# Patient Record
Sex: FEMALE | Race: ASIAN | NOT HISPANIC OR LATINO | Employment: FULL TIME | URBAN - METROPOLITAN AREA
[De-identification: names, ages, dates, MRNs, and addresses within clinical notes are randomized per-mention and may not be internally consistent; named-entity substitution may affect disease eponyms.]

---

## 2017-04-04 ENCOUNTER — OFFICE VISIT (OUTPATIENT)
Dept: FAMILY MEDICINE | Facility: CLINIC | Age: 47
End: 2017-04-04
Payer: COMMERCIAL

## 2017-04-04 VITALS
SYSTOLIC BLOOD PRESSURE: 124 MMHG | HEIGHT: 62 IN | HEART RATE: 70 BPM | DIASTOLIC BLOOD PRESSURE: 68 MMHG | WEIGHT: 194.88 LBS | BODY MASS INDEX: 35.86 KG/M2 | OXYGEN SATURATION: 97 %

## 2017-04-04 DIAGNOSIS — S56.911A MUSCLE STRAIN OF FOREARM, RIGHT, INITIAL ENCOUNTER: Primary | ICD-10-CM

## 2017-04-04 PROCEDURE — 1160F RVW MEDS BY RX/DR IN RCRD: CPT | Mod: S$GLB,,, | Performed by: NURSE PRACTITIONER

## 2017-04-04 PROCEDURE — 3078F DIAST BP <80 MM HG: CPT | Mod: S$GLB,,, | Performed by: NURSE PRACTITIONER

## 2017-04-04 PROCEDURE — 99999 PR PBB SHADOW E&M-EST. PATIENT-LVL III: CPT | Mod: PBBFAC,,, | Performed by: NURSE PRACTITIONER

## 2017-04-04 PROCEDURE — 96372 THER/PROPH/DIAG INJ SC/IM: CPT | Mod: S$GLB,,, | Performed by: NURSE PRACTITIONER

## 2017-04-04 PROCEDURE — 3074F SYST BP LT 130 MM HG: CPT | Mod: S$GLB,,, | Performed by: NURSE PRACTITIONER

## 2017-04-04 PROCEDURE — 99213 OFFICE O/P EST LOW 20 MIN: CPT | Mod: 25,S$GLB,, | Performed by: NURSE PRACTITIONER

## 2017-04-04 RX ORDER — KETOROLAC TROMETHAMINE 30 MG/ML
30 INJECTION, SOLUTION INTRAMUSCULAR; INTRAVENOUS
Status: COMPLETED | OUTPATIENT
Start: 2017-04-04 | End: 2017-04-04

## 2017-04-04 RX ORDER — ETODOLAC 400 MG/1
400 TABLET, EXTENDED RELEASE ORAL DAILY PRN
Qty: 30 TABLET | Refills: 1 | Status: SHIPPED | OUTPATIENT
Start: 2017-04-04 | End: 2018-02-07 | Stop reason: ALTCHOICE

## 2017-04-04 RX ORDER — CYCLOBENZAPRINE HCL 10 MG
10 TABLET ORAL 3 TIMES DAILY PRN
Qty: 30 TABLET | Refills: 0 | Status: SHIPPED | OUTPATIENT
Start: 2017-04-04 | End: 2017-04-14

## 2017-04-04 RX ORDER — KETOROLAC TROMETHAMINE 30 MG/ML
15 INJECTION, SOLUTION INTRAMUSCULAR; INTRAVENOUS
Status: DISCONTINUED | OUTPATIENT
Start: 2017-04-04 | End: 2017-04-04

## 2017-04-04 RX ADMIN — KETOROLAC TROMETHAMINE 30 MG: 30 INJECTION, SOLUTION INTRAMUSCULAR; INTRAVENOUS at 01:04

## 2017-04-04 NOTE — PROGRESS NOTES
Subjective:       Patient ID: Isaura Mancini is a 46 y.o. female.    Chief Complaint: Arm Pain    Arm Pain    The incident occurred 12 to 24 hours ago. The incident occurred at home. There was no injury mechanism. The pain is present in the upper right arm. The quality of the pain is described as aching and cramping. The pain does not radiate. The pain is at a severity of 8/10. The pain is severe. The pain has been constant since the incident. Associated symptoms include muscle weakness. Pertinent negatives include no chest pain. The symptoms are aggravated by movement, lifting and palpation. She has tried nothing for the symptoms.     Review of Systems   Cardiovascular: Negative for chest pain.   Musculoskeletal: Positive for myalgias (right upper arm).   All other systems reviewed and are negative.      Objective:      Physical Exam   Constitutional: She is oriented to person, place, and time. She appears well-developed. No distress.   obese   HENT:   Head: Normocephalic and atraumatic.   Eyes: EOM are normal. Pupils are equal, round, and reactive to light.   Neck: Neck supple. No JVD present. No tracheal deviation present.   Cardiovascular: Normal rate, regular rhythm, normal heart sounds and intact distal pulses.    No murmur heard.  Pulmonary/Chest: Effort normal and breath sounds normal. No respiratory distress. She has no wheezes. She has no rales.   Abdominal: Soft. Bowel sounds are normal. She exhibits no distension and no mass. There is no tenderness.   Musculoskeletal: Normal range of motion. She exhibits no edema.        Right upper arm: She exhibits tenderness.   Neurological: She is alert and oriented to person, place, and time. Coordination normal.   Skin: Skin is warm and dry. No erythema. No pallor.   Psychiatric: She has a normal mood and affect. Her behavior is normal. Judgment and thought content normal. Cognition and memory are normal. She expresses no homicidal and no suicidal ideation.    Nursing note and vitals reviewed.      Assessment:       1. Muscle strain of forearm, right, initial encounter        Plan:     Isaura was seen today for arm pain.    Diagnoses and all orders for this visit:    Muscle strain of forearm, right, initial encounter  Rest and heat therapy recommended.  -     etodolac (LODINE XL) 400 MG 24 hr tablet; Take 1 tablet (400 mg total) by mouth daily as needed (avoid all other NSAIDs).  -     cyclobenzaprine (FLEXERIL) 10 MG tablet; Take 1 tablet (10 mg total) by mouth 3 (three) times daily as needed for Muscle spasms.  -     methylPREDNISolone sod suc(PF) 125 mg/2 mL injection 125 mg; Inject 125 mg into the muscle one time.  -     ketorolac injection 30 mg; Inject 30 mg into the muscle one time.      Follow-up with PCP if symptoms worsen or fail to improve.

## 2017-04-04 NOTE — MR AVS SNAPSHOT
[facility]   [address]  [place] [address]  Phone: [phone]  Fax: [phone]                  Isaura Mancini   2017 1:20 PM   Office Visit    Description:  Female : 1970   Provider:  Renay Ji NP   Department:  [facility]           Reason for Visit     Arm Pain           Diagnoses this Visit        Comments    Right arm pain    -  Primary     Muscle spasm                To Do List           Goals (5 Years of Data)     None      Follow-Up and Disposition     Return if symptoms worsen or fail to improve.       These Medications        Disp Refills Start End    etodolac (LODINE XL) 400 MG 24 hr tablet 30 tablet 1 2017     Take 1 tablet (400 mg total) by mouth daily as needed (avoid all other NSAIDs). - Oral    Pharmacy: [facility] [address] [place] [address] Ph #: [phone]       cyclobenzaprine (FLEXERIL) 10 MG tablet 30 tablet 0 2017    Take 1 tablet (10 mg total) by mouth 3 (three) times daily as needed for Muscle spasms. - Oral    Pharmacy: [address] Ph #: [phone]         [facility]     [facility] Nurse Care Line -  Assistance  Unless otherwise directed by your provider, please contact [facility], our nurse care line that is available for  assistance.     Registered nurses in the [facility] provide: appointment scheduling, clinical advisement, health education, and other advisory services.  Call: [phone] (toll free)               Medications           Message regarding Medications     Verify the changes and/or additions to your medication regime listed below are the same as discussed with your clinician today.  If any of these changes or additions are incorrect, please notify your healthcare provider.        START taking these NEW medications        Refills    cyclobenzaprine (FLEXERIL) 10  "MG tablet 0    Sig: Take 1 tablet (10 mg total) by mouth 3 (three) times daily as needed for Muscle spasms.    Class: Normal    Route: Oral      These medications were administered today        Dose Freq    methylPREDNISolone sod suc(PF) 125 mg/2 mL injection 125 mg 125 mg Clinic/HOD 1 time    Sig: Inject 125 mg into the vein one time.    Class: Normal    Route: Intravenous    ketorolac injection 15 mg 15 mg Clinic/HOD 1 time    Sig: Inject 15 mg into the vein one time.    Class: Normal    Route: Intravenous           Verify that the below list of medications is an accurate representation of the medications you are currently taking.  If none reported, the list may be blank. If incorrect, please contact your healthcare provider. Carry this list with you in case of emergency.           Current Medications     aspirin (ECOTRIN) 81 MG EC tablet Take 81 mg by mouth once daily.    hydrochlorothiazide (HYDRODIURIL) 25 MG tablet Take 1 tablet (25 mg total) by mouth once daily.    cyclobenzaprine (FLEXERIL) 10 MG tablet Take 1 tablet (10 mg total) by mouth 3 (three) times daily as needed for Muscle spasms.    etodolac (LODINE XL) 400 MG 24 hr tablet Take 1 tablet (400 mg total) by mouth daily as needed (avoid all other NSAIDs).           Clinical Reference Information           Your Vitals Were     BP Pulse Height Weight Last Period SpO2    124/68 70 5' 2" (1.575 m) 88.4 kg (194 lb 14.2 oz) 03/21/2017 97%    BMI                35.65 kg/m2          Blood Pressure          Most Recent Value    BP  124/68      Allergies as of 4/4/2017     No Known Allergies      Immunizations Administered on Date of Encounter - 4/4/2017     None      Language Assistance Services     ATTENTION: Language assistance services are available, free of charge. Please call [phone].      ATENCIÓN: Si alisla dalton, tiene a maria disposición servicios gratuitos de asistencia lingüística. Llame al [phone].     YAJAIRA Ý: N?u b?n nói Ti?ng Vi?t, có " các d?ch v? h? tr? ngôn ng? mi?n phí dành cho b?n. G?i s? [phone].         [facility] complies with applicable Federal civil rights laws and does not discriminate on the basis of race, color, national origin, age, disability, or sex.

## 2017-07-03 ENCOUNTER — OFFICE VISIT (OUTPATIENT)
Dept: INTERNAL MEDICINE | Facility: CLINIC | Age: 47
End: 2017-07-03
Payer: COMMERCIAL

## 2017-07-03 VITALS
WEIGHT: 201.06 LBS | DIASTOLIC BLOOD PRESSURE: 80 MMHG | OXYGEN SATURATION: 97 % | SYSTOLIC BLOOD PRESSURE: 126 MMHG | HEART RATE: 67 BPM | BODY MASS INDEX: 36.77 KG/M2

## 2017-07-03 DIAGNOSIS — E66.9 OBESITY, UNSPECIFIED OBESITY SEVERITY, UNSPECIFIED OBESITY TYPE: ICD-10-CM

## 2017-07-03 DIAGNOSIS — R51.9 HEADACHE, UNSPECIFIED HEADACHE TYPE: ICD-10-CM

## 2017-07-03 DIAGNOSIS — R00.2 PALPITATIONS: Primary | ICD-10-CM

## 2017-07-03 DIAGNOSIS — I10 ESSENTIAL HYPERTENSION: ICD-10-CM

## 2017-07-03 DIAGNOSIS — M54.50 CHRONIC MIDLINE LOW BACK PAIN WITHOUT SCIATICA: ICD-10-CM

## 2017-07-03 DIAGNOSIS — G89.29 CHRONIC MIDLINE LOW BACK PAIN WITHOUT SCIATICA: ICD-10-CM

## 2017-07-03 DIAGNOSIS — Z00.00 PREVENTATIVE HEALTH CARE: ICD-10-CM

## 2017-07-03 PROCEDURE — 93010 ELECTROCARDIOGRAM REPORT: CPT | Mod: S$GLB,,, | Performed by: INTERNAL MEDICINE

## 2017-07-03 PROCEDURE — 99999 PR PBB SHADOW E&M-EST. PATIENT-LVL III: CPT | Mod: PBBFAC,,, | Performed by: INTERNAL MEDICINE

## 2017-07-03 PROCEDURE — 99214 OFFICE O/P EST MOD 30 MIN: CPT | Mod: S$GLB,,, | Performed by: INTERNAL MEDICINE

## 2017-07-03 PROCEDURE — 93005 ELECTROCARDIOGRAM TRACING: CPT | Mod: S$GLB,,, | Performed by: INTERNAL MEDICINE

## 2017-07-03 NOTE — PROGRESS NOTES
Subjective:       Patient ID: Isaura Mancini is a 46 y.o. female.    Chief Complaint: Back Pain (follow up)    HPI Pt. Here for f/u for chronic low back pain; pt. Is seeing pain management and back pain is better; she uses lodine prn but is currently asymptomatic; pt. Reports palpitations for past 1 month on and off; occurs once QOD and lasts for several hours; she drinks 2-3 cups of coffee a day; she denies chest pain; she denies being under stress; weight is elevated but stable; she gets headache every few days and tyelenol ususally helps; she needs a refill on HCTZ  Review of Systems   Constitutional: Negative for chills, fatigue and fever.   HENT: Negative for congestion, rhinorrhea and sore throat.    Respiratory: Negative for cough, shortness of breath and wheezing.    Cardiovascular: Positive for palpitations. Negative for chest pain.   Gastrointestinal: Negative for abdominal pain, nausea and vomiting.   Genitourinary: Negative for dysuria.   Musculoskeletal: Negative for arthralgias.   Skin: Negative for rash.   Neurological: Positive for headaches. Negative for dizziness.   Psychiatric/Behavioral: Negative for sleep disturbance. The patient is not nervous/anxious.        Objective:      Physical Exam   Constitutional: She is oriented to person, place, and time.   obese   Eyes: EOM are normal.   Neck: Normal range of motion.   Cardiovascular: Normal rate, regular rhythm and normal heart sounds.    Pulmonary/Chest: Effort normal and breath sounds normal.   Abdominal: Soft. There is no tenderness. There is no rebound and no guarding.   Musculoskeletal: Normal range of motion.   Neurological: She is alert and oriented to person, place, and time.   Skin: No rash noted.       Assessment:       1. Palpitations Active   2. Essential hypertension Well controlled   3. Chronic midline low back pain without sciatica Well controlled   4. Headache, unspecified headache type Active   5. Obesity, unspecified obesity  severity, unspecified obesity type Sub-optimally controlled   6. Preventative health care Active       Plan:         Isaura was seen today for back pain.    Diagnoses and all orders for this visit:    Palpitations  Comments:  avoid caffeine intake; get EKG and holter; get labs including TSH  Orders:  -     TSH; Future  -     IN OFFICE EKG 12-LEAD (to Muse)  -     Holter monitor - 24 hour; Future    Essential hypertension  Comments:  continue current regimen and encouraged low Na diet and weight loss    Chronic midline low back pain without sciatica  Comments:  continue lodine prn     Headache, unspecified headache type  Comments:  no focal neurologic deficits; continue tyelenol prn; will avoid tryptan until cardiac workup is complete    Obesity, unspecified obesity severity, unspecified obesity type  Comments:  encouraged diet and explained risks     Preventative health care  -     CBC auto differential; Future  -     Comprehensive metabolic panel; Future  -     TSH; Future  -     Lipid panel; Future

## 2017-07-06 ENCOUNTER — HOSPITAL ENCOUNTER (OUTPATIENT)
Dept: CARDIOLOGY | Facility: HOSPITAL | Age: 47
Discharge: HOME | End: 2017-07-06
Attending: INTERNAL MEDICINE
Payer: COMMERCIAL

## 2017-07-06 DIAGNOSIS — R00.2 PALPITATIONS: ICD-10-CM

## 2017-07-06 PROCEDURE — 93225 XTRNL ECG REC<48 HRS REC: CPT

## 2017-07-06 PROCEDURE — 93227 XTRNL ECG REC<48 HR R&I: CPT | Mod: ,,, | Performed by: INTERNAL MEDICINE

## 2017-07-12 DIAGNOSIS — I10 ESSENTIAL HYPERTENSION: ICD-10-CM

## 2017-07-12 RX ORDER — HYDROCHLOROTHIAZIDE 25 MG/1
TABLET ORAL
Qty: 30 TABLET | Refills: 2 | Status: SHIPPED | OUTPATIENT
Start: 2017-07-12 | End: 2017-08-07 | Stop reason: SDUPTHER

## 2017-07-12 NOTE — TELEPHONE ENCOUNTER
----- Message from Olinda Gustafson sent at 7/12/2017 11:55 AM CDT -----  Contact: [phone]/self  Refill request for hydrochlorothiazide (HYDRODIURIL) 25 MG tablet  Please advise

## 2017-07-24 ENCOUNTER — LAB VISIT (OUTPATIENT)
Dept: LAB | Facility: HOSPITAL | Age: 47
End: 2017-07-24
Attending: INTERNAL MEDICINE
Payer: COMMERCIAL

## 2017-07-24 DIAGNOSIS — Z00.00 PREVENTATIVE HEALTH CARE: ICD-10-CM

## 2017-07-24 DIAGNOSIS — R00.2 PALPITATIONS: ICD-10-CM

## 2017-07-24 LAB
ALBUMIN SERPL BCP-MCNC: 3.3 G/DL
ALP SERPL-CCNC: 54 U/L
ALT SERPL W/O P-5'-P-CCNC: 9 U/L
ANION GAP SERPL CALC-SCNC: 5 MMOL/L
AST SERPL-CCNC: 14 U/L
BASOPHILS # BLD AUTO: 0.03 K/UL
BASOPHILS NFR BLD: 0.3 %
BILIRUB SERPL-MCNC: 0.4 MG/DL
BUN SERPL-MCNC: 15 MG/DL
CALCIUM SERPL-MCNC: 9 MG/DL
CHLORIDE SERPL-SCNC: 104 MMOL/L
CHOLEST/HDLC SERPL: 2.9 {RATIO}
CO2 SERPL-SCNC: 28 MMOL/L
CREAT SERPL-MCNC: 0.7 MG/DL
DIFFERENTIAL METHOD: ABNORMAL
EOSINOPHIL # BLD AUTO: 0.2 K/UL
EOSINOPHIL NFR BLD: 2.1 %
ERYTHROCYTE [DISTWIDTH] IN BLOOD BY AUTOMATED COUNT: 12.4 %
EST. GFR  (AFRICAN AMERICAN): >60 ML/MIN/1.73 M^2
EST. GFR  (NON AFRICAN AMERICAN): >60 ML/MIN/1.73 M^2
GLUCOSE SERPL-MCNC: 88 MG/DL
HCT VFR BLD AUTO: 40.3 %
HDL/CHOLESTEROL RATIO: 34.8 %
HDLC SERPL-MCNC: 158 MG/DL
HDLC SERPL-MCNC: 55 MG/DL
HGB BLD-MCNC: 13.2 G/DL
LDLC SERPL CALC-MCNC: 89.8 MG/DL
LYMPHOCYTES # BLD AUTO: 3 K/UL
LYMPHOCYTES NFR BLD: 27.3 %
MCH RBC QN AUTO: 28.1 PG
MCHC RBC AUTO-ENTMCNC: 32.8 G/DL
MCV RBC AUTO: 86 FL
MONOCYTES # BLD AUTO: 0.6 K/UL
MONOCYTES NFR BLD: 5.9 %
NEUTROPHILS # BLD AUTO: 7 K/UL
NEUTROPHILS NFR BLD: 64.2 %
NONHDLC SERPL-MCNC: 103 MG/DL
PLATELET # BLD AUTO: 370 K/UL
PMV BLD AUTO: 10.8 FL
POTASSIUM SERPL-SCNC: 3.9 MMOL/L
PROT SERPL-MCNC: 6.7 G/DL
RBC # BLD AUTO: 4.69 M/UL
SODIUM SERPL-SCNC: 137 MMOL/L
TRIGL SERPL-MCNC: 66 MG/DL
TSH SERPL DL<=0.005 MIU/L-ACNC: 1.47 UIU/ML
WBC # BLD AUTO: 10.87 K/UL

## 2017-07-24 PROCEDURE — 80053 COMPREHEN METABOLIC PANEL: CPT

## 2017-07-24 PROCEDURE — 85025 COMPLETE CBC W/AUTO DIFF WBC: CPT

## 2017-07-24 PROCEDURE — 84443 ASSAY THYROID STIM HORMONE: CPT

## 2017-07-24 PROCEDURE — 80061 LIPID PANEL: CPT

## 2017-07-24 PROCEDURE — 36415 COLL VENOUS BLD VENIPUNCTURE: CPT | Mod: PO

## 2017-08-07 ENCOUNTER — OFFICE VISIT (OUTPATIENT)
Dept: INTERNAL MEDICINE | Facility: CLINIC | Age: 47
End: 2017-08-07
Payer: COMMERCIAL

## 2017-08-07 VITALS
BODY MASS INDEX: 36.87 KG/M2 | SYSTOLIC BLOOD PRESSURE: 138 MMHG | OXYGEN SATURATION: 98 % | WEIGHT: 200.38 LBS | HEIGHT: 62 IN | DIASTOLIC BLOOD PRESSURE: 78 MMHG | HEART RATE: 69 BPM

## 2017-08-07 DIAGNOSIS — R00.2 PALPITATIONS: Primary | ICD-10-CM

## 2017-08-07 DIAGNOSIS — E66.9 OBESITY, UNSPECIFIED OBESITY SEVERITY, UNSPECIFIED OBESITY TYPE: ICD-10-CM

## 2017-08-07 DIAGNOSIS — D75.839 THROMBOCYTOSIS: ICD-10-CM

## 2017-08-07 DIAGNOSIS — I10 ESSENTIAL HYPERTENSION: ICD-10-CM

## 2017-08-07 PROCEDURE — 99214 OFFICE O/P EST MOD 30 MIN: CPT | Mod: S$GLB,,, | Performed by: INTERNAL MEDICINE

## 2017-08-07 PROCEDURE — 3078F DIAST BP <80 MM HG: CPT | Mod: S$GLB,,, | Performed by: INTERNAL MEDICINE

## 2017-08-07 PROCEDURE — 3075F SYST BP GE 130 - 139MM HG: CPT | Mod: S$GLB,,, | Performed by: INTERNAL MEDICINE

## 2017-08-07 PROCEDURE — 99999 PR PBB SHADOW E&M-EST. PATIENT-LVL III: CPT | Mod: PBBFAC,,, | Performed by: INTERNAL MEDICINE

## 2017-08-07 RX ORDER — HYDROCHLOROTHIAZIDE 25 MG/1
25 TABLET ORAL DAILY
Qty: 30 TABLET | Refills: 6 | Status: SHIPPED | OUTPATIENT
Start: 2017-08-07 | End: 2018-02-07 | Stop reason: SDUPTHER

## 2017-08-07 NOTE — PROGRESS NOTES
Subjective:       Patient ID: Isaura Mancini is a 46 y.o. female.    Chief Complaint: Palpitations (1 mos follow up)    HPI Pt. Here for f/u for palpitations; holter monitor dated 7/6/17; PAC's were noted but otherwise negative; she states palpitations have improved after cutting down caffeine;  I reviewed labs dated 7/24/17; cholesterol was WNL; platelets were mildly elevated; she is compliant with meds; weight is elevated but stable; she needs refill on HCTZ  Review of Systems   Constitutional: Negative for chills, fatigue and fever.   HENT: Negative for congestion, rhinorrhea and sore throat.    Respiratory: Negative for cough, shortness of breath and wheezing.    Cardiovascular: Positive for palpitations. Negative for chest pain.        Improving with decreased caffeine intake   Gastrointestinal: Negative for abdominal pain, nausea and vomiting.   Genitourinary: Negative for dysuria.   Musculoskeletal: Negative for arthralgias.   Skin: Negative for rash.   Neurological: Negative for dizziness and headaches.   Psychiatric/Behavioral: Negative for sleep disturbance. The patient is not nervous/anxious.        Objective:      Physical Exam   Constitutional: She is oriented to person, place, and time.   obese   Eyes: EOM are normal.   Neck: Normal range of motion.   Cardiovascular: Normal rate, regular rhythm and normal heart sounds.    Pulmonary/Chest: Effort normal and breath sounds normal.   Abdominal: Soft. There is no tenderness. There is no rebound and no guarding.   Musculoskeletal: Normal range of motion.   Neurological: She is alert and oriented to person, place, and time.   Skin: No rash noted.       Assessment:       1. Palpitations Well controlled   2. Essential hypertension Well controlled   3. Thrombocytosis Active   4. Obesity, unspecified obesity severity, unspecified obesity type Sub-optimally controlled       Plan:         Palpitations  Comments:  improving after pt. has cut down caffeine  intake    Essential hypertension  Comments:  continue current regimen and encouraged low Na diet and weight loss  Orders:  -     hydrochlorothiazide (HYDRODIURIL) 25 MG tablet; Take 1 tablet (25 mg total) by mouth once daily.  Dispense: 30 tablet; Refill: 6    Thrombocytosis  Comments:  repeat CBC in 1 month for surveillence  Orders:  -     CBC auto differential; Future; Expected date: 08/28/2017    Obesity, unspecified obesity severity, unspecified obesity type  Comments:  encouraged diet and explained risks

## 2017-09-07 ENCOUNTER — LAB VISIT (OUTPATIENT)
Dept: LAB | Facility: HOSPITAL | Age: 47
End: 2017-09-07
Attending: INTERNAL MEDICINE
Payer: COMMERCIAL

## 2017-09-07 DIAGNOSIS — D75.839 THROMBOCYTOSIS: ICD-10-CM

## 2017-09-07 LAB
BASOPHILS # BLD AUTO: 0.01 K/UL
BASOPHILS NFR BLD: 0.1 %
DIFFERENTIAL METHOD: ABNORMAL
EOSINOPHIL # BLD AUTO: 0.2 K/UL
EOSINOPHIL NFR BLD: 1.9 %
ERYTHROCYTE [DISTWIDTH] IN BLOOD BY AUTOMATED COUNT: 12.6 %
HCT VFR BLD AUTO: 39.8 %
HGB BLD-MCNC: 13.5 G/DL
LYMPHOCYTES # BLD AUTO: 2.6 K/UL
LYMPHOCYTES NFR BLD: 27.9 %
MCH RBC QN AUTO: 28.7 PG
MCHC RBC AUTO-ENTMCNC: 33.9 G/DL
MCV RBC AUTO: 85 FL
MONOCYTES # BLD AUTO: 0.4 K/UL
MONOCYTES NFR BLD: 4.4 %
NEUTROPHILS # BLD AUTO: 6.2 K/UL
NEUTROPHILS NFR BLD: 65.6 %
PLATELET # BLD AUTO: 393 K/UL
PMV BLD AUTO: 10.9 FL
RBC # BLD AUTO: 4.7 M/UL
WBC # BLD AUTO: 9.37 K/UL

## 2017-09-07 PROCEDURE — 85025 COMPLETE CBC W/AUTO DIFF WBC: CPT

## 2017-09-07 PROCEDURE — 36415 COLL VENOUS BLD VENIPUNCTURE: CPT | Mod: PO

## 2017-09-16 ENCOUNTER — PATIENT MESSAGE (OUTPATIENT)
Dept: INTERNAL MEDICINE | Facility: CLINIC | Age: 47
End: 2017-09-16

## 2018-02-07 ENCOUNTER — OFFICE VISIT (OUTPATIENT)
Dept: INTERNAL MEDICINE | Facility: CLINIC | Age: 48
End: 2018-02-07
Payer: COMMERCIAL

## 2018-02-07 VITALS
BODY MASS INDEX: 37.29 KG/M2 | WEIGHT: 202.63 LBS | HEART RATE: 68 BPM | DIASTOLIC BLOOD PRESSURE: 80 MMHG | SYSTOLIC BLOOD PRESSURE: 128 MMHG | HEIGHT: 62 IN

## 2018-02-07 DIAGNOSIS — Z00.00 ROUTINE GENERAL MEDICAL EXAMINATION AT A HEALTH CARE FACILITY: ICD-10-CM

## 2018-02-07 DIAGNOSIS — D75.839 THROMBOCYTOSIS: ICD-10-CM

## 2018-02-07 DIAGNOSIS — Z12.39 BREAST CANCER SCREENING: ICD-10-CM

## 2018-02-07 DIAGNOSIS — E66.01 SEVERE OBESITY (BMI 35.0-35.9 WITH COMORBIDITY): ICD-10-CM

## 2018-02-07 DIAGNOSIS — I10 ESSENTIAL HYPERTENSION: Primary | ICD-10-CM

## 2018-02-07 DIAGNOSIS — Z00.00 PREVENTATIVE HEALTH CARE: ICD-10-CM

## 2018-02-07 PROCEDURE — 99999 PR PBB SHADOW E&M-EST. PATIENT-LVL III: CPT | Mod: PBBFAC,,, | Performed by: INTERNAL MEDICINE

## 2018-02-07 PROCEDURE — 99396 PREV VISIT EST AGE 40-64: CPT | Mod: S$GLB,,, | Performed by: INTERNAL MEDICINE

## 2018-02-07 RX ORDER — HYDROCHLOROTHIAZIDE 25 MG/1
25 TABLET ORAL DAILY
Qty: 30 TABLET | Refills: 6 | Status: SHIPPED | OUTPATIENT
Start: 2018-02-07 | End: 2018-07-12 | Stop reason: SDUPTHER

## 2018-02-07 NOTE — PROGRESS NOTES
Pt. ID: Isaura Mancini is a 47 y.o. female      Chief complaint:   Chief Complaint   Patient presents with    Palpitations     follow up       HPI: Pt. Here for well visit; I reviewed CBC dated 9/7/17; platelets are elevated but stable; weight is elevated but stable; palpitations have improved; she does not want flu or tetanus shot at this time; she would prefer mammogram every 2 years and I will schedule; she needs refill on BP med      Review of Systems   Constitutional: Negative for chills and fever.   Respiratory: Negative for cough and shortness of breath.    Cardiovascular: Negative for chest pain.   Gastrointestinal: Negative for abdominal pain, nausea and vomiting.   Genitourinary: Negative for dysuria.         Objective:    Physical Exam   Constitutional: She is oriented to person, place, and time.   obese   Eyes: EOM are normal.   Neck: Normal range of motion.   Cardiovascular: Normal rate, regular rhythm and normal heart sounds.    Pulmonary/Chest: Effort normal and breath sounds normal.   Abdominal: Soft. There is no tenderness. There is no rebound and no guarding.   Musculoskeletal: Normal range of motion.   Neurological: She is alert and oriented to person, place, and time.   Skin: No rash noted.         Health Maintenance   Topic Date Due    Mammogram  03/22/2017    Pap Smear with HPV Cotest  06/22/2019    Lipid Panel  07/24/2022    TETANUS VACCINE  02/07/2028    Influenza Vaccine  Addressed         Assessment:     1. Essential hypertension Well controlled   2. Thrombocytosis Stable   3. Severe obesity (BMI 35.0-35.9 with comorbidity) Sub-optimally controlled   4. Breast cancer screening Active   5. Preventative health care Active   6. Routine general medical examination at a health care facility Active         Plan: Essential hypertension  Comments:  continue current regimen and encouraged low Na diet and weight loss  Orders:  -     CBC auto differential; Future; Expected date: 07/07/2018  -      Comprehensive metabolic panel; Future; Expected date: 07/07/2018  -     Urinalysis; Future; Expected date: 07/07/2018  -     hydroCHLOROthiazide (HYDRODIURIL) 25 MG tablet; Take 1 tablet (25 mg total) by mouth once daily.  Dispense: 30 tablet; Refill: 6    Thrombocytosis  Comments:  repeat CBC prior to f/u appt.   Orders:  -     CBC auto differential; Future; Expected date: 07/07/2018    Severe obesity (BMI 35.0-35.9 with comorbidity)  Comments:  encouraged diet     Breast cancer screening  -     Mammo Digital Screening Bilateral With CAD; Future; Expected date: 02/07/2018    Preventative health care  -     CBC auto differential; Future; Expected date: 07/07/2018  -     Comprehensive metabolic panel; Future; Expected date: 07/07/2018  -     Lipid panel; Future; Expected date: 07/07/2018  -     TSH; Future; Expected date: 07/07/2018  -     Urinalysis; Future; Expected date: 07/07/2018    Routine general medical examination at a health care facility        Problem List Items Addressed This Visit        Cardiac/Vascular    Essential hypertension - Primary    Relevant Medications    hydroCHLOROthiazide (HYDRODIURIL) 25 MG tablet    Other Relevant Orders    CBC auto differential    Comprehensive metabolic panel    Urinalysis       Oncology    Thrombocytosis    Relevant Orders    CBC auto differential       Endocrine    Severe obesity (BMI 35.0-35.9 with comorbidity)       Other    Preventative health care    Relevant Orders    CBC auto differential    Comprehensive metabolic panel    Lipid panel    TSH    Urinalysis    Routine general medical examination at a health care facility

## 2018-02-10 ENCOUNTER — HOSPITAL ENCOUNTER (OUTPATIENT)
Dept: RADIOLOGY | Facility: HOSPITAL | Age: 48
Discharge: HOME | End: 2018-02-10
Attending: INTERNAL MEDICINE
Payer: COMMERCIAL

## 2018-02-10 VITALS — WEIGHT: 202 LBS | HEIGHT: 62 IN | BODY MASS INDEX: 37.17 KG/M2

## 2018-02-10 DIAGNOSIS — Z12.39 BREAST CANCER SCREENING: ICD-10-CM

## 2018-02-10 PROCEDURE — 77063 BREAST TOMOSYNTHESIS BI: CPT | Mod: 26,,, | Performed by: RADIOLOGY

## 2018-02-10 PROCEDURE — 77067 SCR MAMMO BI INCL CAD: CPT | Mod: 26,,, | Performed by: RADIOLOGY

## 2018-02-10 PROCEDURE — 77067 SCR MAMMO BI INCL CAD: CPT | Mod: TC

## 2018-07-12 DIAGNOSIS — I10 ESSENTIAL HYPERTENSION: ICD-10-CM

## 2018-07-13 RX ORDER — HYDROCHLOROTHIAZIDE 25 MG/1
25 TABLET ORAL DAILY
Qty: 30 TABLET | Refills: 3 | Status: SHIPPED | OUTPATIENT
Start: 2018-07-13 | End: 2018-10-05 | Stop reason: SDUPTHER

## 2018-08-07 ENCOUNTER — LAB VISIT (OUTPATIENT)
Dept: LAB | Facility: HOSPITAL | Age: 48
End: 2018-08-07
Attending: INTERNAL MEDICINE
Payer: COMMERCIAL

## 2018-08-07 DIAGNOSIS — Z00.00 PREVENTATIVE HEALTH CARE: ICD-10-CM

## 2018-08-07 DIAGNOSIS — D75.839 THROMBOCYTOSIS: ICD-10-CM

## 2018-08-07 DIAGNOSIS — I10 ESSENTIAL HYPERTENSION: ICD-10-CM

## 2018-08-07 LAB
ALBUMIN SERPL BCP-MCNC: 3.6 G/DL
ALP SERPL-CCNC: 54 U/L
ALT SERPL W/O P-5'-P-CCNC: 10 U/L
ANION GAP SERPL CALC-SCNC: 6 MMOL/L
AST SERPL-CCNC: 17 U/L
BASOPHILS # BLD AUTO: 0.03 K/UL
BASOPHILS NFR BLD: 0.4 %
BILIRUB SERPL-MCNC: 0.7 MG/DL
BUN SERPL-MCNC: 17 MG/DL
CALCIUM SERPL-MCNC: 9.2 MG/DL
CHLORIDE SERPL-SCNC: 106 MMOL/L
CHOLEST SERPL-MCNC: 184 MG/DL
CHOLEST/HDLC SERPL: 3.2 {RATIO}
CO2 SERPL-SCNC: 25 MMOL/L
CREAT SERPL-MCNC: 0.7 MG/DL
DIFFERENTIAL METHOD: NORMAL
EOSINOPHIL # BLD AUTO: 0.2 K/UL
EOSINOPHIL NFR BLD: 3.1 %
ERYTHROCYTE [DISTWIDTH] IN BLOOD BY AUTOMATED COUNT: 12.5 %
EST. GFR  (AFRICAN AMERICAN): >60 ML/MIN/1.73 M^2
EST. GFR  (NON AFRICAN AMERICAN): >60 ML/MIN/1.73 M^2
GLUCOSE SERPL-MCNC: 91 MG/DL
HCT VFR BLD AUTO: 40.6 %
HDLC SERPL-MCNC: 58 MG/DL
HDLC SERPL: 31.5 %
HGB BLD-MCNC: 13.1 G/DL
IMM GRANULOCYTES # BLD AUTO: 0.02 K/UL
IMM GRANULOCYTES NFR BLD AUTO: 0.3 %
LDLC SERPL CALC-MCNC: 112.4 MG/DL
LYMPHOCYTES # BLD AUTO: 2.3 K/UL
LYMPHOCYTES NFR BLD: 29.1 %
MCH RBC QN AUTO: 29.3 PG
MCHC RBC AUTO-ENTMCNC: 32.3 G/DL
MCV RBC AUTO: 91 FL
MONOCYTES # BLD AUTO: 0.6 K/UL
MONOCYTES NFR BLD: 7 %
NEUTROPHILS # BLD AUTO: 4.7 K/UL
NEUTROPHILS NFR BLD: 60.1 %
NONHDLC SERPL-MCNC: 126 MG/DL
NRBC BLD-RTO: 0 /100 WBC
PLATELET # BLD AUTO: 338 K/UL
PMV BLD AUTO: 11 FL
POTASSIUM SERPL-SCNC: 3.8 MMOL/L
PROT SERPL-MCNC: 7.2 G/DL
RBC # BLD AUTO: 4.47 M/UL
SODIUM SERPL-SCNC: 137 MMOL/L
TRIGL SERPL-MCNC: 68 MG/DL
TSH SERPL DL<=0.005 MIU/L-ACNC: 0.87 UIU/ML
WBC # BLD AUTO: 7.83 K/UL

## 2018-08-07 PROCEDURE — 84443 ASSAY THYROID STIM HORMONE: CPT

## 2018-08-07 PROCEDURE — 85025 COMPLETE CBC W/AUTO DIFF WBC: CPT

## 2018-08-07 PROCEDURE — 36415 COLL VENOUS BLD VENIPUNCTURE: CPT | Mod: PO

## 2018-08-07 PROCEDURE — 80053 COMPREHEN METABOLIC PANEL: CPT

## 2018-08-07 PROCEDURE — 80061 LIPID PANEL: CPT

## 2018-08-08 ENCOUNTER — TELEPHONE (OUTPATIENT)
Dept: FAMILY MEDICINE | Facility: CLINIC | Age: 48
End: 2018-08-08

## 2018-08-09 NOTE — TELEPHONE ENCOUNTER
----- Message from Batool Alva sent at 8/9/2018  8:54 AM CDT -----  No. [phone]    Patient returned your call.

## 2018-08-09 NOTE — TELEPHONE ENCOUNTER
----- Message from Oma Blue sent at 8/9/2018 11:08 AM CDT -----  Contact: [phone]/ self   Patient called in returning your call. Please advise

## 2018-08-10 ENCOUNTER — TELEPHONE (OUTPATIENT)
Dept: FAMILY MEDICINE | Facility: CLINIC | Age: 48
End: 2018-08-10

## 2018-08-10 DIAGNOSIS — N39.0 URINARY TRACT INFECTION WITHOUT HEMATURIA, SITE UNSPECIFIED: Primary | ICD-10-CM

## 2018-08-10 RX ORDER — SULFAMETHOXAZOLE AND TRIMETHOPRIM 800; 160 MG/1; MG/1
1 TABLET ORAL 2 TIMES DAILY
Qty: 10 TABLET | Refills: 0 | Status: SHIPPED | OUTPATIENT
Start: 2018-08-10 | End: 2018-08-15

## 2018-08-10 NOTE — TELEPHONE ENCOUNTER
Left message to pts son Jose that Dr Virgen sent Antibiotic to her pharmacy. Jose voices understanding.

## 2018-08-10 NOTE — TELEPHONE ENCOUNTER
Spoke with pt regarding urine result, informed pt she has mild sign of UTI, pt c/o frequent urination, no other symptoms. Informed pt  I will send message to Dr Virgen for plan and I will call her back soon. She verbalized understanding.

## 2018-08-13 ENCOUNTER — OFFICE VISIT (OUTPATIENT)
Dept: INTERNAL MEDICINE | Facility: CLINIC | Age: 48
End: 2018-08-13
Payer: COMMERCIAL

## 2018-08-13 ENCOUNTER — LAB VISIT (OUTPATIENT)
Dept: LAB | Facility: HOSPITAL | Age: 48
End: 2018-08-13
Attending: INTERNAL MEDICINE
Payer: COMMERCIAL

## 2018-08-13 VITALS
OXYGEN SATURATION: 98 % | WEIGHT: 203.25 LBS | BODY MASS INDEX: 37.4 KG/M2 | HEIGHT: 62 IN | HEART RATE: 73 BPM | SYSTOLIC BLOOD PRESSURE: 136 MMHG | DIASTOLIC BLOOD PRESSURE: 70 MMHG

## 2018-08-13 DIAGNOSIS — G89.29 CHRONIC BILATERAL LOW BACK PAIN WITH LEFT-SIDED SCIATICA: ICD-10-CM

## 2018-08-13 DIAGNOSIS — R31.9 HEMATURIA, UNSPECIFIED TYPE: ICD-10-CM

## 2018-08-13 DIAGNOSIS — I10 HYPERTENSION, UNSPECIFIED TYPE: Primary | ICD-10-CM

## 2018-08-13 DIAGNOSIS — N39.0 URINARY TRACT INFECTION WITHOUT HEMATURIA, SITE UNSPECIFIED: ICD-10-CM

## 2018-08-13 DIAGNOSIS — D75.839 THROMBOCYTOSIS: ICD-10-CM

## 2018-08-13 DIAGNOSIS — K59.00 CONSTIPATION, UNSPECIFIED CONSTIPATION TYPE: ICD-10-CM

## 2018-08-13 DIAGNOSIS — M54.42 CHRONIC BILATERAL LOW BACK PAIN WITH LEFT-SIDED SCIATICA: ICD-10-CM

## 2018-08-13 LAB
BACTERIA #/AREA URNS AUTO: NORMAL /HPF
BILIRUB UR QL STRIP: NEGATIVE
CLARITY UR REFRACT.AUTO: CLEAR
COLOR UR AUTO: YELLOW
GLUCOSE UR QL STRIP: NEGATIVE
HGB UR QL STRIP: NEGATIVE
KETONES UR QL STRIP: NEGATIVE
LEUKOCYTE ESTERASE UR QL STRIP: ABNORMAL
MICROSCOPIC COMMENT: NORMAL
NITRITE UR QL STRIP: NEGATIVE
PH UR STRIP: 5 [PH] (ref 5–8)
PROT UR QL STRIP: NEGATIVE
RBC #/AREA URNS AUTO: 1 /HPF (ref 0–4)
SP GR UR STRIP: 1.03 (ref 1–1.03)
SQUAMOUS #/AREA URNS AUTO: 3 /HPF
URN SPEC COLLECT METH UR: ABNORMAL
UROBILINOGEN UR STRIP-ACNC: 2 EU/DL
WBC #/AREA URNS AUTO: 5 /HPF (ref 0–5)

## 2018-08-13 PROCEDURE — 99214 OFFICE O/P EST MOD 30 MIN: CPT | Mod: S$GLB,,, | Performed by: INTERNAL MEDICINE

## 2018-08-13 PROCEDURE — 99999 PR PBB SHADOW E&M-EST. PATIENT-LVL III: CPT | Mod: PBBFAC,,, | Performed by: INTERNAL MEDICINE

## 2018-08-13 PROCEDURE — 3078F DIAST BP <80 MM HG: CPT | Mod: CPTII,S$GLB,, | Performed by: INTERNAL MEDICINE

## 2018-08-13 PROCEDURE — 81001 URINALYSIS AUTO W/SCOPE: CPT

## 2018-08-13 PROCEDURE — 3075F SYST BP GE 130 - 139MM HG: CPT | Mod: CPTII,S$GLB,, | Performed by: INTERNAL MEDICINE

## 2018-08-13 RX ORDER — ETODOLAC 400 MG/1
400 TABLET, EXTENDED RELEASE ORAL DAILY PRN
Qty: 30 TABLET | Refills: 1 | Status: SHIPPED | OUTPATIENT
Start: 2018-08-13 | End: 2018-09-05 | Stop reason: SDUPTHER

## 2018-08-13 RX ORDER — TRAMADOL HYDROCHLORIDE 50 MG/1
50 TABLET ORAL 3 TIMES DAILY PRN
Qty: 21 TABLET | Refills: 0 | Status: SHIPPED | OUTPATIENT
Start: 2018-08-13 | End: 2018-08-20

## 2018-08-13 RX ORDER — CYCLOBENZAPRINE HCL 10 MG
10 TABLET ORAL 2 TIMES DAILY PRN
Qty: 30 TABLET | Refills: 0 | Status: SHIPPED | OUTPATIENT
Start: 2018-08-13 | End: 2018-08-23

## 2018-08-13 NOTE — PROGRESS NOTES
Pt. ID: Isaura Mancini is a 47 y.o. female      Chief complaint:   Chief Complaint   Patient presents with    Hypertension     follow up       HPI: Pt. Here for f/u for HTN; I reviewed labs dated 8/7/18; platelets have normalized; trace blood was noted on urinalysis as well as signs of UTI; she is on bactrim; weight is elevated but stable; of note, she states her back has flared for past 3 weeks; she denies injury; lumbar xray dated 11/28/16 showed spondylosis and spondylolisthesis;  Pain is located B/L lower back and radiates down L Quad; she is not taking anything for the pain and pain is 9/10 and worse with ROM; LMP: 3 weeks ago ago; pt. Reports occasional constipation      Review of Systems   Constitutional: Negative for chills and fever.   Respiratory: Negative for cough and shortness of breath.    Cardiovascular: Negative for chest pain.   Gastrointestinal: Positive for constipation. Negative for abdominal pain, nausea and vomiting.   Musculoskeletal: Positive for back pain.        Low back pain radiating down L quad         Objective:    Physical Exam   Constitutional: She is oriented to person, place, and time.   Eyes: EOM are normal.   Neck: Normal range of motion.   Cardiovascular: Normal rate, regular rhythm and normal heart sounds.   Pulmonary/Chest: Effort normal and breath sounds normal.   Abdominal: Soft. There is no tenderness. There is no rebound and no guarding.   Musculoskeletal: She exhibits tenderness.   B/L low back tenderness    Neurological: She is alert and oriented to person, place, and time.   Skin: No rash noted.   Vitals reviewed.        Health Maintenance   Topic Date Due    Influenza Vaccine  08/01/2018    Mammogram  02/10/2019    Pap Smear with HPV Cotest  06/22/2019    Lipid Panel  08/07/2023    TETANUS VACCINE  02/07/2028         Assessment:     1. Hypertension, unspecified type Well controlled   2. Hematuria, unspecified type Active   3. Thrombocytosis Well controlled   4.  Chronic bilateral low back pain with left-sided sciatica Active   5. Constipation, unspecified constipation type Active   6. Urinary tract infection without hematuria, site unspecified Active   7. Class 2 obesity with serious comorbidity and body mass index (BMI) of 37.0 to 37.9 in adult, unspecified obesity type Sub-optimally controlled         Plan: Hypertension, unspecified type  Comments:  continue current regimen and encouraged low Na diet and weight loss    Hematuria, unspecified type  Comments:  repeat U/A today for verification  Orders:  -     Urinalysis; Future; Expected date: 08/13/2018    Thrombocytosis  Comments:  normalized; monitor     Chronic bilateral low back pain with left-sided sciatica  Comments:  start lodine prn and flexeril prn; cautioned on sedative effects   Orders:  -     cyclobenzaprine (FLEXERIL) 10 MG tablet; Take 1 tablet (10 mg total) by mouth 2 (two) times daily as needed for Muscle spasms (caution on sedative effects).  Dispense: 30 tablet; Refill: 0  -     etodolac (LODINE XL) 400 MG 24 hr tablet; Take 1 tablet (400 mg total) by mouth daily as needed (avoid all other NSAIDs).  Dispense: 30 tablet; Refill: 1  -     traMADol (ULTRAM) 50 mg tablet; Take 1 tablet (50 mg total) by mouth 3 (three) times daily as needed for Pain.  Dispense: 21 tablet; Refill: 0    Constipation, unspecified constipation type  Comments:  asked pt. to increase fluid and fiber intake; start OTC laxative     Urinary tract infection without hematuria, site unspecified  Comments:  completing antbx; repeat U/A     Class 2 obesity with serious comorbidity and body mass index (BMI) of 37.0 to 37.9 in adult, unspecified obesity type  Comments:  encouraged diet and explained risks         Problem List Items Addressed This Visit        Cardiac/Vascular    Hypertension - Primary       Renal/    Hematuria    Relevant Orders    Urinalysis    Urinary tract infection without hematuria    Overview     completing antbx;  repeat U/A             Oncology    Thrombocytosis       Endocrine    Class 2 obesity with serious comorbidity and body mass index (BMI) of 37.0 to 37.9 in adult       GI    Constipation       Orthopedic    Chronic bilateral low back pain with left-sided sciatica    Relevant Medications    cyclobenzaprine (FLEXERIL) 10 MG tablet    etodolac (LODINE XL) 400 MG 24 hr tablet    traMADol (ULTRAM) 50 mg tablet

## 2018-09-05 ENCOUNTER — OFFICE VISIT (OUTPATIENT)
Dept: INTERNAL MEDICINE | Facility: CLINIC | Age: 48
End: 2018-09-05
Payer: COMMERCIAL

## 2018-09-05 ENCOUNTER — HOSPITAL ENCOUNTER (OUTPATIENT)
Dept: RADIOLOGY | Facility: HOSPITAL | Age: 48
Discharge: HOME | End: 2018-09-05
Attending: INTERNAL MEDICINE
Payer: COMMERCIAL

## 2018-09-05 VITALS
WEIGHT: 204.38 LBS | OXYGEN SATURATION: 98 % | DIASTOLIC BLOOD PRESSURE: 92 MMHG | HEIGHT: 62 IN | BODY MASS INDEX: 37.61 KG/M2 | SYSTOLIC BLOOD PRESSURE: 148 MMHG | HEART RATE: 71 BPM

## 2018-09-05 DIAGNOSIS — G89.29 CHRONIC BILATERAL LOW BACK PAIN WITH LEFT-SIDED SCIATICA: ICD-10-CM

## 2018-09-05 DIAGNOSIS — M54.42 CHRONIC BILATERAL LOW BACK PAIN WITH LEFT-SIDED SCIATICA: ICD-10-CM

## 2018-09-05 DIAGNOSIS — R31.9 HEMATURIA, UNSPECIFIED TYPE: ICD-10-CM

## 2018-09-05 DIAGNOSIS — D75.839 THROMBOCYTOSIS: ICD-10-CM

## 2018-09-05 DIAGNOSIS — I10 ESSENTIAL HYPERTENSION: Primary | ICD-10-CM

## 2018-09-05 PROCEDURE — 3077F SYST BP >= 140 MM HG: CPT | Mod: CPTII,S$GLB,, | Performed by: INTERNAL MEDICINE

## 2018-09-05 PROCEDURE — 72100 X-RAY EXAM L-S SPINE 2/3 VWS: CPT | Mod: 26,,, | Performed by: RADIOLOGY

## 2018-09-05 PROCEDURE — 99214 OFFICE O/P EST MOD 30 MIN: CPT | Mod: S$GLB,,, | Performed by: INTERNAL MEDICINE

## 2018-09-05 PROCEDURE — 3080F DIAST BP >= 90 MM HG: CPT | Mod: CPTII,S$GLB,, | Performed by: INTERNAL MEDICINE

## 2018-09-05 PROCEDURE — 72100 X-RAY EXAM L-S SPINE 2/3 VWS: CPT | Mod: TC,FY,PO

## 2018-09-05 PROCEDURE — 99999 PR PBB SHADOW E&M-EST. PATIENT-LVL III: CPT | Mod: PBBFAC,,, | Performed by: INTERNAL MEDICINE

## 2018-09-05 PROCEDURE — 3008F BODY MASS INDEX DOCD: CPT | Mod: CPTII,S$GLB,, | Performed by: INTERNAL MEDICINE

## 2018-09-05 RX ORDER — TRAMADOL HYDROCHLORIDE 50 MG/1
50 TABLET ORAL EVERY 8 HOURS PRN
Qty: 21 TABLET | Refills: 0 | Status: SHIPPED | OUTPATIENT
Start: 2018-09-05 | End: 2018-09-12

## 2018-09-05 RX ORDER — ETODOLAC 400 MG/1
400 TABLET, EXTENDED RELEASE ORAL DAILY PRN
Qty: 30 TABLET | Refills: 1 | Status: SHIPPED | OUTPATIENT
Start: 2018-09-05 | End: 2018-10-05 | Stop reason: SDUPTHER

## 2018-09-05 NOTE — LETTER
September 5, 2018    Isaura Mancini  [address]  [place] [address]         [facility]  [address]  [place] [address]  Phone: [phone]  Fax: [phone] September 5, 2018     Patient: Isaura Mancini   YOB: 1970   Date of Visit: 9/5/2018       To Whom It May Concern:    It is my medical opinion that Isaura Mancini should have light duty from 9/5/18 to 9/19/18. She will be evaluated by pain management prior to 2 weeks and further restrictions, if any, will be given by pain management. If you have any questions or concerns, please don't hesitate to call.    Sincerely,        Jhony Virgen MD

## 2018-09-05 NOTE — PROGRESS NOTES
Pt. ID: Isaura Mancini is a 48 y.o. female      Chief complaint:   Chief Complaint   Patient presents with    Follow-up       HPI: pt. Here for f/u for chronic low back pain; lodine, flexeril and tramadol prn helps low back to some extent but she states her back is still bothersome; lumbar xray dated 11/28/16 showed spondylosis and spondylolisthesis; she states pain is 9/10 and worse with ROM; I reviewed labs dated 8/7/18; platelets have normalized; repeat U/A was negative for blood; BP is borderline elevated; weight is elevated but stable; pt. Works in [employer] and states she lifts items > 15 pounds; I will give her light duty x 2 weeks until pt. See pain management       Review of Systems   Constitutional: Negative for chills and fever.   Respiratory: Negative for cough and shortness of breath.    Cardiovascular: Negative for chest pain.   Gastrointestinal: Negative for abdominal pain, nausea and vomiting.   Genitourinary: Negative for dysuria and frequency.   Musculoskeletal: Positive for back pain.        Midline low back pain radiating down L leg         Objective:    Physical Exam   Constitutional: She is oriented to person, place, and time.   obese   Eyes: EOM are normal.   Neck: Normal range of motion.   Cardiovascular: Normal rate, regular rhythm and normal heart sounds.   Pulmonary/Chest: Effort normal and breath sounds normal.   Abdominal: Soft. There is no tenderness. There is no rebound and no guarding.   Musculoskeletal:   Low back pain with ROM; decreased ROM    Neurological: She is alert and oriented to person, place, and time.   Skin: No rash noted.   Vitals reviewed.        Health Maintenance   Topic Date Due    Influenza Vaccine  08/01/2018    Mammogram  02/10/2019    Pap Smear with HPV Cotest  06/22/2019    Lipid Panel  08/07/2023    TETANUS VACCINE  02/07/2028         Assessment:     1. Essential hypertension Sub-optimally controlled   2. Chronic bilateral low back pain with left-sided  sciatica Active   3. Hematuria, unspecified type Well controlled   4. Thrombocytosis Well controlled   5. Class 2 obesity with serious comorbidity and body mass index (BMI) of 37.0 to 37.9 in adult, unspecified obesity type Sub-optimally controlled         Plan: Essential hypertension  Comments:  continue current regimen and encouraged low Na diet and weight loss; monitor with better control of pain     Chronic bilateral low back pain with left-sided sciatica  Comments:  continue lodine prn and extend tramadol; get lumbar xray and refer to pain management; light duty x 2 weeks   Orders:  -     X-Ray Lumbar Spine AP And Lateral; Future; Expected date: 09/05/2018  -     Ambulatory referral to Pain Clinic  -     etodolac (LODINE XL) 400 MG 24 hr tablet; Take 1 tablet (400 mg total) by mouth daily as needed (avoid all other NSAIDs).  Dispense: 30 tablet; Refill: 1  -     traMADol (ULTRAM) 50 mg tablet; Take 1 tablet (50 mg total) by mouth every 8 (eight) hours as needed for Pain.  Dispense: 21 tablet; Refill: 0    Hematuria, unspecified type  Comments:  repeat U/A is negative for blood    Thrombocytosis  Comments:  normalized; monitor     Class 2 obesity with serious comorbidity and body mass index (BMI) of 37.0 to 37.9 in adult, unspecified obesity type  Comments:  encouraged diet and explained risks         Problem List Items Addressed This Visit        Cardiac/Vascular    Hypertension - Primary       Renal/    Hematuria       Oncology    Thrombocytosis       Endocrine    Class 2 obesity with serious comorbidity and body mass index (BMI) of 37.0 to 37.9 in adult       Orthopedic    Chronic bilateral low back pain with left-sided sciatica    Relevant Medications    etodolac (LODINE XL) 400 MG 24 hr tablet    traMADol (ULTRAM) 50 mg tablet    Other Relevant Orders    X-Ray Lumbar Spine AP And Lateral    Ambulatory referral to Pain Clinic

## 2018-09-18 ENCOUNTER — HOSPITAL ENCOUNTER (OUTPATIENT)
Dept: RADIOLOGY | Facility: OTHER | Age: 48
Discharge: HOME | End: 2018-09-18
Attending: ANESTHESIOLOGY
Payer: COMMERCIAL

## 2018-09-18 ENCOUNTER — OFFICE VISIT (OUTPATIENT)
Dept: SPINE | Facility: CLINIC | Age: 48
End: 2018-09-18
Attending: ANESTHESIOLOGY
Payer: COMMERCIAL

## 2018-09-18 VITALS
BODY MASS INDEX: 37.54 KG/M2 | WEIGHT: 204 LBS | HEART RATE: 70 BPM | HEIGHT: 62 IN | SYSTOLIC BLOOD PRESSURE: 137 MMHG | DIASTOLIC BLOOD PRESSURE: 88 MMHG

## 2018-09-18 DIAGNOSIS — M47.26 OSTEOARTHRITIS OF SPINE WITH RADICULOPATHY, LUMBAR REGION: ICD-10-CM

## 2018-09-18 DIAGNOSIS — M43.10 SPONDYLOLISTHESIS, UNSPECIFIED SPINAL REGION: ICD-10-CM

## 2018-09-18 DIAGNOSIS — M47.26 OSTEOARTHRITIS OF SPINE WITH RADICULOPATHY, LUMBAR REGION: Primary | ICD-10-CM

## 2018-09-18 PROCEDURE — 72120 X-RAY BEND ONLY L-S SPINE: CPT | Mod: TC,FY

## 2018-09-18 PROCEDURE — 99245 OFF/OP CONSLTJ NEW/EST HI 55: CPT | Mod: S$GLB,,, | Performed by: ANESTHESIOLOGY

## 2018-09-18 PROCEDURE — 72120 X-RAY BEND ONLY L-S SPINE: CPT | Mod: 26,,, | Performed by: RADIOLOGY

## 2018-09-18 PROCEDURE — 99999 PR PBB SHADOW E&M-EST. PATIENT-LVL IV: CPT | Mod: PBBFAC,,, | Performed by: ANESTHESIOLOGY

## 2018-09-18 NOTE — LETTER
September 18, 2018      Jhony Virgen MD  [address]  [place] [address]           [facility]  [address], [address]  [place] [address]  Phone: [phone]  Fax: [phone]          Patient: Isaura Mancini   MR Number: [id number]   YOB: 1970   Date of Visit: 9/18/2018       Dear Dr. Jhony Virgen:    Thank you for referring Isaura Mancini to me for evaluation. Attached you will find relevant portions of my assessment and plan of care.    If you have questions, please do not hesitate to call me. I look forward to following Isaura Mancini along with you.    Sincerely,    Kinza Hdz MD    Enclosure  CC:  No Recipients    If you would like to receive this communication electronically, please contact [email or web address] or [phone] to request more information on [employer] access.    For providers and/or their staff who would like to refer a patient to [facility], please contact us through our one-stop-shop provider referral line, [facility], at [phone].    If you feel you have received this communication in error or would no longer like to receive these types of communications, please e-mail [email or web address]

## 2018-09-18 NOTE — PROGRESS NOTES
Subjective:      Patient ID: Isaura Mancini is a 48 y.o. female.    Chief Complaint: Low-back Pain    Referred by: Jhony Virgen MD     Low-back Pain   This is a chronic problem. The current episode started more than 1 year ago. The problem occurs constantly. The problem has been gradually worsening since onset. The pain is present in the lumbar spine. The pain radiates to the left thigh, left knee, right knee and right thigh. The quality of the pain is described as aching, burning, shooting and stabbing. The pain is at a severity of 8/10. The pain is severe. The pain is the same all the time. The symptoms are aggravated by bending, standing and sitting (walking). Stiffness is present all day. Associated symptoms include leg pain, numbness and tingling. She has tried bed rest for the symptoms. The treatment provided no relief.     HPI: 48 y.o. female here for initial evaluation of LBP and bilateral leg pain. Pain began 2 years ago, but worsened this past year insidiously. Pain is described as grinding in the low back and radiates to the lower buttocks mostly and occasinally down the leg on the left. Worse with standing, sleeping in certain positions. Tramadol and etodolac helps a little. Denies b/b problems. No weakness or numbness.     Interventional Pain History  None    Past Medical History:   Diagnosis Date    Hypertension        Past Surgical History:   Procedure Laterality Date     SECTION      TUBAL LIGATION         Review of patient's allergies indicates:  No Known Allergies    Current Outpatient Medications   Medication Sig Dispense Refill    aspirin (ECOTRIN) 81 MG EC tablet Take 81 mg by mouth once daily.      etodolac (LODINE XL) 400 MG 24 hr tablet Take 1 tablet (400 mg total) by mouth daily as needed (avoid all other NSAIDs). 30 tablet 1    hydroCHLOROthiazide (HYDRODIURIL) 25 MG tablet Take 1 tablet (25 mg total) by mouth once daily. 30 tablet 3     No current facility-administered  "medications for this visit.        Family History   Problem Relation Age of Onset    Heart disease Father     Hypertension Father     Breast cancer Paternal Aunt 60    Diabetes Neg Hx     Colon cancer Neg Hx     Ovarian cancer Neg Hx        Social History     Socioeconomic History    Marital status: Single     Spouse name: Not on file    Number of children: 3    Years of education: Not on file    Highest education level: Not on file   Social Needs    Financial resource strain: Not on file    Food insecurity - worry: Not on file    Food insecurity - inability: Not on file    Transportation needs - medical: Not on file    Transportation needs - non-medical: Not on file   Occupational History     Employer: [id number]     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   Tobacco Use    Smoking status: Never Smoker    Smokeless tobacco: Never Used   Substance and Sexual Activity    Alcohol use: No    Drug use: No    Sexual activity: Yes     Partners: Male     Birth control/protection: Surgical     Comment: BTL   Other Topics Concern    Not on file   Social History Narrative    Not on file           Review of Systems   Musculoskeletal: Positive for back pain.   Neurological: Positive for numbness and tingling.   All other systems reviewed and are negative.  Bowel: Denies constipation or diarrhea  Bladder: Denies incontinence, dysuria  CV: Denies CP, palpitations  PULM: Denies SOB, cough  GEN: Denies fever        Objective:   /88   Pulse 70   Ht 5' 2" (1.575 m)   Wt 92.5 kg (204 lb)   LMP 09/03/2018   BMI 37.31 kg/m²   Pain Disability Index Review:  Last 3 PDI Scores 9/18/2018 12/20/2016   Pain Disability Index (PDI) 58 46     /88   Pulse 70   Ht 5' 2" (1.575 m)   Wt 92.5 kg (204 lb)   LMP 09/03/2018   BMI 37.31 kg/m²   GEN: No acute distress. Calm, comfortable  HENT: Normocephalic, atraumatic, moist mucous membranes  EYE: Anicteric sclera, non-injected  CV: Non-diaphoretic. " Pulses 2+ in BUE.  CHEST: Breathing comfortably. Chest expansion symmetric  EXT: No clubbing, cyanosis. No edema  Psych: Mood and affect are appropriate  GAIT: Independent, normal ambulation  L-spine:       Inspection: No erythema, bruising, surgical incisions      Palpation: (+) TTP of bilateral SIJ, midline L5/S1 area.       ROM: WFL       (+) Facet loading      (-) SLR      (+) LOU      (+) Milgram's  Hip Exam:      Inspection: No erythema, bruising, surgical incisions      Palpation: no TTP of B/L gtb.       ROM: Internal rotation, external rotation wfl  Neuro Exam:     Alert, speech is fluent and appropriate     Strength: 5/5 throughout BLE     Sensation: Grossly intact to LT in BLE     Reflexes: 2+ in b/l patella     Tone: No abnormality appreciated      No Clonus     (-) Gillis bilaterally       Ortho/SPM Exam      Assessment:       Encounter Diagnoses   Name Primary?    Osteoarthritis of spine with radiculopathy, lumbar region Yes    Spondylolisthesis, unspecified spinal region          Plan:   We discussed with the patient the assessment and recommendations. The following is the plan we agreed on:        Isaura was seen today for low-back pain.    Diagnoses and all orders for this visit:    Osteoarthritis of spine with radiculopathy, lumbar region  -     X-Ray Lumbar Spine Flexion And Extension Only; Future  -     Ambulatory consult to Physical Therapy  -     MRI Lumbar Spine Without Contrast; Future    Spondylolisthesis, unspecified spinal region  -     X-Ray Lumbar Spine Flexion And Extension Only; Future  -     Ambulatory consult to Physical Therapy  -     MRI Lumbar Spine Without Contrast; Future     1. Imaging: Order Lumbar flex/ext x-rays & Lumbar MRI to assess spondylolisthesis  2. Referral to PT  3. RTC after imaging. Consider facet injections in future  4. Discussed dialy walking program.     Sharon Tinoco MD  Fellow    I have personally taken the history and examined this patient and agree  with the fellow's note as stated above.

## 2018-09-25 ENCOUNTER — HOSPITAL ENCOUNTER (OUTPATIENT)
Dept: RADIOLOGY | Facility: HOSPITAL | Age: 48
Discharge: HOME | End: 2018-09-25
Attending: ANESTHESIOLOGY
Payer: COMMERCIAL

## 2018-09-25 DIAGNOSIS — M43.10 SPONDYLOLISTHESIS, UNSPECIFIED SPINAL REGION: ICD-10-CM

## 2018-09-25 DIAGNOSIS — M47.26 OSTEOARTHRITIS OF SPINE WITH RADICULOPATHY, LUMBAR REGION: ICD-10-CM

## 2018-09-25 PROCEDURE — 72148 MRI LUMBAR SPINE W/O DYE: CPT | Mod: 26,,, | Performed by: RADIOLOGY

## 2018-09-25 PROCEDURE — 72148 MRI LUMBAR SPINE W/O DYE: CPT | Mod: TC

## 2018-10-02 ENCOUNTER — OFFICE VISIT (OUTPATIENT)
Dept: SPINE | Facility: CLINIC | Age: 48
End: 2018-10-02
Attending: ANESTHESIOLOGY
Payer: COMMERCIAL

## 2018-10-02 VITALS
DIASTOLIC BLOOD PRESSURE: 86 MMHG | HEART RATE: 70 BPM | WEIGHT: 204 LBS | SYSTOLIC BLOOD PRESSURE: 166 MMHG | HEIGHT: 62 IN | BODY MASS INDEX: 37.54 KG/M2

## 2018-10-02 DIAGNOSIS — M43.10 SPONDYLOLISTHESIS, UNSPECIFIED SPINAL REGION: Primary | ICD-10-CM

## 2018-10-02 DIAGNOSIS — M48.062 SPINAL STENOSIS OF LUMBAR REGION WITH NEUROGENIC CLAUDICATION: ICD-10-CM

## 2018-10-02 DIAGNOSIS — M47.26 OSTEOARTHRITIS OF SPINE WITH RADICULOPATHY, LUMBAR REGION: ICD-10-CM

## 2018-10-02 PROCEDURE — 3077F SYST BP >= 140 MM HG: CPT | Mod: CPTII,S$GLB,, | Performed by: ANESTHESIOLOGY

## 2018-10-02 PROCEDURE — 3079F DIAST BP 80-89 MM HG: CPT | Mod: CPTII,S$GLB,, | Performed by: ANESTHESIOLOGY

## 2018-10-02 PROCEDURE — 3008F BODY MASS INDEX DOCD: CPT | Mod: CPTII,S$GLB,, | Performed by: ANESTHESIOLOGY

## 2018-10-02 PROCEDURE — 99999 PR PBB SHADOW E&M-EST. PATIENT-LVL III: CPT | Mod: PBBFAC,,, | Performed by: ANESTHESIOLOGY

## 2018-10-02 PROCEDURE — 99213 OFFICE O/P EST LOW 20 MIN: CPT | Mod: S$GLB,,, | Performed by: ANESTHESIOLOGY

## 2018-10-02 NOTE — PROGRESS NOTES
Subjective:      Patient ID: Isaura Mancini is a 48 y.o. female.    Chief Complaint: Low-back Pain    Referred by: No ref. provider found     Low-back Pain   This is a chronic problem. The current episode started more than 1 year ago. The problem occurs constantly. The problem has been gradually worsening since onset. The pain is present in the lumbar spine. The pain radiates to the left foot, left thigh and left knee. The quality of the pain is described as stabbing, shooting and aching. The pain is at a severity of 8/10. The pain is moderate. The pain is the same all the time. The symptoms are aggravated by bending, standing and sitting (walking ). Stiffness is present all day. Associated symptoms include leg pain, numbness and tingling. She has tried bed rest and NSAIDs for the symptoms. The treatment provided no relief.     History of lower back pain with radiation to both lower extremities. It has this chronic problem with the lower back pain but over the past year she started feeling it down her lower extremity is getting worse.  It is affecting her balance and strength.  She has problems lifting and carrying objects.  She works for [employer].  We saw her recently and she just had an MRI.  No new symptomatology with her lower extremity symptomatology seems to be gradually getting worse.  I discussed with the patient the images from the x-ray an MRI which showed spondylolisthesis at L4-5 with resultant spinal and bilateral neural foraminal stenosis.        Past Medical History:   Diagnosis Date    Hypertension        Past Surgical History:   Procedure Laterality Date     SECTION      TUBAL LIGATION         Review of patient's allergies indicates:  No Known Allergies    Current Outpatient Medications   Medication Sig Dispense Refill    aspirin (ECOTRIN) 81 MG EC tablet Take 81 mg by mouth once daily.      hydroCHLOROthiazide (HYDRODIURIL) 25 MG tablet Take 1 tablet (25 mg total) by mouth once daily.  "30 tablet 3    etodolac (LODINE XL) 400 MG 24 hr tablet Take 1 tablet (400 mg total) by mouth daily as needed (avoid all other NSAIDs). 30 tablet 1     No current facility-administered medications for this visit.        Family History   Problem Relation Age of Onset    Heart disease Father     Hypertension Father     Breast cancer Paternal Aunt 60    Diabetes Neg Hx     Colon cancer Neg Hx     Ovarian cancer Neg Hx        Social History     Socioeconomic History    Marital status: Single     Spouse name: Not on file    Number of children: 3    Years of education: Not on file    Highest education level: Not on file   Social Needs    Financial resource strain: Not on file    Food insecurity - worry: Not on file    Food insecurity - inability: Not on file    Transportation needs - medical: Not on file    Transportation needs - non-medical: Not on file   Occupational History     Employer: [id number]     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   Tobacco Use    Smoking status: Never Smoker    Smokeless tobacco: Never Used   Substance and Sexual Activity    Alcohol use: No    Drug use: No    Sexual activity: Yes     Partners: Male     Birth control/protection: Surgical     Comment: BTL   Other Topics Concern    Not on file   Social History Narrative    Not on file           Review of Systems   Musculoskeletal: Positive for back pain.   Neurological: Positive for numbness and tingling.   All other systems reviewed and are negative.          Objective:   BP (!) 166/86   Pulse 70   Ht 5' 2" (1.575 m)   Wt 92.5 kg (204 lb)   LMP 09/03/2018   BMI 37.31 kg/m²   Pain Disability Index Review:  Last 3 PDI Scores 10/2/2018 9/18/2018 12/20/2016   Pain Disability Index (PDI) 55 58 46     Normocephalic.  Atraumatic.  Affect appropriate.  Breathing unlabored.  Extra ocular muscles intact.           Ortho/SPM Exam      Assessment:       Encounter Diagnoses   Name Primary?    Spondylolisthesis, " unspecified spinal region Yes    Osteoarthritis of spine with radiculopathy, lumbar region     Spinal stenosis of lumbar region with neurogenic claudication          Plan:   We discussed with the patient the assessment and recommendations. The following is the plan we agreed on:  1.  Schedule patient for bilateral  transforaminal epidural steroid injection at L4 level.  2.  Agree with physical therapy.  Consider functional restoration program.  3.  Recommended light duty and she will check with her employer.  She thinks she cannot be at light duty and she has to do full duty to continue working.  4.  Return as needed.  Otherwise, follow-up 2 weeks after procedure.      Isaura was seen today for low-back pain.    Diagnoses and all orders for this visit:    Spondylolisthesis, unspecified spinal region    Osteoarthritis of spine with radiculopathy, lumbar region    Spinal stenosis of lumbar region with neurogenic claudication

## 2018-10-03 DIAGNOSIS — M48.061 SPINAL STENOSIS OF LUMBAR REGION, UNSPECIFIED WHETHER NEUROGENIC CLAUDICATION PRESENT: ICD-10-CM

## 2018-10-03 DIAGNOSIS — M54.16 LUMBAR RADICULOPATHY: Primary | ICD-10-CM

## 2018-10-05 ENCOUNTER — OFFICE VISIT (OUTPATIENT)
Dept: INTERNAL MEDICINE | Facility: CLINIC | Age: 48
End: 2018-10-05
Payer: COMMERCIAL

## 2018-10-05 VITALS
SYSTOLIC BLOOD PRESSURE: 126 MMHG | HEIGHT: 62 IN | OXYGEN SATURATION: 97 % | DIASTOLIC BLOOD PRESSURE: 76 MMHG | WEIGHT: 202.19 LBS | HEART RATE: 72 BPM | BODY MASS INDEX: 37.21 KG/M2

## 2018-10-05 DIAGNOSIS — I10 ESSENTIAL HYPERTENSION: ICD-10-CM

## 2018-10-05 DIAGNOSIS — E66.9 CLASS 2 OBESITY WITH BODY MASS INDEX (BMI) OF 37.0 TO 37.9 IN ADULT, UNSPECIFIED OBESITY TYPE, UNSPECIFIED WHETHER SERIOUS COMORBIDITY PRESENT: ICD-10-CM

## 2018-10-05 DIAGNOSIS — G89.29 CHRONIC BILATERAL LOW BACK PAIN WITH LEFT-SIDED SCIATICA: Primary | ICD-10-CM

## 2018-10-05 DIAGNOSIS — M54.42 CHRONIC BILATERAL LOW BACK PAIN WITH LEFT-SIDED SCIATICA: Primary | ICD-10-CM

## 2018-10-05 PROCEDURE — 99999 PR PBB SHADOW E&M-EST. PATIENT-LVL III: CPT | Mod: PBBFAC,,, | Performed by: INTERNAL MEDICINE

## 2018-10-05 PROCEDURE — 99213 OFFICE O/P EST LOW 20 MIN: CPT | Mod: S$GLB,,, | Performed by: INTERNAL MEDICINE

## 2018-10-05 PROCEDURE — 3008F BODY MASS INDEX DOCD: CPT | Mod: CPTII,S$GLB,, | Performed by: INTERNAL MEDICINE

## 2018-10-05 PROCEDURE — 3074F SYST BP LT 130 MM HG: CPT | Mod: CPTII,S$GLB,, | Performed by: INTERNAL MEDICINE

## 2018-10-05 PROCEDURE — 3078F DIAST BP <80 MM HG: CPT | Mod: CPTII,S$GLB,, | Performed by: INTERNAL MEDICINE

## 2018-10-05 RX ORDER — HYDROCHLOROTHIAZIDE 25 MG/1
25 TABLET ORAL DAILY
Qty: 30 TABLET | Refills: 6 | Status: SHIPPED | OUTPATIENT
Start: 2018-10-05 | End: 2019-04-05 | Stop reason: SDUPTHER

## 2018-10-05 RX ORDER — ETODOLAC 400 MG/1
400 TABLET, EXTENDED RELEASE ORAL DAILY PRN
Qty: 30 TABLET | Refills: 1 | Status: SHIPPED | OUTPATIENT
Start: 2018-10-05 | End: 2019-04-05 | Stop reason: SDUPTHER

## 2018-10-05 NOTE — PROGRESS NOTES
Pt. ID: Isaura Mancini is a 48 y.o. female      Chief complaint:   Chief Complaint   Patient presents with    Follow-up    Back Pain       HPI: Pt. Here for f/u for HTN and chronic back pain; pt. Has f/u spine clinic and is scheduled for a localized procedure and referred to PT; she will start PT next week; she takes lodine which helps; I reviewed MRI of lumbar spine with pt.; she is compliant with meds; weight is elevated but stable; she does not want flu shot ; she needs refill on HCTZ    Review of Systems   Constitutional: Negative for malaise/fatigue.   Eyes: Negative for blurred vision.   Respiratory: Negative for shortness of breath.    Cardiovascular: Negative for chest pain, palpitations, orthopnea and PND.   Musculoskeletal: Positive for back pain. Negative for neck pain.   Neurological: Negative for headaches.         Objective:    Physical Exam   Constitutional: She is oriented to person, place, and time.   obese   Eyes: EOM are normal.   Neck: Normal range of motion.   Cardiovascular: Normal rate, regular rhythm and normal heart sounds.   Pulmonary/Chest: Effort normal and breath sounds normal.   Abdominal: Soft. There is no tenderness. There is no rebound and no guarding.   Musculoskeletal:   Low back pain with ROM, radiating down L leg    Neurological: She is alert and oriented to person, place, and time.   Skin: No rash noted.   Vitals reviewed.        Health Maintenance   Topic Date Due    Influenza Vaccine  11/23/2018 (Originally 8/1/2018)    Mammogram  02/10/2019    Pap Smear with HPV Cotest  06/22/2019    Lipid Panel  08/07/2023    TETANUS VACCINE  02/07/2028         Assessment:     1. Chronic bilateral low back pain with left-sided sciatica Active   2. Essential hypertension Well controlled   3. Class 2 obesity with body mass index (BMI) of 37.0 to 37.9 in adult, unspecified obesity type, unspecified whether serious comorbidity present Sub-optimally controlled         Plan: Chronic  bilateral low back pain with left-sided sciatica  Comments:  continue lodine and PT; f/u spine clinic for further management   Orders:  -     etodolac (LODINE XL) 400 MG 24 hr tablet; Take 1 tablet (400 mg total) by mouth daily as needed (avoid all other NSAIDs).  Dispense: 30 tablet; Refill: 1    Essential hypertension  Comments:  continue current regimen and encouraged low Na diet and weight loss  Orders:  -     hydroCHLOROthiazide (HYDRODIURIL) 25 MG tablet; Take 1 tablet (25 mg total) by mouth once daily.  Dispense: 30 tablet; Refill: 6    Class 2 obesity with body mass index (BMI) of 37.0 to 37.9 in adult, unspecified obesity type, unspecified whether serious comorbidity present  Comments:  encouraged diet and explained risks         Problem List Items Addressed This Visit        Cardiac/Vascular    Hypertension    Relevant Medications    hydroCHLOROthiazide (HYDRODIURIL) 25 MG tablet       Endocrine    Class 2 obesity with body mass index (BMI) of 37.0 to 37.9 in adult       Orthopedic    Chronic bilateral low back pain with left-sided sciatica - Primary    Relevant Medications    etodolac (LODINE XL) 400 MG 24 hr tablet

## 2018-10-11 ENCOUNTER — CLINICAL SUPPORT (OUTPATIENT)
Dept: REHABILITATION | Facility: HOSPITAL | Age: 48
End: 2018-10-11
Payer: COMMERCIAL

## 2018-10-11 DIAGNOSIS — G89.29 CHRONIC BILATERAL LOW BACK PAIN WITH LEFT-SIDED SCIATICA: Primary | ICD-10-CM

## 2018-10-11 DIAGNOSIS — R26.89 IMPAIRED GAIT AND MOBILITY: ICD-10-CM

## 2018-10-11 DIAGNOSIS — M54.42 CHRONIC BILATERAL LOW BACK PAIN WITH LEFT-SIDED SCIATICA: Primary | ICD-10-CM

## 2018-10-11 PROCEDURE — 97110 THERAPEUTIC EXERCISES: CPT | Mod: PN

## 2018-10-11 PROCEDURE — 97162 PT EVAL MOD COMPLEX 30 MIN: CPT | Mod: PN

## 2018-10-11 NOTE — PLAN OF CARE
TIME RECORD    Date: 10/11/2018    Start Time:  1510  Stop Time:  1600    PROCEDURES:    TIMED  Procedure Min.   TE 10                     UNTIMED  Procedure Min.   IE 40         Total Timed Minutes:  10  Total Timed Units:  1  Total Untimed Units:  1  Charges Billed/# of units:  MCE-1, TE-1    [facility]    PHYSICAL THERAPY EVALUATION  Onset Date: low back pain ~2 years ago; radicular LE pain ~1 year ago  Medical Diagnosis:   M47.26 (ICD-10-CM) - Osteoarthritis of spine with radiculopathy, lumbar region   M43.10 (ICD-10-CM) - Spondylolisthesis, unspecified spinal region     Treatment Diagnosis:   1. Chronic bilateral low back pain with left-sided sciatica     2. Impaired gait and mobility       Physician: Kinza Hdz MD  Past Medical History:   Diagnosis Date    Hypertension      Precautions: Standard  Prior Therapy: Never  Medications: Isaura Mancini has a current medication list which includes the following prescription(s): aspirin, etodolac, and hydrochlorothiazide.  Nutrition:  Obese  History of Present Illness: Chronic low back pain, chronic radicular L LE pain  Prior Level of Function: Independent  Social History: Pt is a full time  in [employer] department at [employer]; requires standing, walking, and lifting  Place of Residence (Steps/Adaptations): No barriers  Functional Deficits Leading to Referral/Nature of Injury: Decreased tolerance to standing, walking, and lifting; radicular L LE symptoms  Patient Therapy Goals: Eliminate pain, especially in low back    Subjective     Isaura Mancini reports chronic low back pain that radiates to the posterior aspect of her L knee and separately into the center of the ball of her L foot. Pt reports these radicular symptoms increase after walking 1 block. Pt had X-Ray and MRI performed, shows PT MRI report indicating L4-5 anterolisthesis with stenosis. Pt denies numbness or tingling in either LE. Denies saddle paresthesia or bowel or  "bladder incontinence.    Pain:  Location: B low back  Description: Deep, sharp; described as "bones breaking"  Activities Which Increase Pain: Standing > 2 hrs; walking > 1 block; lifting ~40-50 lbs  Activities Which Decrease Pain: Supine positioning; medication  Pain Scale: 5/10 at best 6/10 now  9/10 at worst    Objective     Posture: Sitting: Head oriented down, increased upper thoracic kyphosis, decreased lumbar lordosis; increased scapular protraction B  Standing: above - head oriented down; head positioning WFL; mild backwards trunk lean  Palpation: TTP to midline of lower lumbar spine; TTP to B PSIS L>R; TTP to L glute med, glute min, piriformis, and lateral sacral border; TTP to L 2nd and 3rd metatarsal heads  Sensation: Light touch dermatome testing: intact to B LE  Range of Motion/Strength:     Lumbar AROM: Pain/Dysfunction with Movement:   Flexion Moderate loss, pain to B low back with ROM and greater with return to neutral along with Livier's sign   Extension Major loss, concordant pain to L PSIS   Right side bending Minimal loss, relief of pain   Left side bending Minimal loss, increased pain to L PSIS   Right rotation 90%, relief of pain   Left rotation 50%, increased pain to L PSIS     LE Myotomes  Hip flexion: 4+/5 R, 4/5 L; pain to L PSIS with L MMT  Hip extension: NT  Knee flexion: 4+/5 B  Knee extension: 5/5 B  Ankle DF: 4+/5 R, 4/5 L; pain to center aspect of plantar forefoot with L MMT  Ankle PF: 5/5 B, pain to center aspect of plantar forefoot with L MMT  Ankle Eversion: 5/5 R, 4+/5 L  Big Toe Extension: 4/5 B; pain to center aspect of plantar forefoot with L MMT    Flexibility: Impaired gastroc and HS flexibility B  Gait: no AD  Analysis: B toe out, decreased hip flexion and toe clearance B  Bed Mobility:Independent  Transfers: Independent  Special Tests: Slump (+) L for concordant pain near L PSIS, unaffected by foot positioning, improved with neutral head positioning  SLR (+) L for concordant " "pain near L PSIS worsened with cervical flexion  Metatarsal squeeze test/White's neuroma test (+) L for pain to center aspect of plantar forefoot  Metatarsal shear: (+) L for pain to center aspect of plantar forefoot with between 1st and 2nd > 2nd and 3rd metatarsal heads  Other: TA activation poor-fair B    CMS Impairment/Limitation/Restriction for FOTO Lumbar Spine Survey    Therapist reviewed FOTO scores for Isaura Mancini on 10/11/2018.   FOTO documents entered into EPIC - see Media section.    Limitation Score: 55%  Category: Mobility    Current : CK = at least 40% but < 60% impaired, limited or restricted  Goal: CJ = at least 20% but < 40% impaired, limited or restricted       TREATMENT     Time In: 1550  Time Out: 1600    PT Evaluation Completed? Yes  Discussed Plan of Care with patient: Yes    Isaura received 10 minutes of therapeutic exercise & instruction including:    Towel scrunches: HEP only     Date 10/11/18   Visit  Exp 12/31/18 1/20   POC 12/11/18    Gcode 1/10   FOTO 1/5   Cap visit  Cap total 113.2  113.2       MHP        MT        Stretches:    Supine HS    Supine piriformis    Repeated R sidebend X5, continue next       Supine:    DKC    LTR x5 B   TAs 5"x5   March  Reverse March    Bug    Bridge        Standing:    Lats    Pallof Press    Leg Press          Initials CC     Written Home Exercises Provided: See EMR in Patient Instructions for HEP given: Yes  Isaura demo good understanding of the education provided. Patient demo good return demo of skill of exercises.    Assessment     Initial Assessment (Pertinent finding, problem list and factors affecting outcome): Pt is a 47 yo female presenting to PT with signs and symptoms consistent with lumbar joint dysfunction. Pain to 2nd and 3rd metatarsals seems unrelated to low back pain and more related to neural issue in the foot exacerbated by increased WB requirement during work. May benefit from follow up with MD if no change in symptoms. Pt " "would benefit from skilled PT to address limitations and increase functional mobility.    History  Co-morbidities and personal factors that may impact the plan of care Co-morbidities:   Back pain, Headaches, High Blood Pressure, Sleep dysfunction    Personal Factors:   no deficits     high   Examination  Body Structures and Functions, activity limitations and participation restrictions that may impact the plan of care Body Regions:   back  lower extremities    Body Systems:    gross symmetry  ROM  strength  gross coordinated movement  gait  motor control    Participation Restrictions:   Working, community ambulation    Activity limitations:   Learning and applying knowledge  no deficits    General Tasks and Commands  no deficits    Communication  no deficits    Mobility  lifting and carrying objects  walking    Self care  no deficits    Domestic Life  shopping  cooking  doing house work (cleaning house, washing dishes, laundry)    Interactions/Relationships  no deficits    Life Areas  employment    Community and Social Life  community life  recreation and leisure         high   Clinical Presentation evolving clinical presentation with changing clinical characteristics moderate   Decision Making/ Complexity Score: moderate     Rehab Potiential: good  Spiritual/Cultural Needs: none  Barriers to Rehab: chronicity of pain  Short Term Goals (4 Weeks):   1. Pt will be compliant with HEP to supplement PT in decreasing pain with functional mobility.  2. Pt will perform and hold TA contraction for 10x10" in dynamic sitting activities to demonstrate improved core strength  3. Pt will improve lumbar ROM to </= moderate loss in all planes to improve functional mobility.  4. Pt will report no radicular pain with walking >/= 1 block to promote community ambulation.  5. Pt will report pain </=5/10 with lifting 40-50 lbs to promote work related QOL.  Long Term Goals (8 Weeks):   1. Pt will improve FOTO score to </= 40% limited to " "decrease perceived limitation with maintaining/changing body position  2. Pt will perform and hold TA contraction for 10x10" in dynamic standing activities to demonstrate improved core strength  3. Pt will improve lumbar ROM to </= minimal loss in all planes to improve functional mobility.  4. Pt will report no radicular pain with walking >/= 3 blocks to promote community ambulation.  5. Pt will report no pain with lifting 40-50 lbs to promote work related QOL.    Plan     Certification Period: 10/11/18 to 12/11/18  Recommended Treatment Plan: 2 times per week for 8 weeks: Gait Training, Manual Therapy, Moist Heat/ Ice, Neuromuscular Re-ed, Patient Education, Therapeutic Activites and Therapeutic Exercise  Other Recommendations: modalities prn, ASTYM prn, kinesiotape prn, Functional Dry Needling prn       Lily Joe, PT  10/11/2018      I CERTIFY THE NEED FOR THESE SERVICES FURNISHED UNDER THIS PLAN OF TREATMENT AND WHILE UNDER MY CARE    Physician's comments: ________________________________________________________________________________________________________________________________________________      Physician's Name: ___________________________________  "

## 2018-10-11 NOTE — PATIENT INSTRUCTIONS
Posture - Sitting    Sit upright, head facing forward. Scoot your tailbone all the way to the back of your chair. Lean slightly forward and place a rolled up towel at your waist. Lean back so shoulder blades lightly touch the back of the chair. Keep shoulders relaxed, and avoid rounded back. Keep hips level with knees. Avoid crossing legs for long periods.       Transverse Abdominus Activation    Flatten back by tightening lower abdominal muscles. Do not hold your breath.    Hold 5 seconds. Repeat 10 times per set. Do 2 sets per session. Do 2 sessions per day.      Lumbar Rotation    Feet on floor, slowly rock knees from side to side in small, pain-free range of motion. Allow lower back to rotate slightly, but keep shoulders flat on ground.   Hold 5 seconds. Repeat 10 times per set. Do 2 sets per session. Do 2 sessions per day.

## 2018-10-16 ENCOUNTER — CLINICAL SUPPORT (OUTPATIENT)
Dept: REHABILITATION | Facility: HOSPITAL | Age: 48
End: 2018-10-16
Payer: COMMERCIAL

## 2018-10-16 DIAGNOSIS — R26.89 IMPAIRED GAIT AND MOBILITY: ICD-10-CM

## 2018-10-16 DIAGNOSIS — G89.29 CHRONIC BILATERAL LOW BACK PAIN WITH LEFT-SIDED SCIATICA: ICD-10-CM

## 2018-10-16 DIAGNOSIS — M54.42 CHRONIC BILATERAL LOW BACK PAIN WITH LEFT-SIDED SCIATICA: ICD-10-CM

## 2018-10-16 PROCEDURE — 97140 MANUAL THERAPY 1/> REGIONS: CPT | Mod: PN

## 2018-10-16 PROCEDURE — 97110 THERAPEUTIC EXERCISES: CPT | Mod: PN

## 2018-10-16 NOTE — PROGRESS NOTES
"  Physical Therapy Daily Treatment Note     Name: Isaura Mancini  Clinic Number: [id number]    Therapy Diagnosis:   Encounter Diagnoses   Name Primary?    Chronic bilateral low back pain with left-sided sciatica     Impaired gait and mobility      Physician: Kinza Hdz MD    Visit Date: 10/16/2018    Physician Orders: PT Eval and treat  Medical Diagnosis:   M47.26 (ICD-10-CM) - Osteoarthritis of spine with radiculopathy, lumbar region   M43.10 (ICD-10-CM) - Spondylolisthesis, unspecified spinal region     Evaluation Date: 10/11/2018  Authorization Period Expiration: 12/31/18  Plan of Care Certification Period: 12/11/18  Visit #/Visits authorized: 2/20    Time In: 2: 55  Time Out: 3:50  Total Billable Time: 55 minutes (MT-1, TE-3)    Precautions: Standard    Subjective     Pt reports: she has been feeling better since her last visit and since doing her HEP every day  She was compliant with home exercise program.  Response to previous treatment: reduction in pain  Functional change: none    Pain: 6/10  Location:  B lumbar back pain, worst on left     Objective     Isaura received the following manual therapy techniques: Soft tissue Mobilization were applied to the: B lumbar paraspinals, ITB and glute musculature for 15 minutes    Isaura received therapeutic exercises to develop strength, ROM, posture and core stabilization for 40 minutes including:  See log below     Date 10/16/2018 10/11/18   Visit  Exp 12/31/18 2/20 1/20   POC 12/11/18      Gcode 2/10 1/10   FOTO 2/5 1/5   Cap visit  Cap total 118.42 113.2  113.2          MHP NT            MT 15'            Stretches:      Supine HS 3x30" B w/ strap     Supine piriformis 3x30" B     Repeated R sidebend x10  X5, continue next          Supine:      DKC 20x5" w/ TB     LTR x20 B x5 B   TAs 10x5"  5"x5   PPT 10x5"     March  Reverse March 20x     Bug      Bridge 2x10             Standing:      Lats 2x10 YTC     Pallof Press 2x10 YTC     Rows 2x10 YTC    Leg Press DL " "3.5 3x10              Initials AC 1/6 CC       Home Exercises Provided and Patient Education Provided     Education provided:   - Continue HEP    Written Home Exercises Provided: Patient instructed to cont prior HEP.  Exercises were reviewed and Isaura was able to demonstrate them prior to the end of the session.  Isaura demonstrated good  understanding of the education provided.     See EMR under Patient Instructions for exercises provided prior visit.    Assessment   Pt was able to progress with there-ex today with no c/o pain. Pt reported feeling better and looser after session today.   Isaura is progressing well towards her goals.   Pt prognosis is Excellent.     Pt will continue to benefit from skilled outpatient physical therapy to address the deficits listed in the problem list box on initial evaluation, provide pt/family education and to maximize pt's level of independence in the home and community environment.   Pt's spiritual, cultural and educational needs considered and pt agreeable to plan of care and goals.    Anticipated barriers to physical therapy: none    Goals:   Short Term Goals (4 Weeks):   1. Pt will be compliant with HEP to supplement PT in decreasing pain with functional mobility.  2. Pt will perform and hold TA contraction for 10x10" in dynamic sitting activities to demonstrate improved core strength  3. Pt will improve lumbar ROM to </= moderate loss in all planes to improve functional mobility.  4. Pt will report no radicular pain with walking >/= 1 block to promote community ambulation.  5. Pt will report pain </=5/10 with lifting 40-50 lbs to promote work related QOL.  Long Term Goals (8 Weeks):   1. Pt will improve FOTO score to </= 40% limited to decrease perceived limitation with maintaining/changing body position  2. Pt will perform and hold TA contraction for 10x10" in dynamic standing activities to demonstrate improved core strength  3. Pt will improve lumbar ROM to </= minimal " loss in all planes to improve functional mobility.  4. Pt will report no radicular pain with walking >/= 3 blocks to promote community ambulation.  5. Pt will report no pain with lifting 40-50 lbs to promote work related QOL.    Plan   Continue POC. Progress as tolerated.    Tanya Carcamo, PTA

## 2018-10-19 ENCOUNTER — CLINICAL SUPPORT (OUTPATIENT)
Dept: REHABILITATION | Facility: HOSPITAL | Age: 48
End: 2018-10-19
Payer: COMMERCIAL

## 2018-10-19 DIAGNOSIS — M54.42 CHRONIC BILATERAL LOW BACK PAIN WITH LEFT-SIDED SCIATICA: ICD-10-CM

## 2018-10-19 DIAGNOSIS — R26.89 IMPAIRED GAIT AND MOBILITY: ICD-10-CM

## 2018-10-19 DIAGNOSIS — G89.29 CHRONIC BILATERAL LOW BACK PAIN WITH LEFT-SIDED SCIATICA: ICD-10-CM

## 2018-10-19 PROCEDURE — 97110 THERAPEUTIC EXERCISES: CPT | Mod: PN

## 2018-10-19 NOTE — PROGRESS NOTES
"  Physical Therapy Daily Treatment Note     Name: Isaura Mancini  Clinic Number: [id number]    Therapy Diagnosis:   Encounter Diagnoses   Name Primary?    Chronic bilateral low back pain with left-sided sciatica     Impaired gait and mobility      Physician: Kinza Hdz MD    Visit Date: 10/19/2018    Physician Orders: PT Eval and treat  Medical Diagnosis:   M47.26 (ICD-10-CM) - Osteoarthritis of spine with radiculopathy, lumbar region   M43.10 (ICD-10-CM) - Spondylolisthesis, unspecified spinal region     Evaluation Date: 10/11/2018  Authorization Period Expiration: 12/31/18  Plan of Care Certification Period: 12/11/18  Visit #/Visits authorized: 3/20    Time In: 1500  Time Out: 1600  Total Billable Time: 30 minutes (2 TE)    Precautions: Standard    Subjective   Mrs. Berger reports L sided buttock/back pain that is worse when she walks and extends her back.  She reports that the exercises seem to be helping.     She was compliant with home exercise program.  Response to previous treatment: reduction in pain  Functional change: none  Pain: 6/10  Location:  B lumbar back pain, worst on left     Objective   O:  (+) Sarah's sign L with (+) LOU test, SLR, and compression/gapping test for L SIJ pain.  Pain with lumbar extension.  Stretch-type pain with RSB.  Less pain with LSB.  Supine-to-sit LLD test: L posterior innominate rotation.       Isaura received therapeutic exercises to develop strength, ROM, posture and core stabilization for 40 minutes including:  See log below.  Ended session x10' cold to lumbosacral spine for pain and inflammation control.       Date 10/19/2018   Visit  Exp 12/31/18 3/20   POC 12/11/18    Gcode 3/10   FOTO 3/5   Cap visit  Cap total 118.42        MHP NT        MT 15'        Stretches:    Supine HS 3x30" B w/ strap   Supine piriformis 3x30" B   Repeated R sidebend x10    SL QL stretch Over bolster on R; 5 x 30 seconds       Supine:    DKC 20x5" w/ TB   LTR x20 B   TAs 10x5"    PPT " "10x5"    March  Reverse March 20x   Bug    Bridge 2x10         Standing:    Lats 2x10 YTC   Pallof Press 2x10 YTC   Rows 2x10 YTC   Leg Press DL 3.5 3x10          Initials JH       Home Exercises Provided and Patient Education Provided     Education provided:   - Continue HEP    Written Home Exercises Provided: Patient instructed to cont prior HEP.  Exercises were reviewed and Isaura was able to demonstrate them prior to the end of the session.  Isaura demonstrated good  understanding of the education provided.     See EMR under Patient Instructions for exercises provided prior visit.    Assessment   A:  Positive SIJ cluster tests today with (+) supine-to-sit LLD test for innominate rotation.  Also with lumbar closing dysfunction.  Weak core stabilizers.     Isaura is progressing well towards her goals.   Pt prognosis is Excellent.     Pt will continue to benefit from skilled outpatient physical therapy to address the deficits listed in the problem list box on initial evaluation, provide pt/family education and to maximize pt's level of independence in the home and community environment.   Pt's spiritual, cultural and educational needs considered and pt agreeable to plan of care and goals.    Anticipated barriers to physical therapy: none    Goals:   Short Term Goals (4 Weeks):   1. Pt will be compliant with HEP to supplement PT in decreasing pain with functional mobility.  2. Pt will perform and hold TA contraction for 10x10" in dynamic sitting activities to demonstrate improved core strength  3. Pt will improve lumbar ROM to </= moderate loss in all planes to improve functional mobility.  4. Pt will report no radicular pain with walking >/= 1 block to promote community ambulation.  5. Pt will report pain </=5/10 with lifting 40-50 lbs to promote work related QOL.  Long Term Goals (8 Weeks):   1. Pt will improve FOTO score to </= 40% limited to decrease perceived limitation with maintaining/changing body " "position  2. Pt will perform and hold TA contraction for 10x10" in dynamic standing activities to demonstrate improved core strength  3. Pt will improve lumbar ROM to </= minimal loss in all planes to improve functional mobility.  4. Pt will report no radicular pain with walking >/= 3 blocks to promote community ambulation.  5. Pt will report no pain with lifting 40-50 lbs to promote work related QOL.    Plan   Continue POC. Progress as tolerated.    Ashutosh Braden, PT   "

## 2018-10-22 ENCOUNTER — CLINICAL SUPPORT (OUTPATIENT)
Dept: REHABILITATION | Facility: HOSPITAL | Age: 48
End: 2018-10-22
Payer: COMMERCIAL

## 2018-10-22 DIAGNOSIS — M54.42 CHRONIC BILATERAL LOW BACK PAIN WITH LEFT-SIDED SCIATICA: ICD-10-CM

## 2018-10-22 DIAGNOSIS — G89.29 CHRONIC BILATERAL LOW BACK PAIN WITH LEFT-SIDED SCIATICA: ICD-10-CM

## 2018-10-22 DIAGNOSIS — R26.89 IMPAIRED GAIT AND MOBILITY: ICD-10-CM

## 2018-10-22 PROCEDURE — 97110 THERAPEUTIC EXERCISES: CPT | Mod: PN

## 2018-10-22 NOTE — PROGRESS NOTES
"  Physical Therapy Daily Treatment Note     Name: Isaura Mancini  Clinic Number: [id number]    Therapy Diagnosis:   No diagnosis found.  Physician: Kinza Hdz MD    Visit Date: 10/22/2018    Physician Orders: PT Eval and treat  Medical Diagnosis:   M47.26 (ICD-10-CM) - Osteoarthritis of spine with radiculopathy, lumbar region   M43.10 (ICD-10-CM) - Spondylolisthesis, unspecified spinal region     Evaluation Date: 10/11/2018  Authorization Period Expiration: 12/31/18  Plan of Care Certification Period: 12/11/18  Visit #/Visits authorized: 4/20    Time In: 1600  Time Out: 1655  Total Billable Time: 30 minutes (2 TE)    Precautions: Standard    Subjective   Mrs. Berger reports L sided buttock/back pain; improving overall.  States that she felt very good over the weekend.      She was compliant with home exercise program.  Response to previous treatment: reduction in pain  Functional change: none  Pain: 5/10  Location:  B lumbar back pain, worst on left     Objective   O:  (+) Sarah's sign L with (+) LOU test, SLR, and compression/gapping test for L SIJ pain.  Pain with lumbar extension.  Stretch-type pain with RSB.  Less pain with LSB.  Supine-to-sit LLD test: L posterior innominate rotation.       Isaura received therapeutic exercises to develop strength, ROM, posture and core stabilization for 30 minutes with PT 1:1 and 10 minutes under supervision. Including: See log below.  Ended session x10' cold to lumbosacral spine for pain and inflammation control.       Date 10/22/2018   Visit  Exp 12/31/18 4/20   POC 12/11/18    Gcode 4/10   FOTO 4/5        MHP NT        MT -        Stretches:    Supine HS 3x30" B w/ strap   Supine piriformis 3x30" B   Repeated R sidebend 3x10 t/o session.   SL QL stretch Over bolster on R; 5 x 30 seconds       Supine:    DKC 20x5" w/ TB   LTR x20 B   TAs 10x5"    PPT 10x5"    Hip flexor isometrics 10 reps L x 5 second hold   March  Reverse March 20x   Bug    Bridge 2x10 with hip add " "ball   Seated SIJ push/pull R push/L pull; 10 reps x 5 sec hold   Standing:    Lats 2x10 YTC   Pallof Press 2x10 YTC   Rows 2x10 YTC   Leg Press NT          Initials JH       Home Exercises Provided and Patient Education Provided   Education provided:   - Continue HEP    Written Home Exercises Provided: Patient instructed to cont prior HEP.  Exercises were reviewed and Isaura was able to demonstrate them prior to the end of the session.  Isaura demonstrated good  understanding of the education provided.     See EMR under Patient Instructions for exercises provided prior visit.    Assessment   A:  Positive SIJ cluster tests today with (+) supine-to-sit LLD test for innominate rotation.  Also with lumbar closing dysfunction.  Weak core stabilizers. Tolerated session well with mild increase in L-side SIJ pain.    Isaura is progressing well towards her goals.   Pt prognosis is Excellent.     Pt will continue to benefit from skilled outpatient physical therapy to address the deficits listed in the problem list box on initial evaluation, provide pt/family education and to maximize pt's level of independence in the home and community environment.   Pt's spiritual, cultural and educational needs considered and pt agreeable to plan of care and goals.    Anticipated barriers to physical therapy: none    Goals:   Short Term Goals (4 Weeks):   1. Pt will be compliant with HEP to supplement PT in decreasing pain with functional mobility.  2. Pt will perform and hold TA contraction for 10x10" in dynamic sitting activities to demonstrate improved core strength  3. Pt will improve lumbar ROM to </= moderate loss in all planes to improve functional mobility.  4. Pt will report no radicular pain with walking >/= 1 block to promote community ambulation.  5. Pt will report pain </=5/10 with lifting 40-50 lbs to promote work related QOL.  Long Term Goals (8 Weeks):   1. Pt will improve FOTO score to </= 40% limited to decrease " "perceived limitation with maintaining/changing body position  2. Pt will perform and hold TA contraction for 10x10" in dynamic standing activities to demonstrate improved core strength  3. Pt will improve lumbar ROM to </= minimal loss in all planes to improve functional mobility.  4. Pt will report no radicular pain with walking >/= 3 blocks to promote community ambulation.  5. Pt will report no pain with lifting 40-50 lbs to promote work related QOL.    Plan   Continue POC. Progress as tolerated.    Ashutosh Braden, PT   "

## 2018-10-23 ENCOUNTER — DOCUMENTATION ONLY (OUTPATIENT)
Dept: REHABILITATION | Facility: HOSPITAL | Age: 48
End: 2018-10-23

## 2018-10-23 DIAGNOSIS — R26.89 IMPAIRED GAIT AND MOBILITY: ICD-10-CM

## 2018-10-23 DIAGNOSIS — M54.42 CHRONIC BILATERAL LOW BACK PAIN WITH LEFT-SIDED SCIATICA: ICD-10-CM

## 2018-10-23 DIAGNOSIS — G89.29 CHRONIC BILATERAL LOW BACK PAIN WITH LEFT-SIDED SCIATICA: ICD-10-CM

## 2018-10-23 NOTE — PROGRESS NOTES
Face to Face PTA Conference performed with Tanya Carcamo PTA regarding patient's current status, overall progress, and plan of care    Lily Joe, MARCI     Face to face meeting completed with Lily Joe PT regarding current status and progress of   Isaura Mancini .  Tanya Carcamo PTA

## 2018-10-24 ENCOUNTER — CLINICAL SUPPORT (OUTPATIENT)
Dept: REHABILITATION | Facility: HOSPITAL | Age: 48
End: 2018-10-24
Payer: COMMERCIAL

## 2018-10-24 DIAGNOSIS — M54.42 CHRONIC BILATERAL LOW BACK PAIN WITH LEFT-SIDED SCIATICA: ICD-10-CM

## 2018-10-24 DIAGNOSIS — G89.29 CHRONIC BILATERAL LOW BACK PAIN WITH LEFT-SIDED SCIATICA: ICD-10-CM

## 2018-10-24 DIAGNOSIS — R26.89 IMPAIRED GAIT AND MOBILITY: ICD-10-CM

## 2018-10-24 PROCEDURE — 97110 THERAPEUTIC EXERCISES: CPT | Mod: PN

## 2018-10-24 NOTE — PROGRESS NOTES
"  Physical Therapy Daily Treatment Note     Name: Isaura Mancini  Clinic Number: [id number]    Therapy Diagnosis:   Encounter Diagnoses   Name Primary?    Chronic bilateral low back pain with left-sided sciatica     Impaired gait and mobility      Physician: Kinza Hdz MD    Visit Date: 10/24/2018    Physician Orders: PT Eval and treat  Medical Diagnosis:   M47.26 (ICD-10-CM) - Osteoarthritis of spine with radiculopathy, lumbar region   M43.10 (ICD-10-CM) - Spondylolisthesis, unspecified spinal region     Evaluation Date: 10/11/2018  Authorization Period Expiration: 12/31/18  Plan of Care Certification Period: 12/11/18  Visit #/Visits authorized: 5/20    Time In: 1505  Time Out: 1555  Total Billable Time: 25 minutes (TE-2)     Precautions: Standard    Subjective     Pt reports: pain down her L leg  She was compliant with home exercise program.  Response to previous treatment: no adverse reaction   Functional change: no change    Pain: 6/10  Location: left back , buttocks  and lower legs     Objective     Isaura received therapeutic exercises to develop strength, endurance, flexibility and core stabilization for 25 minutes including:  Log below and supervised x 25'    Date 10/24/18 10/22/2018   Visit  Exp 12/31/18 5/20 4/20   POC 12/11/18     FOTO done 4/5         MHP  NT         MT  -         Stretches:     Supine HS 3x30" B w/ strap 3x30" B w/ strap   Supine piriformis 3x30" B 3x30" B   Repeated R sidebend  3x10 t/o session.   SL QL stretch Over bolster on R; 2 x 1' Over bolster on R; 5 x 30 seconds        Supine:     DKC 20x5" w/ TB 20x5" w/ TB   LTR x20 B x20 B   TAs 10x5"  10x5"    PPT 10x5"  10x5"    Hip flexor isometrics 10 reps L x 5 second hold 10 reps L x 5 second hold   March  Reverse March Held (pain) 20x   Bug     Bridge Held (pain) 2x10 with hip add ball   Seated SIJ push/pull  R push/L pull; 10 reps x 5 sec hold        Clams 2x10 B         Standing:     Lats 2x10 YTC 2x10 YTC   Pallof Press 2x10 YTC " "2x10 YTC   Rows 2x10 YTC 2x10 YTC   Leg Press NT NT           Initials KV JH         Home Exercises Provided and Patient Education Provided     Written Home Exercises Provided: Patient instructed to cont prior HEP.  Exercises were reviewed and Isaura was able to demonstrate them prior to the end of the session.  Isaura demonstrated good  understanding of the education provided.     See EMR under Patient Instructions for exercises provided prior visit.    Assessment     Pt reported "a little better" at the end of the treatment session.     Isaura is progressing towards her goals.   Pt prognosis is Good.     Pt will continue to benefit from skilled outpatient physical therapy to address the deficits listed in the problem list box on initial evaluation, provide pt/family education and to maximize pt's level of independence in the home and community environment.     Pt's spiritual, cultural and educational needs considered and pt agreeable to plan of care and goals.    Anticipated barriers to physical therapy: none    Goals:     Short Term Goals (4 Weeks):   1. Pt will be compliant with HEP to supplement PT in decreasing pain with functional mobility.  2. Pt will perform and hold TA contraction for 10x10" in dynamic sitting activities to demonstrate improved core strength  3. Pt will improve lumbar ROM to </= moderate loss in all planes to improve functional mobility.  4. Pt will report no radicular pain with walking >/= 1 block to promote community ambulation.  5. Pt will report pain </=5/10 with lifting 40-50 lbs to promote work related QOL.    Long Term Goals (8 Weeks):   1. Pt will improve FOTO score to </= 40% limited to decrease perceived limitation with maintaining/changing body position  2. Pt will perform and hold TA contraction for 10x10" in dynamic standing activities to demonstrate improved core strength  3. Pt will improve lumbar ROM to </= minimal loss in all planes to improve functional mobility.  4. " Pt will report no radicular pain with walking >/= 3 blocks to promote community ambulation.  5. Pt will report no pain with lifting 40-50 lbs to promote work related QOL.      Plan     Cont POC.    Alis Castro, PT

## 2018-10-29 ENCOUNTER — TELEPHONE (OUTPATIENT)
Dept: REHABILITATION | Facility: HOSPITAL | Age: 48
End: 2018-10-29

## 2018-10-29 DIAGNOSIS — G89.29 CHRONIC BILATERAL LOW BACK PAIN WITH LEFT-SIDED SCIATICA: ICD-10-CM

## 2018-10-29 DIAGNOSIS — R26.89 IMPAIRED GAIT AND MOBILITY: ICD-10-CM

## 2018-10-29 DIAGNOSIS — M54.42 CHRONIC BILATERAL LOW BACK PAIN WITH LEFT-SIDED SCIATICA: ICD-10-CM

## 2018-11-01 ENCOUNTER — CLINICAL SUPPORT (OUTPATIENT)
Dept: REHABILITATION | Facility: HOSPITAL | Age: 48
End: 2018-11-01
Payer: COMMERCIAL

## 2018-11-01 DIAGNOSIS — G89.29 CHRONIC BILATERAL LOW BACK PAIN WITH LEFT-SIDED SCIATICA: ICD-10-CM

## 2018-11-01 DIAGNOSIS — M54.42 CHRONIC BILATERAL LOW BACK PAIN WITH LEFT-SIDED SCIATICA: ICD-10-CM

## 2018-11-01 DIAGNOSIS — R26.89 IMPAIRED GAIT AND MOBILITY: ICD-10-CM

## 2018-11-01 PROCEDURE — 97110 THERAPEUTIC EXERCISES: CPT | Mod: PN

## 2018-11-01 NOTE — PROGRESS NOTES
"  Physical Therapy Daily Treatment Note     Name: Isaura Mancini  Clinic Number: [id number]    Therapy Diagnosis:   Encounter Diagnoses   Name Primary?    Chronic bilateral low back pain with left-sided sciatica     Impaired gait and mobility      Physician: Kinza Hdz MD    Visit Date: 11/1/2018    Physician Orders: PT Eval and treat  Medical Diagnosis:   M47.26 (ICD-10-CM) - Osteoarthritis of spine with radiculopathy, lumbar region   M43.10 (ICD-10-CM) - Spondylolisthesis, unspecified spinal region      Evaluation Date: 10/11/2018  Authorization Period Expiration: 12/31/18  Plan of Care Certification Period: 12/11/18  Visit #/Visits authorized: 6/20     Time In: 3:00  Time Out: 4:05  Total Billable Time: 55 (TE-4)     Precautions: Standard    Subjective     Pt reports: no change in her pain level   She was compliant with home exercise program.  Response to previous treatment: no adverse effects  Functional change: none    Pain: 6/10  Location: left lumbosacral area    Objective      Isaura received therapeutic exercises to develop strength, flexibility, posture and core stabilization for 55 minutes including:  See log below     Date 11/1/18 10/24/18 10/22/2018   Visit  Exp 12/31/18 6/20 5/20 4/20   POC 12/11/18        FOTO 6/10 done 4/5            MHP NT   NT            MT NT   -            Stretches:        Supine HS 3x30" B w/strap 3x30" B w/ strap 3x30" B w/ strap   Supine piriformis 3x30" B 3x30" B 3x30" B   Repeated R sidebend    3x10 t/o session.   SL QL stretch Over bolster on R   2x 1' Over bolster on R; 2 x 1' Over bolster on R; 5 x 30 seconds            Supine:        DKC 20x5" w/ TB 20x5" w/ TB 20x5" w/ TB   LTR x20 B x20 B x20 B   TAs 15 x 5" 10x5"  10x5"    PPT 15 x 5" 10x5"  10x5"    Hip flexor isometrics 10 reps L x 5" hold 10 reps L x 5 second hold 10 reps L x 5 second hold   March  Reverse March x20 alt Held (pain) 20x   Bug        Bridge 2x10 w/ ball Held (pain) 2x10 with hip add ball   Seated " "SIJ push/pull R push L pull 5 reps    R push/L pull; 10 reps x 5 sec hold            Clams 2x10 B 2x10 B              Standing:        Lats 3x10 RTC 2x10 YTC 2x10 YTC   Pallof Press 3x10 RTC 2x10 YTC 2x10 YTC   Rows 3x10 RTC 2x10 YTC 2x10 YTC   Leg Press 4.0 3x10 DL NT NT                    Initials AC 1/6 KV JH      Pt received CP for 10' for pain control    Home Exercises Provided and Patient Education Provided     Education provided:   - Continue HEP    Written Home Exercises Provided: Patient instructed to cont prior HEP.  Exercises were reviewed and Isaura was able to demonstrate them prior to the end of the session.  Isaura demonstrated good  understanding of the education provided.   See EMR under Patient Instructions for exercises provided prior visit.    Assessment     Pt able to progress today with standing exercises. Attempted standing hip abd which pt reported increased her back pain. Pt w/ L posterior rotation innominate correctable with push/pull.   Isaura is progressing well towards her goals.   Pt prognosis is Good.     Pt will continue to benefit from skilled outpatient physical therapy to address the deficits listed in the problem list box on initial evaluation, provide pt/family education and to maximize pt's level of independence in the home and community environment.   Pt's spiritual, cultural and educational needs considered and pt agreeable to plan of care and goals.    Anticipated barriers to physical therapy: none    Goals: Short Term Goals (4 Weeks):   1. Pt will be compliant with HEP to supplement PT in decreasing pain with functional mobility.  2. Pt will perform and hold TA contraction for 10x10" in dynamic sitting activities to demonstrate improved core strength  3. Pt will improve lumbar ROM to </= moderate loss in all planes to improve functional mobility.  4. Pt will report no radicular pain with walking >/= 1 block to promote community ambulation.  5. Pt will report pain </=5/10 " "with lifting 40-50 lbs to promote work related QOL.     Long Term Goals (8 Weeks):   1. Pt will improve FOTO score to </= 40% limited to decrease perceived limitation with maintaining/changing body position  2. Pt will perform and hold TA contraction for 10x10" in dynamic standing activities to demonstrate improved core strength  3. Pt will improve lumbar ROM to </= minimal loss in all planes to improve functional mobility.  4. Pt will report no radicular pain with walking >/= 3 blocks to promote community ambulation.  5. Pt will report no pain with lifting 40-50 lbs to promote work related QOL.    Plan     Continue POC. Progress as tolerated.    Tanya Carcamo, PTA   "

## 2018-11-26 ENCOUNTER — DOCUMENTATION ONLY (OUTPATIENT)
Dept: REHABILITATION | Facility: HOSPITAL | Age: 48
End: 2018-11-26

## 2018-11-26 DIAGNOSIS — G89.29 CHRONIC BILATERAL LOW BACK PAIN WITH LEFT-SIDED SCIATICA: ICD-10-CM

## 2018-11-26 DIAGNOSIS — M54.42 CHRONIC BILATERAL LOW BACK PAIN WITH LEFT-SIDED SCIATICA: ICD-10-CM

## 2018-11-26 DIAGNOSIS — R26.89 IMPAIRED GAIT AND MOBILITY: ICD-10-CM

## 2018-11-26 NOTE — PROGRESS NOTES
Face to Face PTA Conference performed with Tanya Carcamo PTA, Jeny Jarvis PTA, Cresencio Stroud PTA Jose Vaz PTA regarding patient's current status, overall progress, and plan of care    Lily Joe PT     Face to face meeting completed with Lily Joe PT regarding current status and progress of   Isaura Mancini .  Jose Vaz PTA    Face to face meeting completed with Lily Joe PT regarding current status and progress of   Isaura Mancini .  Jeny Jarvis PTA     Face to face meeting completed with Lily Joe PT regarding current status and progress of   Isaura Mancini .  Tanya Carcamo PTA      Face to face meeting completed with Lily Joe PT regarding current status and progress of   Isaura Mancini .  Cresencio Stroud PTA

## 2018-11-27 ENCOUNTER — TELEPHONE (OUTPATIENT)
Dept: REHABILITATION | Facility: HOSPITAL | Age: 48
End: 2018-11-27

## 2018-11-27 DIAGNOSIS — M54.42 CHRONIC BILATERAL LOW BACK PAIN WITH LEFT-SIDED SCIATICA: ICD-10-CM

## 2018-11-27 DIAGNOSIS — G89.29 CHRONIC BILATERAL LOW BACK PAIN WITH LEFT-SIDED SCIATICA: ICD-10-CM

## 2018-11-27 DIAGNOSIS — R26.89 IMPAIRED GAIT AND MOBILITY: ICD-10-CM

## 2018-11-29 ENCOUNTER — TELEPHONE (OUTPATIENT)
Dept: REHABILITATION | Facility: HOSPITAL | Age: 48
End: 2018-11-29

## 2018-11-29 DIAGNOSIS — G89.29 CHRONIC BILATERAL LOW BACK PAIN WITH LEFT-SIDED SCIATICA: ICD-10-CM

## 2018-11-29 DIAGNOSIS — M54.42 CHRONIC BILATERAL LOW BACK PAIN WITH LEFT-SIDED SCIATICA: ICD-10-CM

## 2018-11-29 DIAGNOSIS — R26.89 IMPAIRED GAIT AND MOBILITY: ICD-10-CM

## 2018-12-05 ENCOUNTER — TELEPHONE (OUTPATIENT)
Dept: INTERNAL MEDICINE | Facility: CLINIC | Age: 48
End: 2018-12-05

## 2018-12-05 NOTE — TELEPHONE ENCOUNTER
Pt reprot she is taking Hydrochlorothiazide that's currently on recall she want's to know if need to continue taking it, informed pt she will have to call the pharmacy to check if its the right lot # and . Pt voices understanding.

## 2018-12-05 NOTE — TELEPHONE ENCOUNTER
----- Message from Becca Wilkinson sent at 12/5/2018 10:42 AM CST -----  Contact: [phone]/self  Patient requesting to speak with you regarding medication recall. Please advise.

## 2018-12-18 ENCOUNTER — DOCUMENTATION ONLY (OUTPATIENT)
Dept: REHABILITATION | Facility: HOSPITAL | Age: 48
End: 2018-12-18

## 2018-12-18 DIAGNOSIS — G89.29 CHRONIC BILATERAL LOW BACK PAIN WITH LEFT-SIDED SCIATICA: ICD-10-CM

## 2018-12-18 DIAGNOSIS — R26.89 IMPAIRED GAIT AND MOBILITY: ICD-10-CM

## 2018-12-18 DIAGNOSIS — M54.42 CHRONIC BILATERAL LOW BACK PAIN WITH LEFT-SIDED SCIATICA: ICD-10-CM

## 2018-12-18 NOTE — PROGRESS NOTES
Pt was evaluated on 10/11/18 and was seen 6 times for PT. Pt has not attended PT since 18 and was removed secondary to poor attendance. Current status is unknown. POC  18. Pt to be d/c'd at this time.

## 2019-03-20 ENCOUNTER — TELEPHONE (OUTPATIENT)
Dept: ADMINISTRATIVE | Facility: OTHER | Age: 49
End: 2019-03-20

## 2019-03-20 NOTE — TELEPHONE ENCOUNTER
Called pt to follow up about FRP. Pt was last contacted in 2018. Pt stated she is not interested right now because she is still paying for her all her apts/procedures from last year.

## 2019-04-05 ENCOUNTER — OFFICE VISIT (OUTPATIENT)
Dept: INTERNAL MEDICINE | Facility: CLINIC | Age: 49
End: 2019-04-05
Payer: COMMERCIAL

## 2019-04-05 VITALS
HEART RATE: 71 BPM | WEIGHT: 207.44 LBS | DIASTOLIC BLOOD PRESSURE: 80 MMHG | SYSTOLIC BLOOD PRESSURE: 126 MMHG | OXYGEN SATURATION: 99 % | BODY MASS INDEX: 38.17 KG/M2 | HEIGHT: 62 IN

## 2019-04-05 DIAGNOSIS — M54.42 CHRONIC BILATERAL LOW BACK PAIN WITH LEFT-SIDED SCIATICA: Primary | ICD-10-CM

## 2019-04-05 DIAGNOSIS — I10 ESSENTIAL HYPERTENSION: ICD-10-CM

## 2019-04-05 DIAGNOSIS — E66.01 CLASS 2 SEVERE OBESITY WITH SERIOUS COMORBIDITY AND BODY MASS INDEX (BMI) OF 37.0 TO 37.9 IN ADULT, UNSPECIFIED OBESITY TYPE: ICD-10-CM

## 2019-04-05 DIAGNOSIS — Z00.00 PREVENTATIVE HEALTH CARE: ICD-10-CM

## 2019-04-05 DIAGNOSIS — G89.29 CHRONIC BILATERAL LOW BACK PAIN WITH LEFT-SIDED SCIATICA: Primary | ICD-10-CM

## 2019-04-05 PROCEDURE — 3074F PR MOST RECENT SYSTOLIC BLOOD PRESSURE < 130 MM HG: ICD-10-PCS | Mod: CPTII,S$GLB,, | Performed by: INTERNAL MEDICINE

## 2019-04-05 PROCEDURE — 3079F PR MOST RECENT DIASTOLIC BLOOD PRESSURE 80-89 MM HG: ICD-10-PCS | Mod: CPTII,S$GLB,, | Performed by: INTERNAL MEDICINE

## 2019-04-05 PROCEDURE — 3079F DIAST BP 80-89 MM HG: CPT | Mod: CPTII,S$GLB,, | Performed by: INTERNAL MEDICINE

## 2019-04-05 PROCEDURE — 3008F BODY MASS INDEX DOCD: CPT | Mod: CPTII,S$GLB,, | Performed by: INTERNAL MEDICINE

## 2019-04-05 PROCEDURE — 99999 PR PBB SHADOW E&M-EST. PATIENT-LVL III: ICD-10-PCS | Mod: PBBFAC,,, | Performed by: INTERNAL MEDICINE

## 2019-04-05 PROCEDURE — 3008F PR BODY MASS INDEX (BMI) DOCUMENTED: ICD-10-PCS | Mod: CPTII,S$GLB,, | Performed by: INTERNAL MEDICINE

## 2019-04-05 PROCEDURE — 3074F SYST BP LT 130 MM HG: CPT | Mod: CPTII,S$GLB,, | Performed by: INTERNAL MEDICINE

## 2019-04-05 PROCEDURE — 99214 OFFICE O/P EST MOD 30 MIN: CPT | Mod: S$GLB,,, | Performed by: INTERNAL MEDICINE

## 2019-04-05 PROCEDURE — 99214 PR OFFICE/OUTPT VISIT, EST, LEVL IV, 30-39 MIN: ICD-10-PCS | Mod: S$GLB,,, | Performed by: INTERNAL MEDICINE

## 2019-04-05 PROCEDURE — 99999 PR PBB SHADOW E&M-EST. PATIENT-LVL III: CPT | Mod: PBBFAC,,, | Performed by: INTERNAL MEDICINE

## 2019-04-05 RX ORDER — HYDROCHLOROTHIAZIDE 25 MG/1
25 TABLET ORAL DAILY
Qty: 90 TABLET | Refills: 1 | Status: SHIPPED | OUTPATIENT
Start: 2019-04-05 | End: 2019-09-11 | Stop reason: SDUPTHER

## 2019-04-05 RX ORDER — ETODOLAC 400 MG/1
400 TABLET, EXTENDED RELEASE ORAL DAILY PRN
Qty: 30 TABLET | Refills: 1 | Status: SHIPPED | OUTPATIENT
Start: 2019-04-05 | End: 2019-11-07 | Stop reason: SDUPTHER

## 2019-04-05 NOTE — PROGRESS NOTES
Pt. ID: Isaura Mancini is a 48 y.o. female      Chief complaint:   Chief Complaint   Patient presents with    Follow-up       HPI: pt. Here for f/u for HTN; she is compliant with meds and BP is WNL; pt. Continues to have back pain and sees spine clinic and pain management; she was offered procedure and could not afford and did not proceed; pain is in low back pain and worse wit ROM; pain is 6/10; pain is intermittent; MRI lumbar spine showed spondylosis with moderate spinal stenosis and B/L neural foraminal narrowing; facet arthropathy was noted with anterolisthesis; she would like a refill on lodine prn which helps to some extent, until she f/u spine clinic for further management; she would like mammogram Q 2 years; she needs refills on HCTZ      Review of Systems   Constitutional: Negative for chills and fever.   Respiratory: Negative for cough and shortness of breath.    Cardiovascular: Negative for chest pain.   Gastrointestinal: Negative for abdominal pain, nausea and vomiting.   Genitourinary: Negative for dysuria.   Musculoskeletal: Positive for back pain.        Low back pain which is worse with ROM and helped with lodine prn          Objective:    Physical Exam   Constitutional: She is oriented to person, place, and time.   obese   Eyes: EOM are normal.   Neck: Normal range of motion.   Cardiovascular: Normal rate, regular rhythm and normal heart sounds.   Pulmonary/Chest: Effort normal and breath sounds normal. No respiratory distress. She has no wheezes. She has no rales.   Abdominal: Soft. There is no tenderness. There is no rebound and no guarding.   Musculoskeletal: Normal range of motion.   Low back pain with ROM    Neurological: She is alert and oriented to person, place, and time.   Skin: No rash noted.   Vitals reviewed.        Health Maintenance   Topic Date Due    Influenza Vaccine  05/23/2019 (Originally 8/1/2018)    Mammogram  04/16/2020 (Originally 2/10/2019)    Pap Smear with HPV Cotest   06/22/2019    Lipid Panel  08/07/2023    TETANUS VACCINE  02/07/2028         Assessment:     1. Chronic bilateral low back pain with left-sided sciatica Active   2. Essential hypertension Well controlled   3. Class 2 severe obesity with serious comorbidity and body mass index (BMI) of 37.0 to 37.9 in adult, unspecified obesity type Sub-optimally controlled   4. Preventative health care Active         Plan: Chronic bilateral low back pain with left-sided sciatica  Comments:  continue lodine and PT; f/u spine clinic for further management   Orders:  -     etodolac (LODINE XL) 400 MG 24 hr tablet; Take 1 tablet (400 mg total) by mouth daily as needed (avoid all other NSAIDs).  Dispense: 30 tablet; Refill: 1    Essential hypertension  Comments:  continue current regimen and encouraged low Na diet and weight loss  Orders:  -     hydroCHLOROthiazide (HYDRODIURIL) 25 MG tablet; Take 1 tablet (25 mg total) by mouth once daily.  Dispense: 90 tablet; Refill: 1  -     CBC auto differential; Future; Expected date: 09/05/2019  -     Comprehensive metabolic panel; Future; Expected date: 09/05/2019  -     Urinalysis; Future; Expected date: 09/05/2019    Class 2 severe obesity with serious comorbidity and body mass index (BMI) of 37.0 to 37.9 in adult, unspecified obesity type  Comments:  encouraged diet and explained risks     Preventative health care  -     CBC auto differential; Future; Expected date: 09/05/2019  -     Comprehensive metabolic panel; Future; Expected date: 09/05/2019  -     Lipid panel; Future; Expected date: 09/05/2019  -     TSH; Future; Expected date: 09/05/2019  -     Urinalysis; Future; Expected date: 09/05/2019        Problem List Items Addressed This Visit        Cardiac/Vascular    Essential hypertension    Relevant Medications    hydroCHLOROthiazide (HYDRODIURIL) 25 MG tablet    Other Relevant Orders    CBC auto differential    Comprehensive metabolic panel    Urinalysis       Endocrine    Class 2  obesity with body mass index (BMI) of 37.0 to 37.9 in adult       Orthopedic    Chronic bilateral low back pain with left-sided sciatica - Primary    Relevant Medications    etodolac (LODINE XL) 400 MG 24 hr tablet       Other    Preventative health care    Relevant Orders    CBC auto differential    Comprehensive metabolic panel    Lipid panel    TSH    Urinalysis

## 2019-09-09 ENCOUNTER — LAB VISIT (OUTPATIENT)
Dept: LAB | Facility: HOSPITAL | Age: 49
End: 2019-09-09
Attending: INTERNAL MEDICINE
Payer: COMMERCIAL

## 2019-09-09 DIAGNOSIS — I10 ESSENTIAL HYPERTENSION: ICD-10-CM

## 2019-09-09 DIAGNOSIS — Z00.00 PREVENTATIVE HEALTH CARE: ICD-10-CM

## 2019-09-09 LAB
BASOPHILS # BLD AUTO: 0.03 K/UL (ref 0–0.2)
BASOPHILS NFR BLD: 0.3 % (ref 0–1.9)
DIFFERENTIAL METHOD: ABNORMAL
EOSINOPHIL # BLD AUTO: 0.2 K/UL (ref 0–0.5)
EOSINOPHIL NFR BLD: 2 % (ref 0–8)
ERYTHROCYTE [DISTWIDTH] IN BLOOD BY AUTOMATED COUNT: 12.3 % (ref 11.5–14.5)
HCT VFR BLD AUTO: 41.5 % (ref 37–48.5)
HGB BLD-MCNC: 13.1 G/DL (ref 12–16)
IMM GRANULOCYTES # BLD AUTO: 0.04 K/UL (ref 0–0.04)
IMM GRANULOCYTES NFR BLD AUTO: 0.4 % (ref 0–0.5)
LYMPHOCYTES # BLD AUTO: 2.9 K/UL (ref 1–4.8)
LYMPHOCYTES NFR BLD: 30.3 % (ref 18–48)
MCH RBC QN AUTO: 28.8 PG (ref 27–31)
MCHC RBC AUTO-ENTMCNC: 31.6 G/DL (ref 32–36)
MCV RBC AUTO: 91 FL (ref 82–98)
MONOCYTES # BLD AUTO: 0.6 K/UL (ref 0.3–1)
MONOCYTES NFR BLD: 6 % (ref 4–15)
NEUTROPHILS # BLD AUTO: 5.8 K/UL (ref 1.8–7.7)
NEUTROPHILS NFR BLD: 61 % (ref 38–73)
NRBC BLD-RTO: 0 /100 WBC
PLATELET # BLD AUTO: 363 K/UL (ref 150–350)
PMV BLD AUTO: 11.3 FL (ref 9.2–12.9)
RBC # BLD AUTO: 4.55 M/UL (ref 4–5.4)
WBC # BLD AUTO: 9.59 K/UL (ref 3.9–12.7)

## 2019-09-09 PROCEDURE — 85025 COMPLETE CBC W/AUTO DIFF WBC: CPT

## 2019-09-09 PROCEDURE — 36415 COLL VENOUS BLD VENIPUNCTURE: CPT | Mod: PO

## 2019-09-09 PROCEDURE — 80053 COMPREHEN METABOLIC PANEL: CPT

## 2019-09-09 PROCEDURE — 84443 ASSAY THYROID STIM HORMONE: CPT

## 2019-09-09 PROCEDURE — 80061 LIPID PANEL: CPT

## 2019-09-10 LAB
ALBUMIN SERPL BCP-MCNC: 4 G/DL (ref 3.5–5.2)
ALP SERPL-CCNC: 51 U/L (ref 55–135)
ALT SERPL W/O P-5'-P-CCNC: 7 U/L (ref 10–44)
ANION GAP SERPL CALC-SCNC: 10 MMOL/L (ref 8–16)
AST SERPL-CCNC: 18 U/L (ref 10–40)
BILIRUB SERPL-MCNC: 0.7 MG/DL (ref 0.1–1)
BUN SERPL-MCNC: 16 MG/DL (ref 6–20)
CALCIUM SERPL-MCNC: 9.6 MG/DL (ref 8.7–10.5)
CHLORIDE SERPL-SCNC: 104 MMOL/L (ref 95–110)
CHOLEST SERPL-MCNC: 184 MG/DL (ref 120–199)
CHOLEST/HDLC SERPL: 3.6 {RATIO} (ref 2–5)
CO2 SERPL-SCNC: 22 MMOL/L (ref 23–29)
CREAT SERPL-MCNC: 0.7 MG/DL (ref 0.5–1.4)
EST. GFR  (AFRICAN AMERICAN): >60 ML/MIN/1.73 M^2
EST. GFR  (NON AFRICAN AMERICAN): >60 ML/MIN/1.73 M^2
GLUCOSE SERPL-MCNC: 81 MG/DL (ref 70–110)
HDLC SERPL-MCNC: 51 MG/DL (ref 40–75)
HDLC SERPL: 27.7 % (ref 20–50)
LDLC SERPL CALC-MCNC: 123 MG/DL (ref 63–159)
NONHDLC SERPL-MCNC: 133 MG/DL
POTASSIUM SERPL-SCNC: 3.4 MMOL/L (ref 3.5–5.1)
PROT SERPL-MCNC: 7.5 G/DL (ref 6–8.4)
SODIUM SERPL-SCNC: 136 MMOL/L (ref 136–145)
TRIGL SERPL-MCNC: 50 MG/DL (ref 30–150)
TSH SERPL DL<=0.005 MIU/L-ACNC: 0.97 UIU/ML (ref 0.4–4)

## 2019-09-11 ENCOUNTER — OFFICE VISIT (OUTPATIENT)
Dept: INTERNAL MEDICINE | Facility: CLINIC | Age: 49
End: 2019-09-11
Payer: COMMERCIAL

## 2019-09-11 VITALS
BODY MASS INDEX: 37.77 KG/M2 | SYSTOLIC BLOOD PRESSURE: 128 MMHG | HEIGHT: 62 IN | OXYGEN SATURATION: 99 % | DIASTOLIC BLOOD PRESSURE: 78 MMHG | WEIGHT: 205.25 LBS | HEART RATE: 73 BPM

## 2019-09-11 DIAGNOSIS — R00.2 PALPITATIONS: ICD-10-CM

## 2019-09-11 DIAGNOSIS — E66.01 CLASS 2 SEVERE OBESITY WITH SERIOUS COMORBIDITY AND BODY MASS INDEX (BMI) OF 37.0 TO 37.9 IN ADULT, UNSPECIFIED OBESITY TYPE: ICD-10-CM

## 2019-09-11 DIAGNOSIS — I10 ESSENTIAL HYPERTENSION: ICD-10-CM

## 2019-09-11 DIAGNOSIS — R07.9 CHEST PAIN, UNSPECIFIED TYPE: Primary | ICD-10-CM

## 2019-09-11 DIAGNOSIS — D75.839 THROMBOCYTOSIS: ICD-10-CM

## 2019-09-11 DIAGNOSIS — E87.6 HYPOKALEMIA: ICD-10-CM

## 2019-09-11 DIAGNOSIS — K21.9 GASTROESOPHAGEAL REFLUX DISEASE, ESOPHAGITIS PRESENCE NOT SPECIFIED: ICD-10-CM

## 2019-09-11 PROCEDURE — 93010 ELECTROCARDIOGRAM REPORT: CPT | Mod: S$GLB,,, | Performed by: INTERNAL MEDICINE

## 2019-09-11 PROCEDURE — 99214 OFFICE O/P EST MOD 30 MIN: CPT | Mod: S$GLB,,, | Performed by: INTERNAL MEDICINE

## 2019-09-11 PROCEDURE — 93005 ELECTROCARDIOGRAM TRACING: CPT | Mod: S$GLB,,, | Performed by: INTERNAL MEDICINE

## 2019-09-11 PROCEDURE — 3008F PR BODY MASS INDEX (BMI) DOCUMENTED: ICD-10-PCS | Mod: CPTII,S$GLB,, | Performed by: INTERNAL MEDICINE

## 2019-09-11 PROCEDURE — 99999 PR PBB SHADOW E&M-EST. PATIENT-LVL III: CPT | Mod: PBBFAC,,, | Performed by: INTERNAL MEDICINE

## 2019-09-11 PROCEDURE — 99999 PR PBB SHADOW E&M-EST. PATIENT-LVL III: ICD-10-PCS | Mod: PBBFAC,,, | Performed by: INTERNAL MEDICINE

## 2019-09-11 PROCEDURE — 99214 PR OFFICE/OUTPT VISIT, EST, LEVL IV, 30-39 MIN: ICD-10-PCS | Mod: S$GLB,,, | Performed by: INTERNAL MEDICINE

## 2019-09-11 PROCEDURE — 3078F DIAST BP <80 MM HG: CPT | Mod: CPTII,S$GLB,, | Performed by: INTERNAL MEDICINE

## 2019-09-11 PROCEDURE — 3074F SYST BP LT 130 MM HG: CPT | Mod: CPTII,S$GLB,, | Performed by: INTERNAL MEDICINE

## 2019-09-11 PROCEDURE — 3078F PR MOST RECENT DIASTOLIC BLOOD PRESSURE < 80 MM HG: ICD-10-PCS | Mod: CPTII,S$GLB,, | Performed by: INTERNAL MEDICINE

## 2019-09-11 PROCEDURE — 93010 EKG 12-LEAD: ICD-10-PCS | Mod: S$GLB,,, | Performed by: INTERNAL MEDICINE

## 2019-09-11 PROCEDURE — 3074F PR MOST RECENT SYSTOLIC BLOOD PRESSURE < 130 MM HG: ICD-10-PCS | Mod: CPTII,S$GLB,, | Performed by: INTERNAL MEDICINE

## 2019-09-11 PROCEDURE — 93005 EKG 12-LEAD: ICD-10-PCS | Mod: S$GLB,,, | Performed by: INTERNAL MEDICINE

## 2019-09-11 PROCEDURE — 3008F BODY MASS INDEX DOCD: CPT | Mod: CPTII,S$GLB,, | Performed by: INTERNAL MEDICINE

## 2019-09-11 RX ORDER — HYDROCHLOROTHIAZIDE 25 MG/1
25 TABLET ORAL DAILY
Qty: 90 TABLET | Refills: 1 | Status: SHIPPED | OUTPATIENT
Start: 2019-09-11 | End: 2019-11-07 | Stop reason: SDUPTHER

## 2019-09-11 RX ORDER — PANTOPRAZOLE SODIUM 40 MG/1
40 TABLET, DELAYED RELEASE ORAL DAILY
Qty: 30 TABLET | Refills: 1 | Status: SHIPPED | OUTPATIENT
Start: 2019-09-11 | End: 2019-11-07

## 2019-09-11 NOTE — PROGRESS NOTES
Pt. ID: Isaura Mancini is a 49 y.o. female      Chief complaint:   Chief Complaint   Patient presents with    Follow-up       HPI: pt. Here for f/u for HTN; she is compliant with meds and BP is WNL; pt. Reports intermittent palpitations associated with L sided chest pain; last episode was 2 weeks ago and she does not have symptoms at this time; she denies anxiety and reports GERD and is not taking anything for it; she takes asa QD; she drinks 2 cups of coffee a day; holter dated 7/6/17 showed no significant abnormalities; I reviewed labs dated 9/9/19; platelet count is elevated; potassium is mildly low; TSH is WNL; weight is elevated but stable; she needs refill on HCTZ; she does not want flu shot     Review of Systems   Constitutional: Negative for chills and fever.   Respiratory: Negative for cough and shortness of breath.    Cardiovascular: Positive for chest pain and palpitations.   Gastrointestinal: Positive for heartburn. Negative for abdominal pain, nausea and vomiting.   Genitourinary: Negative for dysuria.         Objective:    Physical Exam   Constitutional: She is oriented to person, place, and time.   obese   Eyes: EOM are normal.   Neck: Normal range of motion.   Cardiovascular: Normal rate, regular rhythm and normal heart sounds.   Pulmonary/Chest: Effort normal and breath sounds normal. No respiratory distress. She has no wheezes. She has no rales.   Abdominal: Soft. There is no tenderness. There is no rebound and no guarding.   Musculoskeletal: Normal range of motion.   Neurological: She is alert and oriented to person, place, and time.   Skin: No rash noted.   Vitals reviewed.        Health Maintenance   Topic Date Due    Pap Smear with HPV Cotest  06/22/2019    Mammogram  04/16/2020 (Originally 2/10/2019)    Lipid Panel  09/09/2024    TETANUS VACCINE  02/07/2028         Assessment:     1. Chest pain, unspecified type Active   2. Palpitations Active   3. Essential hypertension Well controlled    4. Gastroesophageal reflux disease, esophagitis presence not specified Active   5. Hypokalemia Active   6. Thrombocytosis Active   7. Class 2 severe obesity with serious comorbidity and body mass index (BMI) of 37.0 to 37.9 in adult, unspecified obesity type Sub-optimally controlled         Plan: Chest pain, unspecified type  Comments:  continue asa QD and get EKG and stress echo; avoid NSAIDs and proceed to ER for recurrence; re-evaluate in 3 weeks; limit heavy exertion   Orders:  -     IN OFFICE EKG 12-LEAD (to Muse)  -     Stress Echo Which stress agent will be used? Treadmill Exercise; Color Flow Doppler? No; Future; Expected date: 09/12/2019    Palpitations  Comments:  avoid caffeine intake; TSH WNL; get EKG and holter; re-evalaute in 3 weeks   Orders:  -     IN OFFICE EKG 12-LEAD (to Muse)  -     Holter monitor - 24 hour; Future    Essential hypertension  Comments:  continue current regimen and encouraged low Na diet and weight loss   Orders:  -     hydroCHLOROthiazide (HYDRODIURIL) 25 MG tablet; Take 1 tablet (25 mg total) by mouth once daily.  Dispense: 90 tablet; Refill: 1  -     Basic metabolic panel; Future; Expected date: 09/25/2019  -     CBC auto differential; Future; Expected date: 09/25/2019    Gastroesophageal reflux disease, esophagitis presence not specified  Comments:  start protonix   Orders:  -     pantoprazole (PROTONIX) 40 MG tablet; Take 1 tablet (40 mg total) by mouth once daily.  Dispense: 30 tablet; Refill: 1    Hypokalemia  Comments:  asked pt. to take MVI with potassium daily and eat foods high in potassium; repeat BMP prior to 3 week f/u   Orders:  -     Basic metabolic panel; Future; Expected date: 09/25/2019    Thrombocytosis  Comments:  repeat CBC prior to 3 week f/u for surveillence   Orders:  -     CBC auto differential; Future; Expected date: 09/25/2019    Class 2 severe obesity with serious comorbidity and body mass index (BMI) of 37.0 to 37.9 in adult, unspecified obesity  type  Comments:  encouraged diet and explained risks         Problem List Items Addressed This Visit        Cardiac/Vascular    Palpitations    Relevant Orders    IN OFFICE EKG 12-LEAD (to Muse)    Holter monitor - 24 hour    Essential hypertension    Relevant Medications    hydroCHLOROthiazide (HYDRODIURIL) 25 MG tablet    Other Relevant Orders    Basic metabolic panel    CBC auto differential       Renal/    Hypokalemia    Relevant Orders    Basic metabolic panel       Oncology    Thrombocytosis    Relevant Orders    CBC auto differential       Endocrine    Class 2 obesity with body mass index (BMI) of 37.0 to 37.9 in adult       GI    Gastroesophageal reflux disease    Relevant Medications    pantoprazole (PROTONIX) 40 MG tablet       Other    Chest pain - Primary    Relevant Orders    IN OFFICE EKG 12-LEAD (to Muse)    Stress Echo Which stress agent will be used? Treadmill Exercise; Color Flow Doppler? No

## 2019-09-13 ENCOUNTER — TELEPHONE (OUTPATIENT)
Dept: FAMILY MEDICINE | Facility: CLINIC | Age: 49
End: 2019-09-13

## 2019-09-13 ENCOUNTER — TELEPHONE (OUTPATIENT)
Dept: INTERNAL MEDICINE | Facility: CLINIC | Age: 49
End: 2019-09-13

## 2019-09-13 NOTE — TELEPHONE ENCOUNTER
----- Message from Jaja Corea sent at 9/13/2019  1:39 PM CDT -----  Contact: [phone]/self  Patient requesting to speak with you regarding  pantoprazole (PROTONIX) 40 MG tablet. Patient states it is too expensive and would like a more affordable option. [facility]. Please advise.

## 2019-09-20 ENCOUNTER — HOSPITAL ENCOUNTER (OUTPATIENT)
Dept: CARDIOLOGY | Facility: HOSPITAL | Age: 49
Discharge: HOME | End: 2019-09-20
Attending: INTERNAL MEDICINE
Payer: COMMERCIAL

## 2019-09-20 DIAGNOSIS — R07.9 CHEST PAIN, UNSPECIFIED TYPE: ICD-10-CM

## 2019-09-20 DIAGNOSIS — R00.2 PALPITATIONS: ICD-10-CM

## 2019-09-20 LAB
AORTIC ROOT ANNULUS: 2.9 CM
CV ECHO LV RWT: 0.35 CM
CV STRESS BASE HR: 74 BPM
DIASTOLIC BLOOD PRESSURE: 70 MMHG
DOP CALC LVOT AREA: 2.5 CM2
DOP CALC LVOT DIAMETER: 1.8 CM
ECHO LV POSTERIOR WALL: 0.8 CM (ref 0.6–1.1)
FRACTIONAL SHORTENING: 37 % (ref 28–44)
INTERVENTRICULAR SEPTUM: 0.9 CM (ref 0.6–1.1)
LEFT ATRIUM SIZE: 3.5 CM
LEFT INTERNAL DIMENSION IN SYSTOLE: 2.9 CM (ref 2.1–4)
LEFT VENTRICULAR INTERNAL DIMENSION IN DIASTOLE: 4.6 CM (ref 3.5–6)
LEFT VENTRICULAR MASS: 127.66 G
OHS CV CPX 1 MINUTE RECOVERY HEART RATE: 122 BPM
OHS CV CPX 85 PERCENT MAX PREDICTED HEART RATE MALE: 140
OHS CV CPX ESTIMATED METS: 8
OHS CV CPX MAX PREDICTED HEART RATE: 165
OHS CV CPX PATIENT IS FEMALE: 1
OHS CV CPX PATIENT IS MALE: 0
OHS CV CPX PEAK DIASTOLIC BLOOD PRESSURE: 85 MMHG
OHS CV CPX PEAK HEAR RATE: 151 BPM
OHS CV CPX PEAK RATE PRESSURE PRODUCT: NORMAL
OHS CV CPX PEAK SYSTOLIC BLOOD PRESSURE: 197 MMHG
OHS CV CPX PERCENT MAX PREDICTED HEART RATE ACHIEVED: 92
OHS CV CPX RATE PRESSURE PRODUCT PRESENTING: 9842
STRESS ANGINA INDEX: 0
STRESS ECHO POST EXERCISE DUR MIN: 6 MINUTES
STRESS ECHO POST EXERCISE DUR SEC: 39 SECONDS
SYSTOLIC BLOOD PRESSURE: 133 MMHG

## 2019-09-20 PROCEDURE — 93351 STRESS TTE COMPLETE: CPT | Mod: 26,,, | Performed by: INTERNAL MEDICINE

## 2019-09-20 PROCEDURE — 93227 XTRNL ECG REC<48 HR R&I: CPT | Mod: ,,, | Performed by: INTERNAL MEDICINE

## 2019-09-20 PROCEDURE — 93351 STRESS TTE COMPLETE: CPT

## 2019-09-20 PROCEDURE — 93226 XTRNL ECG REC<48 HR SCAN A/R: CPT

## 2019-09-20 PROCEDURE — 93227 HOLTER MONITOR - 24 HOUR (CUPID ONLY): ICD-10-PCS | Mod: ,,, | Performed by: INTERNAL MEDICINE

## 2019-09-20 PROCEDURE — 93351 STRESS ECHO (CUPID ONLY): ICD-10-PCS | Mod: 26,,, | Performed by: INTERNAL MEDICINE

## 2019-09-23 LAB
OHS CV EVENT MONITOR DAY: 0
OHS CV HOLTER LENGTH DECIMAL HOURS: 23.98
OHS CV HOLTER LENGTH HOURS: 23
OHS CV HOLTER LENGTH MINUTES: 59

## 2019-09-24 ENCOUNTER — PATIENT MESSAGE (OUTPATIENT)
Dept: INTERNAL MEDICINE | Facility: CLINIC | Age: 49
End: 2019-09-24

## 2019-09-26 ENCOUNTER — PATIENT MESSAGE (OUTPATIENT)
Dept: INTERNAL MEDICINE | Facility: CLINIC | Age: 49
End: 2019-09-26

## 2019-11-04 ENCOUNTER — LAB VISIT (OUTPATIENT)
Dept: LAB | Facility: HOSPITAL | Age: 49
End: 2019-11-04
Attending: INTERNAL MEDICINE
Payer: COMMERCIAL

## 2019-11-04 DIAGNOSIS — D75.839 THROMBOCYTOSIS: ICD-10-CM

## 2019-11-04 DIAGNOSIS — I10 ESSENTIAL HYPERTENSION: ICD-10-CM

## 2019-11-04 DIAGNOSIS — E87.6 HYPOKALEMIA: ICD-10-CM

## 2019-11-04 LAB
ANION GAP SERPL CALC-SCNC: 6 MMOL/L (ref 8–16)
BASOPHILS # BLD AUTO: 0.05 K/UL (ref 0–0.2)
BASOPHILS NFR BLD: 0.5 % (ref 0–1.9)
BUN SERPL-MCNC: 15 MG/DL (ref 6–20)
CALCIUM SERPL-MCNC: 9.6 MG/DL (ref 8.7–10.5)
CHLORIDE SERPL-SCNC: 105 MMOL/L (ref 95–110)
CO2 SERPL-SCNC: 27 MMOL/L (ref 23–29)
CREAT SERPL-MCNC: 0.7 MG/DL (ref 0.5–1.4)
DIFFERENTIAL METHOD: ABNORMAL
EOSINOPHIL # BLD AUTO: 0.2 K/UL (ref 0–0.5)
EOSINOPHIL NFR BLD: 2.3 % (ref 0–8)
ERYTHROCYTE [DISTWIDTH] IN BLOOD BY AUTOMATED COUNT: 12.6 % (ref 11.5–14.5)
EST. GFR  (AFRICAN AMERICAN): >60 ML/MIN/1.73 M^2
EST. GFR  (NON AFRICAN AMERICAN): >60 ML/MIN/1.73 M^2
GLUCOSE SERPL-MCNC: 99 MG/DL (ref 70–110)
HCT VFR BLD AUTO: 43.6 % (ref 37–48.5)
HGB BLD-MCNC: 13.6 G/DL (ref 12–16)
IMM GRANULOCYTES # BLD AUTO: 0.03 K/UL (ref 0–0.04)
IMM GRANULOCYTES NFR BLD AUTO: 0.3 % (ref 0–0.5)
LYMPHOCYTES # BLD AUTO: 2.9 K/UL (ref 1–4.8)
LYMPHOCYTES NFR BLD: 28.5 % (ref 18–48)
MCH RBC QN AUTO: 28.8 PG (ref 27–31)
MCHC RBC AUTO-ENTMCNC: 31.2 G/DL (ref 32–36)
MCV RBC AUTO: 92 FL (ref 82–98)
MONOCYTES # BLD AUTO: 0.6 K/UL (ref 0.3–1)
MONOCYTES NFR BLD: 5.3 % (ref 4–15)
NEUTROPHILS # BLD AUTO: 6.5 K/UL (ref 1.8–7.7)
NEUTROPHILS NFR BLD: 63.1 % (ref 38–73)
NRBC BLD-RTO: 0 /100 WBC
PLATELET # BLD AUTO: 354 K/UL (ref 150–350)
PMV BLD AUTO: 11.3 FL (ref 9.2–12.9)
POTASSIUM SERPL-SCNC: 3.6 MMOL/L (ref 3.5–5.1)
RBC # BLD AUTO: 4.72 M/UL (ref 4–5.4)
SODIUM SERPL-SCNC: 138 MMOL/L (ref 136–145)
WBC # BLD AUTO: 10.32 K/UL (ref 3.9–12.7)

## 2019-11-04 PROCEDURE — 85025 COMPLETE CBC W/AUTO DIFF WBC: CPT

## 2019-11-04 PROCEDURE — 80048 BASIC METABOLIC PNL TOTAL CA: CPT

## 2019-11-04 PROCEDURE — 36415 COLL VENOUS BLD VENIPUNCTURE: CPT | Mod: PO

## 2019-11-05 NOTE — PROGRESS NOTES
Hi  I believe you are seeing this patient in a few days  Mild thrombocytosis. Seen by heme/onc in the past 2013/2014 and workup was negative for ET  Thanks

## 2019-11-07 ENCOUNTER — OFFICE VISIT (OUTPATIENT)
Dept: INTERNAL MEDICINE | Facility: CLINIC | Age: 49
End: 2019-11-07
Payer: COMMERCIAL

## 2019-11-07 VITALS
WEIGHT: 209.88 LBS | OXYGEN SATURATION: 98 % | DIASTOLIC BLOOD PRESSURE: 78 MMHG | HEART RATE: 67 BPM | SYSTOLIC BLOOD PRESSURE: 122 MMHG | BODY MASS INDEX: 38.62 KG/M2 | HEIGHT: 62 IN

## 2019-11-07 DIAGNOSIS — E87.6 HYPOKALEMIA: ICD-10-CM

## 2019-11-07 DIAGNOSIS — I10 ESSENTIAL HYPERTENSION: ICD-10-CM

## 2019-11-07 DIAGNOSIS — G89.29 CHRONIC BILATERAL LOW BACK PAIN WITH LEFT-SIDED SCIATICA: ICD-10-CM

## 2019-11-07 DIAGNOSIS — Z82.49 FAMILY HISTORY OF PREMATURE CAD: ICD-10-CM

## 2019-11-07 DIAGNOSIS — Z12.31 SCREENING MAMMOGRAM, ENCOUNTER FOR: ICD-10-CM

## 2019-11-07 DIAGNOSIS — M54.42 CHRONIC BILATERAL LOW BACK PAIN WITH LEFT-SIDED SCIATICA: ICD-10-CM

## 2019-11-07 PROCEDURE — 3074F SYST BP LT 130 MM HG: CPT | Mod: CPTII,S$GLB,, | Performed by: INTERNAL MEDICINE

## 2019-11-07 PROCEDURE — 99214 PR OFFICE/OUTPT VISIT, EST, LEVL IV, 30-39 MIN: ICD-10-PCS | Mod: S$GLB,,, | Performed by: INTERNAL MEDICINE

## 2019-11-07 PROCEDURE — 3074F PR MOST RECENT SYSTOLIC BLOOD PRESSURE < 130 MM HG: ICD-10-PCS | Mod: CPTII,S$GLB,, | Performed by: INTERNAL MEDICINE

## 2019-11-07 PROCEDURE — 99999 PR PBB SHADOW E&M-EST. PATIENT-LVL III: CPT | Mod: PBBFAC,,, | Performed by: INTERNAL MEDICINE

## 2019-11-07 PROCEDURE — 3008F PR BODY MASS INDEX (BMI) DOCUMENTED: ICD-10-PCS | Mod: CPTII,S$GLB,, | Performed by: INTERNAL MEDICINE

## 2019-11-07 PROCEDURE — 99999 PR PBB SHADOW E&M-EST. PATIENT-LVL III: ICD-10-PCS | Mod: PBBFAC,,, | Performed by: INTERNAL MEDICINE

## 2019-11-07 PROCEDURE — 3078F PR MOST RECENT DIASTOLIC BLOOD PRESSURE < 80 MM HG: ICD-10-PCS | Mod: CPTII,S$GLB,, | Performed by: INTERNAL MEDICINE

## 2019-11-07 PROCEDURE — 3008F BODY MASS INDEX DOCD: CPT | Mod: CPTII,S$GLB,, | Performed by: INTERNAL MEDICINE

## 2019-11-07 PROCEDURE — 99214 OFFICE O/P EST MOD 30 MIN: CPT | Mod: S$GLB,,, | Performed by: INTERNAL MEDICINE

## 2019-11-07 PROCEDURE — 3078F DIAST BP <80 MM HG: CPT | Mod: CPTII,S$GLB,, | Performed by: INTERNAL MEDICINE

## 2019-11-07 RX ORDER — HYDROCHLOROTHIAZIDE 25 MG/1
25 TABLET ORAL DAILY
Qty: 90 TABLET | Refills: 1 | Status: SHIPPED | OUTPATIENT
Start: 2019-11-07 | End: 2020-03-10 | Stop reason: SDUPTHER

## 2019-11-07 RX ORDER — ETODOLAC 400 MG/1
400 TABLET, EXTENDED RELEASE ORAL DAILY PRN
Qty: 30 TABLET | Refills: 1 | Status: SHIPPED | OUTPATIENT
Start: 2019-11-07 | End: 2020-05-06

## 2019-11-07 NOTE — PROGRESS NOTES
Subjective:       Patient ID: Isaura Mancini is a 49 y.o. female.    Chief Complaint: Establish Care    HPI Mrs. Mancini is a 49-year-old female with hypertension who presents for follow-up of hypertension and to establish care.  At last visit with Dr. Virgen she was told that potassium was low.  She was told to increase potassium rich foods in her diet and she has been doing this, primarily by consuming more bananas.  She has no acute complaints or concerns today.  She reports that she takes lodine as needed for back pain relief.  She is aware that she should use this medication sparingly, and not daily. She is taking HCTZ 25 mg po daily for treatment of HTN. BP today is 122/78.  Of note, in family history her father had MI in his 40s. She recently underwent stress echo testing which was normal     Review of Systems   All other systems reviewed and are negative.      Objective:      Physical Exam   Constitutional: She is oriented to person, place, and time. She appears well-developed and well-nourished. No distress.   HENT:   Head: Normocephalic and atraumatic.   Right Ear: External ear normal.   Left Ear: External ear normal.   Nose: Nose normal.   Eyes: Conjunctivae and EOM are normal.   Cardiovascular: Normal rate, regular rhythm, normal heart sounds and intact distal pulses. Exam reveals no gallop and no friction rub.   No murmur heard.  Pulmonary/Chest: Effort normal and breath sounds normal. No stridor. No respiratory distress. She has no wheezes. She has no rales.   Neurological: She is alert and oriented to person, place, and time.   Skin: Skin is warm and dry. She is not diaphoretic.   Psychiatric: She has a normal mood and affect. Her behavior is normal. Judgment and thought content normal.   Vitals reviewed.      Assessment:       1. Essential hypertension Well controlled   2. Chronic bilateral low back pain with left-sided sciatica Active   3. Screening mammogram, encounter for    4. Hypokalemia Inactive    5. Family history of premature CAD        Plan:     Isaura was seen today for establish care.    Diagnoses and all orders for this visit:    Essential hypertension  Comments:  continue current regimen   Orders:  -     hydroCHLOROthiazide (HYDRODIURIL) 25 MG tablet; Take 1 tablet (25 mg total) by mouth once daily.  -     Comprehensive metabolic panel; Future    Chronic bilateral low back pain with left-sided sciatica  Comments:  continue lodine PRN. Pt aware to use medication sparingly    Orders:  -     etodolac (LODINE XL) 400 MG 24 hr tablet; Take 1 tablet (400 mg total) by mouth daily as needed (avoid all other NSAIDs).    Screening mammogram, encounter for  -     Mammo Digital Screening Bilat; Future    Hypokalemia  Comments:  Resolved. Cont consumption of potassium rich foods    Family history of premature CAD  Comments:  Recent stress test normal.  No further workup at this time.        RTC 6 months and PRN

## 2020-02-23 ENCOUNTER — OFFICE VISIT (OUTPATIENT)
Dept: URGENT CARE | Facility: CLINIC | Age: 50
End: 2020-02-23
Payer: COMMERCIAL

## 2020-02-23 VITALS
WEIGHT: 210 LBS | HEART RATE: 71 BPM | SYSTOLIC BLOOD PRESSURE: 157 MMHG | HEIGHT: 62 IN | TEMPERATURE: 99 F | DIASTOLIC BLOOD PRESSURE: 83 MMHG | OXYGEN SATURATION: 98 % | RESPIRATION RATE: 16 BRPM | BODY MASS INDEX: 38.64 KG/M2

## 2020-02-23 DIAGNOSIS — R52 GENERALIZED BODY ACHES: Primary | ICD-10-CM

## 2020-02-23 DIAGNOSIS — J11.1 FLU SYNDROME: ICD-10-CM

## 2020-02-23 LAB
CTP QC/QA: YES
FLUAV AG NPH QL: NEGATIVE
FLUBV AG NPH QL: NEGATIVE

## 2020-02-23 PROCEDURE — 87804 POCT INFLUENZA A/B: ICD-10-PCS | Mod: 59,QW,S$GLB, | Performed by: FAMILY MEDICINE

## 2020-02-23 PROCEDURE — 87804 INFLUENZA ASSAY W/OPTIC: CPT | Mod: QW,S$GLB,, | Performed by: FAMILY MEDICINE

## 2020-02-23 PROCEDURE — 99214 OFFICE O/P EST MOD 30 MIN: CPT | Mod: 25,S$GLB,, | Performed by: FAMILY MEDICINE

## 2020-02-23 PROCEDURE — 99214 PR OFFICE/OUTPT VISIT, EST, LEVL IV, 30-39 MIN: ICD-10-PCS | Mod: 25,S$GLB,, | Performed by: FAMILY MEDICINE

## 2020-02-23 RX ORDER — LORATADINE 10 MG/1
10 TABLET ORAL DAILY
Qty: 30 TABLET | Refills: 2 | Status: SHIPPED | OUTPATIENT
Start: 2020-02-23 | End: 2020-09-14 | Stop reason: CLARIF

## 2020-02-23 RX ORDER — PROMETHAZINE HYDROCHLORIDE AND DEXTROMETHORPHAN HYDROBROMIDE 6.25; 15 MG/5ML; MG/5ML
SYRUP ORAL
Qty: 180 ML | Refills: 0 | Status: SHIPPED | OUTPATIENT
Start: 2020-02-23 | End: 2020-08-28

## 2020-02-23 RX ORDER — ALBUTEROL SULFATE 90 UG/1
2 AEROSOL, METERED RESPIRATORY (INHALATION) EVERY 6 HOURS PRN
Qty: 18 G | Refills: 3 | Status: SHIPPED | OUTPATIENT
Start: 2020-02-23 | End: 2020-09-14 | Stop reason: CLARIF

## 2020-02-23 RX ORDER — GUAIFENESIN 600 MG/1
600 TABLET, EXTENDED RELEASE ORAL 2 TIMES DAILY
Qty: 14 TABLET | Refills: 2 | Status: SHIPPED | OUTPATIENT
Start: 2020-02-23 | End: 2020-03-01

## 2020-02-23 NOTE — PROGRESS NOTES
"Subjective:       Patient ID: Isaura Mancini is a 49 y.o. female.    Vitals:  height is 5' 2" (1.575 m) and weight is 95.3 kg (210 lb). Her temperature is 98.9 °F (37.2 °C). Her blood pressure is 157/83 (abnormal) and her pulse is 71. Her respiration is 16 and oxygen saturation is 98%.     Chief Complaint: URI    Patient c/o cough, congestion, body aches, and sore throat that started 3 days ago.  Patient feels slightly Juan Jose has had flu vaccine this season cough is    URI    This is a new problem. The current episode started in the past 7 days. The problem has been gradually worsening. There has been no fever. Associated symptoms include congestion, coughing, headaches, joint pain, rhinorrhea, sinus pain, sneezing and a sore throat. Pertinent negatives include no abdominal pain, chest pain, diarrhea, dysuria, ear pain, joint swelling, nausea, neck pain, plugged ear sensation, rash, swollen glands, vomiting or wheezing. She has tried decongestant for the symptoms. The treatment provided no relief.       Constitution: Positive for chills and fatigue. Negative for sweating and fever.   HENT: Positive for congestion, postnasal drip, sinus pain, sinus pressure, sore throat and voice change. Negative for ear pain.    Neck: Negative for neck pain and painful lymph nodes.   Cardiovascular: Negative for chest pain.   Eyes: Negative for eye redness.   Respiratory: Positive for cough and sputum production. Negative for chest tightness, bloody sputum, COPD, shortness of breath, stridor, wheezing and asthma.    Gastrointestinal: Negative for abdominal pain, nausea, vomiting and diarrhea.   Genitourinary: Negative for dysuria.   Musculoskeletal: Positive for muscle ache.   Skin: Negative for rash.   Allergic/Immunologic: Positive for sneezing. Negative for seasonal allergies and asthma.   Neurological: Positive for headaches.   Hematologic/Lymphatic: Negative for swollen lymph nodes.       Objective:      Physical Exam "   Constitutional: She is oriented to person, place, and time. She appears well-developed and well-nourished. She is cooperative.  Non-toxic appearance. She does not appear ill. No distress.   HENT:   Head: Normocephalic and atraumatic.   Right Ear: Hearing, tympanic membrane, external ear and ear canal normal.   Left Ear: Hearing, tympanic membrane, external ear and ear canal normal.   Nose: Nose normal. No mucosal edema, rhinorrhea or nasal deformity. No epistaxis. Right sinus exhibits no maxillary sinus tenderness and no frontal sinus tenderness. Left sinus exhibits no maxillary sinus tenderness and no frontal sinus tenderness.   Mouth/Throat: Uvula is midline, oropharynx is clear and moist and mucous membranes are normal. No trismus in the jaw. Normal dentition. No uvula swelling. No posterior oropharyngeal erythema.   Throat is erythematous no exudates no lymphadenopathy TMs normal   Eyes: Conjunctivae and lids are normal. Right eye exhibits no discharge. Left eye exhibits no discharge. No scleral icterus.   Neck: Trachea normal, normal range of motion, full passive range of motion without pain and phonation normal. Neck supple.   Cardiovascular: Normal rate, regular rhythm, normal heart sounds, intact distal pulses and normal pulses.   Pulmonary/Chest: Effort normal and breath sounds normal. No respiratory distress.   Abdominal: Soft. Normal appearance and bowel sounds are normal. She exhibits no distension, no pulsatile midline mass and no mass. There is no tenderness.   Musculoskeletal: Normal range of motion. She exhibits no edema or deformity.   Neurological: She is alert and oriented to person, place, and time. She exhibits normal muscle tone. Coordination normal.   Skin: Skin is warm, dry, intact, not diaphoretic and not pale.   Psychiatric: She has a normal mood and affect. Her speech is normal and behavior is normal. Judgment and thought content normal. Cognition and memory are normal.   Nursing note  and vitals reviewed.        Assessment:       1. Generalized body aches    2. Flu syndrome        Plan:         Generalized body aches  -     POCT Influenza A/B    Flu syndrome    Other orders  -     loratadine (CLARITIN) 10 mg tablet; Take 1 tablet (10 mg total) by mouth once daily.  Dispense: 30 tablet; Refill: 2  -     promethazine-dextromethorphan (PROMETHAZINE-DM) 6.25-15 mg/5 mL Syrp; Take one tsp po q 6 hrs prn cough  Dispense: 180 mL; Refill: 0  -     guaiFENesin (MUCINEX) 600 mg 12 hr tablet; Take 1 tablet (600 mg total) by mouth 2 (two) times daily. for 7 days  Dispense: 14 tablet; Refill: 2        patient advised rest and fluids    Results for orders placed or performed in visit on 02/23/20   POCT Influenza A/B   Result Value Ref Range    Rapid Influenza A Ag Negative Negative    Rapid Influenza B Ag Negative Negative     Acceptable Yes

## 2020-03-10 DIAGNOSIS — I10 ESSENTIAL HYPERTENSION: ICD-10-CM

## 2020-03-11 RX ORDER — HYDROCHLOROTHIAZIDE 25 MG/1
25 TABLET ORAL DAILY
Qty: 90 TABLET | Refills: 1 | Status: SHIPPED | OUTPATIENT
Start: 2020-03-11 | End: 2020-05-06 | Stop reason: SDUPTHER

## 2020-05-01 ENCOUNTER — PATIENT MESSAGE (OUTPATIENT)
Dept: INTERNAL MEDICINE | Facility: CLINIC | Age: 50
End: 2020-05-01

## 2020-05-04 ENCOUNTER — LAB VISIT (OUTPATIENT)
Dept: LAB | Facility: HOSPITAL | Age: 50
End: 2020-05-04
Attending: INTERNAL MEDICINE
Payer: COMMERCIAL

## 2020-05-04 DIAGNOSIS — I10 ESSENTIAL HYPERTENSION: ICD-10-CM

## 2020-05-04 LAB
ALBUMIN SERPL BCP-MCNC: 3.6 G/DL (ref 3.5–5.2)
ALP SERPL-CCNC: 59 U/L (ref 55–135)
ALT SERPL W/O P-5'-P-CCNC: 14 U/L (ref 10–44)
ANION GAP SERPL CALC-SCNC: 9 MMOL/L (ref 8–16)
AST SERPL-CCNC: 21 U/L (ref 10–40)
BILIRUB SERPL-MCNC: 0.3 MG/DL (ref 0.1–1)
BUN SERPL-MCNC: 14 MG/DL (ref 6–20)
CALCIUM SERPL-MCNC: 9.6 MG/DL (ref 8.7–10.5)
CHLORIDE SERPL-SCNC: 103 MMOL/L (ref 95–110)
CO2 SERPL-SCNC: 25 MMOL/L (ref 23–29)
CREAT SERPL-MCNC: 0.7 MG/DL (ref 0.5–1.4)
EST. GFR  (AFRICAN AMERICAN): >60 ML/MIN/1.73 M^2
EST. GFR  (NON AFRICAN AMERICAN): >60 ML/MIN/1.73 M^2
GLUCOSE SERPL-MCNC: 87 MG/DL (ref 70–110)
POTASSIUM SERPL-SCNC: 3.7 MMOL/L (ref 3.5–5.1)
PROT SERPL-MCNC: 7.5 G/DL (ref 6–8.4)
SODIUM SERPL-SCNC: 137 MMOL/L (ref 136–145)

## 2020-05-04 PROCEDURE — 36415 COLL VENOUS BLD VENIPUNCTURE: CPT | Mod: PO

## 2020-05-04 PROCEDURE — 80053 COMPREHEN METABOLIC PANEL: CPT

## 2020-05-06 ENCOUNTER — OFFICE VISIT (OUTPATIENT)
Dept: INTERNAL MEDICINE | Facility: CLINIC | Age: 50
End: 2020-05-06
Payer: COMMERCIAL

## 2020-05-06 DIAGNOSIS — I10 ESSENTIAL HYPERTENSION: ICD-10-CM

## 2020-05-06 DIAGNOSIS — S39.012A BACK STRAIN, INITIAL ENCOUNTER: ICD-10-CM

## 2020-05-06 PROCEDURE — 99213 OFFICE O/P EST LOW 20 MIN: CPT | Mod: 95,,, | Performed by: INTERNAL MEDICINE

## 2020-05-06 PROCEDURE — 99213 PR OFFICE/OUTPT VISIT, EST, LEVL III, 20-29 MIN: ICD-10-PCS | Mod: 95,,, | Performed by: INTERNAL MEDICINE

## 2020-05-06 RX ORDER — HYDROCHLOROTHIAZIDE 25 MG/1
25 TABLET ORAL DAILY
Qty: 90 TABLET | Refills: 3 | Status: SHIPPED | OUTPATIENT
Start: 2020-05-06 | End: 2021-01-19 | Stop reason: SDUPTHER

## 2020-05-06 RX ORDER — CYCLOBENZAPRINE HCL 5 MG
5 TABLET ORAL 3 TIMES DAILY PRN
Qty: 30 TABLET | Refills: 0 | Status: SHIPPED | OUTPATIENT
Start: 2020-05-06 | End: 2020-05-16

## 2020-05-06 NOTE — PROGRESS NOTES
Patient ID: Isaura Mancini is a 49 y.o. female.    Chief Complaint: No chief complaint on file.    The patient location is: [place]  The chief complaint leading to consultation is: follow up of hypertension and back pain  Visit type: audiovisual  Total time spent with patient: 15 minutes  Each patient to whom he or she provides medical services by telemedicine is:  (1) informed of the relationship between the physician and patient and the respective role of any other health care provider with respect to management of the patient; and (2) notified that he or she may decline to receive medical services by telemedicine and may withdraw from such care at any time.      HPI Isaura is a 49 y.o. female who presents today with with a chief complaint of follow-up of hypertension and back pain.  Patient states that her blood pressure recently was 150s/87.  Typically the systolic blood pressure is in the 130s or 140s.  And she has been compliant with her blood pressure medication (hydrochlorothiazide 25 mg p.o. daily).  However she has been taking etodolac daily for back pain.  It does help to relieve her back pain.  She has been using this medication and a heating pad.  Upon questioning, admits that the back feels tight in character and like muscle spasms.    Review of Systems   Musculoskeletal: Positive for back pain.   All other systems reviewed and are negative.      Objective:   There were no vitals filed for this visit.     Physical Exam   Constitutional: She is oriented to person, place, and time. She appears well-developed and well-nourished. No distress.   HENT:   Head: Normocephalic and atraumatic.   Right Ear: External ear normal.   Left Ear: External ear normal.   Nose: Nose normal.   Eyes: Conjunctivae and EOM are normal.   Pulmonary/Chest: Effort normal. No respiratory distress.   Neurological: She is alert and oriented to person, place, and time.   Skin: No rash noted. She is not diaphoretic. No erythema. No  pallor.   Psychiatric: She has a normal mood and affect. Her behavior is normal. Judgment and thought content normal.       Assessment:       1. Essential hypertension Sub-optimally controlled   2. Back strain, initial encounter Active       Plan:     Elevated blood pressure may be due to daily use of Etodolac.     Essential hypertension  Comments:  continue current regimen. Stop etodolac. Keep low salt diet  Orders:  -     hydroCHLOROthiazide (HYDRODIURIL) 25 MG tablet; Take 1 tablet (25 mg total) by mouth once daily.  Dispense: 90 tablet; Refill: 3    Back strain, initial encounter  Comments:  See instructions in AVS.  Orders:  -     cyclobenzaprine (FLEXERIL) 5 MG tablet; Take 1 tablet (5 mg total) by mouth 3 (three) times daily as needed.  Dispense: 30 tablet; Refill: 0      RTC one month, f/u HTN and back pain      Warning signs discussed, patient to call with any further issues or worsening of symptoms.       Parts of the above note were dictated using a voice dictation software. Please excuse any grammatical or typographical errors.

## 2020-05-06 NOTE — PATIENT INSTRUCTIONS
Please apply heat in the form of a heating pad 3 times daily to the area of pain in the back.  You may use any over-the-counter topical products that you wish such as Aspercreme, Justin-Radford, lidocaine gel, or icy Hot for pain relief.  You may use over-the-counter Tylenol as needed for pain relief.  Please stay away from NSAID medications such as Etodolac as these may be causing the increase in your blood pressure.  I am also prescribing a medication called Flexeril (muscle relaxant medication) to be taken as needed for pain relief.    Please continue to monitor your blood pressure once daily.  We will follow-up with each other in clinic in 1 month to recheck blood pressure and re-evaluate back pain.      Cyclobenzaprine tablets  What is this medicine?  CYCLOBENZAPRINE (symisty regalado preen) is a muscle relaxer. It is used to treat muscle pain, spasms, and stiffness.  How should I use this medicine?  Take this medicine by mouth with a glass of water. Follow the directions on the prescription label. If this medicine upsets your stomach, take it with food or milk. Take your medicine at regular intervals. Do not take it more often than directed.  Talk to your pediatrician regarding the use of this medicine in children. Special care may be needed.  What side effects may I notice from receiving this medicine?  Side effects that you should report to your doctor or health care professional as soon as possible:  · allergic reactions like skin rash, itching or hives, swelling of the face, lips, or tongue  · breathing problems  · chest pain  · fast, irregular heartbeat  · hallucinations  · seizures  · unusually weak or tired  Side effects that usually do not require medical attention (report to your doctor or health care professional if they continue or are bothersome):  · headache  · nausea, vomiting  What may interact with this medicine?  Do not take this medicine with any of the following medications:  · certain medicines for  fungal infections like fluconazole, itraconazole, ketoconazole, posaconazole, voriconazole  · cisapride  · dofetilide  · dronedarone  · halofantrine  · levomethadyl  · MAOIs like Carbex, Eldepryl, Marplan, Nardil, and Parnate  · narcotic medicines for cough  · pimozide  · thioridazine  · ziprasidone  This medicine may also interact with the following medications:  · alcohol  · antihistamines for allergy, cough and cold  · certain medicines for anxiety or sleep  · certain medicines for cancer  · certain medicines for depression like amitriptyline, fluoxetine, sertraline  · certain medicines for infection like alfuzosin, chloroquine, clarithromycin, levofloxacin, mefloquine, pentamidine, troleandomycin  · certain medicines for irregular heart beat  · certain medicines for seizures like phenobarbital, primidone  · contrast dyes  · general anesthetics like halothane, isoflurane, methoxyflurane, propofol  · local anesthetics like lidocaine, pramoxine, tetracaine  · medicines that relax muscles for surgery  · narcotic medicines for pain  · other medicines that prolong the QT interval (cause an abnormal heart rhythm)  · phenothiazines like chlorpromazine, mesoridazine, prochlorperazine  What if I miss a dose?  If you miss a dose, take it as soon as you can. If it is almost time for your next dose, take only that dose. Do not take double or extra doses.  Where should I keep my medicine?  Keep out of the reach of children.  Store at room temperature between 15 and 30 degrees C (59 and 86 degrees F). Keep container tightly closed. Throw away any unused medicine after the expiration date.  What should I tell my health care provider before I take this medicine?  They need to know if you have any of these conditions:  · heart disease, irregular heartbeat, or previous heart attack  · liver disease  · thyroid problem  · an unusual or allergic reaction to cyclobenzaprine, tricyclic antidepressants, lactose, other medicines, foods,  dyes, or preservatives  · pregnant or trying to get pregnant  · breast-feeding  What should I watch for while using this medicine?  Tell your doctor or health care professional if your symptoms do not start to get better or if they get worse.  You may get drowsy or dizzy. Do not drive, use machinery, or do anything that needs mental alertness until you know how this medicine affects you. Do not stand or sit up quickly, especially if you are an older patient. This reduces the risk of dizzy or fainting spells. Alcohol may interfere with the effect of this medicine. Avoid alcoholic drinks.  If you are taking another medicine that also causes drowsiness, you may have more side effects. Give your health care provider a list of all medicines you use. Your doctor will tell you how much medicine to take. Do not take more medicine than directed. Call emergency for help if you have problems breathing or unusual sleepiness.  Your mouth may get dry. Chewing sugarless gum or sucking hard candy, and drinking plenty of water may help. Contact your doctor if the problem does not go away or is severe.  NOTE:This sheet is a summary. It may not cover all possible information. If you have questions about this medicine, talk to your doctor, pharmacist, or health care provider. Copyright© 2017 [employer]

## 2020-06-12 ENCOUNTER — OFFICE VISIT (OUTPATIENT)
Dept: INTERNAL MEDICINE | Facility: CLINIC | Age: 50
End: 2020-06-12
Payer: COMMERCIAL

## 2020-06-12 VITALS
WEIGHT: 207.25 LBS | DIASTOLIC BLOOD PRESSURE: 82 MMHG | BODY MASS INDEX: 38.14 KG/M2 | SYSTOLIC BLOOD PRESSURE: 138 MMHG | TEMPERATURE: 99 F | HEIGHT: 62 IN | OXYGEN SATURATION: 99 % | HEART RATE: 68 BPM

## 2020-06-12 DIAGNOSIS — I10 ESSENTIAL HYPERTENSION: ICD-10-CM

## 2020-06-12 DIAGNOSIS — M48.061 SPINAL STENOSIS AT L4-L5 LEVEL: Primary | ICD-10-CM

## 2020-06-12 PROCEDURE — 3079F PR MOST RECENT DIASTOLIC BLOOD PRESSURE 80-89 MM HG: ICD-10-PCS | Mod: CPTII,S$GLB,, | Performed by: INTERNAL MEDICINE

## 2020-06-12 PROCEDURE — 99999 PR PBB SHADOW E&M-EST. PATIENT-LVL IV: ICD-10-PCS | Mod: PBBFAC,,, | Performed by: INTERNAL MEDICINE

## 2020-06-12 PROCEDURE — 3075F PR MOST RECENT SYSTOLIC BLOOD PRESS GE 130-139MM HG: ICD-10-PCS | Mod: CPTII,S$GLB,, | Performed by: INTERNAL MEDICINE

## 2020-06-12 PROCEDURE — 3008F PR BODY MASS INDEX (BMI) DOCUMENTED: ICD-10-PCS | Mod: CPTII,S$GLB,, | Performed by: INTERNAL MEDICINE

## 2020-06-12 PROCEDURE — 3008F BODY MASS INDEX DOCD: CPT | Mod: CPTII,S$GLB,, | Performed by: INTERNAL MEDICINE

## 2020-06-12 PROCEDURE — 99214 OFFICE O/P EST MOD 30 MIN: CPT | Mod: S$GLB,,, | Performed by: INTERNAL MEDICINE

## 2020-06-12 PROCEDURE — 3079F DIAST BP 80-89 MM HG: CPT | Mod: CPTII,S$GLB,, | Performed by: INTERNAL MEDICINE

## 2020-06-12 PROCEDURE — 3075F SYST BP GE 130 - 139MM HG: CPT | Mod: CPTII,S$GLB,, | Performed by: INTERNAL MEDICINE

## 2020-06-12 PROCEDURE — 99214 PR OFFICE/OUTPT VISIT, EST, LEVL IV, 30-39 MIN: ICD-10-PCS | Mod: S$GLB,,, | Performed by: INTERNAL MEDICINE

## 2020-06-12 PROCEDURE — 99999 PR PBB SHADOW E&M-EST. PATIENT-LVL IV: CPT | Mod: PBBFAC,,, | Performed by: INTERNAL MEDICINE

## 2020-06-12 RX ORDER — LOSARTAN POTASSIUM 25 MG/1
25 TABLET ORAL DAILY
Qty: 90 TABLET | Refills: 1 | Status: SHIPPED | OUTPATIENT
Start: 2020-06-12 | End: 2020-11-23 | Stop reason: SDUPTHER

## 2020-06-12 RX ORDER — GABAPENTIN 100 MG/1
100 CAPSULE ORAL 3 TIMES DAILY PRN
Qty: 90 CAPSULE | Refills: 11 | Status: SHIPPED | OUTPATIENT
Start: 2020-06-12 | End: 2021-01-19 | Stop reason: SDUPTHER

## 2020-06-12 NOTE — PROGRESS NOTES
Patient ID: Isaura Mancini is a 49 y.o. female.    Chief Complaint: No chief complaint on file.    RADHIKA Delarosa is a 49 y.o. female with spinal stenosis and hypertension who presents for follow-up of back pain and hypertension.    Patient has been maintained on hydrochlorothiazide 25 mg p.o. daily for treatment of hypertension.  She continues to check her blood pressure at home.  Systolic blood pressures typically in the 130s and 140s.    Patient continues to complain of back pain.  Reports that the Flexeril made her too sleepy.  Therefore she cannot take it.  She has back pain located in the right buttocks and which radiates down the right leg.  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  We reviewed her past medical history and imaging.  She had an MRI lumbar spine performed in 2018 and followed up with pain management.  They did an injection, which did not relieve her pain and was very expensive.  Therefore she is not interested in going back to pain management.  The MRI lumbar spine shows spinal stenosis.  She has never been evaluated by a neurosurgeon.    Onset of pain:  Years ago.  Location:  Right buttocks and radiating down the right leg.  Duration:  Constant problem in duration.  Worse when she gets up.  Currently nothing making pain better.    Review of Systems   Musculoskeletal: Positive for back pain (chronic).   All other systems reviewed and are negative.      Objective:     Vitals:    06/12/20 1530   BP: 138/82   Pulse: 68   Temp: 98.7 °F (37.1 °C)        Physical Exam   Constitutional: She is oriented to person, place, and time. She appears well-developed and well-nourished. No distress.   HENT:   Head: Normocephalic and atraumatic.   Right Ear: External ear normal.   Left Ear: External ear normal.   Nose: Nose normal.   Eyes: Conjunctivae and EOM are normal. Right eye exhibits no discharge. Left eye exhibits no discharge. No scleral icterus.    Cardiovascular: Normal rate, regular rhythm, normal heart sounds and intact distal pulses. Exam reveals no gallop and no friction rub.   No murmur heard.  Pulmonary/Chest: Effort normal and breath sounds normal. No stridor. No respiratory distress. She has no wheezes. She has no rales.   Neurological: She is alert and oriented to person, place, and time.   Skin: Skin is warm and dry. She is not diaphoretic.   Psychiatric: She has a normal mood and affect. Her behavior is normal. Judgment and thought content normal.   Vitals reviewed.      Assessment:       1. Spinal stenosis at L4-L5 level Active   2. Essential hypertension Sub-optimally controlled       Plan:         Spinal stenosis at L4-L5 level  Comments:  Discontinue Flexeril.  Try gabapentin 100 mg p.o. t.i.d. p.r.n. pain.  Referral placed to neurosurgery for evaluation.  Orders:  -     gabapentin (NEURONTIN) 100 MG capsule; Take 1 capsule (100 mg total) by mouth 3 (three) times daily as needed (back and leg pain).  Dispense: 90 capsule; Refill: 11  -     Ambulatory referral/consult to Neurosurgery; Future; Expected date: 06/19/2020    Essential hypertension  Comments:  Add losartan 25 mg daily. Cont HCTZ 25 mg daily. BMP in one month.   Orders:  -     losartan (COZAAR) 25 MG tablet; Take 1 tablet (25 mg total) by mouth once daily.  Dispense: 90 tablet; Refill: 1  -     Basic metabolic panel; Future; Expected date: 06/12/2020        RTC one month, f/u HTN    Warning signs discussed, patient to call with any further issues or worsening of symptoms.       Parts of the above note were dictated using a voice dictation software. Please excuse any grammatical or typographical errors.

## 2020-06-18 ENCOUNTER — PATIENT OUTREACH (OUTPATIENT)
Dept: ADMINISTRATIVE | Facility: OTHER | Age: 50
End: 2020-06-18

## 2020-06-18 NOTE — PROGRESS NOTES
Patient's chart was reviewed.   Requested updates within Care Everywhere.  Immunizations reconciled.   Mammogram tasked to pt via pt portal.   Health Maintenance was updated.

## 2020-07-14 ENCOUNTER — LAB VISIT (OUTPATIENT)
Dept: LAB | Facility: HOSPITAL | Age: 50
End: 2020-07-14
Attending: INTERNAL MEDICINE
Payer: COMMERCIAL

## 2020-07-14 DIAGNOSIS — I10 ESSENTIAL HYPERTENSION: ICD-10-CM

## 2020-07-14 LAB
ANION GAP SERPL CALC-SCNC: 6 MMOL/L (ref 8–16)
BUN SERPL-MCNC: 14 MG/DL (ref 6–20)
CALCIUM SERPL-MCNC: 9.6 MG/DL (ref 8.7–10.5)
CHLORIDE SERPL-SCNC: 105 MMOL/L (ref 95–110)
CO2 SERPL-SCNC: 26 MMOL/L (ref 23–29)
CREAT SERPL-MCNC: 0.7 MG/DL (ref 0.5–1.4)
EST. GFR  (AFRICAN AMERICAN): >60 ML/MIN/1.73 M^2
EST. GFR  (NON AFRICAN AMERICAN): >60 ML/MIN/1.73 M^2
GLUCOSE SERPL-MCNC: 86 MG/DL (ref 70–110)
POTASSIUM SERPL-SCNC: 3.7 MMOL/L (ref 3.5–5.1)
SODIUM SERPL-SCNC: 137 MMOL/L (ref 136–145)

## 2020-07-14 PROCEDURE — 36415 COLL VENOUS BLD VENIPUNCTURE: CPT | Mod: PO

## 2020-07-14 PROCEDURE — 80048 BASIC METABOLIC PNL TOTAL CA: CPT

## 2020-07-16 ENCOUNTER — OFFICE VISIT (OUTPATIENT)
Dept: INTERNAL MEDICINE | Facility: CLINIC | Age: 50
End: 2020-07-16
Payer: COMMERCIAL

## 2020-07-16 VITALS
DIASTOLIC BLOOD PRESSURE: 82 MMHG | BODY MASS INDEX: 38.1 KG/M2 | TEMPERATURE: 97 F | WEIGHT: 208.31 LBS | HEART RATE: 69 BPM | SYSTOLIC BLOOD PRESSURE: 132 MMHG | OXYGEN SATURATION: 98 %

## 2020-07-16 DIAGNOSIS — Z00.00 ANNUAL PHYSICAL EXAM: ICD-10-CM

## 2020-07-16 DIAGNOSIS — I10 ESSENTIAL HYPERTENSION: ICD-10-CM

## 2020-07-16 DIAGNOSIS — M48.061 SPINAL STENOSIS OF LUMBAR REGION WITHOUT NEUROGENIC CLAUDICATION: ICD-10-CM

## 2020-07-16 PROCEDURE — 3079F DIAST BP 80-89 MM HG: CPT | Mod: CPTII,S$GLB,, | Performed by: INTERNAL MEDICINE

## 2020-07-16 PROCEDURE — 99213 OFFICE O/P EST LOW 20 MIN: CPT | Mod: S$GLB,,, | Performed by: INTERNAL MEDICINE

## 2020-07-16 PROCEDURE — 3008F BODY MASS INDEX DOCD: CPT | Mod: CPTII,S$GLB,, | Performed by: INTERNAL MEDICINE

## 2020-07-16 PROCEDURE — 3075F PR MOST RECENT SYSTOLIC BLOOD PRESS GE 130-139MM HG: ICD-10-PCS | Mod: CPTII,S$GLB,, | Performed by: INTERNAL MEDICINE

## 2020-07-16 PROCEDURE — 3079F PR MOST RECENT DIASTOLIC BLOOD PRESSURE 80-89 MM HG: ICD-10-PCS | Mod: CPTII,S$GLB,, | Performed by: INTERNAL MEDICINE

## 2020-07-16 PROCEDURE — 3075F SYST BP GE 130 - 139MM HG: CPT | Mod: CPTII,S$GLB,, | Performed by: INTERNAL MEDICINE

## 2020-07-16 PROCEDURE — 99999 PR PBB SHADOW E&M-EST. PATIENT-LVL IV: ICD-10-PCS | Mod: PBBFAC,,, | Performed by: INTERNAL MEDICINE

## 2020-07-16 PROCEDURE — 3008F PR BODY MASS INDEX (BMI) DOCUMENTED: ICD-10-PCS | Mod: CPTII,S$GLB,, | Performed by: INTERNAL MEDICINE

## 2020-07-16 PROCEDURE — 99999 PR PBB SHADOW E&M-EST. PATIENT-LVL IV: CPT | Mod: PBBFAC,,, | Performed by: INTERNAL MEDICINE

## 2020-07-16 PROCEDURE — 99213 PR OFFICE/OUTPT VISIT, EST, LEVL III, 20-29 MIN: ICD-10-PCS | Mod: S$GLB,,, | Performed by: INTERNAL MEDICINE

## 2020-07-16 NOTE — PROGRESS NOTES
Patient ID: Isaura Mancini is a 49 y.o. female.    Chief Complaint: Follow-up    HPI Isaura is a 49 y.o. female spinal stenosis of the lumbar spine and hypertension who presents for follow-up of these medical conditions.  At last visit, patient was referred to neurosurgery for the issue of spinal stenosis.  She states that they canceled her appointment twice because she did not have a more recent MRI scan.  At last visit, losartan was added to her regimen of hydrochlorothiazide for treatment of blood pressure.  Patient reports that blood pressure is still elevated at home.  She sees systolic values in the 130s and 140s.  Diastolic is usually in the 80s.  She has brought her home blood pressure cuff with her to clinic today.  When I took her manual blood pressure, her blood pressure was 129/72.  When we did her automated BP cuff it was 154/89.  No acute complaints or concerns today.    Review of Systems   All other systems reviewed and are negative.      Objective:     Vitals:    07/16/20 1131   BP: 132/82   Pulse: 69   Temp: 96.9 °F (36.1 °C)        Physical Exam  Vitals signs reviewed.   Constitutional:       General: She is not in acute distress.     Appearance: Normal appearance. She is well-developed. She is obese. She is not ill-appearing, toxic-appearing or diaphoretic.   HENT:      Head: Normocephalic and atraumatic.      Right Ear: External ear normal.      Left Ear: External ear normal.      Nose: Nose normal.   Eyes:      General: No scleral icterus.        Right eye: No discharge.         Left eye: No discharge.      Conjunctiva/sclera: Conjunctivae normal.   Cardiovascular:      Rate and Rhythm: Normal rate and regular rhythm.      Heart sounds: Normal heart sounds. No murmur. No friction rub. No gallop.    Pulmonary:      Effort: Pulmonary effort is normal. No respiratory distress.      Breath sounds: Normal breath sounds. No stridor. No wheezing, rhonchi or rales.   Skin:     General: Skin is warm  and dry.   Neurological:      General: No focal deficit present.      Mental Status: She is alert and oriented to person, place, and time. Mental status is at baseline.   Psychiatric:         Mood and Affect: Mood normal.         Behavior: Behavior normal.         Thought Content: Thought content normal.         Judgment: Judgment normal.         Assessment:       1. Essential hypertension Well controlled   2. Spinal stenosis of lumbar region without neurogenic claudication Chronic   3. Annual physical exam        Plan:         Essential hypertension  Comments:  Continue current medications.  Will get new blood pressure cuff when enrolled in digital hypertension.  Orders:  -     Hypertension [facility] ([facility]) Enrollment Order  -     Hypertension Digital Medicine ([facility]): Assign Onboarding Questionnaires    Spinal stenosis of lumbar region without neurogenic claudication  -     MRI Lumbar Spine Without Contrast; Future; Expected date: 07/16/2020  -     Ambulatory referral/consult to Neurosurgery; Future; Expected date: 07/23/2020    Annual physical exam  -     CBC auto differential; Future; Expected date: 01/16/2021  -     Comprehensive metabolic panel; Future; Expected date: 01/16/2021  -     Hemoglobin A1C; Future; Expected date: 01/16/2021  -     Lipid Panel; Future; Expected date: 01/16/2021  -     TSH; Future; Expected date: 01/16/2021          RTC 6 months for annual exam with pap smear     Warning signs discussed, patient to call with any further issues or worsening of symptoms.       Parts of the above note were dictated using a voice dictation software. Please excuse any grammatical or typographical errors.

## 2020-07-18 ENCOUNTER — HOSPITAL ENCOUNTER (OUTPATIENT)
Dept: RADIOLOGY | Facility: HOSPITAL | Age: 50
Discharge: HOME | End: 2020-07-18
Attending: INTERNAL MEDICINE
Payer: COMMERCIAL

## 2020-07-18 DIAGNOSIS — M48.061 SPINAL STENOSIS OF LUMBAR REGION WITHOUT NEUROGENIC CLAUDICATION: ICD-10-CM

## 2020-07-18 PROCEDURE — 72148 MRI LUMBAR SPINE W/O DYE: CPT | Mod: 26,,, | Performed by: RADIOLOGY

## 2020-07-18 PROCEDURE — 72148 MRI LUMBAR SPINE WITHOUT CONTRAST: ICD-10-PCS | Mod: 26,,, | Performed by: RADIOLOGY

## 2020-07-18 PROCEDURE — 72148 MRI LUMBAR SPINE W/O DYE: CPT | Mod: TC

## 2020-07-30 ENCOUNTER — PATIENT OUTREACH (OUTPATIENT)
Dept: OTHER | Facility: OTHER | Age: 50
End: 2020-07-30

## 2020-07-30 NOTE — LETTER
July 30, 2020     Isaura Mancini  [address] [place] [address]       Dear Isaura,    Welcome to [facility]! Our goal is to make care effective, proactive and convenient by using data you send us from home to better treat your chronic conditions.              My name is Lizbeth Kaur, and I am your dedicated Digital Medicine clinician. As an expert in medication management, I will help ensure that the medications you are taking continue to provide the intended benefits and help you reach your goals. You can reach me directly at [phone] or by sending me a message directly through your [facility] account.      I am Lala Ramirez and I will be your health . My job is to help you identify lifestyle changes to improve your disease control. We will talk about nutrition, exercise, and other ways you may be able to adjust your current habits to better your health. Additionally, we will help ensure you are completing the tests and screenings that are necessary to help manage your conditions. You can reach me directly at [phone] or by sending me a message directly through your [facility] account.    Most importantly, YOU are at the center of this team. Together, we will work to improve your overall health and encourage you to meet your goals for a healthier lifestyle.     What we expect from YOU:  · Please take frequent home blood pressure measurements. We ask that you take at least 1 blood pressure reading per week, but more information will better help us get you know you. Be sure you rest for a few minutes before taking the reading in a quiet, comfortable place.     Be available to receive phone calls or [employer] messages, when appropriate, from your care team. Please let us know if there are any specific days or times that work best for us to reach you via phone.     Complete routine tests and screenings. Dont worry, we will help keep you on track!           What you should expect  from your Digital Medicine Care Team:   We will work with you to create a personalized plan of care and provide you with encouragement and education, including regarding lifestyle changes, that could help you manage your disease states.     We will adjust your current medications, if needed, and continue to monitor your long-term progress.     We will provide you and your physician with monthly progress reports after you have been in the program for more than 30 days.     We will send you reminders through [employer] and text messages to help ensure you do not miss any testing deadlines to help manage your disease states.    You will be able to reach us by phone or through your [facility] account by clicking our names under Care Team on the right side of the home screen.    I look forward to working with you to achieve your blood pressure goals!    We look forward to working with you to help manage your health,    Sincerely,    Your Digital Medicine Team    Please visit our websites to learn more:   · Hypertension: [email or web address]      Remember, we are not available for emergencies. If you have an emergency, please contact your doctors office directly or call [facility] on-call ([phone] or [phone]) or 911.

## 2020-07-30 NOTE — PROGRESS NOTES
Digital Medicine: Health  Introduction    Introduced Isaura Mancini to Digital Medicine. Discussed health  role and recommended lifestyle modifications.    The history is provided by the patient.               HYPERTENSION  Explained hypertension digital medicine goals including BP goal less than or equal to 130/80mmHg, improved convenience of BP management and reduced risk of heart attack, kidney failure, stroke, eye disease, dementia, and death.      Explained non-pharmacologic therapies like low salt diet and physical activity can reduce blood pressure. .      Explained that we expect patient to submit several blood pressure readings per week at random times of the day, but at least 30 minutes after taking blood pressure medications. Instructed patient not to allow anyone else to use their blood pressure monitor and phone as data submitted is directly entered into medical record. Reviewed and confirmed appropriate blood pressure monitoring technique.         Explained to the patient that the Digital Medicine team is not available for emergencies. Advised patient call [facility] ([phone] or [phone]) or 911 if needed.               Diet-Assessed      Additional diet details: Encouraged to decrease sodium intake to <2,000 mg per day and recommend DASH diet.     Physical Activity-Assessed      Additional physical activity details: Encouraged to increase exercise to at least 150 minutes per week.      Medication Adherence-Medication Adherence not addressed.      Substance, Sleep, Stress-Not assessed    PLAN  Provided patient education:  Reviewed Device Techniques  Patient verbalizes understanding. Patient did not express questions or concerns and patient has contact information if needed.    Explained the importance of self-monitoring and medication adherence. Encouraged the patient to communicate with their health  for lifestyle modifications to help improve or maintain a healthy  lifestyle.      Explained to the patient that the Digital Medicine team is not available for emergencies. Advised patient call [facility] ([phone] or [phone]) or 911 if needed.       There are no preventive care reminders to display for this patient.    Last 5 Patient Entered Readings                                      Current 30 Day Average: 131/80     Recent Readings 7/30/2020 7/28/2020 7/27/2020 7/27/2020 7/25/2020    SBP (mmHg) 124 132 125 121 143    DBP (mmHg) 74 77 80 84 83    Pulse 75 59 77 70 67

## 2020-08-04 ENCOUNTER — PATIENT OUTREACH (OUTPATIENT)
Dept: ADMINISTRATIVE | Facility: OTHER | Age: 50
End: 2020-08-04

## 2020-08-04 ENCOUNTER — TELEPHONE (OUTPATIENT)
Dept: NEUROSURGERY | Facility: CLINIC | Age: 50
End: 2020-08-04

## 2020-08-04 NOTE — PROGRESS NOTES
Requested updates within Care Everywhere.  Patient's chart was reviewed for overdue UMA topics.  Immunizations reconciled.    Orders placed:  Tasked appts: Mammogram tasked 06/18/2020  Labs Linked:

## 2020-08-05 ENCOUNTER — HOSPITAL ENCOUNTER (OUTPATIENT)
Dept: RADIOLOGY | Facility: HOSPITAL | Age: 50
Discharge: HOME | End: 2020-08-05
Attending: NEUROLOGICAL SURGERY
Payer: COMMERCIAL

## 2020-08-05 ENCOUNTER — OFFICE VISIT (OUTPATIENT)
Dept: NEUROSURGERY | Facility: CLINIC | Age: 50
End: 2020-08-05
Payer: COMMERCIAL

## 2020-08-05 VITALS
HEART RATE: 60 BPM | SYSTOLIC BLOOD PRESSURE: 130 MMHG | WEIGHT: 208.31 LBS | HEIGHT: 62 IN | BODY MASS INDEX: 38.33 KG/M2 | DIASTOLIC BLOOD PRESSURE: 80 MMHG

## 2020-08-05 DIAGNOSIS — M43.16 SPONDYLOLISTHESIS AT L4-L5 LEVEL: Primary | ICD-10-CM

## 2020-08-05 DIAGNOSIS — M43.16 SPONDYLOLISTHESIS AT L4-L5 LEVEL: ICD-10-CM

## 2020-08-05 DIAGNOSIS — M48.061 SPINAL STENOSIS OF LUMBAR REGION WITHOUT NEUROGENIC CLAUDICATION: ICD-10-CM

## 2020-08-05 PROCEDURE — 3079F PR MOST RECENT DIASTOLIC BLOOD PRESSURE 80-89 MM HG: ICD-10-PCS | Mod: CPTII,S$GLB,, | Performed by: NEUROLOGICAL SURGERY

## 2020-08-05 PROCEDURE — 72120 X-RAY BEND ONLY L-S SPINE: CPT | Mod: TC,PN,59

## 2020-08-05 PROCEDURE — 72082 X-RAY EXAM ENTIRE SPI 2/3 VW: CPT | Mod: TC,PN

## 2020-08-05 PROCEDURE — 99999 PR PBB SHADOW E&M-EST. PATIENT-LVL IV: ICD-10-PCS | Mod: PBBFAC,,, | Performed by: NEUROLOGICAL SURGERY

## 2020-08-05 PROCEDURE — 99205 OFFICE O/P NEW HI 60 MIN: CPT | Mod: S$GLB,,, | Performed by: NEUROLOGICAL SURGERY

## 2020-08-05 PROCEDURE — 72120 XR LUMBAR SPINE FLEXION AND EXTENSION ONLY: ICD-10-PCS | Mod: 26,59,, | Performed by: RADIOLOGY

## 2020-08-05 PROCEDURE — 99205 PR OFFICE/OUTPT VISIT, NEW, LEVL V, 60-74 MIN: ICD-10-PCS | Mod: S$GLB,,, | Performed by: NEUROLOGICAL SURGERY

## 2020-08-05 PROCEDURE — 99999 PR PBB SHADOW E&M-EST. PATIENT-LVL IV: CPT | Mod: PBBFAC,,, | Performed by: NEUROLOGICAL SURGERY

## 2020-08-05 PROCEDURE — 72082 XR SCOLIOSIS COMPLETE: ICD-10-PCS | Mod: 26,,, | Performed by: RADIOLOGY

## 2020-08-05 PROCEDURE — 3008F PR BODY MASS INDEX (BMI) DOCUMENTED: ICD-10-PCS | Mod: CPTII,S$GLB,, | Performed by: NEUROLOGICAL SURGERY

## 2020-08-05 PROCEDURE — 3075F PR MOST RECENT SYSTOLIC BLOOD PRESS GE 130-139MM HG: ICD-10-PCS | Mod: CPTII,S$GLB,, | Performed by: NEUROLOGICAL SURGERY

## 2020-08-05 PROCEDURE — 72082 X-RAY EXAM ENTIRE SPI 2/3 VW: CPT | Mod: 26,,, | Performed by: RADIOLOGY

## 2020-08-05 PROCEDURE — 72120 X-RAY BEND ONLY L-S SPINE: CPT | Mod: 26,59,, | Performed by: RADIOLOGY

## 2020-08-05 PROCEDURE — 3008F BODY MASS INDEX DOCD: CPT | Mod: CPTII,S$GLB,, | Performed by: NEUROLOGICAL SURGERY

## 2020-08-05 PROCEDURE — 3079F DIAST BP 80-89 MM HG: CPT | Mod: CPTII,S$GLB,, | Performed by: NEUROLOGICAL SURGERY

## 2020-08-05 PROCEDURE — 3075F SYST BP GE 130 - 139MM HG: CPT | Mod: CPTII,S$GLB,, | Performed by: NEUROLOGICAL SURGERY

## 2020-08-05 NOTE — PROGRESS NOTES
NEUROSURGICAL OUTPATIENT CONSULTATION NOTE    DATE OF SERVICE:  08/05/2020    ATTENDING PHYSICIAN:  Marvin Keen MD    CONSULT REQUESTED BY:  Delisa Shah MD    REASON FOR CONSULT:  Lumbar back pain with radiculopathy    SUBJECTIVE:    HISTORY OF PRESENT ILLNESS:  This is a very pleasant 49 y.o. female who presents as referral for low back pain and leg pain. She says the back pain started around 3 years ago but has gotten worse over the past 6 months. Patient also has pain going down the posterolateral leg to the dorsum of the foot that is almost constant on the right and intermittent on the left. No specific positions make the pain better.  Right more than left leg pain.  Back pain is as severe as leg pain.  She be up and moving for 1-2 hours before having to rest due to severe pain in the back and legs. Denies any numbness or weakness.  No sphincter dysfunction symptoms.  She has done physical therapy 2 years ago without significant pain relief.  She is taking gabapentin with partial pain relief.  Pain is interfering with walking.  She is now limping because of the pain.  Pain is interfering with working.  She is working at [employer].  Pain is interfering with quality of life.                PAST MEDICAL HISTORY:  Active Ambulatory Problems     Diagnosis Date Noted    Essential hypertension 11/12/2013    Thrombocytosis 12/23/2013    Snoring 08/20/2014    Obesity (BMI 30-39.9) 08/20/2014    Insomnia 11/28/2016    Chronic bilateral low back pain with left-sided sciatica 11/28/2016    Hypokalemia 11/28/2016    Class 2 obesity with body mass index (BMI) of 37.0 to 37.9 in adult 11/28/2016    Lumbar facet arthropathy 12/20/2016    Spondylolisthesis at L4-L5 level 12/20/2016    Sacroiliitis 12/20/2016    Muscle strain of right forearm 04/04/2017    Palpitations 07/03/2017    Headache 07/03/2017    Hematuria 08/13/2018    Constipation 08/13/2018    Urinary tract infection without hematuria  2018    Chest pain 2019    Gastroesophageal reflux disease 2019    Family history of premature CAD 2019     Resolved Ambulatory Problems     Diagnosis Date Noted    Preventative health care 2015    Routine general medical examination at a health care facility 2015    Chronic low back pain with left-sided sciatica 10/11/2018    Impaired gait and mobility 10/11/2018     Past Medical History:   Diagnosis Date    Hypertension        PAST SURGICAL HISTORY:  Past Surgical History:   Procedure Laterality Date     SECTION      TUBAL LIGATION         SOCIAL HISTORY:   Social History     Socioeconomic History    Marital status: Single     Spouse name: Not on file    Number of children: 3    Years of education: Not on file    Highest education level: Not on file   Occupational History     Employer: [id number]     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   Social Needs    Financial resource strain: Not very hard    Food insecurity     Worry: Never true     Inability: Never true    Transportation needs     Medical: No     Non-medical: No   Tobacco Use    Smoking status: Never Smoker    Smokeless tobacco: Never Used   Substance and Sexual Activity    Alcohol use: No     Frequency: Never     Drinks per session: Patient refused     Binge frequency: Never    Drug use: No    Sexual activity: Yes     Partners: Male     Birth control/protection: Surgical     Comment: BTL   Lifestyle    Physical activity     Days per week: 0 days     Minutes per session: Not on file    Stress: Only a little   Relationships    Social connections     Talks on phone: Twice a week     Gets together: Never     Attends Alevism service: More than 4 times per year     Active member of club or organization: No     Attends meetings of clubs or organizations: Never     Relationship status:    Other Topics Concern    Not on file   Social History Narrative    Not on file        FAMILY HISTORY:  Family History   Problem Relation Age of Onset    Heart disease Father     Hypertension Father     Breast cancer Paternal Aunt 60    Diabetes Neg Hx     Colon cancer Neg Hx     Ovarian cancer Neg Hx        CURRENTS MEDICATIONS:  Current Outpatient Medications on File Prior to Visit   Medication Sig Dispense Refill    aspirin (ECOTRIN) 81 MG EC tablet Take 81 mg by mouth once daily.      gabapentin (NEURONTIN) 100 MG capsule Take 1 capsule (100 mg total) by mouth 3 (three) times daily as needed (back and leg pain). 90 capsule 11    hydroCHLOROthiazide (HYDRODIURIL) 25 MG tablet Take 1 tablet (25 mg total) by mouth once daily. 90 tablet 3    loratadine (CLARITIN) 10 mg tablet Take 1 tablet (10 mg total) by mouth once daily. 30 tablet 2    losartan (COZAAR) 25 MG tablet Take 1 tablet (25 mg total) by mouth once daily. 90 tablet 1    albuterol (PROVENTIL/VENTOLIN HFA) 90 mcg/actuation inhaler Inhale 2 puffs into the lungs every 6 (six) hours as needed. Rescue (Patient not taking: Reported on 8/5/2020) 18 g 3    promethazine-dextromethorphan (PROMETHAZINE-DM) 6.25-15 mg/5 mL Syrp Take one tsp po q 6 hrs prn cough (Patient not taking: Reported on 8/5/2020) 180 mL 0     No current facility-administered medications on file prior to visit.        ALLERGIES:  Review of patient's allergies indicates:  No Known Allergies    REVIEW OF SYSTEMS:  Review of Systems   Constitutional: Negative for diaphoresis, fever and weight loss.   Respiratory: Negative for shortness of breath.    Cardiovascular: Negative for chest pain.   Gastrointestinal: Negative for blood in stool.   Genitourinary: Negative for hematuria.   Endo/Heme/Allergies: Does not bruise/bleed easily.   All other systems reviewed and are negative.      OBJECTIVE:    PHYSICAL EXAMINATION:   Vitals:    08/05/20 0812   BP: 130/80   Pulse: 60       Physical Exam:  Vitals reviewed.    Constitutional: She appears well-developed and  well-nourished.     Eyes: Pupils are equal, round, and reactive to light. Conjunctivae and EOM are normal.     Cardiovascular: Normal distal pulses and no edema.     Abdominal: Soft.     Skin: Skin displays no rash on trunk and no rash on extremities. Skin displays no lesions on trunk and no lesions on extremities.     Psych/Behavior: She is alert. She is oriented to person, place, and time. She has a normal mood and affect.     Musculoskeletal:        Neck: Range of motion is full.     Neurological:        Coordination: She has a normal Romberg Test.        DTRs: Tricep reflexes are 2+ on the right side and 2+ on the left side. Bicep reflexes are 2+ on the right side and 2+ on the left side. Brachioradialis reflexes are 2+ on the right side and 2+ on the left side. Patellar reflexes are 2+ on the right side and 2+ on the left side. Achilles reflexes are 2+ on the right side and 2+ on the left side.        Cranial nerves: Cranial nerve(s) VIII, X, XI and XII are intact.       Back Exam     Tenderness   The patient is experiencing tenderness in the lumbar.    Range of Motion   Extension: abnormal   Flexion: abnormal   Lateral bend right: abnormal   Lateral bend left: abnormal   Rotation right: abnormal   Rotation left: abnormal     Muscle Strength   Right Quadriceps:  5/5   Left Quadriceps:  5/5   Right Hamstrings:  5/5   Left Hamstrings:  5/5     Tests   Straight leg raise right: negative  Straight leg raise left: negative    Other   Toe walk: normal  Heel walk: normal              Neurologic Exam     Mental Status   Oriented to person, place, and time.   Speech: speech is normal   Level of consciousness: alert    Cranial Nerves   Cranial nerves II through XII intact.     CN II   Visual fields full to confrontation.     CN III, IV, VI   Pupils are equal, round, and reactive to light.  Extraocular motions are normal.     CN V   Facial sensation intact.     CN VII   Facial expression full, symmetric.     CN VIII   CN  VIII normal.     CN IX, X   CN X normal.     CN XI   CN XI normal.     CN XII   CN XII normal.     Motor Exam   Muscle bulk: normal  Overall muscle tone: normal    Strength   Strength 5/5 throughout.   Right deltoid: 5/5  Left deltoid: 5/5  Right biceps: 5/5  Left biceps: 5/5  Right triceps: 5/5  Left triceps: 5/5  Right wrist flexion: 5/5  Left wrist flexion: 5/5  Right wrist extension: 5/5  Left wrist extension: 5/5  Right interossei: 5/5  Left interossei: 5/5  Right iliopsoas: 5/5  Left iliopsoas: 5/5  Right quadriceps: 5/5  Left quadriceps: 5/5  Right hamstrin/5  Left hamstrin/5  Right anterior tibial: 5/5  Left anterior tibial: 5/5  Right posterior tibial: 5/5  Left posterior tibial: 5/5  Right peroneal: 5/5  Left peroneal: 5/5  Right gastroc: 5/5  Left gastroc: 5/5    Sensory Exam   Light touch normal.   Vibration normal.   Proprioception normal.   Pinprick normal.     Gait, Coordination, and Reflexes     Gait  Gait: normal    Coordination   Romberg: negative  Finger to nose coordination: normal  Tandem walking coordination: normal    Reflexes   Right brachioradialis: 2+  Left brachioradialis: 2+  Right biceps: 2+  Left biceps: 2+  Right triceps: 2+  Left triceps: 2+  Right patellar: 2+  Left patellar: 2+  Right achilles: 2+  Left achilles: 2+  Right : 2+  Left : 2+  Right plantar: normal  Left plantar: normal  Right Gillis: absent  Left Gillis: absent  Right ankle clonus: absent  Left ankle clonus: absent        DIAGNOSTIC DATA:  I personally interpreted the following imaging:   Lumbar spine MRI  compared 2018, in 2018 the L4-5 degenerative spondylolisthesis was measuring 3 mm, increased to 6 mm in 2020, severe L4-5 spinal stenosis  Lumbar flexion-extension x-rays shows progression of the spondylolisthesis from 3 to 6 mm    ASSESMENT:  This is a 49 y.o. female with L4-5 spondylolisthesis that has worsened compared to previous MRI done 2 years ago. Back pain and lower extremity  radiculopathy have worsened as well.     Problem List Items Addressed This Visit        Orthopedic    Spondylolisthesis at L4-L5 level - Primary    Relevant Orders    X-Ray Lumbar Spine Flexion And Extension Only    X-Ray Scoliosis Complete Including Supine And Erect      Other Visit Diagnoses     Spinal stenosis of lumbar region without neurogenic claudication   (Chronic)            PLAN:  I explained the natural history of the disease and all treatment options. I recommended a left L4-5 oblique interbody fusion with placement of interbody spacer and posterior instrumentation, L4-5 laminectomy, medial facetectomy.  The goals of the surgery is to prevent worsening radiculopathy due to the worsening chronic instability at L4-5 causing radiculopathy.      We have discussed the risks of surgery including bleeding, infection, failure of surgery, CSF leak, nerve root injury, spinal cord injury, ureter injury, weakness, paralysis, peripheral neuropathy, malplaced hardware, migration of hardware, non-union, need for reoperation. Patient understands the risks and would like to proceed with surgery.      The patient has increased perioperative risks because of these comorbidities:  Essential hypertension.         Marvin Keen MD  Cell:[phone]

## 2020-08-05 NOTE — LETTER
August 5, 2020      Delisa Shah MD  [address]  [place] [address]           [facility]  [address]  [facility] [address]  Phone: [phone]          Patient: Isaura Mancini   MR Number: [id number]   YOB: 1970   Date of Visit: 8/5/2020       Dear Dr. Delisa Shah:    Thank you for referring Isaura Mancini to me for evaluation. Attached you will find relevant portions of my assessment and plan of care.    If you have questions, please do not hesitate to call me. I look forward to following Isaura Mancini along with you.    Sincerely,    Marvin Keen MD    Enclosure  CC:  No Recipients    If you would like to receive this communication electronically, please contact [email or web address] or [phone] to request more information on [employer] Link access.    For providers and/or their staff who would like to refer a patient to [facility], please contact us through our one-stop-shop provider referral line, [facility], at [phone].    If you feel you have received this communication in error or would no longer like to receive these types of communications, please e-mail [email or web address]

## 2020-08-06 ENCOUNTER — TELEPHONE (OUTPATIENT)
Dept: NEUROSURGERY | Facility: CLINIC | Age: 50
End: 2020-08-06

## 2020-08-06 DIAGNOSIS — M43.16 SPONDYLOLISTHESIS AT L4-L5 LEVEL: Primary | ICD-10-CM

## 2020-08-06 DIAGNOSIS — M51.36 DDD (DEGENERATIVE DISC DISEASE), LUMBAR: ICD-10-CM

## 2020-08-06 DIAGNOSIS — M43.27 FUSION OF SPINE, LUMBOSACRAL REGION: ICD-10-CM

## 2020-08-06 DIAGNOSIS — Z41.9 SURGERY, ELECTIVE: ICD-10-CM

## 2020-08-06 DIAGNOSIS — M54.16 LUMBAR RADICULOPATHY: ICD-10-CM

## 2020-08-18 ENCOUNTER — TELEPHONE (OUTPATIENT)
Dept: NEUROSURGERY | Facility: CLINIC | Age: 50
End: 2020-08-18

## 2020-08-18 NOTE — TELEPHONE ENCOUNTER
SYD Venegas Staff             Good Morning,     I am attempting to secure an authorization for this procedure.   Please provide the name of the Allograft that will be used and the .       Thanks

## 2020-08-21 ENCOUNTER — TELEPHONE (OUTPATIENT)
Dept: NEUROSURGERY | Facility: CLINIC | Age: 50
End: 2020-08-21

## 2020-08-21 NOTE — TELEPHONE ENCOUNTER
Me  to Eli Gonzalez LPN          8/18/20 1:28 PM  Dr Keen would like a list of the allographs that is covered and he will pick one. Thanks Mya Keen MD  to Me          8/18/20 1:14 PM  Ask them to give us a list of allograft that they authorize and we will choose.   Me  to Marvin Keen MD          8/18/20 11:53 AM

## 2020-08-27 ENCOUNTER — PATIENT OUTREACH (OUTPATIENT)
Dept: OTHER | Facility: OTHER | Age: 50
End: 2020-08-27

## 2020-08-28 ENCOUNTER — OFFICE VISIT (OUTPATIENT)
Dept: INTERNAL MEDICINE | Facility: CLINIC | Age: 50
End: 2020-08-28
Payer: COMMERCIAL

## 2020-08-28 ENCOUNTER — HOSPITAL ENCOUNTER (OUTPATIENT)
Dept: RADIOLOGY | Facility: HOSPITAL | Age: 50
Discharge: HOME | End: 2020-08-28
Attending: INTERNAL MEDICINE
Payer: COMMERCIAL

## 2020-08-28 VITALS
TEMPERATURE: 98 F | HEART RATE: 62 BPM | WEIGHT: 198.44 LBS | BODY MASS INDEX: 36.52 KG/M2 | HEIGHT: 62 IN | OXYGEN SATURATION: 100 % | DIASTOLIC BLOOD PRESSURE: 80 MMHG | SYSTOLIC BLOOD PRESSURE: 120 MMHG

## 2020-08-28 DIAGNOSIS — E66.9 OBESITY (BMI 30-39.9): ICD-10-CM

## 2020-08-28 DIAGNOSIS — Z01.818 PREOPERATIVE EXAMINATION: Primary | ICD-10-CM

## 2020-08-28 DIAGNOSIS — Z01.818 PREOPERATIVE EXAMINATION: ICD-10-CM

## 2020-08-28 DIAGNOSIS — I10 ESSENTIAL HYPERTENSION: ICD-10-CM

## 2020-08-28 PROCEDURE — 3079F DIAST BP 80-89 MM HG: CPT | Mod: CPTII,S$GLB,, | Performed by: INTERNAL MEDICINE

## 2020-08-28 PROCEDURE — 99213 PR OFFICE/OUTPT VISIT, EST, LEVL III, 20-29 MIN: ICD-10-PCS | Mod: S$GLB,,, | Performed by: INTERNAL MEDICINE

## 2020-08-28 PROCEDURE — 3008F PR BODY MASS INDEX (BMI) DOCUMENTED: ICD-10-PCS | Mod: CPTII,S$GLB,, | Performed by: INTERNAL MEDICINE

## 2020-08-28 PROCEDURE — 71046 XR CHEST PA AND LATERAL: ICD-10-PCS | Mod: 26,,, | Performed by: RADIOLOGY

## 2020-08-28 PROCEDURE — 93010 ELECTROCARDIOGRAM REPORT: CPT | Mod: S$GLB,,, | Performed by: INTERNAL MEDICINE

## 2020-08-28 PROCEDURE — 3008F BODY MASS INDEX DOCD: CPT | Mod: CPTII,S$GLB,, | Performed by: INTERNAL MEDICINE

## 2020-08-28 PROCEDURE — 71046 X-RAY EXAM CHEST 2 VIEWS: CPT | Mod: 26,,, | Performed by: RADIOLOGY

## 2020-08-28 PROCEDURE — 71046 X-RAY EXAM CHEST 2 VIEWS: CPT | Mod: TC,FY,PO

## 2020-08-28 PROCEDURE — 99999 PR PBB SHADOW E&M-EST. PATIENT-LVL III: ICD-10-PCS | Mod: PBBFAC,,, | Performed by: INTERNAL MEDICINE

## 2020-08-28 PROCEDURE — 93010 EKG 12-LEAD: ICD-10-PCS | Mod: S$GLB,,, | Performed by: INTERNAL MEDICINE

## 2020-08-28 PROCEDURE — 93005 ELECTROCARDIOGRAM TRACING: CPT | Mod: S$GLB,,, | Performed by: INTERNAL MEDICINE

## 2020-08-28 PROCEDURE — 3079F PR MOST RECENT DIASTOLIC BLOOD PRESSURE 80-89 MM HG: ICD-10-PCS | Mod: CPTII,S$GLB,, | Performed by: INTERNAL MEDICINE

## 2020-08-28 PROCEDURE — 99213 OFFICE O/P EST LOW 20 MIN: CPT | Mod: S$GLB,,, | Performed by: INTERNAL MEDICINE

## 2020-08-28 PROCEDURE — 3074F SYST BP LT 130 MM HG: CPT | Mod: CPTII,S$GLB,, | Performed by: INTERNAL MEDICINE

## 2020-08-28 PROCEDURE — 3074F PR MOST RECENT SYSTOLIC BLOOD PRESSURE < 130 MM HG: ICD-10-PCS | Mod: CPTII,S$GLB,, | Performed by: INTERNAL MEDICINE

## 2020-08-28 PROCEDURE — 99999 PR PBB SHADOW E&M-EST. PATIENT-LVL III: CPT | Mod: PBBFAC,,, | Performed by: INTERNAL MEDICINE

## 2020-08-28 PROCEDURE — 93005 EKG 12-LEAD: ICD-10-PCS | Mod: S$GLB,,, | Performed by: INTERNAL MEDICINE

## 2020-08-28 NOTE — PROGRESS NOTES
Patient ID: Isaura Mancini is a 50 y.o. female.    Chief Complaint: Pre-op Exam    HPI Isaura is a 50 y.o. female with hypertension and obesity who presents for preop evaluation.  She has no acute complaints or concerns today.  Blood pressure is well controlled at 120/80 in the clinic.  Patient states that typically her systolic is less than 120 and her diastolic is in the 70s at home.  She is compliant with her losartan and hydrochlorothiazide medicines for treatment.  Patient has improved her diet.  She adheres to a low-sodium and low sugar diet.  She has lost 10 lb in the last 1 month.    Patient plans to undergo lumbar fusion in the area of L4-L5 with neurosurgeon Dr. Keen on 2020.  Patient has undergone  and tubal ligation in the past.  No adverse reactions to general anesthesia.  No bleeding or blood clotting issues.  Patient feels that she can walk up a flight of stairs without shortness of breath or chest pain.  Patient feels that she could play sports without chest pain or significant shortness of breath.    Review of Systems   All other systems reviewed and are negative.      Objective:     Vitals:    20 0810   BP: 120/80   Pulse: 62   Temp: 97.9 °F (36.6 °C)        Physical Exam  Vitals signs reviewed.   Constitutional:       General: She is not in acute distress.     Appearance: Normal appearance. She is well-developed. She is obese. She is not ill-appearing or diaphoretic.   HENT:      Head: Normocephalic and atraumatic.      Right Ear: External ear normal.      Left Ear: External ear normal.      Nose: Nose normal.      Mouth/Throat:      Pharynx: No oropharyngeal exudate.   Eyes:      General: No scleral icterus.        Right eye: No discharge.         Left eye: No discharge.      Extraocular Movements: Extraocular movements intact.      Conjunctiva/sclera: Conjunctivae normal.   Cardiovascular:      Rate and Rhythm: Normal rate and regular rhythm.      Heart sounds: Normal  heart sounds. No murmur. No friction rub. No gallop.    Pulmonary:      Effort: Pulmonary effort is normal. No respiratory distress.      Breath sounds: Normal breath sounds. No stridor. No wheezing, rhonchi or rales.   Skin:     General: Skin is warm and dry.   Neurological:      General: No focal deficit present.      Mental Status: She is alert and oriented to person, place, and time. Mental status is at baseline.   Psychiatric:         Mood and Affect: Mood normal.         Behavior: Behavior normal.         Thought Content: Thought content normal.         Judgment: Judgment normal.         Assessment:       1. Preoperative examination    2. Essential hypertension Well controlled   3. Obesity (BMI 30-39.9) Active       Plan:     Patient's only medical conditions are hypertension and obesity.  Hypertension is well controlled.  Patient is currently losing weight.  EKG was within normal limits and was unchanged when compared to prior EKG from September 2019. Patient was instructed to hold aspirin for 7 days before surgery.   Assuming no major abnormalities on labs or chest xray, patient is at low risk for any adverse events occurring during this intermediate risk procedure.     Preoperative examination  -     EKG 12-lead; Future  -     CBC auto differential; Future; Expected date: 08/28/2020  -     Comprehensive metabolic panel; Future; Expected date: 08/28/2020  -     Protime-INR; Future; Expected date: 08/28/2020  -     APTT; Future; Expected date: 08/28/2020  -     Urinalysis; Future; Expected date: 08/28/2020  -     X-Ray Chest PA And Lateral; Future; Expected date: 08/28/2020    Essential hypertension  Comments:  Continue current medications    Obesity (BMI 30-39.9)  Comments:  Encouraged continued diet and weight loss.        RTC PRN    Warning signs discussed, patient to call with any further issues or worsening of symptoms.       Parts of the above note were dictated using a voice dictation software. Please  excuse any grammatical or typographical errors.

## 2020-09-14 ENCOUNTER — HOSPITAL ENCOUNTER (OUTPATIENT)
Dept: PREADMISSION TESTING | Facility: HOSPITAL | Age: 50
Discharge: HOME | End: 2020-09-14
Attending: NEUROLOGICAL SURGERY
Payer: COMMERCIAL

## 2020-09-14 VITALS
TEMPERATURE: 99 F | BODY MASS INDEX: 35.7 KG/M2 | OXYGEN SATURATION: 100 % | WEIGHT: 194 LBS | RESPIRATION RATE: 16 BRPM | HEART RATE: 63 BPM | HEIGHT: 62 IN | SYSTOLIC BLOOD PRESSURE: 133 MMHG | DIASTOLIC BLOOD PRESSURE: 83 MMHG

## 2020-09-14 RX ORDER — SODIUM CHLORIDE, SODIUM LACTATE, POTASSIUM CHLORIDE, CALCIUM CHLORIDE 600; 310; 30; 20 MG/100ML; MG/100ML; MG/100ML; MG/100ML
INJECTION, SOLUTION INTRAVENOUS CONTINUOUS
Status: CANCELLED | OUTPATIENT
Start: 2020-09-14

## 2020-09-14 RX ORDER — LIDOCAINE HYDROCHLORIDE 10 MG/ML
1 INJECTION, SOLUTION EPIDURAL; INFILTRATION; INTRACAUDAL; PERINEURAL ONCE
Status: CANCELLED | OUTPATIENT
Start: 2020-09-14 | End: 2020-09-14

## 2020-09-14 NOTE — DISCHARGE INSTRUCTIONS
Your surgery is scheduled for 9/22/20.    Please report to Front Lobby on the 1st Floor at 10:15a.m.    THIS TIME IS SUBJECT TO CHANGE.  YOU WILL RECEIVE A PHONE CALL THE DAY BEFORE SURGERY BY 3:30 PM TO CONFIRM YOUR TIME OF ARRIVAL.  IF YOU HAVE NOT RECEIVED A PHONE CALL BY 3:30 PM THE DAY BEFORE YOUR SURGERY PLEASE CALL [phone]     INSTRUCTIONS IMPORTANT!!!  ¨ Do not eat or drink after 12 midnight-including water. OK to brush teeth, no   gum, candy or mints!    ____  Proceed to [facility] on 9/14/20 for additional testing.        ____  Do not wear makeup, including mascara.  ____  No powder, lotions or creams to surgical area.  ____  Please remove all jewelry, including piercings and leave at home.  ____  No money or valuables needed. Please leave at home.  ____  Please bring any documents given by your doctor.  ____  If going home the same day, arrange for a ride home. You will not be able to             drive if Anesthesia was used.  ____  Wear loose fitting clothing. Allow for dressings, bandages.  ____  Stop Aspirin, Ibuprofen, Motrin and Aleve at least 3-5 days before surgery, unless otherwise instructed by your doctor, or the nurse.   You MAY use Tylenol/acetaminophen until day of surgery.  ____  Wash the surgical area with Hibiclens the night before surgery, and again the             morning of surgery.  Be sure to rinse hibiclens off completely (if instructed by   nurse).  ____  If you take diabetic medication, do not take am of surgery unless instructed by Doctor.  ____  Call MD for temperature above 101 degrees or any other signs of infection such as Urinary (bladder) infection, Upper respiratory infection, skin boils, etc.  ____ Stop taking any Fish Oil supplement or any Vitamins that contain Vitamin E at least 5 days prior to surgery.  ____ Do Not wear your contact lenses the day of your procedure.  You may wear your glasses.      ____Do not shave surgical site for 3 days prior to  surgery.  ____ Practice Good hand washing before, during, and after procedure.      I have read or had read and explained to me, and understand the above information.  Additional comments or instructions:  For additional questions call [phone]      ANESTHESIA SIDE EFFECTS  -For the first 24 hours after surgery:  Do not drive, use heavy equipment, make important decisions, or drink alcohol  -It is normal to feel sleepy for several hours.  Rest until you are more awake.  -Have someone stay with you, if needed.  They can watch for problems and help keep you safe.  -Some possible post anesthesia side effects include: nausea and vomiting, sore throat and hoarseness, sleepiness, and dizziness.        Pre-Op Bathing Instructions    Before surgery, you can play an important role in your own health.    Because skin is not sterile, we need to be sure that your skin is as free of germs as possible. By following the instructions below, you can reduce the number of germs on your skin before surgery.    IMPORTANT: You will need to shower with a special soap called Hibiclens*, available at any pharmacy.  If you are allergic to Chlorhexidine (the antiseptic in Hibiclens), use an antibacterial soap such as Dial Soap for your preoperative shower.  You will shower with Hibiclens both the night before your surgery and the morning of your surgery.  Do not use Hibiclens on the head, face or genitals to avoid injury to those areas.    STEP #1: THE NIGHT BEFORE YOUR SURGERY     1. Do not shave the area of your body where your surgery will be performed.  2. Shower and wash your hair and body as usual with your normal soap and shampoo.  3. Rinse your hair and body thoroughly after you shower to remove all soap residue.  4. With your hand, apply one packet of Hibiclens soap to the surgical site.   5. Wash the site gently for five (5) minutes. Do not scrub your skin too hard.   6. Do not wash with your regular soap after Hibiclens is  used.  7. Rinse your body thoroughly.  8. Pat yourself dry with a clean, soft towel.  9. Do not use lotion, cream, or powder.  10. Wear clean clothes.    STEP #2: THE MORNING OF YOUR SURGERY     1. Repeat Step #1.    * Not to be used by people allergic to Chlorhexidine.

## 2020-09-16 ENCOUNTER — PATIENT OUTREACH (OUTPATIENT)
Dept: OTHER | Facility: OTHER | Age: 50
End: 2020-09-16

## 2020-09-17 NOTE — PROGRESS NOTES
Digital Medicine: Health  Follow-Up    The history is provided by the patient.             Reason for review: Blood pressure at goal      Additional Follow-up details: Average blood pressure today is 120/76. Her readings are below goal and stable. She is having back surgery next week but will still be able to take readings.           Diet-Change    Patient reports eating or drinking the following: She reports that she has been doing a low sodium and low sugar diet. She has also been watching her carbs and starches. This has been going really well for her. She has also been trying to get more water throughout the day. She has lost 18 pounds in 2 months. She is feeling a lot better.       Physical Activity-no change to routine  No change to exercise routine.       Additional physical activity details: She hasn't been able to get exercise in because of her back pain. She is hoping after her surgery and once she is recovered she will be able to start this back up again.       Medication Adherence-Medication adherence was asssessed.  Patient continue taking medication as prescribed.          Substance, Sleep, Stress-change  stress-not assessed  Details:  Intervention(s):    Sleep-not assessed  Details:  Intervention(s):    Alcohol -assessed  Details:No alcohol use   Intervention(s):    Tobacco-Assessed  Details:No tobacco use   Intervention(s):        Plan  There are no preventive care reminders to display for this patient.    Last 5 Patient Entered Readings                                      Current 30 Day Average: 120/76     Recent Readings 9/16/2020 9/16/2020 9/13/2020 9/11/2020 9/9/2020    SBP (mmHg) 117 132 115 123 121    DBP (mmHg) 67 77 78 74 76    Pulse 64 67 62 63 65

## 2020-09-19 ENCOUNTER — CLINICAL SUPPORT (OUTPATIENT)
Dept: URGENT CARE | Facility: CLINIC | Age: 50
End: 2020-09-19
Payer: COMMERCIAL

## 2020-09-19 DIAGNOSIS — Z41.9 SURGERY, ELECTIVE: ICD-10-CM

## 2020-09-19 PROCEDURE — U0003 INFECTIOUS AGENT DETECTION BY NUCLEIC ACID (DNA OR RNA); SEVERE ACUTE RESPIRATORY SYNDROME CORONAVIRUS 2 (SARS-COV-2) (CORONAVIRUS DISEASE [COVID-19]), AMPLIFIED PROBE TECHNIQUE, MAKING USE OF HIGH THROUGHPUT TECHNOLOGIES AS DESCRIBED BY CMS-2020-01-R: HCPCS

## 2020-09-20 LAB — SARS-COV-2 RNA RESP QL NAA+PROBE: NOT DETECTED

## 2020-09-22 PROBLEM — M51.36 DEGENERATIVE DISC DISEASE, LUMBAR: Status: ACTIVE | Noted: 2020-09-22

## 2020-09-22 PROBLEM — M51.369 DEGENERATIVE DISC DISEASE, LUMBAR: Status: ACTIVE | Noted: 2020-09-22

## 2020-09-23 ENCOUNTER — TELEPHONE (OUTPATIENT)
Dept: NEUROSURGERY | Facility: CLINIC | Age: 50
End: 2020-09-23

## 2020-09-23 NOTE — TELEPHONE ENCOUNTER
----- Message from Robe Fischer sent at 9/23/2020 10:06 AM CDT -----  Type:  Needs Medical Advice    Who Called: Diamond, registered  nurse with [facility]  Symptoms (please be specific): surgery appt  How long has patient had these symptoms:  unknown  Pharmacy name and phone #:  n/a  Would the patient rather a call back or a response via [facility]? Call back  Best Call Back Number: [phone]  Additional Information: Call Diamond as soon as possible

## 2020-09-24 ENCOUNTER — TELEPHONE (OUTPATIENT)
Dept: NEUROSURGERY | Facility: CLINIC | Age: 50
End: 2020-09-24

## 2020-09-24 NOTE — TELEPHONE ENCOUNTER
----- Message from Terrence Shrestha sent at 9/24/2020  9:51 AM CDT -----  Regarding: call back  Type:  Needs Medical Advice    Who Called: patient   Reason: patient needs to know when will her surgery be. Patient has taken a leave of absence at work and if surgery is not scheduled soon she needs to go back to work.  Would the patient rather a call back or a response via [facility]? Call back   Best Call Back Number: [phone]  Additional Information: patient has been out 4 days that was suppose to be used for surgery.

## 2020-09-27 DIAGNOSIS — R31.9 HEMATURIA, UNSPECIFIED TYPE: Primary | ICD-10-CM

## 2020-09-28 ENCOUNTER — LAB VISIT (OUTPATIENT)
Dept: LAB | Facility: HOSPITAL | Age: 50
End: 2020-09-28
Attending: INTERNAL MEDICINE
Payer: COMMERCIAL

## 2020-09-28 DIAGNOSIS — R31.9 HEMATURIA, UNSPECIFIED TYPE: ICD-10-CM

## 2020-09-28 LAB
BILIRUB UR QL STRIP: NEGATIVE
CLARITY UR REFRACT.AUTO: ABNORMAL
COLOR UR AUTO: YELLOW
GLUCOSE UR QL STRIP: NEGATIVE
HGB UR QL STRIP: NEGATIVE
KETONES UR QL STRIP: NEGATIVE
LEUKOCYTE ESTERASE UR QL STRIP: NEGATIVE
NITRITE UR QL STRIP: NEGATIVE
PH UR STRIP: 7 [PH] (ref 5–8)
PROT UR QL STRIP: NEGATIVE
SP GR UR STRIP: 1.02 (ref 1–1.03)
URN SPEC COLLECT METH UR: ABNORMAL

## 2020-09-28 PROCEDURE — 81003 URINALYSIS AUTO W/O SCOPE: CPT

## 2020-09-28 PROCEDURE — 87086 URINE CULTURE/COLONY COUNT: CPT

## 2020-09-29 LAB — BACTERIA UR CULT: NO GROWTH

## 2020-10-05 ENCOUNTER — TELEPHONE (OUTPATIENT)
Dept: NEUROSURGERY | Facility: CLINIC | Age: 50
End: 2020-10-05

## 2020-10-05 DIAGNOSIS — M54.16 LUMBAR RADICULOPATHY: ICD-10-CM

## 2020-10-05 DIAGNOSIS — M51.36 DDD (DEGENERATIVE DISC DISEASE), LUMBAR: ICD-10-CM

## 2020-10-05 DIAGNOSIS — M43.16 SPONDYLOLISTHESIS AT L4-L5 LEVEL: Primary | ICD-10-CM

## 2020-10-05 DIAGNOSIS — Z41.9 SURGERY, ELECTIVE: ICD-10-CM

## 2020-10-29 ENCOUNTER — PATIENT OUTREACH (OUTPATIENT)
Dept: OTHER | Facility: OTHER | Age: 50
End: 2020-10-29

## 2020-10-30 ENCOUNTER — OFFICE VISIT (OUTPATIENT)
Dept: URGENT CARE | Facility: CLINIC | Age: 50
End: 2020-10-30
Payer: COMMERCIAL

## 2020-10-30 VITALS
OXYGEN SATURATION: 99 % | SYSTOLIC BLOOD PRESSURE: 137 MMHG | BODY MASS INDEX: 35.7 KG/M2 | WEIGHT: 194 LBS | HEART RATE: 63 BPM | HEIGHT: 62 IN | DIASTOLIC BLOOD PRESSURE: 83 MMHG | RESPIRATION RATE: 18 BRPM | TEMPERATURE: 98 F

## 2020-10-30 DIAGNOSIS — R21 RASH AND NONSPECIFIC SKIN ERUPTION: Primary | ICD-10-CM

## 2020-10-30 PROCEDURE — 99214 PR OFFICE/OUTPT VISIT, EST, LEVL IV, 30-39 MIN: ICD-10-PCS | Mod: S$GLB,,, | Performed by: NURSE PRACTITIONER

## 2020-10-30 PROCEDURE — 99214 OFFICE O/P EST MOD 30 MIN: CPT | Mod: S$GLB,,, | Performed by: NURSE PRACTITIONER

## 2020-10-30 RX ORDER — TRIAMCINOLONE ACETONIDE 5 MG/G
CREAM TOPICAL 2 TIMES DAILY
Qty: 15 G | Refills: 0 | Status: SHIPPED | OUTPATIENT
Start: 2020-10-30 | End: 2020-10-30

## 2020-10-30 RX ORDER — HYDROCORTISONE 25 MG/G
CREAM TOPICAL 2 TIMES DAILY
Qty: 20 G | Refills: 0 | Status: SHIPPED | OUTPATIENT
Start: 2020-10-30 | End: 2020-10-30

## 2020-10-30 RX ORDER — TRIAMCINOLONE ACETONIDE 5 MG/G
CREAM TOPICAL 2 TIMES DAILY
Qty: 15 G | Refills: 0 | Status: SHIPPED | OUTPATIENT
Start: 2020-10-30 | End: 2021-07-21

## 2020-10-30 RX ORDER — HYDROCORTISONE 25 MG/G
CREAM TOPICAL 2 TIMES DAILY
Qty: 20 G | Refills: 0 | Status: SHIPPED | OUTPATIENT
Start: 2020-10-30 | End: 2021-07-21

## 2020-10-30 NOTE — PROGRESS NOTES
"Subjective:       Patient ID: Isaura Mancini is a 50 y.o. female.    Vitals:  height is 5' 2" (1.575 m) and weight is 88 kg (194 lb). Her temperature is 97.9 °F (36.6 °C). Her blood pressure is 137/83 and her pulse is 63. Her respiration is 18 and oxygen saturation is 99%.     Chief Complaint: Rash    Pt states itchy red rash on arms and legs as of this am.    Rash  This is a new problem. The current episode started today. The problem is unchanged. The affected locations include the left arm, right arm, left upper leg and right upper leg. The rash is characterized by itchiness, redness and burning. She was exposed to nothing. Treatments tried: benadryl. The treatment provided no relief.       Skin: Positive for rash and erythema.       Objective:      Physical Exam   Constitutional: She is oriented to person, place, and time. She appears well-developed.   HENT:   Head: Normocephalic and atraumatic. Head is without abrasion, without contusion and without laceration.   Ears:   Right Ear: External ear normal.   Left Ear: External ear normal.   Nose: Nose normal.   Mouth/Throat: Oropharynx is clear and moist and mucous membranes are normal.   Eyes: Pupils are equal, round, and reactive to light. Conjunctivae, EOM and lids are normal.   Neck: Trachea normal, full passive range of motion without pain and phonation normal. Neck supple.   Cardiovascular: Normal rate, regular rhythm and normal heart sounds.   Pulmonary/Chest: Effort normal and breath sounds normal. No stridor. No respiratory distress.   Musculoskeletal: Normal range of motion.   Neurological: She is alert and oriented to person, place, and time.   Skin: Skin is warm, dry, intact and rash. Capillary refill takes less than 2 seconds. Lesions:  erythemaabrasion, burn, bruising and ecchymosisPsychiatric: Her speech is normal and behavior is normal. Judgment and thought content normal.   Nursing note and vitals reviewed.            Assessment:       1. Rash and " nonspecific skin eruption        Plan:     50 year old female presents with rash to left arm. Pt report that rash is very itchy. Unsure if she was bitten by insect. States that  She has no power. Denies respiratory symptoms or difficulty breathing.     Rash and nonspecific skin eruption  -     Discontinue: triamcinolone acetonide 0.5% (KENALOG) 0.5 % Crea; Apply topically 2 (two) times daily.  Dispense: 15 g; Refill: 0  -     Discontinue: hydrocortisone 2.5 % cream; Apply topically 2 (two) times daily.  Dispense: 20 g; Refill: 0  -     hydrocortisone 2.5 % cream; Apply topically 2 (two) times daily.  Dispense: 20 g; Refill: 0  -     triamcinolone acetonide 0.5% (KENALOG) 0.5 % Crea; Apply topically 2 (two) times daily.  Dispense: 15 g; Refill: 0         Patient Instructions   Please drink plenty of fluids.  Please get plenty of rest.  Please return here or go to the Emergency Department for any concerns or worsening of condition.  Please take over the counter Pepcid or Zantac as directed for the next 24-72hours as needed.  If you were given a steroid shot in the clinic and have also been given a prescription for a steroid such as Prednisone or a Medrol Dose Pack, please begin taking them tomorrow.  If you have a localized reaction it is ok to apply Cortaid as directed to the affected area.    Please take over the counter Benadryl as directed for the next 24-72 hours as needed for itching unless it makes you sleepy, then you can take over the counter Allegra or Claritin or Zyrtec as directed during the daytime hours as these generally do not cause drowsiness.    Please follow up with your primary care doctor or specialist as needed.    .

## 2020-10-30 NOTE — PATIENT INSTRUCTIONS
Please drink plenty of fluids.  Please get plenty of rest.  Please return here or go to the Emergency Department for any concerns or worsening of condition.  Please take over the counter Pepcid or Zantac as directed for the next 24-72hours as needed.  If you were given a steroid shot in the clinic and have also been given a prescription for a steroid such as Prednisone or a Medrol Dose Pack, please begin taking them tomorrow.  If you have a localized reaction it is ok to apply Cortaid as directed to the affected area.    Please take over the counter Benadryl as directed for the next 24-72 hours as needed for itching unless it makes you sleepy, then you can take over the counter Allegra or Claritin or Zyrtec as directed during the daytime hours as these generally do not cause drowsiness.    Please follow up with your primary care doctor or specialist as needed.    .

## 2020-11-03 NOTE — PROGRESS NOTES
Digital Medicine: Clinician Introduction    Isaura Mancini is a 50 y.o. female who is newly enrolled in the Digital Medicine Clinic.    Called to welcome patient to [facility] and to introduce myself. Reviewed goals of therapy, medication list/allergies, monitoring requirements and technique with patient      The history is provided by the patient.      Review of patient's allergies indicates:  No Known Allergies  Completed Medication Reconciliation  Verified pharmacy information.    HYPERTENSION  Explained hypertension digital medicine goals including BP goal less than or equal to 130/80mmHg, improved convenience of BP management and reduced risk of heart attack, kidney failure, stroke, eye disease, dementia, and death.     Explained non-pharmacologic therapies like low salt diet and physical activity can reduce blood pressure.       Explained that we expect patient to submit several blood pressure readings per week at random times of the day, but at least 30 minutes after taking blood pressure medications. Instructed patient not to allow anyone else to use their blood pressure monitor and phone as data submitted is directly entered into medical record. Reviewed and confirmed appropriate blood pressure monitoring technique.         Reviewed signs/symptoms of hypertension (headache, changes in vision, chest pain, shortness of breath)   Reviewed signs/symptoms of hypotension (lightheaded, dizziness, weakness)     Patient's BP goal is less than or equal to 130/80. Patients BP average is 115/77 mmHg, which is at goal, per 2017 ACC/AHA Hypertension Guidelines.         Last 5 Patient Entered Readings                                      Current 30 Day Average: 115/77     Recent Readings 11/2/2020 11/1/2020 10/27/2020 10/27/2020 10/22/2020    SBP (mmHg) 123 122 113 132 105    DBP (mmHg) 79 77 76 83 73    Pulse 70 67 72 72 66                Depression Screening  Isaura Mancini screened negative on the depression screening.      Sleep Apnea Screening  Patient not previously diagnosed with STAS       Medication Affordability Screening  Patient did not answer the medication affordability questionnaires.           ASSESSMENT(S)  Patients BP average is 115/77 mmHg, which is at goal. Patient's BP goal is less than or equal to 130/80.    Hypertension Plan  Continue current therapy.  Continue current diet/physical activity routine.       Addressed patient questions and patient has my contact information if needed prior to next outreach. Patient verbalizes understanding.      Explained the importance of self-monitoring and medication adherence. Encouraged the patient to communicate with their health  for lifestyle modifications to help improve or maintain a healthy lifestyle.        Sent link to [facility] webpages and my contact information via [employer] for future questions.        Explained to the patient that the Digital Medicine team is not available for emergencies. Advised patient call [facility] ([phone] or [phone]) or [phone] if needed.            There are no preventive care reminders to display for this patient.       Current Medication Regimen:  Hypertension Medications             hydroCHLOROthiazide (HYDRODIURIL) 25 MG tablet Take 1 tablet (25 mg total) by mouth once daily.    losartan (COZAAR) 25 MG tablet Take 1 tablet (25 mg total) by mouth once daily.

## 2020-11-20 ENCOUNTER — LAB VISIT (OUTPATIENT)
Dept: PRIMARY CARE CLINIC | Facility: OTHER | Age: 50
End: 2020-11-20
Attending: INTERNAL MEDICINE
Payer: COMMERCIAL

## 2020-11-20 DIAGNOSIS — Z03.818 ENCOUNTER FOR OBSERVATION FOR SUSPECTED EXPOSURE TO OTHER BIOLOGICAL AGENTS RULED OUT: ICD-10-CM

## 2020-11-20 PROCEDURE — U0003 INFECTIOUS AGENT DETECTION BY NUCLEIC ACID (DNA OR RNA); SEVERE ACUTE RESPIRATORY SYNDROME CORONAVIRUS 2 (SARS-COV-2) (CORONAVIRUS DISEASE [COVID-19]), AMPLIFIED PROBE TECHNIQUE, MAKING USE OF HIGH THROUGHPUT TECHNOLOGIES AS DESCRIBED BY CMS-2020-01-R: HCPCS

## 2020-11-23 LAB — SARS-COV-2 RNA RESP QL NAA+PROBE: NOT DETECTED

## 2021-01-05 ENCOUNTER — TELEPHONE (OUTPATIENT)
Dept: NEUROSURGERY | Facility: CLINIC | Age: 51
End: 2021-01-05

## 2021-01-14 ENCOUNTER — TELEPHONE (OUTPATIENT)
Dept: NEUROSURGERY | Facility: CLINIC | Age: 51
End: 2021-01-14

## 2021-01-14 ENCOUNTER — LAB VISIT (OUTPATIENT)
Dept: LAB | Facility: HOSPITAL | Age: 51
End: 2021-01-14
Attending: INTERNAL MEDICINE
Payer: COMMERCIAL

## 2021-01-14 DIAGNOSIS — Z00.00 ANNUAL PHYSICAL EXAM: ICD-10-CM

## 2021-01-14 LAB
ALBUMIN SERPL BCP-MCNC: 3.8 G/DL (ref 3.5–5.2)
ALP SERPL-CCNC: 56 U/L (ref 55–135)
ALT SERPL W/O P-5'-P-CCNC: 10 U/L (ref 10–44)
ANION GAP SERPL CALC-SCNC: 7 MMOL/L (ref 8–16)
AST SERPL-CCNC: 16 U/L (ref 10–40)
BASOPHILS # BLD AUTO: 0.03 K/UL (ref 0–0.2)
BASOPHILS NFR BLD: 0.4 % (ref 0–1.9)
BILIRUB SERPL-MCNC: 0.4 MG/DL (ref 0.1–1)
BUN SERPL-MCNC: 20 MG/DL (ref 6–20)
CALCIUM SERPL-MCNC: 9.2 MG/DL (ref 8.7–10.5)
CHLORIDE SERPL-SCNC: 104 MMOL/L (ref 95–110)
CHOLEST SERPL-MCNC: 193 MG/DL (ref 120–199)
CHOLEST/HDLC SERPL: 3 {RATIO} (ref 2–5)
CO2 SERPL-SCNC: 27 MMOL/L (ref 23–29)
CREAT SERPL-MCNC: 0.7 MG/DL (ref 0.5–1.4)
DIFFERENTIAL METHOD: ABNORMAL
EOSINOPHIL # BLD AUTO: 0.2 K/UL (ref 0–0.5)
EOSINOPHIL NFR BLD: 2 % (ref 0–8)
ERYTHROCYTE [DISTWIDTH] IN BLOOD BY AUTOMATED COUNT: 12.5 % (ref 11.5–14.5)
EST. GFR  (AFRICAN AMERICAN): >60 ML/MIN/1.73 M^2
EST. GFR  (NON AFRICAN AMERICAN): >60 ML/MIN/1.73 M^2
ESTIMATED AVG GLUCOSE: 97 MG/DL (ref 68–131)
GLUCOSE SERPL-MCNC: 89 MG/DL (ref 70–110)
HBA1C MFR BLD HPLC: 5 % (ref 4–5.6)
HCT VFR BLD AUTO: 42 % (ref 37–48.5)
HDLC SERPL-MCNC: 65 MG/DL (ref 40–75)
HDLC SERPL: 33.7 % (ref 20–50)
HGB BLD-MCNC: 13.4 G/DL (ref 12–16)
IMM GRANULOCYTES # BLD AUTO: 0.02 K/UL (ref 0–0.04)
IMM GRANULOCYTES NFR BLD AUTO: 0.2 % (ref 0–0.5)
LDLC SERPL CALC-MCNC: 115.8 MG/DL (ref 63–159)
LYMPHOCYTES # BLD AUTO: 2.6 K/UL (ref 1–4.8)
LYMPHOCYTES NFR BLD: 30.9 % (ref 18–48)
MCH RBC QN AUTO: 28.9 PG (ref 27–31)
MCHC RBC AUTO-ENTMCNC: 31.9 G/DL (ref 32–36)
MCV RBC AUTO: 91 FL (ref 82–98)
MONOCYTES # BLD AUTO: 0.5 K/UL (ref 0.3–1)
MONOCYTES NFR BLD: 5.6 % (ref 4–15)
NEUTROPHILS # BLD AUTO: 5.1 K/UL (ref 1.8–7.7)
NEUTROPHILS NFR BLD: 60.9 % (ref 38–73)
NONHDLC SERPL-MCNC: 128 MG/DL
NRBC BLD-RTO: 0 /100 WBC
PLATELET # BLD AUTO: 399 K/UL (ref 150–350)
PMV BLD AUTO: 11.1 FL (ref 9.2–12.9)
POTASSIUM SERPL-SCNC: 4.2 MMOL/L (ref 3.5–5.1)
PROT SERPL-MCNC: 7.4 G/DL (ref 6–8.4)
RBC # BLD AUTO: 4.64 M/UL (ref 4–5.4)
SODIUM SERPL-SCNC: 138 MMOL/L (ref 136–145)
TRIGL SERPL-MCNC: 61 MG/DL (ref 30–150)
TSH SERPL DL<=0.005 MIU/L-ACNC: 1 UIU/ML (ref 0.4–4)
WBC # BLD AUTO: 8.32 K/UL (ref 3.9–12.7)

## 2021-01-14 PROCEDURE — 84443 ASSAY THYROID STIM HORMONE: CPT

## 2021-01-14 PROCEDURE — 80061 LIPID PANEL: CPT

## 2021-01-14 PROCEDURE — 83036 HEMOGLOBIN GLYCOSYLATED A1C: CPT

## 2021-01-14 PROCEDURE — 85025 COMPLETE CBC W/AUTO DIFF WBC: CPT

## 2021-01-14 PROCEDURE — 36415 COLL VENOUS BLD VENIPUNCTURE: CPT | Mod: PO

## 2021-01-14 PROCEDURE — 80053 COMPREHEN METABOLIC PANEL: CPT

## 2021-01-19 ENCOUNTER — OFFICE VISIT (OUTPATIENT)
Dept: INTERNAL MEDICINE | Facility: CLINIC | Age: 51
End: 2021-01-19
Payer: COMMERCIAL

## 2021-01-19 VITALS
BODY MASS INDEX: 34.08 KG/M2 | TEMPERATURE: 98 F | HEIGHT: 62 IN | WEIGHT: 185.19 LBS | HEART RATE: 76 BPM | OXYGEN SATURATION: 99 % | SYSTOLIC BLOOD PRESSURE: 124 MMHG | DIASTOLIC BLOOD PRESSURE: 70 MMHG

## 2021-01-19 DIAGNOSIS — I10 ESSENTIAL HYPERTENSION: ICD-10-CM

## 2021-01-19 DIAGNOSIS — E66.9 OBESITY (BMI 30-39.9): ICD-10-CM

## 2021-01-19 DIAGNOSIS — Z12.31 SCREENING MAMMOGRAM, ENCOUNTER FOR: ICD-10-CM

## 2021-01-19 DIAGNOSIS — Z00.00 ANNUAL PHYSICAL EXAM: ICD-10-CM

## 2021-01-19 DIAGNOSIS — M48.061 SPINAL STENOSIS AT L4-L5 LEVEL: ICD-10-CM

## 2021-01-19 PROCEDURE — 1126F PR PAIN SEVERITY QUANTIFIED, NO PAIN PRESENT: ICD-10-PCS | Mod: S$GLB,,, | Performed by: INTERNAL MEDICINE

## 2021-01-19 PROCEDURE — 3008F BODY MASS INDEX DOCD: CPT | Mod: CPTII,S$GLB,, | Performed by: INTERNAL MEDICINE

## 2021-01-19 PROCEDURE — 3074F PR MOST RECENT SYSTOLIC BLOOD PRESSURE < 130 MM HG: ICD-10-PCS | Mod: CPTII,S$GLB,, | Performed by: INTERNAL MEDICINE

## 2021-01-19 PROCEDURE — 3074F SYST BP LT 130 MM HG: CPT | Mod: CPTII,S$GLB,, | Performed by: INTERNAL MEDICINE

## 2021-01-19 PROCEDURE — 99999 PR PBB SHADOW E&M-EST. PATIENT-LVL IV: CPT | Mod: PBBFAC,,, | Performed by: INTERNAL MEDICINE

## 2021-01-19 PROCEDURE — 99396 PR PREVENTIVE VISIT,EST,40-64: ICD-10-PCS | Mod: S$GLB,,, | Performed by: INTERNAL MEDICINE

## 2021-01-19 PROCEDURE — 99396 PREV VISIT EST AGE 40-64: CPT | Mod: S$GLB,,, | Performed by: INTERNAL MEDICINE

## 2021-01-19 PROCEDURE — 3078F PR MOST RECENT DIASTOLIC BLOOD PRESSURE < 80 MM HG: ICD-10-PCS | Mod: CPTII,S$GLB,, | Performed by: INTERNAL MEDICINE

## 2021-01-19 PROCEDURE — 3078F DIAST BP <80 MM HG: CPT | Mod: CPTII,S$GLB,, | Performed by: INTERNAL MEDICINE

## 2021-01-19 PROCEDURE — 1126F AMNT PAIN NOTED NONE PRSNT: CPT | Mod: S$GLB,,, | Performed by: INTERNAL MEDICINE

## 2021-01-19 PROCEDURE — 99999 PR PBB SHADOW E&M-EST. PATIENT-LVL IV: ICD-10-PCS | Mod: PBBFAC,,, | Performed by: INTERNAL MEDICINE

## 2021-01-19 PROCEDURE — 87624 HPV HI-RISK TYP POOLED RSLT: CPT

## 2021-01-19 PROCEDURE — 88175 CYTOPATH C/V AUTO FLUID REDO: CPT

## 2021-01-19 PROCEDURE — 3008F PR BODY MASS INDEX (BMI) DOCUMENTED: ICD-10-PCS | Mod: CPTII,S$GLB,, | Performed by: INTERNAL MEDICINE

## 2021-01-19 RX ORDER — LOSARTAN POTASSIUM 25 MG/1
25 TABLET ORAL DAILY
Qty: 90 TABLET | Refills: 1 | Status: SHIPPED | OUTPATIENT
Start: 2021-01-19 | End: 2021-02-01 | Stop reason: SDUPTHER

## 2021-01-19 RX ORDER — HYDROCHLOROTHIAZIDE 25 MG/1
25 TABLET ORAL DAILY
Qty: 90 TABLET | Refills: 1 | Status: SHIPPED | OUTPATIENT
Start: 2021-01-19 | End: 2021-02-01 | Stop reason: SDUPTHER

## 2021-01-19 RX ORDER — GABAPENTIN 100 MG/1
100 CAPSULE ORAL 3 TIMES DAILY PRN
Qty: 270 CAPSULE | Refills: 3 | Status: SHIPPED | OUTPATIENT
Start: 2021-01-19 | End: 2024-02-29

## 2021-01-20 ENCOUNTER — PATIENT MESSAGE (OUTPATIENT)
Dept: ADMINISTRATIVE | Facility: OTHER | Age: 51
End: 2021-01-20

## 2021-01-28 LAB
HPV HR 12 DNA SPEC QL NAA+PROBE: NEGATIVE
HPV16 AG SPEC QL: NEGATIVE
HPV18 DNA SPEC QL NAA+PROBE: NEGATIVE

## 2021-02-18 LAB
FINAL PATHOLOGIC DIAGNOSIS: NORMAL
Lab: NORMAL

## 2021-03-18 ENCOUNTER — HOSPITAL ENCOUNTER (OUTPATIENT)
Dept: RADIOLOGY | Facility: HOSPITAL | Age: 51
Discharge: HOME | End: 2021-03-18
Attending: INTERNAL MEDICINE
Payer: COMMERCIAL

## 2021-03-18 DIAGNOSIS — Z12.31 SCREENING MAMMOGRAM, ENCOUNTER FOR: ICD-10-CM

## 2021-03-18 PROCEDURE — 77063 BREAST TOMOSYNTHESIS BI: CPT | Mod: 26,,, | Performed by: RADIOLOGY

## 2021-03-18 PROCEDURE — 77067 MAMMO DIGITAL SCREENING BILAT WITH TOMO: ICD-10-PCS | Mod: 26,,, | Performed by: RADIOLOGY

## 2021-03-18 PROCEDURE — 77063 MAMMO DIGITAL SCREENING BILAT WITH TOMO: ICD-10-PCS | Mod: 26,,, | Performed by: RADIOLOGY

## 2021-03-18 PROCEDURE — 77067 SCR MAMMO BI INCL CAD: CPT | Mod: TC

## 2021-03-18 PROCEDURE — 77067 SCR MAMMO BI INCL CAD: CPT | Mod: 26,,, | Performed by: RADIOLOGY

## 2021-07-01 ENCOUNTER — PATIENT MESSAGE (OUTPATIENT)
Dept: OBSTETRICS AND GYNECOLOGY | Facility: CLINIC | Age: 51
End: 2021-07-01

## 2021-07-01 ENCOUNTER — TELEPHONE (OUTPATIENT)
Dept: OBSTETRICS AND GYNECOLOGY | Facility: CLINIC | Age: 51
End: 2021-07-01

## 2021-07-15 ENCOUNTER — OFFICE VISIT (OUTPATIENT)
Dept: OBSTETRICS AND GYNECOLOGY | Facility: CLINIC | Age: 51
End: 2021-07-15
Payer: COMMERCIAL

## 2021-07-15 VITALS
BODY MASS INDEX: 35.97 KG/M2 | DIASTOLIC BLOOD PRESSURE: 74 MMHG | WEIGHT: 196.63 LBS | SYSTOLIC BLOOD PRESSURE: 112 MMHG

## 2021-07-15 DIAGNOSIS — R10.2 PELVIC PAIN IN FEMALE: Primary | ICD-10-CM

## 2021-07-15 DIAGNOSIS — Z86.018 HISTORY OF UTERINE FIBROID: ICD-10-CM

## 2021-07-15 LAB
B-HCG UR QL: NEGATIVE
CTP QC/QA: YES

## 2021-07-15 PROCEDURE — 3008F BODY MASS INDEX DOCD: CPT | Mod: CPTII,S$GLB,, | Performed by: NURSE PRACTITIONER

## 2021-07-15 PROCEDURE — 87591 N.GONORRHOEAE DNA AMP PROB: CPT | Performed by: NURSE PRACTITIONER

## 2021-07-15 PROCEDURE — 87491 CHLMYD TRACH DNA AMP PROBE: CPT | Performed by: NURSE PRACTITIONER

## 2021-07-15 PROCEDURE — 99213 PR OFFICE/OUTPT VISIT, EST, LEVL III, 20-29 MIN: ICD-10-PCS | Mod: S$GLB,,, | Performed by: NURSE PRACTITIONER

## 2021-07-15 PROCEDURE — 3008F PR BODY MASS INDEX (BMI) DOCUMENTED: ICD-10-PCS | Mod: CPTII,S$GLB,, | Performed by: NURSE PRACTITIONER

## 2021-07-15 PROCEDURE — 87086 URINE CULTURE/COLONY COUNT: CPT | Performed by: NURSE PRACTITIONER

## 2021-07-15 PROCEDURE — 99213 OFFICE O/P EST LOW 20 MIN: CPT | Mod: S$GLB,,, | Performed by: NURSE PRACTITIONER

## 2021-07-15 PROCEDURE — 1126F AMNT PAIN NOTED NONE PRSNT: CPT | Mod: S$GLB,,, | Performed by: NURSE PRACTITIONER

## 2021-07-15 PROCEDURE — 81025 URINE PREGNANCY TEST: CPT | Mod: S$GLB,,, | Performed by: NURSE PRACTITIONER

## 2021-07-15 PROCEDURE — 99999 PR PBB SHADOW E&M-EST. PATIENT-LVL III: ICD-10-PCS | Mod: PBBFAC,,, | Performed by: NURSE PRACTITIONER

## 2021-07-15 PROCEDURE — 1126F PR PAIN SEVERITY QUANTIFIED, NO PAIN PRESENT: ICD-10-PCS | Mod: S$GLB,,, | Performed by: NURSE PRACTITIONER

## 2021-07-15 PROCEDURE — 99999 PR PBB SHADOW E&M-EST. PATIENT-LVL III: CPT | Mod: PBBFAC,,, | Performed by: NURSE PRACTITIONER

## 2021-07-15 PROCEDURE — 81025 POCT URINE PREGNANCY: ICD-10-PCS | Mod: S$GLB,,, | Performed by: NURSE PRACTITIONER

## 2021-07-15 RX ORDER — IBUPROFEN 800 MG/1
800 TABLET ORAL EVERY 8 HOURS PRN
Qty: 60 TABLET | Refills: 2 | Status: SHIPPED | OUTPATIENT
Start: 2021-07-15 | End: 2022-05-23 | Stop reason: SDUPTHER

## 2021-07-16 LAB — BACTERIA UR CULT: NO GROWTH

## 2021-07-18 ENCOUNTER — PATIENT MESSAGE (OUTPATIENT)
Dept: ADMINISTRATIVE | Facility: OTHER | Age: 51
End: 2021-07-18

## 2021-07-19 ENCOUNTER — LAB VISIT (OUTPATIENT)
Dept: LAB | Facility: HOSPITAL | Age: 51
End: 2021-07-19
Attending: INTERNAL MEDICINE
Payer: COMMERCIAL

## 2021-07-19 DIAGNOSIS — I10 ESSENTIAL HYPERTENSION: ICD-10-CM

## 2021-07-19 LAB
ALBUMIN SERPL BCP-MCNC: 3.7 G/DL (ref 3.5–5.2)
ALP SERPL-CCNC: 48 U/L (ref 55–135)
ALT SERPL W/O P-5'-P-CCNC: 10 U/L (ref 10–44)
ANION GAP SERPL CALC-SCNC: 9 MMOL/L (ref 8–16)
AST SERPL-CCNC: 14 U/L (ref 10–40)
BILIRUB SERPL-MCNC: 0.8 MG/DL (ref 0.1–1)
BUN SERPL-MCNC: 16 MG/DL (ref 6–20)
CALCIUM SERPL-MCNC: 9.9 MG/DL (ref 8.7–10.5)
CHLORIDE SERPL-SCNC: 105 MMOL/L (ref 95–110)
CO2 SERPL-SCNC: 25 MMOL/L (ref 23–29)
CREAT SERPL-MCNC: 0.7 MG/DL (ref 0.5–1.4)
EST. GFR  (AFRICAN AMERICAN): >60 ML/MIN/1.73 M^2
EST. GFR  (NON AFRICAN AMERICAN): >60 ML/MIN/1.73 M^2
GLUCOSE SERPL-MCNC: 85 MG/DL (ref 70–110)
POTASSIUM SERPL-SCNC: 3.8 MMOL/L (ref 3.5–5.1)
PROT SERPL-MCNC: 7.2 G/DL (ref 6–8.4)
SODIUM SERPL-SCNC: 139 MMOL/L (ref 136–145)

## 2021-07-19 PROCEDURE — 36415 COLL VENOUS BLD VENIPUNCTURE: CPT | Mod: PO | Performed by: INTERNAL MEDICINE

## 2021-07-19 PROCEDURE — 80053 COMPREHEN METABOLIC PANEL: CPT | Performed by: INTERNAL MEDICINE

## 2021-07-21 ENCOUNTER — OFFICE VISIT (OUTPATIENT)
Dept: INTERNAL MEDICINE | Facility: CLINIC | Age: 51
End: 2021-07-21
Payer: COMMERCIAL

## 2021-07-21 VITALS
SYSTOLIC BLOOD PRESSURE: 122 MMHG | BODY MASS INDEX: 37.13 KG/M2 | DIASTOLIC BLOOD PRESSURE: 74 MMHG | HEIGHT: 62 IN | OXYGEN SATURATION: 99 % | WEIGHT: 201.75 LBS | HEART RATE: 65 BPM

## 2021-07-21 DIAGNOSIS — M43.16 SPONDYLOLISTHESIS AT L4-L5 LEVEL: ICD-10-CM

## 2021-07-21 DIAGNOSIS — I10 ESSENTIAL HYPERTENSION: ICD-10-CM

## 2021-07-21 DIAGNOSIS — Z00.00 ANNUAL PHYSICAL EXAM: ICD-10-CM

## 2021-07-21 LAB
C TRACH DNA SPEC QL NAA+PROBE: NOT DETECTED
N GONORRHOEA DNA SPEC QL NAA+PROBE: NOT DETECTED

## 2021-07-21 PROCEDURE — 99213 OFFICE O/P EST LOW 20 MIN: CPT | Mod: S$GLB,,, | Performed by: INTERNAL MEDICINE

## 2021-07-21 PROCEDURE — 3078F DIAST BP <80 MM HG: CPT | Mod: CPTII,S$GLB,, | Performed by: INTERNAL MEDICINE

## 2021-07-21 PROCEDURE — 99213 PR OFFICE/OUTPT VISIT, EST, LEVL III, 20-29 MIN: ICD-10-PCS | Mod: S$GLB,,, | Performed by: INTERNAL MEDICINE

## 2021-07-21 PROCEDURE — 3008F BODY MASS INDEX DOCD: CPT | Mod: CPTII,S$GLB,, | Performed by: INTERNAL MEDICINE

## 2021-07-21 PROCEDURE — 3078F PR MOST RECENT DIASTOLIC BLOOD PRESSURE < 80 MM HG: ICD-10-PCS | Mod: CPTII,S$GLB,, | Performed by: INTERNAL MEDICINE

## 2021-07-21 PROCEDURE — 99999 PR PBB SHADOW E&M-EST. PATIENT-LVL III: ICD-10-PCS | Mod: PBBFAC,,, | Performed by: INTERNAL MEDICINE

## 2021-07-21 PROCEDURE — 99999 PR PBB SHADOW E&M-EST. PATIENT-LVL III: CPT | Mod: PBBFAC,,, | Performed by: INTERNAL MEDICINE

## 2021-07-21 PROCEDURE — 3074F SYST BP LT 130 MM HG: CPT | Mod: CPTII,S$GLB,, | Performed by: INTERNAL MEDICINE

## 2021-07-21 PROCEDURE — 3008F PR BODY MASS INDEX (BMI) DOCUMENTED: ICD-10-PCS | Mod: CPTII,S$GLB,, | Performed by: INTERNAL MEDICINE

## 2021-07-21 PROCEDURE — 3074F PR MOST RECENT SYSTOLIC BLOOD PRESSURE < 130 MM HG: ICD-10-PCS | Mod: CPTII,S$GLB,, | Performed by: INTERNAL MEDICINE

## 2021-07-21 RX ORDER — LOSARTAN POTASSIUM 25 MG/1
25 TABLET ORAL DAILY
Qty: 90 TABLET | Refills: 1 | Status: SHIPPED | OUTPATIENT
Start: 2021-07-21 | End: 2022-01-21 | Stop reason: SDUPTHER

## 2021-07-21 RX ORDER — HYDROCHLOROTHIAZIDE 25 MG/1
25 TABLET ORAL DAILY
Qty: 90 TABLET | Refills: 1 | Status: SHIPPED | OUTPATIENT
Start: 2021-07-21 | End: 2022-01-21 | Stop reason: SDUPTHER

## 2021-07-23 ENCOUNTER — HOSPITAL ENCOUNTER (OUTPATIENT)
Dept: RADIOLOGY | Facility: HOSPITAL | Age: 51
Discharge: HOME | End: 2021-07-23
Attending: NURSE PRACTITIONER
Payer: COMMERCIAL

## 2021-07-23 DIAGNOSIS — R10.2 PELVIC PAIN IN FEMALE: ICD-10-CM

## 2021-07-23 DIAGNOSIS — Z86.018 HISTORY OF UTERINE FIBROID: ICD-10-CM

## 2021-07-23 PROCEDURE — 76830 US PELVIS COMP WITH TRANSVAG NON-OB (XPD): ICD-10-PCS | Mod: 26,,, | Performed by: RADIOLOGY

## 2021-07-23 PROCEDURE — 76830 TRANSVAGINAL US NON-OB: CPT | Mod: 26,,, | Performed by: RADIOLOGY

## 2021-07-23 PROCEDURE — 76856 US EXAM PELVIC COMPLETE: CPT | Mod: 26,,, | Performed by: RADIOLOGY

## 2021-07-23 PROCEDURE — 76856 US PELVIS COMP WITH TRANSVAG NON-OB (XPD): ICD-10-PCS | Mod: 26,,, | Performed by: RADIOLOGY

## 2021-07-23 PROCEDURE — 76856 US EXAM PELVIC COMPLETE: CPT | Mod: TC

## 2021-07-27 ENCOUNTER — PATIENT MESSAGE (OUTPATIENT)
Dept: OBSTETRICS AND GYNECOLOGY | Facility: CLINIC | Age: 51
End: 2021-07-27

## 2021-08-03 ENCOUNTER — PATIENT OUTREACH (OUTPATIENT)
Dept: ADMINISTRATIVE | Facility: OTHER | Age: 51
End: 2021-08-03

## 2021-08-04 ENCOUNTER — OFFICE VISIT (OUTPATIENT)
Dept: OBSTETRICS AND GYNECOLOGY | Facility: CLINIC | Age: 51
End: 2021-08-04
Payer: COMMERCIAL

## 2021-08-04 VITALS
WEIGHT: 199.31 LBS | DIASTOLIC BLOOD PRESSURE: 78 MMHG | SYSTOLIC BLOOD PRESSURE: 118 MMHG | HEIGHT: 62 IN | BODY MASS INDEX: 36.68 KG/M2

## 2021-08-04 DIAGNOSIS — N94.10 DYSPAREUNIA IN FEMALE: ICD-10-CM

## 2021-08-04 DIAGNOSIS — G89.29 CHRONIC FEMALE PELVIC PAIN: ICD-10-CM

## 2021-08-04 DIAGNOSIS — N93.9 ABNORMAL UTERINE BLEEDING (AUB): Primary | ICD-10-CM

## 2021-08-04 DIAGNOSIS — R10.2 CHRONIC FEMALE PELVIC PAIN: ICD-10-CM

## 2021-08-04 PROCEDURE — 1159F PR MEDICATION LIST DOCUMENTED IN MEDICAL RECORD: ICD-10-PCS | Mod: CPTII,S$GLB,, | Performed by: OBSTETRICS & GYNECOLOGY

## 2021-08-04 PROCEDURE — 1160F PR REVIEW ALL MEDS BY PRESCRIBER/CLIN PHARMACIST DOCUMENTED: ICD-10-PCS | Mod: CPTII,S$GLB,, | Performed by: OBSTETRICS & GYNECOLOGY

## 2021-08-04 PROCEDURE — 3078F PR MOST RECENT DIASTOLIC BLOOD PRESSURE < 80 MM HG: ICD-10-PCS | Mod: CPTII,S$GLB,, | Performed by: OBSTETRICS & GYNECOLOGY

## 2021-08-04 PROCEDURE — 3044F HG A1C LEVEL LT 7.0%: CPT | Mod: CPTII,S$GLB,, | Performed by: OBSTETRICS & GYNECOLOGY

## 2021-08-04 PROCEDURE — 3078F DIAST BP <80 MM HG: CPT | Mod: CPTII,S$GLB,, | Performed by: OBSTETRICS & GYNECOLOGY

## 2021-08-04 PROCEDURE — 3044F PR MOST RECENT HEMOGLOBIN A1C LEVEL <7.0%: ICD-10-PCS | Mod: CPTII,S$GLB,, | Performed by: OBSTETRICS & GYNECOLOGY

## 2021-08-04 PROCEDURE — 99213 OFFICE O/P EST LOW 20 MIN: CPT | Mod: S$GLB,,, | Performed by: OBSTETRICS & GYNECOLOGY

## 2021-08-04 PROCEDURE — 3008F PR BODY MASS INDEX (BMI) DOCUMENTED: ICD-10-PCS | Mod: CPTII,S$GLB,, | Performed by: OBSTETRICS & GYNECOLOGY

## 2021-08-04 PROCEDURE — 99999 PR PBB SHADOW E&M-EST. PATIENT-LVL III: ICD-10-PCS | Mod: PBBFAC,,, | Performed by: OBSTETRICS & GYNECOLOGY

## 2021-08-04 PROCEDURE — 99999 PR PBB SHADOW E&M-EST. PATIENT-LVL III: CPT | Mod: PBBFAC,,, | Performed by: OBSTETRICS & GYNECOLOGY

## 2021-08-04 PROCEDURE — 3074F SYST BP LT 130 MM HG: CPT | Mod: CPTII,S$GLB,, | Performed by: OBSTETRICS & GYNECOLOGY

## 2021-08-04 PROCEDURE — 99213 PR OFFICE/OUTPT VISIT, EST, LEVL III, 20-29 MIN: ICD-10-PCS | Mod: S$GLB,,, | Performed by: OBSTETRICS & GYNECOLOGY

## 2021-08-04 PROCEDURE — 1159F MED LIST DOCD IN RCRD: CPT | Mod: CPTII,S$GLB,, | Performed by: OBSTETRICS & GYNECOLOGY

## 2021-08-04 PROCEDURE — 1160F RVW MEDS BY RX/DR IN RCRD: CPT | Mod: CPTII,S$GLB,, | Performed by: OBSTETRICS & GYNECOLOGY

## 2021-08-04 PROCEDURE — 3008F BODY MASS INDEX DOCD: CPT | Mod: CPTII,S$GLB,, | Performed by: OBSTETRICS & GYNECOLOGY

## 2021-08-04 PROCEDURE — 3074F PR MOST RECENT SYSTOLIC BLOOD PRESSURE < 130 MM HG: ICD-10-PCS | Mod: CPTII,S$GLB,, | Performed by: OBSTETRICS & GYNECOLOGY

## 2021-08-18 ENCOUNTER — PATIENT MESSAGE (OUTPATIENT)
Dept: OBSTETRICS AND GYNECOLOGY | Facility: CLINIC | Age: 51
End: 2021-08-18

## 2021-08-24 ENCOUNTER — TELEPHONE (OUTPATIENT)
Dept: OBSTETRICS AND GYNECOLOGY | Facility: CLINIC | Age: 51
End: 2021-08-24

## 2021-10-06 ENCOUNTER — PATIENT OUTREACH (OUTPATIENT)
Dept: ADMINISTRATIVE | Facility: OTHER | Age: 51
End: 2021-10-06

## 2021-10-07 ENCOUNTER — OFFICE VISIT (OUTPATIENT)
Dept: OBSTETRICS AND GYNECOLOGY | Facility: CLINIC | Age: 51
End: 2021-10-07
Payer: COMMERCIAL

## 2021-10-07 VITALS
SYSTOLIC BLOOD PRESSURE: 118 MMHG | DIASTOLIC BLOOD PRESSURE: 70 MMHG | BODY MASS INDEX: 36.41 KG/M2 | WEIGHT: 199.06 LBS

## 2021-10-07 DIAGNOSIS — R10.2 CHRONIC FEMALE PELVIC PAIN: ICD-10-CM

## 2021-10-07 DIAGNOSIS — N93.9 ABNORMAL UTERINE BLEEDING (AUB): Primary | ICD-10-CM

## 2021-10-07 DIAGNOSIS — N94.6 DYSMENORRHEA: ICD-10-CM

## 2021-10-07 DIAGNOSIS — D25.9 UTERINE LEIOMYOMA, UNSPECIFIED LOCATION: ICD-10-CM

## 2021-10-07 DIAGNOSIS — G89.29 CHRONIC FEMALE PELVIC PAIN: ICD-10-CM

## 2021-10-07 PROCEDURE — 3074F SYST BP LT 130 MM HG: CPT | Mod: CPTII,S$GLB,, | Performed by: OBSTETRICS & GYNECOLOGY

## 2021-10-07 PROCEDURE — 88305 TISSUE EXAM BY PATHOLOGIST: CPT | Performed by: STUDENT IN AN ORGANIZED HEALTH CARE EDUCATION/TRAINING PROGRAM

## 2021-10-07 PROCEDURE — 1160F RVW MEDS BY RX/DR IN RCRD: CPT | Mod: CPTII,S$GLB,, | Performed by: OBSTETRICS & GYNECOLOGY

## 2021-10-07 PROCEDURE — 3044F PR MOST RECENT HEMOGLOBIN A1C LEVEL <7.0%: ICD-10-PCS | Mod: CPTII,S$GLB,, | Performed by: OBSTETRICS & GYNECOLOGY

## 2021-10-07 PROCEDURE — 1160F PR REVIEW ALL MEDS BY PRESCRIBER/CLIN PHARMACIST DOCUMENTED: ICD-10-PCS | Mod: CPTII,S$GLB,, | Performed by: OBSTETRICS & GYNECOLOGY

## 2021-10-07 PROCEDURE — 3078F PR MOST RECENT DIASTOLIC BLOOD PRESSURE < 80 MM HG: ICD-10-PCS | Mod: CPTII,S$GLB,, | Performed by: OBSTETRICS & GYNECOLOGY

## 2021-10-07 PROCEDURE — 88305 TISSUE EXAM BY PATHOLOGIST: ICD-10-PCS | Mod: 26,,, | Performed by: STUDENT IN AN ORGANIZED HEALTH CARE EDUCATION/TRAINING PROGRAM

## 2021-10-07 PROCEDURE — 3078F DIAST BP <80 MM HG: CPT | Mod: CPTII,S$GLB,, | Performed by: OBSTETRICS & GYNECOLOGY

## 2021-10-07 PROCEDURE — 99212 OFFICE O/P EST SF 10 MIN: CPT | Mod: 25,S$GLB,, | Performed by: OBSTETRICS & GYNECOLOGY

## 2021-10-07 PROCEDURE — 1159F MED LIST DOCD IN RCRD: CPT | Mod: CPTII,S$GLB,, | Performed by: OBSTETRICS & GYNECOLOGY

## 2021-10-07 PROCEDURE — 99999 PR PBB SHADOW E&M-EST. PATIENT-LVL III: CPT | Mod: PBBFAC,,, | Performed by: OBSTETRICS & GYNECOLOGY

## 2021-10-07 PROCEDURE — 58100 PR BIOPSY OF UTERUS LINING: ICD-10-PCS | Mod: S$GLB,,, | Performed by: OBSTETRICS & GYNECOLOGY

## 2021-10-07 PROCEDURE — 4010F ACE/ARB THERAPY RXD/TAKEN: CPT | Mod: CPTII,S$GLB,, | Performed by: OBSTETRICS & GYNECOLOGY

## 2021-10-07 PROCEDURE — 4010F PR ACE/ARB THEARPY RXD/TAKEN: ICD-10-PCS | Mod: CPTII,S$GLB,, | Performed by: OBSTETRICS & GYNECOLOGY

## 2021-10-07 PROCEDURE — 1159F PR MEDICATION LIST DOCUMENTED IN MEDICAL RECORD: ICD-10-PCS | Mod: CPTII,S$GLB,, | Performed by: OBSTETRICS & GYNECOLOGY

## 2021-10-07 PROCEDURE — 3044F HG A1C LEVEL LT 7.0%: CPT | Mod: CPTII,S$GLB,, | Performed by: OBSTETRICS & GYNECOLOGY

## 2021-10-07 PROCEDURE — 99212 PR OFFICE/OUTPT VISIT, EST, LEVL II, 10-19 MIN: ICD-10-PCS | Mod: 25,S$GLB,, | Performed by: OBSTETRICS & GYNECOLOGY

## 2021-10-07 PROCEDURE — 99999 PR PBB SHADOW E&M-EST. PATIENT-LVL III: ICD-10-PCS | Mod: PBBFAC,,, | Performed by: OBSTETRICS & GYNECOLOGY

## 2021-10-07 PROCEDURE — 58100 BIOPSY OF UTERUS LINING: CPT | Mod: S$GLB,,, | Performed by: OBSTETRICS & GYNECOLOGY

## 2021-10-07 PROCEDURE — 3074F PR MOST RECENT SYSTOLIC BLOOD PRESSURE < 130 MM HG: ICD-10-PCS | Mod: CPTII,S$GLB,, | Performed by: OBSTETRICS & GYNECOLOGY

## 2021-10-07 PROCEDURE — 3008F PR BODY MASS INDEX (BMI) DOCUMENTED: ICD-10-PCS | Mod: CPTII,S$GLB,, | Performed by: OBSTETRICS & GYNECOLOGY

## 2021-10-07 PROCEDURE — 88305 TISSUE EXAM BY PATHOLOGIST: CPT | Mod: 26,,, | Performed by: STUDENT IN AN ORGANIZED HEALTH CARE EDUCATION/TRAINING PROGRAM

## 2021-10-07 PROCEDURE — 3008F BODY MASS INDEX DOCD: CPT | Mod: CPTII,S$GLB,, | Performed by: OBSTETRICS & GYNECOLOGY

## 2021-10-14 ENCOUNTER — PATIENT MESSAGE (OUTPATIENT)
Dept: OBSTETRICS AND GYNECOLOGY | Facility: HOSPITAL | Age: 51
End: 2021-10-14

## 2021-10-14 ENCOUNTER — PATIENT MESSAGE (OUTPATIENT)
Dept: OBSTETRICS AND GYNECOLOGY | Facility: CLINIC | Age: 51
End: 2021-10-14
Payer: COMMERCIAL

## 2021-10-14 LAB
FINAL PATHOLOGIC DIAGNOSIS: NORMAL
GROSS: NORMAL
Lab: NORMAL

## 2022-01-12 ENCOUNTER — OFFICE VISIT (OUTPATIENT)
Dept: URGENT CARE | Facility: CLINIC | Age: 52
End: 2022-01-12
Payer: COMMERCIAL

## 2022-01-12 VITALS
DIASTOLIC BLOOD PRESSURE: 99 MMHG | OXYGEN SATURATION: 97 % | HEART RATE: 79 BPM | SYSTOLIC BLOOD PRESSURE: 153 MMHG | RESPIRATION RATE: 17 BRPM | BODY MASS INDEX: 36.62 KG/M2 | HEIGHT: 62 IN | TEMPERATURE: 99 F | WEIGHT: 199 LBS

## 2022-01-12 DIAGNOSIS — Z20.822 ENCOUNTER FOR LABORATORY TESTING FOR COVID-19 VIRUS: Primary | ICD-10-CM

## 2022-01-12 DIAGNOSIS — U07.1 COVID-19: ICD-10-CM

## 2022-01-12 DIAGNOSIS — U07.1 COVID-19 VIRUS DETECTED: ICD-10-CM

## 2022-01-12 LAB
CTP QC/QA: YES
SARS-COV-2 RDRP RESP QL NAA+PROBE: POSITIVE

## 2022-01-12 PROCEDURE — 99213 PR OFFICE/OUTPT VISIT, EST, LEVL III, 20-29 MIN: ICD-10-PCS | Mod: S$GLB,CS,, | Performed by: PHYSICIAN ASSISTANT

## 2022-01-12 PROCEDURE — U0002 COVID-19 LAB TEST NON-CDC: HCPCS | Mod: QW,S$GLB,, | Performed by: PHYSICIAN ASSISTANT

## 2022-01-12 PROCEDURE — 3077F PR MOST RECENT SYSTOLIC BLOOD PRESSURE >= 140 MM HG: ICD-10-PCS | Mod: CPTII,S$GLB,, | Performed by: PHYSICIAN ASSISTANT

## 2022-01-12 PROCEDURE — 3008F PR BODY MASS INDEX (BMI) DOCUMENTED: ICD-10-PCS | Mod: CPTII,S$GLB,, | Performed by: PHYSICIAN ASSISTANT

## 2022-01-12 PROCEDURE — 3077F SYST BP >= 140 MM HG: CPT | Mod: CPTII,S$GLB,, | Performed by: PHYSICIAN ASSISTANT

## 2022-01-12 PROCEDURE — 3008F BODY MASS INDEX DOCD: CPT | Mod: CPTII,S$GLB,, | Performed by: PHYSICIAN ASSISTANT

## 2022-01-12 PROCEDURE — 1159F MED LIST DOCD IN RCRD: CPT | Mod: CPTII,S$GLB,, | Performed by: PHYSICIAN ASSISTANT

## 2022-01-12 PROCEDURE — U0002: ICD-10-PCS | Mod: QW,S$GLB,, | Performed by: PHYSICIAN ASSISTANT

## 2022-01-12 PROCEDURE — 1160F RVW MEDS BY RX/DR IN RCRD: CPT | Mod: CPTII,S$GLB,, | Performed by: PHYSICIAN ASSISTANT

## 2022-01-12 PROCEDURE — 1159F PR MEDICATION LIST DOCUMENTED IN MEDICAL RECORD: ICD-10-PCS | Mod: CPTII,S$GLB,, | Performed by: PHYSICIAN ASSISTANT

## 2022-01-12 PROCEDURE — 1160F PR REVIEW ALL MEDS BY PRESCRIBER/CLIN PHARMACIST DOCUMENTED: ICD-10-PCS | Mod: CPTII,S$GLB,, | Performed by: PHYSICIAN ASSISTANT

## 2022-01-12 PROCEDURE — 3080F PR MOST RECENT DIASTOLIC BLOOD PRESSURE >= 90 MM HG: ICD-10-PCS | Mod: CPTII,S$GLB,, | Performed by: PHYSICIAN ASSISTANT

## 2022-01-12 PROCEDURE — 3080F DIAST BP >= 90 MM HG: CPT | Mod: CPTII,S$GLB,, | Performed by: PHYSICIAN ASSISTANT

## 2022-01-12 PROCEDURE — 99213 OFFICE O/P EST LOW 20 MIN: CPT | Mod: S$GLB,CS,, | Performed by: PHYSICIAN ASSISTANT

## 2022-01-12 NOTE — PATIENT INSTRUCTIONS
Instructions for Patients with Confirmed or Suspected COVID-19  Go to the emergency department if you get worse    If you are awaiting your test result, you will either be called or it will be released to the patient portal.  If you have any questions about your test, please visit [email or web address] or call our COVID-19 information line at [phone].      Please isolate yourself at home.  You may leave home and/or return to work once the following conditions are met:    If you have symptoms and tested positive:   More than 5 days since symptoms first appeared AND   More than 24 hours fever free without medications AND       symptoms have improved   · For five days after ending isolation, masks are required.    If you had no symptoms but tested positive:   More than 5 days since the date of the first positive test. If you develop symptoms, then use the guidelines above  · For five days after ending isolation, masks are required.      Testing is not recommended if you are symptom free after completing isolation.    
Negative

## 2022-01-12 NOTE — PROGRESS NOTES
"Subjective:       Patient ID: Isaura Mancini is a 51 y.o. female.    Vitals:  height is 5' 2" (1.575 m) and weight is 90.3 kg (199 lb). Her temperature is 99 °F (37.2 °C). Her blood pressure is 153/99 (abnormal) and her pulse is 79. Her respiration is 17 and oxygen saturation is 97%.     Chief Complaint: Cough    Close exposure to COVId from daughter  Fully vaccinated     C/o chills, cough, fever, body aches, HA, nausea, chest congestion/nasal congestion x2 days    Cough  This is a new problem. The current episode started in the past 7 days. The problem has been gradually worsening. The cough is non-productive. Associated symptoms include a fever, headaches, myalgias and nasal congestion. Pertinent negatives include no chest pain, chills, ear congestion, ear pain, heartburn, hemoptysis, postnasal drip, rash, rhinorrhea, sore throat, shortness of breath, sweats, weight loss or wheezing. She has tried OTC cough suppressant (tylenol) for the symptoms. Her past medical history is significant for bronchitis. There is no history of asthma, bronchiectasis, COPD, emphysema, environmental allergies or pneumonia.       Constitution: Positive for activity change, fatigue and fever. Negative for chills.   HENT: Negative for ear pain, postnasal drip and sore throat.    Cardiovascular: Negative for chest pain.   Respiratory: Positive for cough. Negative for bloody sputum, shortness of breath and wheezing.    Gastrointestinal: Negative for heartburn.   Musculoskeletal: Positive for muscle ache.   Skin: Negative for rash.   Allergic/Immunologic: Negative for environmental allergies.   Neurological: Positive for headaches.       Objective:      Physical Exam   Constitutional: She is oriented to person, place, and time. She appears well-developed and well-nourished. She is cooperative.  Non-toxic appearance. She does not have a sickly appearance. She does not appear ill. No distress.   HENT:   Head: Normocephalic and atraumatic. "   Ears:   Right Ear: Hearing, tympanic membrane, external ear and ear canal normal.   Left Ear: Hearing, tympanic membrane, external ear and ear canal normal.   Nose: Nose normal. No mucosal edema, rhinorrhea or nasal deformity. No epistaxis. Right sinus exhibits no maxillary sinus tenderness and no frontal sinus tenderness. Left sinus exhibits no maxillary sinus tenderness and no frontal sinus tenderness.   Mouth/Throat: Uvula is midline and mucous membranes are normal. No trismus in the jaw. Normal dentition. No uvula swelling. Posterior oropharyngeal erythema present. No oropharyngeal exudate or posterior oropharyngeal edema.   Eyes: Conjunctivae and lids are normal. No scleral icterus.   Neck: Trachea normal and phonation normal. Neck supple. No edema present. No erythema present. No neck rigidity present.   Cardiovascular: Normal rate, regular rhythm, normal heart sounds, intact distal pulses and normal pulses.   Pulmonary/Chest: Effort normal and breath sounds normal. No stridor. No respiratory distress. She has no decreased breath sounds. She has no wheezes. She has no rhonchi. She has no rales. She exhibits no tenderness.   Abdominal: Normal appearance. Soft. There is no abdominal tenderness.   Musculoskeletal: Normal range of motion.         General: No deformity or edema. Normal range of motion.   Neurological: She is alert and oriented to person, place, and time. She exhibits normal muscle tone. Coordination normal.   Skin: Skin is warm, dry, intact, not diaphoretic, not pale and no rash. Capillary refill takes less than 2 seconds.   Psychiatric: She has a normal mood and affect. Her speech is normal and behavior is normal. Judgment and thought content normal. Cognition and memory  Nursing note and vitals reviewed.    Results for orders placed or performed in visit on 01/12/22   POCT COVID-19 Rapid Screening   Result Value Ref Range    POC Rapid COVID Positive (A) Negative     Acceptable  Yes     No results found.       Assessment:       1. Encounter for laboratory testing for COVID-19 virus          Plan:         Encounter for laboratory testing for COVID-19 virus  -     POCT COVID-19 Rapid Screening    Follow up if symptoms worsen or fail to improve, for F/U with PCP or ED.   Patient Instructions   Instructions for Patients with Confirmed or Suspected COVID-19  Go to the emergency department if you get worse    If you are awaiting your test result, you will either be called or it will be released to the patient portal.  If you have any questions about your test, please visit [email or web address] or call our COVID-19 information line at [phone].      Please isolate yourself at home.  You may leave home and/or return to work once the following conditions are met:    If you have symptoms and tested positive:   More than 5 days since symptoms first appeared AND   More than 24 hours fever free without medications AND       symptoms have improved   · For five days after ending isolation, masks are required.    If you had no symptoms but tested positive:   More than 5 days since the date of the first positive test. If you develop symptoms, then use the guidelines above  · For five days after ending isolation, masks are required.      Testing is not recommended if you are symptom free after completing isolation.

## 2022-01-14 ENCOUNTER — PATIENT MESSAGE (OUTPATIENT)
Dept: ADMINISTRATIVE | Facility: OTHER | Age: 52
End: 2022-01-14
Payer: COMMERCIAL

## 2022-01-19 ENCOUNTER — LAB VISIT (OUTPATIENT)
Dept: LAB | Facility: HOSPITAL | Age: 52
End: 2022-01-19
Attending: INTERNAL MEDICINE
Payer: COMMERCIAL

## 2022-01-19 ENCOUNTER — OFFICE VISIT (OUTPATIENT)
Dept: URGENT CARE | Facility: CLINIC | Age: 52
End: 2022-01-19
Payer: COMMERCIAL

## 2022-01-19 VITALS
RESPIRATION RATE: 16 BRPM | DIASTOLIC BLOOD PRESSURE: 81 MMHG | BODY MASS INDEX: 36.8 KG/M2 | HEIGHT: 62 IN | OXYGEN SATURATION: 98 % | WEIGHT: 200 LBS | HEART RATE: 83 BPM | TEMPERATURE: 99 F | SYSTOLIC BLOOD PRESSURE: 139 MMHG

## 2022-01-19 DIAGNOSIS — Z00.00 ANNUAL PHYSICAL EXAM: ICD-10-CM

## 2022-01-19 DIAGNOSIS — R05.9 COUGH: Primary | ICD-10-CM

## 2022-01-19 DIAGNOSIS — U07.1 COVID-19: ICD-10-CM

## 2022-01-19 DIAGNOSIS — J04.0 LARYNGITIS: ICD-10-CM

## 2022-01-19 LAB
ALBUMIN SERPL BCP-MCNC: 3.8 G/DL (ref 3.5–5.2)
ALP SERPL-CCNC: 47 U/L (ref 55–135)
ALT SERPL W/O P-5'-P-CCNC: 12 U/L (ref 10–44)
ANION GAP SERPL CALC-SCNC: 10 MMOL/L (ref 8–16)
AST SERPL-CCNC: 21 U/L (ref 10–40)
BASOPHILS # BLD AUTO: 0.03 K/UL (ref 0–0.2)
BASOPHILS NFR BLD: 0.3 % (ref 0–1.9)
BILIRUB SERPL-MCNC: 0.8 MG/DL (ref 0.1–1)
BUN SERPL-MCNC: 15 MG/DL (ref 6–20)
CALCIUM SERPL-MCNC: 9.8 MG/DL (ref 8.7–10.5)
CHLORIDE SERPL-SCNC: 101 MMOL/L (ref 95–110)
CHOLEST SERPL-MCNC: 200 MG/DL (ref 120–199)
CHOLEST/HDLC SERPL: 3.7 {RATIO} (ref 2–5)
CO2 SERPL-SCNC: 26 MMOL/L (ref 23–29)
CREAT SERPL-MCNC: 0.6 MG/DL (ref 0.5–1.4)
DIFFERENTIAL METHOD: ABNORMAL
EOSINOPHIL # BLD AUTO: 0.2 K/UL (ref 0–0.5)
EOSINOPHIL NFR BLD: 2.5 % (ref 0–8)
ERYTHROCYTE [DISTWIDTH] IN BLOOD BY AUTOMATED COUNT: 12 % (ref 11.5–14.5)
EST. GFR  (AFRICAN AMERICAN): >60 ML/MIN/1.73 M^2
EST. GFR  (NON AFRICAN AMERICAN): >60 ML/MIN/1.73 M^2
ESTIMATED AVG GLUCOSE: 103 MG/DL (ref 68–131)
GLUCOSE SERPL-MCNC: 98 MG/DL (ref 70–110)
HBA1C MFR BLD: 5.2 % (ref 4–5.6)
HCT VFR BLD AUTO: 42.8 % (ref 37–48.5)
HCV AB SERPL QL IA: NEGATIVE
HDLC SERPL-MCNC: 54 MG/DL (ref 40–75)
HDLC SERPL: 27 % (ref 20–50)
HGB BLD-MCNC: 13.6 G/DL (ref 12–16)
HIV 1+2 AB+HIV1 P24 AG SERPL QL IA: NEGATIVE
IMM GRANULOCYTES # BLD AUTO: 0.03 K/UL (ref 0–0.04)
IMM GRANULOCYTES NFR BLD AUTO: 0.3 % (ref 0–0.5)
LDLC SERPL CALC-MCNC: 120.2 MG/DL (ref 63–159)
LYMPHOCYTES # BLD AUTO: 2.9 K/UL (ref 1–4.8)
LYMPHOCYTES NFR BLD: 32.2 % (ref 18–48)
MCH RBC QN AUTO: 28.8 PG (ref 27–31)
MCHC RBC AUTO-ENTMCNC: 31.8 G/DL (ref 32–36)
MCV RBC AUTO: 91 FL (ref 82–98)
MONOCYTES # BLD AUTO: 0.6 K/UL (ref 0.3–1)
MONOCYTES NFR BLD: 6.7 % (ref 4–15)
NEUTROPHILS # BLD AUTO: 5.2 K/UL (ref 1.8–7.7)
NEUTROPHILS NFR BLD: 58 % (ref 38–73)
NONHDLC SERPL-MCNC: 146 MG/DL
NRBC BLD-RTO: 0 /100 WBC
PLATELET # BLD AUTO: 462 K/UL (ref 150–450)
PMV BLD AUTO: 10.5 FL (ref 9.2–12.9)
POTASSIUM SERPL-SCNC: 3.9 MMOL/L (ref 3.5–5.1)
PROT SERPL-MCNC: 7.6 G/DL (ref 6–8.4)
RBC # BLD AUTO: 4.72 M/UL (ref 4–5.4)
SODIUM SERPL-SCNC: 137 MMOL/L (ref 136–145)
T4 FREE SERPL-MCNC: 1.1 NG/DL (ref 0.71–1.51)
TRIGL SERPL-MCNC: 129 MG/DL (ref 30–150)
TSH SERPL DL<=0.005 MIU/L-ACNC: 0.27 UIU/ML (ref 0.4–4)
WBC # BLD AUTO: 9.06 K/UL (ref 3.9–12.7)

## 2022-01-19 PROCEDURE — 3008F BODY MASS INDEX DOCD: CPT | Mod: CPTII,S$GLB,, | Performed by: PHYSICIAN ASSISTANT

## 2022-01-19 PROCEDURE — 3075F SYST BP GE 130 - 139MM HG: CPT | Mod: CPTII,S$GLB,, | Performed by: PHYSICIAN ASSISTANT

## 2022-01-19 PROCEDURE — 3044F PR MOST RECENT HEMOGLOBIN A1C LEVEL <7.0%: ICD-10-PCS | Mod: CPTII,S$GLB,, | Performed by: PHYSICIAN ASSISTANT

## 2022-01-19 PROCEDURE — 3079F DIAST BP 80-89 MM HG: CPT | Mod: CPTII,S$GLB,, | Performed by: PHYSICIAN ASSISTANT

## 2022-01-19 PROCEDURE — 3044F HG A1C LEVEL LT 7.0%: CPT | Mod: CPTII,S$GLB,, | Performed by: PHYSICIAN ASSISTANT

## 2022-01-19 PROCEDURE — 3075F PR MOST RECENT SYSTOLIC BLOOD PRESS GE 130-139MM HG: ICD-10-PCS | Mod: CPTII,S$GLB,, | Performed by: PHYSICIAN ASSISTANT

## 2022-01-19 PROCEDURE — 86803 HEPATITIS C AB TEST: CPT | Performed by: INTERNAL MEDICINE

## 2022-01-19 PROCEDURE — 1159F PR MEDICATION LIST DOCUMENTED IN MEDICAL RECORD: ICD-10-PCS | Mod: CPTII,S$GLB,, | Performed by: PHYSICIAN ASSISTANT

## 2022-01-19 PROCEDURE — 84443 ASSAY THYROID STIM HORMONE: CPT | Performed by: INTERNAL MEDICINE

## 2022-01-19 PROCEDURE — 1159F MED LIST DOCD IN RCRD: CPT | Mod: CPTII,S$GLB,, | Performed by: PHYSICIAN ASSISTANT

## 2022-01-19 PROCEDURE — 80053 COMPREHEN METABOLIC PANEL: CPT | Performed by: INTERNAL MEDICINE

## 2022-01-19 PROCEDURE — 87389 HIV-1 AG W/HIV-1&-2 AB AG IA: CPT | Performed by: INTERNAL MEDICINE

## 2022-01-19 PROCEDURE — 3079F PR MOST RECENT DIASTOLIC BLOOD PRESSURE 80-89 MM HG: ICD-10-PCS | Mod: CPTII,S$GLB,, | Performed by: PHYSICIAN ASSISTANT

## 2022-01-19 PROCEDURE — 1160F PR REVIEW ALL MEDS BY PRESCRIBER/CLIN PHARMACIST DOCUMENTED: ICD-10-PCS | Mod: CPTII,S$GLB,, | Performed by: PHYSICIAN ASSISTANT

## 2022-01-19 PROCEDURE — 1160F RVW MEDS BY RX/DR IN RCRD: CPT | Mod: CPTII,S$GLB,, | Performed by: PHYSICIAN ASSISTANT

## 2022-01-19 PROCEDURE — 99213 OFFICE O/P EST LOW 20 MIN: CPT | Mod: S$GLB,,, | Performed by: PHYSICIAN ASSISTANT

## 2022-01-19 PROCEDURE — 99213 PR OFFICE/OUTPT VISIT, EST, LEVL III, 20-29 MIN: ICD-10-PCS | Mod: S$GLB,,, | Performed by: PHYSICIAN ASSISTANT

## 2022-01-19 PROCEDURE — 83036 HEMOGLOBIN GLYCOSYLATED A1C: CPT | Performed by: INTERNAL MEDICINE

## 2022-01-19 PROCEDURE — 80061 LIPID PANEL: CPT | Performed by: INTERNAL MEDICINE

## 2022-01-19 PROCEDURE — 84439 ASSAY OF FREE THYROXINE: CPT | Performed by: INTERNAL MEDICINE

## 2022-01-19 PROCEDURE — 3008F PR BODY MASS INDEX (BMI) DOCUMENTED: ICD-10-PCS | Mod: CPTII,S$GLB,, | Performed by: PHYSICIAN ASSISTANT

## 2022-01-19 PROCEDURE — 85025 COMPLETE CBC W/AUTO DIFF WBC: CPT | Performed by: INTERNAL MEDICINE

## 2022-01-19 PROCEDURE — 36415 COLL VENOUS BLD VENIPUNCTURE: CPT | Mod: PO | Performed by: INTERNAL MEDICINE

## 2022-01-19 RX ORDER — BENZONATATE 200 MG/1
200 CAPSULE ORAL 3 TIMES DAILY PRN
Qty: 30 CAPSULE | Refills: 0 | Status: SHIPPED | OUTPATIENT
Start: 2022-01-19 | End: 2022-01-21

## 2022-01-19 NOTE — PROGRESS NOTES
"Subjective:       Patient ID: Isaura Mancini is a 51 y.o. female.    Vitals:  height is 5' 2" (1.575 m) and weight is 90.7 kg (200 lb). Her temperature is 98.7 °F (37.1 °C). Her blood pressure is 139/81 and her pulse is 83. Her respiration is 16 and oxygen saturation is 98%.     Chief Complaint: Cough    Pt c/o cough for one week.  She was last seen in the clinic on 1/11 and tested positive for COVID.  She states she has taken a full bottle of OTC Robitussin but still has a cough.  She has received 2 doses of the COVID vaccine.  Pt also reports that she has lost her voice.    Cough  This is a new problem. The current episode started in the past 7 days. The problem has been unchanged. The problem occurs constantly. The cough is non-productive. Pertinent negatives include no chills, ear pain, fever, myalgias or shortness of breath. Nothing aggravates the symptoms. She has tried nothing for the symptoms. The treatment provided no relief.       Constitution: Negative for chills, sweating, fatigue and fever.   HENT: Positive for congestion and voice change. Negative for ear pain.    Respiratory: Positive for cough. Negative for shortness of breath.    Gastrointestinal: Negative for vomiting and diarrhea.   Musculoskeletal: Negative for muscle ache.   Skin: Negative for erythema.       Objective:      Physical Exam   Constitutional: She is oriented to person, place, and time. She appears well-developed and well-nourished.   HENT:   Head: Normocephalic and atraumatic. Head is without abrasion, without contusion and without laceration.   Ears:   Right Ear: External ear normal.   Left Ear: External ear normal.   Nose: Nose normal.   Hoarse vocal quality      Comments: Hoarse vocal quality  Eyes: Conjunctivae, EOM and lids are normal. Pupils are equal, round, and reactive to light.   Neck: Trachea normal and phonation normal. Neck supple.   Cardiovascular: Normal rate, regular rhythm and normal heart sounds. "   Pulmonary/Chest: Effort normal and breath sounds normal. No stridor. No respiratory distress. She has no decreased breath sounds. She has no wheezes. She has no rhonchi. She has no rales.   Musculoskeletal: Normal range of motion.         General: Normal range of motion.   Neurological: no focal deficit. She is alert and oriented to person, place, and time. She has intact cranial nerves.      Comments: CN's grossly intact   Skin: Skin is warm, dry, intact and no rash. Capillary refill takes less than 2 seconds. No abrasion, No burn, No bruising, No erythema and No ecchymosis   Psychiatric: She has a normal mood and affect. Her speech is normal and behavior is normal. Judgment and thought content normal. Cognition and memory  Nursing note and vitals reviewed.        Assessment:       1. Cough    2. COVID-19    3. Laryngitis          Plan:         Cough  -     benzonatate (TESSALON) 200 MG capsule; Take 1 capsule (200 mg total) by mouth 3 (three) times daily as needed for Cough.  Dispense: 30 capsule; Refill: 0    COVID-19    Laryngitis           Medical Decision Making:   History:   Old Records Summarized: records from clinic visits.  Initial Assessment:   51 y.o. female with cough, laryngitis, and COVID  Clinical Tests:   Lab Tests: Ordered and Reviewed  COVID Risk Score - 2    Patient does not meet EPIC COVID risk factor score of 3 or >3.  Monoclonal antibody infusion referral not submitted.  Treat symptomatically with OTC medications as needed.    Discussed CDC guidelines of needing quarantine for 5 days and mask mandatory for following 5 days.  Also needs to notify anybody has been around last 48 hr of being positive.  Follow-up with PCP or as needed           Patient Instructions   Patient Education       Cough Discharge Instructions, Adult   About this topic   Many things can cause a cough. A cold or allergies can cause a cough. Other times, asthma or smoking can cause your cough. You can have a cough from  mucus that drips down the back of your throat or from stomach acid that backs up into your throat. Sometimes a cough is a side effect from a drug. You may be waiting on some test results. If so, the staff will contact you if there are concerning results.  What care is needed at home?   · Ask your doctor what you need to do when you go home. Make sure you ask questions if you do not understand what the doctor says.  · To help you feel better:  ? Use a cool mist humidifier to avoid breathing dry air.  ? Use hard candy or cough drops to soothe sore throat and cough.  ? Gargle with salt water (mix 1/2 teaspoon salt with 1 cup warm water) a few times a day.  ? Spray saltwater mist in each nostril. Any normal saline spray works.  ? Sip warm liquids to keep your throat moist.  ? Take warm, steamy showers to help soothe the cough.  · Do not smoke or be in smoke-filled places. Avoid things that may cause breathing problems like vaping, fumes, pollution, dust, and other common allergens.  · You may want to use over-the-counter medicines for allergies or acid reflux if your cough is due to one of these problems.  · You can also use an over-the-counter cough medicine.  What follow-up care is needed?   Your doctor may ask you to make visits to the office to check on your progress. Be sure to keep these visits.  What drugs may be needed?   The doctor may order drugs to:  · Control coughing  · Get rid of or thin mucus  · Block allergens if your cough is due to allergies  · Increase airflow if your cough is due to asthma or COPD  · Reduce swelling of the airways  · Reduce stomach acid if your cough is due to stomach acid problems  Will physical activity be limited?   Your cough may interfere with some of your activities.  What changes to diet are needed?   Some people feel milk products worsen their sputum production and worsen their cough. There is no proof that this is true. There is no need to reduce milk intake. Honey has been  shown to be as effective as the leading over-the-counter (OTC) drug for calming coughs. Use 1/2 to 1 teaspoon (2.5 mL to 5 mL) of honey as needed. It can thin the secretions and loosen the cough. Never give honey to a child under the age of 1.  What can be done to prevent this health problem?   · Wash your hands often with soap and water for at least 20 seconds, especially after coughing or sneezing. Alcohol-based hand sanitizers also work to kill the virus.       · If you are sick, cover your mouth and nose with tissue when you cough or sneeze. You can also cough into your elbow. Throw away tissues in the trash and wash your hands after touching used tissues.  · Clean items and surfaces you most often touch like door handles, remotes, phones, or toys. Wipe them with a disinfectant. This can help reduce the spread of infection.  · Do not get too close (kissing, hugging) to people who are sick.  · Do not share towels or hankies with anyone who is sick.  · Stay away from crowded places.  · Get a flu shot each year.     When do I need to call the doctor?   · You have chest pain when you cough or trouble breathing.  · You start to cough up blood or yellow or green mucus.  · You have a fever of 100.4°F (38°C) or higher or chills.  · You cough so hard you throw up.  · You are still coughing in 10 days.  Teach Back: Helping You Understand   The Teach Back Method helps you understand the information we are giving you. After you talk with the staff, tell them in your own words what you learned. This helps to make sure the staff has described each thing clearly. It also helps to explain things that may have been confusing. Before going home, make sure you can do these:  · I can tell you about my condition.  · I can tell you what I can do to help thin the mucus and ease my cough.  · I can tell you what I will do if I have wheezing, chest tightness, my lips turn blue with coughing, or I have other trouble breathing.  Where can  I learn more?   American Academy [employer]  https://familydoctor.org/cough-medicine-understanding-your-otc-options/   [facility]HS Choices  https://www.nhs.uk/conditions/cough/   Last Reviewed Date   2021-06-10  Consumer Information Use and Disclaimer   This information is not specific medical advice and does not replace information you receive from your health care provider. This is only a brief summary of general information. It does NOT include all information about conditions, illnesses, injuries, tests, procedures, treatments, therapies, discharge instructions or life-style choices that may apply to you. You must talk with your health care provider for complete information about your health and treatment options. This information should not be used to decide whether or not to accept your health care providers advice, instructions or recommendations. Only your health care provider has the knowledge and training to provide advice that is right for you.  Copyright   Copyright © 2021 [employer] and its affiliates and/or licensors. All rights reserved.  Patient Education       COVID-19 Discharge Instructions   About this topic   Coronavirus disease 2019 is also known as COVID-19. It is a viral illness that infects the lungs. It is caused by a virus called SARS-associated coronavirus (SARS-CoV-2).  The signs of COVID-19 most often start a few days after you have been infected. In some people, it takes longer to show signs. Others never show signs of the infection. You may have a cough, fever, shaking chills and it may be hard to breathe. You may be very tired, have muscle aches, a headache or sore throat. Some people have an upset stomach or loose stools. Others lose their sense of smell or taste. You may not have these signs all the time and they may come and go while you are sick.  The virus spreads easily through droplets when you talk, sneeze, or cough. You can pass the virus to others when you are talking  close together, singing, hugging, sharing food, or shaking hands. Doctors believe the germs also survive on surfaces like tables, door handles, and telephones. However, this is not a common way that COVID-19 spreads. Doctors believe you can also spread the infection even if you dont have any symptoms, but they do not know how that happens. This is why getting vaccinated is one of the best ways to keep you healthy and slow the spread of the virus.  Some people have a mild case of COVID-19 and are able to stay at home and away from others until they feel better. Others may need to be in the hospital if they are very sick. Some people with COVID-19 can have some symptoms for weeks or months. People with COVID-19 must isolate themselves. You can start to be around others when your doctor says it is safe to do so.       What care is needed at home?   · Ask your doctor what you need to do when you go home. Make sure you ask questions if you do not understand what the doctor says.  · Drink lots of water, juice, or broth to replace fluids lost from a fever.  · You may use cool mist humidifiers to help ease congestion and coughing.  · Use 2 to 3 pillows to prop yourself up when you lie down to make it easier to breathe and sleep.  · Do not smoke and do not drink beer, wine, and mixed drinks (alcohol).  · To lower the chance of passing the infection to others, get a COVID-19 vaccine after your infection has resolved.  · If you have not been fully vaccinated:  ? Wear a mask over your mouth and nose if you are around others who are not sick. Cloth masks work best if they have more than one layer of fabric.  ? Wash your hands often.  ? Stay home in a separate room, if possible, away from others. Only go out to get medical care.  ? Use a separate bathroom if possible.  ? Do not make food for others.  What follow-up care is needed?   · Your doctor may ask you to make visits to the office to check on your progress. Be sure to keep  these visits. Make sure you wear a mask at these visits.  · If you can, tell the staff you have COVID-19 ahead of time so they can take extra care to stop the disease from spreading.  · It may take a few weeks before your health returns to normal.  What drugs may be needed?   The doctor may order drugs to:  · Help with breathing  · Help with fever  · Help with swelling in your airways and lungs  · Control coughing  · Ease a sore throat  · Help a runny or stuffy nose  Will physical activity be limited?   You may have to limit your physical activity. Talk to your doctor about the right amount of activity for you. If you have been very sick with COVID-19, it can take some time to get your strength back.  Will there be any other care needed?   Doctors do not know how long you can pass the virus on to others after you are sick. This is why it is important to stay in a separate room, if possible, when you are sick. For now, doctors are giving general guidelines for you to follow after you have been sick. Before you go around other people, you should:  · Be fever free for 24 hours without taking any drugs to lower the fever  · Have no symptoms of cough or shortness of breath  · Wait at least 10 days after first having symptoms or your first positive test, and you need to be symptom free as above. Some experts suggest waiting 20 days if you have had a more severe infection.  Talk with your doctor about getting a COVID-19 vaccine.  What problems could happen?   · Fluid loss. This is dehydration.  · Short-term or long-term lung damage  · Heart problems  · Death  When do I need to call the doctor?   · You are having so much trouble breathing that you can only say one or two words at a time.  · You need to sit upright at all times to be able to breathe and/or cannot lie down.  · You are very confused or cannot stay awake.  · Your lips or skin start to turn blue or grey.  · You think you might be having a medical emergency. Some  examples of medical emergencies are:  ? Severe chest pain.  ? Not able to speak or move normally.  · You have trouble breathing when talking or sitting still.  · You have new shortness of breath.  · You become weak or dizzy.  · You have very dark urine or do not pass urine for more than 8 hours.  · You have new or worsening COVID-19 symptoms like:  ? Fever  ? Cough  ? Feeling very tired  ? Shaking chills  ? Headache  ? Trouble swallowing  ? Throwing up  ? Loose stools  ? Reddish purple spots on your fingers or toes  Teach Back: Helping You Understand   The Teach Back Method helps you understand the information we are giving you. After you talk with the staff, tell them in your own words what you learned. This helps to make sure the staff has described each thing clearly. It also helps to explain things that may have been confusing. Before going home, make sure you can do these:  · I can tell you about my condition.  · I can tell you what may help ease my breathing.  · I can tell you what I can do to help avoid passing the infection to others.  · I can tell you what I will do if I have trouble breathing; feel sleepy or confused; or my fingertips, fingernails, skin, or lips are blue.  Where can I learn more?   Centers for Disease Control and Prevention  https://www.cdc.gov/coronavirus/2019-ncov/about/index.html   Centers for Disease Control and Prevention  https://www.cdc.gov/coronavirus/2019-ncov/hcp/disposition-in-home-patients.html   World Health Organization  https://www.who.int/news-room/q-a-detail/r-i-muabczumoofgi   Last Reviewed Date   2021-10-05  Consumer Information Use and Disclaimer   This information is not specific medical advice and does not replace information you receive from your health care provider. This is only a brief summary of general information. It does NOT include all information about conditions, illnesses, injuries, tests, procedures, treatments, therapies, discharge instructions or  life-style choices that may apply to you. You must talk with your health care provider for complete information about your health and treatment options. This information should not be used to decide whether or not to accept your health care providers advice, instructions or recommendations. Only your health care provider has the knowledge and training to provide advice that is right for you.  Copyright   Copyright © 2021 [employer] and its affiliates and/or licensors. All rights reserved.        Patient Education       Laryngitis Discharge Instructions   About this topic   The larynx is also called the voice box. It is a tube that holds your vocal cords and they make sounds when we speak. Our vocal cords help us talk with each other. It also acts like a door that opens to let air in and closes to keep food out of the lungs.  Sometimes, the voice box and vocal cords get swollen. This can happen for many reasons. It can be because you have a cold or the flu or you may have used your voice too much. At times, it may be from breathing in chemicals or smoke. Allergies can cause problems with this as well.  When this happens it is called laryngitis. Laryngitis causes your voice to become hoarse or weak. You may even lose your voice completely. This may be a problem that lasts for a short time or a long time. Laryngitis that lasts for a long time may be caused by some other health problem like acid reflux, polyps, or damage to nerves. Your care will be based on what is causing your laryngitis.  What care is needed at home?   · Ask your doctor what you need to do when you go home. Make sure you ask questions if you do not understand what the doctor says. This way you will know what you need to do.  · Try not to talk. Do not whisper. This can make your problem worse.  · Put a cool mist humidifier in your room to keep your throat from being dry.  · Drink lots of liquids.  · Gargle with warm salt water. Mix 1/2 teaspoon  (2.5 grams) salt with a cup (240 mL) of warm water.  · Keep your mouth from being dry. Try chewing gum or sucking on lozenges.  What follow-up care is needed?   · Your doctor may ask you to make visits to the office to check on your progress. Be sure to keep these visits.  · You may need to see a special kind of doctor called an ENT or ear, nose, and throat doctor.  What drugs may be needed?   Your doctor may order drugs to:  · Fight an infection  · Help with pain and swelling  · Help with acid reflux  Will physical activity be limited?   Physical activity may be limited based on what is causing this problem. Talk with your doctor about the right amount of activity for you.  What changes to diet are needed?   · Eat soft foods like soup and pureed fruit and vegetables if it hurts to swallow.  · Avoid drinking sports drinks, soft drinks, or undiluted fruit juice. They have too much sugar and may cause fluid loss and throat pain. Instead try herbal teas, diluted fruit juice, and water.  · Avoid caffeine, smoking, and beer, wine, and mixed drinks (alcohol). These can make your signs worse.  · Honey will help your sore throat feel better. Do not give honey to children under 1 year old.  What problems could happen?   · Harm to vocal cords  · Breathing problems  What can be done to prevent this health problem?   · Avoid being near people who have a cold or the flu.  · Try not to strain your voice. Avoid clearing your throat.  · Don't smoke. Avoid being with people who do smoke.  When do I need to call the doctor?   · Signs of infection. These include a fever of 100.4°F (38°C) or higher, chills, very bad sore throat, ear or sinus pain, cough, more mucus or change in color of mucus.  · Voice stays hoarse or voice does not come back after 2 weeks  · Very bad throat pain  · Trouble breathing or swallowing  · You are not feeling better in 2 to 3 days or you are feeling worse  Teach Back: Helping You Understand   The Teach Back  Method helps you understand the information we are giving you. After you talk with the staff, tell them in your own words what you learned. This helps to make sure the staff has described each thing clearly. It also helps to explain things that may have been confusing. Before going home, make sure you can do these:  · I can tell you about my condition.  · I can tell you what may help ease my sore throat.  · I can tell you what I will do if I have trouble breathing, very bad throat pain, or my voice stays hoarse.  Where can I learn more?   [employer] [place] on Deafness and Other Communication Disorders  http://www.nidcd.nih.gov/health/voice/pages/vocalabuse.aspx   [facility] Choices  https://www.nhs.uk/conditions/Laryngitis/   [facility] Inform  https://www.nhsinform.scot/illnesses-and-conditions/ears-nose-and-throat/laryngitis   Last Reviewed Date   2020-07-02  Consumer Information Use and Disclaimer   This information is not specific medical advice and does not replace information you receive from your health care provider. This is only a brief summary of general information. It does NOT include all information about conditions, illnesses, injuries, tests, procedures, treatments, therapies, discharge instructions or life-style choices that may apply to you. You must talk with your health care provider for complete information about your health and treatment options. This information should not be used to decide whether or not to accept your health care providers advice, instructions or recommendations. Only your health care provider has the knowledge and training to provide advice that is right for you.  Copyright   Copyright © 2021 [employer] and its affiliates and/or licensors. All rights reserved.    You must understand that you've received an Urgent Care treatment only and that you may be released before all your medical problems are known or treated. You, the patient, will arrange for follow up care as  instructed.    Follow up with your PCP or specialty clinic as directed in the next 1-2 weeks if not improved or as needed. You can call [phone] to schedule an appointment with the appropriate provider.    If your condition worsens we recommend that you receive another evaluation at the emergency room immediately or contact your primary medical clinic's after hours call service to discuss your concerns.    Please go to the Emergency Department for any concerns or worsening of condition.

## 2022-01-20 NOTE — PATIENT INSTRUCTIONS
Patient Education       Cough Discharge Instructions, Adult   About this topic   Many things can cause a cough. A cold or allergies can cause a cough. Other times, asthma or smoking can cause your cough. You can have a cough from mucus that drips down the back of your throat or from stomach acid that backs up into your throat. Sometimes a cough is a side effect from a drug. You may be waiting on some test results. If so, the staff will contact you if there are concerning results.  What care is needed at home?   · Ask your doctor what you need to do when you go home. Make sure you ask questions if you do not understand what the doctor says.  · To help you feel better:  ? Use a cool mist humidifier to avoid breathing dry air.  ? Use hard candy or cough drops to soothe sore throat and cough.  ? Gargle with salt water (mix 1/2 teaspoon salt with 1 cup warm water) a few times a day.  ? Spray saltwater mist in each nostril. Any normal saline spray works.  ? Sip warm liquids to keep your throat moist.  ? Take warm, steamy showers to help soothe the cough.  · Do not smoke or be in smoke-filled places. Avoid things that may cause breathing problems like vaping, fumes, pollution, dust, and other common allergens.  · You may want to use over-the-counter medicines for allergies or acid reflux if your cough is due to one of these problems.  · You can also use an over-the-counter cough medicine.  What follow-up care is needed?   Your doctor may ask you to make visits to the office to check on your progress. Be sure to keep these visits.  What drugs may be needed?   The doctor may order drugs to:  · Control coughing  · Get rid of or thin mucus  · Block allergens if your cough is due to allergies  · Increase airflow if your cough is due to asthma or COPD  · Reduce swelling of the airways  · Reduce stomach acid if your cough is due to stomach acid problems  Will physical activity be limited?   Your cough may interfere with some of  your activities.  What changes to diet are needed?   Some people feel milk products worsen their sputum production and worsen their cough. There is no proof that this is true. There is no need to reduce milk intake. Honey has been shown to be as effective as the leading over-the-counter (OTC) drug for calming coughs. Use 1/2 to 1 teaspoon (2.5 mL to 5 mL) of honey as needed. It can thin the secretions and loosen the cough. Never give honey to a child under the age of 1.  What can be done to prevent this health problem?   · Wash your hands often with soap and water for at least 20 seconds, especially after coughing or sneezing. Alcohol-based hand sanitizers also work to kill the virus.       · If you are sick, cover your mouth and nose with tissue when you cough or sneeze. You can also cough into your elbow. Throw away tissues in the trash and wash your hands after touching used tissues.  · Clean items and surfaces you most often touch like door handles, remotes, phones, or toys. Wipe them with a disinfectant. This can help reduce the spread of infection.  · Do not get too close (kissing, hugging) to people who are sick.  · Do not share towels or hankies with anyone who is sick.  · Stay away from crowded places.  · Get a flu shot each year.     When do I need to call the doctor?   · You have chest pain when you cough or trouble breathing.  · You start to cough up blood or yellow or green mucus.  · You have a fever of 100.4°F (38°C) or higher or chills.  · You cough so hard you throw up.  · You are still coughing in 10 days.  Teach Back: Helping You Understand   The Teach Back Method helps you understand the information we are giving you. After you talk with the staff, tell them in your own words what you learned. This helps to make sure the staff has described each thing clearly. It also helps to explain things that may have been confusing. Before going home, make sure you can do these:  · I can tell you about my  condition.  · I can tell you what I can do to help thin the mucus and ease my cough.  · I can tell you what I will do if I have wheezing, chest tightness, my lips turn blue with coughing, or I have other trouble breathing.  Where can I learn more?   American Academy of [employer]icians  https://familydoctor.org/cough-medicine-understanding-your-otc-options/   NHS Choices  https://www.nhs.uk/conditions/cough/   Last Reviewed Date   2021-06-10  Consumer Information Use and Disclaimer   This information is not specific medical advice and does not replace information you receive from your health care provider. This is only a brief summary of general information. It does NOT include all information about conditions, illnesses, injuries, tests, procedures, treatments, therapies, discharge instructions or life-style choices that may apply to you. You must talk with your health care provider for complete information about your health and treatment options. This information should not be used to decide whether or not to accept your health care providers advice, instructions or recommendations. Only your health care provider has the knowledge and training to provide advice that is right for you.  Copyright   Copyright © 2021 [employer] and its affiliates and/or licensors. All rights reserved.  Patient Education       COVID-19 Discharge Instructions   About this topic   Coronavirus disease 2019 is also known as COVID-19. It is a viral illness that infects the lungs. It is caused by a virus called SARS-associated coronavirus (SARS-CoV-2).  The signs of COVID-19 most often start a few days after you have been infected. In some people, it takes longer to show signs. Others never show signs of the infection. You may have a cough, fever, shaking chills and it may be hard to breathe. You may be very tired, have muscle aches, a headache or sore throat. Some people have an upset stomach or loose stools. Others lose their sense  of smell or taste. You may not have these signs all the time and they may come and go while you are sick.  The virus spreads easily through droplets when you talk, sneeze, or cough. You can pass the virus to others when you are talking close together, singing, hugging, sharing food, or shaking hands. Doctors believe the germs also survive on surfaces like tables, door handles, and telephones. However, this is not a common way that COVID-19 spreads. Doctors believe you can also spread the infection even if you dont have any symptoms, but they do not know how that happens. This is why getting vaccinated is one of the best ways to keep you healthy and slow the spread of the virus.  Some people have a mild case of COVID-19 and are able to stay at home and away from others until they feel better. Others may need to be in the hospital if they are very sick. Some people with COVID-19 can have some symptoms for weeks or months. People with COVID-19 must isolate themselves. You can start to be around others when your doctor says it is safe to do so.       What care is needed at home?   · Ask your doctor what you need to do when you go home. Make sure you ask questions if you do not understand what the doctor says.  · Drink lots of water, juice, or broth to replace fluids lost from a fever.  · You may use cool mist humidifiers to help ease congestion and coughing.  · Use 2 to 3 pillows to prop yourself up when you lie down to make it easier to breathe and sleep.  · Do not smoke and do not drink beer, wine, and mixed drinks (alcohol).  · To lower the chance of passing the infection to others, get a COVID-19 vaccine after your infection has resolved.  · If you have not been fully vaccinated:  ? Wear a mask over your mouth and nose if you are around others who are not sick. Cloth masks work best if they have more than one layer of fabric.  ? Wash your hands often.  ? Stay home in a separate room, if possible, away from others.  Only go out to get medical care.  ? Use a separate bathroom if possible.  ? Do not make food for others.  What follow-up care is needed?   · Your doctor may ask you to make visits to the office to check on your progress. Be sure to keep these visits. Make sure you wear a mask at these visits.  · If you can, tell the staff you have COVID-19 ahead of time so they can take extra care to stop the disease from spreading.  · It may take a few weeks before your health returns to normal.  What drugs may be needed?   The doctor may order drugs to:  · Help with breathing  · Help with fever  · Help with swelling in your airways and lungs  · Control coughing  · Ease a sore throat  · Help a runny or stuffy nose  Will physical activity be limited?   You may have to limit your physical activity. Talk to your doctor about the right amount of activity for you. If you have been very sick with COVID-19, it can take some time to get your strength back.  Will there be any other care needed?   Doctors do not know how long you can pass the virus on to others after you are sick. This is why it is important to stay in a separate room, if possible, when you are sick. For now, doctors are giving general guidelines for you to follow after you have been sick. Before you go around other people, you should:  · Be fever free for 24 hours without taking any drugs to lower the fever  · Have no symptoms of cough or shortness of breath  · Wait at least 10 days after first having symptoms or your first positive test, and you need to be symptom free as above. Some experts suggest waiting 20 days if you have had a more severe infection.  Talk with your doctor about getting a COVID-19 vaccine.  What problems could happen?   · Fluid loss. This is dehydration.  · Short-term or long-term lung damage  · Heart problems  · Death  When do I need to call the doctor?   · You are having so much trouble breathing that you can only say one or two words at a  time.  · You need to sit upright at all times to be able to breathe and/or cannot lie down.  · You are very confused or cannot stay awake.  · Your lips or skin start to turn blue or grey.  · You think you might be having a medical emergency. Some examples of medical emergencies are:  ? Severe chest pain.  ? Not able to speak or move normally.  · You have trouble breathing when talking or sitting still.  · You have new shortness of breath.  · You become weak or dizzy.  · You have very dark urine or do not pass urine for more than 8 hours.  · You have new or worsening COVID-19 symptoms like:  ? Fever  ? Cough  ? Feeling very tired  ? Shaking chills  ? Headache  ? Trouble swallowing  ? Throwing up  ? Loose stools  ? Reddish purple spots on your fingers or toes  Teach Back: Helping You Understand   The Teach Back Method helps you understand the information we are giving you. After you talk with the staff, tell them in your own words what you learned. This helps to make sure the staff has described each thing clearly. It also helps to explain things that may have been confusing. Before going home, make sure you can do these:  · I can tell you about my condition.  · I can tell you what may help ease my breathing.  · I can tell you what I can do to help avoid passing the infection to others.  · I can tell you what I will do if I have trouble breathing; feel sleepy or confused; or my fingertips, fingernails, skin, or lips are blue.  Where can I learn more?   Centers for Disease Control and Prevention  https://www.cdc.gov/coronavirus/2019-ncov/about/index.html   Centers for Disease Control and Prevention  https://www.cdc.gov/coronavirus/2019-ncov/hcp/disposition-in-home-patients.html   World Health Organization  https://www.who.int/news-room/q-a-detail/e-e-qipuhkvwwmawr   Last Reviewed Date   2021-10-05  Consumer Information Use and Disclaimer   This information is not specific medical advice and does not replace information  you receive from your health care provider. This is only a brief summary of general information. It does NOT include all information about conditions, illnesses, injuries, tests, procedures, treatments, therapies, discharge instructions or life-style choices that may apply to you. You must talk with your health care provider for complete information about your health and treatment options. This information should not be used to decide whether or not to accept your health care providers advice, instructions or recommendations. Only your health care provider has the knowledge and training to provide advice that is right for you.  Copyright   Copyright © 2021 [employer], [employer]. and its affiliates and/or licensors. All rights reserved.        Patient Education       Laryngitis Discharge Instructions   About this topic   The larynx is also called the voice box. It is a tube that holds your vocal cords and they make sounds when we speak. Our vocal cords help us talk with each other. It also acts like a door that opens to let air in and closes to keep food out of the lungs.  Sometimes, the voice box and vocal cords get swollen. This can happen for many reasons. It can be because you have a cold or the flu or you may have used your voice too much. At times, it may be from breathing in chemicals or smoke. Allergies can cause problems with this as well.  When this happens it is called laryngitis. Laryngitis causes your voice to become hoarse or weak. You may even lose your voice completely. This may be a problem that lasts for a short time or a long time. Laryngitis that lasts for a long time may be caused by some other health problem like acid reflux, polyps, or damage to nerves. Your care will be based on what is causing your laryngitis.  What care is needed at home?   · Ask your doctor what you need to do when you go home. Make sure you ask questions if you do not understand what the doctor says. This way you will know what  you need to do.  · Try not to talk. Do not whisper. This can make your problem worse.  · Put a cool mist humidifier in your room to keep your throat from being dry.  · Drink lots of liquids.  · Gargle with warm salt water. Mix 1/2 teaspoon (2.5 grams) salt with a cup (240 mL) of warm water.  · Keep your mouth from being dry. Try chewing gum or sucking on lozenges.  What follow-up care is needed?   · Your doctor may ask you to make visits to the office to check on your progress. Be sure to keep these visits.  · You may need to see a special kind of doctor called an ENT or ear, nose, and throat doctor.  What drugs may be needed?   Your doctor may order drugs to:  · Fight an infection  · Help with pain and swelling  · Help with acid reflux  Will physical activity be limited?   Physical activity may be limited based on what is causing this problem. Talk with your doctor about the right amount of activity for you.  What changes to diet are needed?   · Eat soft foods like soup and pureed fruit and vegetables if it hurts to swallow.  · Avoid drinking sports drinks, soft drinks, or undiluted fruit juice. They have too much sugar and may cause fluid loss and throat pain. Instead try herbal teas, diluted fruit juice, and water.  · Avoid caffeine, smoking, and beer, wine, and mixed drinks (alcohol). These can make your signs worse.  · Honey will help your sore throat feel better. Do not give honey to children under 1 year old.  What problems could happen?   · Harm to vocal cords  · Breathing problems  What can be done to prevent this health problem?   · Avoid being near people who have a cold or the flu.  · Try not to strain your voice. Avoid clearing your throat.  · Don't smoke. Avoid being with people who do smoke.  When do I need to call the doctor?   · Signs of infection. These include a fever of 100.4°F (38°C) or higher, chills, very bad sore throat, ear or sinus pain, cough, more mucus or change in color of  mucus.  · Voice stays hoarse or voice does not come back after 2 weeks  · Very bad throat pain  · Trouble breathing or swallowing  · You are not feeling better in 2 to 3 days or you are feeling worse  Teach Back: Helping You Understand   The Teach Back Method helps you understand the information we are giving you. After you talk with the staff, tell them in your own words what you learned. This helps to make sure the staff has described each thing clearly. It also helps to explain things that may have been confusing. Before going home, make sure you can do these:  · I can tell you about my condition.  · I can tell you what may help ease my sore throat.  · I can tell you what I will do if I have trouble breathing, very bad throat pain, or my voice stays hoarse.  Where can I learn more?   National [place] on Deafness and Other Communication Disorders  http://www.nidcd.nih.gov/health/voice/pages/vocalabuse.aspx   NHS Choices  https://www.nhs.uk/conditions/Laryngitis/   [facility] Inform  https://www.nhsinform.scot/illnesses-and-conditions/ears-nose-and-throat/laryngitis   Last Reviewed Date   2020-07-02  Consumer Information Use and Disclaimer   This information is not specific medical advice and does not replace information you receive from your health care provider. This is only a brief summary of general information. It does NOT include all information about conditions, illnesses, injuries, tests, procedures, treatments, therapies, discharge instructions or life-style choices that may apply to you. You must talk with your health care provider for complete information about your health and treatment options. This information should not be used to decide whether or not to accept your health care providers advice, instructions or recommendations. Only your health care provider has the knowledge and training to provide advice that is right for you.  Copyright   Copyright © 2021 [employer], [employer]. and its affiliates and/or  licensors. All rights reserved.    You must understand that you've received an Urgent Care treatment only and that you may be released before all your medical problems are known or treated. You, the patient, will arrange for follow up care as instructed.    Follow up with your PCP or specialty clinic as directed in the next 1-2 weeks if not improved or as needed. You can call [phone] to schedule an appointment with the appropriate provider.    If your condition worsens we recommend that you receive another evaluation at the emergency room immediately or contact your primary medical clinic's after hours call service to discuss your concerns.    Please go to the Emergency Department for any concerns or worsening of condition.

## 2022-01-21 ENCOUNTER — PATIENT MESSAGE (OUTPATIENT)
Dept: INTERNAL MEDICINE | Facility: CLINIC | Age: 52
End: 2022-01-21

## 2022-01-21 ENCOUNTER — OFFICE VISIT (OUTPATIENT)
Dept: INTERNAL MEDICINE | Facility: CLINIC | Age: 52
End: 2022-01-21
Payer: COMMERCIAL

## 2022-01-21 VITALS
SYSTOLIC BLOOD PRESSURE: 104 MMHG | BODY MASS INDEX: 38.54 KG/M2 | WEIGHT: 209.44 LBS | OXYGEN SATURATION: 99 % | HEIGHT: 62 IN | HEART RATE: 73 BPM | DIASTOLIC BLOOD PRESSURE: 70 MMHG

## 2022-01-21 DIAGNOSIS — I10 ESSENTIAL HYPERTENSION: ICD-10-CM

## 2022-01-21 DIAGNOSIS — J01.90 ACUTE BACTERIAL SINUSITIS: ICD-10-CM

## 2022-01-21 DIAGNOSIS — D75.839 THROMBOCYTOSIS: ICD-10-CM

## 2022-01-21 DIAGNOSIS — E05.90 SUBCLINICAL HYPERTHYROIDISM: ICD-10-CM

## 2022-01-21 DIAGNOSIS — Z12.11 COLON CANCER SCREENING: ICD-10-CM

## 2022-01-21 DIAGNOSIS — Z12.31 SCREENING MAMMOGRAM FOR BREAST CANCER: ICD-10-CM

## 2022-01-21 DIAGNOSIS — B96.89 ACUTE BACTERIAL SINUSITIS: ICD-10-CM

## 2022-01-21 DIAGNOSIS — Z00.00 ANNUAL PHYSICAL EXAM: Primary | ICD-10-CM

## 2022-01-21 PROCEDURE — 1159F MED LIST DOCD IN RCRD: CPT | Mod: CPTII,S$GLB,, | Performed by: INTERNAL MEDICINE

## 2022-01-21 PROCEDURE — 3044F PR MOST RECENT HEMOGLOBIN A1C LEVEL <7.0%: ICD-10-PCS | Mod: CPTII,S$GLB,, | Performed by: INTERNAL MEDICINE

## 2022-01-21 PROCEDURE — 1159F PR MEDICATION LIST DOCUMENTED IN MEDICAL RECORD: ICD-10-PCS | Mod: CPTII,S$GLB,, | Performed by: INTERNAL MEDICINE

## 2022-01-21 PROCEDURE — 3078F DIAST BP <80 MM HG: CPT | Mod: CPTII,S$GLB,, | Performed by: INTERNAL MEDICINE

## 2022-01-21 PROCEDURE — 4010F PR ACE/ARB THEARPY RXD/TAKEN: ICD-10-PCS | Mod: CPTII,S$GLB,, | Performed by: INTERNAL MEDICINE

## 2022-01-21 PROCEDURE — 99999 PR PBB SHADOW E&M-EST. PATIENT-LVL IV: ICD-10-PCS | Mod: PBBFAC,,, | Performed by: INTERNAL MEDICINE

## 2022-01-21 PROCEDURE — 3044F HG A1C LEVEL LT 7.0%: CPT | Mod: CPTII,S$GLB,, | Performed by: INTERNAL MEDICINE

## 2022-01-21 PROCEDURE — 3008F BODY MASS INDEX DOCD: CPT | Mod: CPTII,S$GLB,, | Performed by: INTERNAL MEDICINE

## 2022-01-21 PROCEDURE — 4010F ACE/ARB THERAPY RXD/TAKEN: CPT | Mod: CPTII,S$GLB,, | Performed by: INTERNAL MEDICINE

## 2022-01-21 PROCEDURE — 99396 PREV VISIT EST AGE 40-64: CPT | Mod: S$GLB,,, | Performed by: INTERNAL MEDICINE

## 2022-01-21 PROCEDURE — 3008F PR BODY MASS INDEX (BMI) DOCUMENTED: ICD-10-PCS | Mod: CPTII,S$GLB,, | Performed by: INTERNAL MEDICINE

## 2022-01-21 PROCEDURE — 99396 PR PREVENTIVE VISIT,EST,40-64: ICD-10-PCS | Mod: S$GLB,,, | Performed by: INTERNAL MEDICINE

## 2022-01-21 PROCEDURE — 3074F SYST BP LT 130 MM HG: CPT | Mod: CPTII,S$GLB,, | Performed by: INTERNAL MEDICINE

## 2022-01-21 PROCEDURE — 3074F PR MOST RECENT SYSTOLIC BLOOD PRESSURE < 130 MM HG: ICD-10-PCS | Mod: CPTII,S$GLB,, | Performed by: INTERNAL MEDICINE

## 2022-01-21 PROCEDURE — 3078F PR MOST RECENT DIASTOLIC BLOOD PRESSURE < 80 MM HG: ICD-10-PCS | Mod: CPTII,S$GLB,, | Performed by: INTERNAL MEDICINE

## 2022-01-21 PROCEDURE — 99999 PR PBB SHADOW E&M-EST. PATIENT-LVL IV: CPT | Mod: PBBFAC,,, | Performed by: INTERNAL MEDICINE

## 2022-01-21 RX ORDER — AMOXICILLIN AND CLAVULANATE POTASSIUM 875; 125 MG/1; MG/1
1 TABLET, FILM COATED ORAL EVERY 12 HOURS
Qty: 14 TABLET | Refills: 0 | Status: SHIPPED | OUTPATIENT
Start: 2022-01-21 | End: 2022-01-28

## 2022-01-21 RX ORDER — FLUTICASONE PROPIONATE 50 MCG
2 SPRAY, SUSPENSION (ML) NASAL DAILY
Qty: 16 G | Refills: 0 | Status: SHIPPED | OUTPATIENT
Start: 2022-01-21 | End: 2022-02-20

## 2022-01-21 RX ORDER — METHYLPREDNISOLONE 4 MG/1
TABLET ORAL
Qty: 21 EACH | Refills: 0 | Status: SHIPPED | OUTPATIENT
Start: 2022-01-21 | End: 2022-02-11

## 2022-01-21 RX ORDER — LOSARTAN POTASSIUM 25 MG/1
25 TABLET ORAL DAILY
Qty: 90 TABLET | Refills: 1 | Status: SHIPPED | OUTPATIENT
Start: 2022-01-21 | End: 2022-05-23 | Stop reason: SDUPTHER

## 2022-01-21 RX ORDER — HYDROCHLOROTHIAZIDE 25 MG/1
25 TABLET ORAL DAILY
Qty: 90 TABLET | Refills: 1 | Status: SHIPPED | OUTPATIENT
Start: 2022-01-21 | End: 2022-05-23 | Stop reason: SDUPTHER

## 2022-01-21 NOTE — PATIENT INSTRUCTIONS
Take a multivitamin that contains iron (ferrous sulfate), can get this over the counter and take daily.

## 2022-01-21 NOTE — PROGRESS NOTES
Patient ID: Isaura Mancini is a 51 y.o. female.    Chief Complaint: Annual Exam    HPI Isaura is a 51 y.o. female with hypertension, obesity, and spinal stenosis who presents for annual exam.  She complains today of cough.  Cough has been since she was diagnosed with COVID on January 12. Has associated symptoms of sinus pressure, headache (frontal), hoarseness and occasional popping of ears. Symptoms constant in duration. Nothing making symptoms better.    Reviewed annual exam lab results with her in clinic today.    Health Maintenance Topics with due status: Not Due       Topic Last Completion Date    Cervical Cancer Screening 01/19/2021    TETANUS VACCINE 03/24/2021    Lipid Panel 01/19/2022       Review of Systems   HENT: Positive for congestion and sinus pressure.    Respiratory: Positive for cough.    Neurological: Positive for headaches.   All other systems reviewed and are negative.        Objective:     Vitals:    01/21/22 0957   BP: 104/70   Pulse: 73        Physical Exam  Vitals reviewed.   Constitutional:       General: She is not in acute distress.     Appearance: Normal appearance. She is well-developed. She is obese. She is not ill-appearing, toxic-appearing or diaphoretic.   HENT:      Head: Normocephalic and atraumatic.        Comments: Area of sinus pressure-frontal sinuses     Right Ear: Tympanic membrane, ear canal and external ear normal. There is no impacted cerumen.      Left Ear: Tympanic membrane, ear canal and external ear normal. There is no impacted cerumen.      Nose: Congestion and rhinorrhea (clear) present.      Mouth/Throat:      Pharynx: Posterior oropharyngeal erythema present. No oropharyngeal exudate.   Eyes:      General: No scleral icterus.        Right eye: No discharge.         Left eye: No discharge.      Extraocular Movements: Extraocular movements intact.      Conjunctiva/sclera: Conjunctivae normal.   Cardiovascular:      Rate and Rhythm: Normal rate and regular  rhythm.      Heart sounds: Normal heart sounds. No murmur heard.  No friction rub. No gallop.    Pulmonary:      Effort: Pulmonary effort is normal. No respiratory distress.      Breath sounds: Normal breath sounds. No stridor. No wheezing, rhonchi or rales.   Skin:     General: Skin is warm and dry.   Neurological:      General: No focal deficit present.      Mental Status: She is alert and oriented to person, place, and time. Mental status is at baseline.   Psychiatric:         Mood and Affect: Mood normal.         Behavior: Behavior normal.         Thought Content: Thought content normal.         Judgment: Judgment normal.         Assessment:       1. Annual physical exam    2. Essential hypertension Well controlled   3. Screening mammogram for breast cancer    4. Colon cancer screening    5. Acute bacterial sinusitis Active   6. Subclinical hyperthyroidism Active   7. Thrombocytosis Chronic       Plan:         Annual physical exam    Essential hypertension  Comments:  Continue current medication  Orders:  -     hydroCHLOROthiazide (HYDRODIURIL) 25 MG tablet; Take 1 tablet (25 mg total) by mouth once daily.  Dispense: 90 tablet; Refill: 1  -     losartan (COZAAR) 25 MG tablet; Take 1 tablet (25 mg total) by mouth once daily.  Dispense: 90 tablet; Refill: 1  -     Comprehensive Metabolic Panel; Future; Expected date: 07/21/2022    Screening mammogram for breast cancer  -     Mammo Digital Screening Bilat; Future; Expected date: 01/21/2022    Colon cancer screening  -     Fecal Immunochemical Test (iFOBT); Future; Expected date: 01/21/2022    Acute bacterial sinusitis  -     methylPREDNISolone (MEDROL DOSEPACK) 4 mg tablet; use as directed  Dispense: 21 each; Refill: 0  -     amoxicillin-clavulanate 875-125mg (AUGMENTIN) 875-125 mg per tablet; Take 1 tablet by mouth every 12 (twelve) hours. for 7 days  Dispense: 14 tablet; Refill: 0  -     fluticasone propionate (FLONASE) 50 mcg/actuation nasal spray; 2 sprays (100  mcg total) by Each Nostril route once daily.  Dispense: 16 g; Refill: 0    Subclinical hyperthyroidism  -     TSH; Future; Expected date: 01/21/2022    Thrombocytosis  Comments:  Take OTC MV with iron (see AVS)  Orders:  -     CBC Auto Differential; Future; Expected date: 01/21/2022        RTC 6 months     Warning signs discussed, patient to call with any further issues or worsening of symptoms.       Parts of the above note were dictated using a voice dictation software. Please excuse any grammatical or typographical errors.

## 2022-01-27 ENCOUNTER — PATIENT MESSAGE (OUTPATIENT)
Dept: INTERNAL MEDICINE | Facility: CLINIC | Age: 52
End: 2022-01-27
Payer: COMMERCIAL

## 2022-02-10 ENCOUNTER — LAB VISIT (OUTPATIENT)
Dept: LAB | Facility: HOSPITAL | Age: 52
End: 2022-02-10
Attending: INTERNAL MEDICINE
Payer: COMMERCIAL

## 2022-02-10 DIAGNOSIS — Z12.11 COLON CANCER SCREENING: ICD-10-CM

## 2022-02-10 PROCEDURE — 82274 ASSAY TEST FOR BLOOD FECAL: CPT | Performed by: INTERNAL MEDICINE

## 2022-02-15 LAB — HEMOCCULT STL QL IA: NEGATIVE

## 2022-03-10 ENCOUNTER — PATIENT OUTREACH (OUTPATIENT)
Dept: ADMINISTRATIVE | Facility: OTHER | Age: 52
End: 2022-03-10
Payer: COMMERCIAL

## 2022-03-10 NOTE — PROGRESS NOTES
Health Maintenance Due   Topic Date Due    Influenza Vaccine (1) 09/01/2021    COVID-19 Vaccine (3 - Booster for Moderna series) 12/04/2021     Updates were requested from care everywhere.  Chart was reviewed for overdue Proactive [facility] Encounters ([facility]) topics (CRS, Breast Cancer Screening, Eye exam)  Health Maintenance has been updated.  LINKS immunization registry triggered.  Immunizations were reconciled.

## 2022-04-14 ENCOUNTER — LAB VISIT (OUTPATIENT)
Dept: LAB | Facility: HOSPITAL | Age: 52
End: 2022-04-14
Attending: INTERNAL MEDICINE
Payer: COMMERCIAL

## 2022-04-14 DIAGNOSIS — D75.839 THROMBOCYTOSIS: ICD-10-CM

## 2022-04-14 DIAGNOSIS — E05.90 SUBCLINICAL HYPERTHYROIDISM: ICD-10-CM

## 2022-04-14 LAB
BASOPHILS # BLD AUTO: 0.03 K/UL (ref 0–0.2)
BASOPHILS NFR BLD: 0.4 % (ref 0–1.9)
DIFFERENTIAL METHOD: ABNORMAL
EOSINOPHIL # BLD AUTO: 0.2 K/UL (ref 0–0.5)
EOSINOPHIL NFR BLD: 2.1 % (ref 0–8)
ERYTHROCYTE [DISTWIDTH] IN BLOOD BY AUTOMATED COUNT: 11.6 % (ref 11.5–14.5)
HCT VFR BLD AUTO: 39.6 % (ref 37–48.5)
HGB BLD-MCNC: 12.6 G/DL (ref 12–16)
IMM GRANULOCYTES # BLD AUTO: 0.03 K/UL (ref 0–0.04)
IMM GRANULOCYTES NFR BLD AUTO: 0.4 % (ref 0–0.5)
LYMPHOCYTES # BLD AUTO: 2.3 K/UL (ref 1–4.8)
LYMPHOCYTES NFR BLD: 27 % (ref 18–48)
MCH RBC QN AUTO: 28.3 PG (ref 27–31)
MCHC RBC AUTO-ENTMCNC: 31.8 G/DL (ref 32–36)
MCV RBC AUTO: 89 FL (ref 82–98)
MONOCYTES # BLD AUTO: 0.8 K/UL (ref 0.3–1)
MONOCYTES NFR BLD: 8.8 % (ref 4–15)
NEUTROPHILS # BLD AUTO: 5.3 K/UL (ref 1.8–7.7)
NEUTROPHILS NFR BLD: 61.3 % (ref 38–73)
NRBC BLD-RTO: 0 /100 WBC
PLATELET # BLD AUTO: 413 K/UL (ref 150–450)
PMV BLD AUTO: 11.1 FL (ref 9.2–12.9)
RBC # BLD AUTO: 4.46 M/UL (ref 4–5.4)
T4 FREE SERPL-MCNC: 2.05 NG/DL (ref 0.71–1.51)
TSH SERPL DL<=0.005 MIU/L-ACNC: <0.01 UIU/ML (ref 0.4–4)
WBC # BLD AUTO: 8.55 K/UL (ref 3.9–12.7)

## 2022-04-14 PROCEDURE — 84439 ASSAY OF FREE THYROXINE: CPT | Performed by: INTERNAL MEDICINE

## 2022-04-14 PROCEDURE — 85025 COMPLETE CBC W/AUTO DIFF WBC: CPT | Performed by: INTERNAL MEDICINE

## 2022-04-14 PROCEDURE — 84443 ASSAY THYROID STIM HORMONE: CPT | Performed by: INTERNAL MEDICINE

## 2022-04-14 PROCEDURE — 36415 COLL VENOUS BLD VENIPUNCTURE: CPT | Mod: PO | Performed by: INTERNAL MEDICINE

## 2022-04-15 ENCOUNTER — PATIENT MESSAGE (OUTPATIENT)
Dept: INTERNAL MEDICINE | Facility: CLINIC | Age: 52
End: 2022-04-15
Payer: COMMERCIAL

## 2022-04-19 DIAGNOSIS — E05.90 HYPERTHYROIDISM: Primary | ICD-10-CM

## 2022-05-11 ENCOUNTER — PATIENT MESSAGE (OUTPATIENT)
Dept: ADMINISTRATIVE | Facility: OTHER | Age: 52
End: 2022-05-11
Payer: COMMERCIAL

## 2022-05-23 DIAGNOSIS — I10 ESSENTIAL HYPERTENSION: ICD-10-CM

## 2022-05-23 RX ORDER — LOSARTAN POTASSIUM 25 MG/1
TABLET ORAL
Qty: 90 TABLET | Refills: 0 | OUTPATIENT
Start: 2022-05-23

## 2022-05-23 RX ORDER — HYDROCHLOROTHIAZIDE 25 MG/1
TABLET ORAL
Qty: 90 TABLET | Refills: 0 | OUTPATIENT
Start: 2022-05-23

## 2022-05-23 NOTE — TELEPHONE ENCOUNTER
No new care gaps identified.  Health [facility] Embedded Care Gaps. Reference number: [id number]. 5/23/2022   10:07:38 AM CHANELLE

## 2022-05-23 NOTE — TELEPHONE ENCOUNTER
[place]. Request already responded to by other means (e.g. phone or fax)   Refill Authorization Note   Isaura Mancini  is requesting a refill authorization.  Brief Assessment and Rationale for Refill:  Quick Discontinue  Medication Therapy Plan:       Medication Reconciliation Completed:  No      Comments: Receipt electronically confirmed by requesting pharmacy    Note composed:5:48 PM 05/23/2022

## 2022-05-31 NOTE — PROGRESS NOTES
"Subjective:      Patient ID: Isaura Mancini is a 51 y.o. female.    Chief Complaint:  Thyroid Nodule    History of Present Illness  Isaura Mancini is here for evaluation and management of hyperthyroidism.  Referred by Dr. Shah.  This is their first visit with me.      COVID in January     With regards to hyperthyroidism:    Denies FH of thyroid disease or thyroid cancer.     Lab Results   Component Value Date    TSH <0.010 (L) 04/14/2022    FREET4 2.05 (H) 04/14/2022     Current Symptoms:   Reports Palpations  Reports unexplained weight loss  Denies Diarrhea  Reports constipation   Repoirts Hair loss  Denies Brittle nails  Denies Skin changes  Denies Tremor   Reports Anxiety    Biotin Use: Denies    Current medication:  NONE    Any recent (3-6 months) iodine or contrast? Denies    Smoke: Denies    Thyroid tenderness?  Denies    Graves Eye Clinical Activity: 3/7=Active  Eye pain? Denies  Eye pain with movement? Denies  Eyelid erythema? Denies  Erythema of conjunctiva? Denies  Caruncle inflammation? Denies  Eyelid swelling? Denies    Review of Systems   as above    Objective:   Physical Exam  Vitals reviewed.   Neck:      Thyroid: Thyromegaly present.   Cardiovascular:      Rate and Rhythm: Normal rate.      Comments: No edema present  Pulmonary:      Effort: Pulmonary effort is normal.   Abdominal:      Palpations: Abdomen is soft.         Visit Vitals  /71   Pulse 79   Ht 5' 2" (1.575 m)   Wt 84.7 kg (186 lb 11.7 oz)   BMI 34.15 kg/m²       Body mass index is 34.15 kg/m².    Lab Review:   Lab Results   Component Value Date    HGBA1C 5.2 01/19/2022    HGBA1C 5.0 01/14/2021    HGBA1C 5.2 03/13/2015       Lab Results   Component Value Date    CHOL 200 (H) 01/19/2022    HDL 54 01/19/2022    LDLCALC 120.2 01/19/2022    TRIG 129 01/19/2022    CHOLHDL 27.0 01/19/2022     Lab Results   Component Value Date     01/19/2022    K 3.9 01/19/2022     01/19/2022    CO2 26 01/19/2022    GLU 98 01/19/2022 "    BUN 15 01/19/2022    CREATININE 0.6 01/19/2022    CALCIUM 9.8 01/19/2022    PROT 7.6 01/19/2022    ALBUMIN 3.8 01/19/2022    BILITOT 0.8 01/19/2022    ALKPHOS 47 (L) 01/19/2022    AST 21 01/19/2022    ALT 12 01/19/2022    ANIONGAP 10 01/19/2022    ESTGFRAFRICA >60.0 01/19/2022    EGFRNONAA >60.0 01/19/2022    TSH <0.010 (L) 04/14/2022     No results found for: [id number]  Assessment and Plan     1. Hyperthyroidism  Ambulatory referral/consult to Endocrinology    TSH    T4, Free    T3    Thyroid Stimulating Immunoglobulin    Thyrotropin Receptor Antibody    US Soft Tissue Head Neck Thyroid       Hyperthyroidism  -- Differential diagnosis includes Graves, MNG/toxic nodule, thyroiditis.    -- Cause-specific treatment options and associated risks discussed with patient.   -- Thionamide monitoring: I have reviewed the risk of agranulocytosis that can occur in 1 of 500 patients who are taking either PTU or methimazole. I have also reviewed the even rarer risk of hepatic dysfunction. A baseline CBC with differential and Liver function tests are available. The instruction sheet was given to the patient that describes these risks, warning symptoms, and instructions to stop the medication, contact the covering endocrinology fellow, and check blood tests.   -- Labs today with thyroid Ab.  -- Schedule thyroid US.   -- If Ab negative, schedule NM Thyroid Uptake and scan.  -- Treatment:  ATD pending labs      Follow up in about 6 months (around 12/6/2022).

## 2022-06-06 ENCOUNTER — LAB VISIT (OUTPATIENT)
Dept: LAB | Facility: HOSPITAL | Age: 52
End: 2022-06-06
Attending: INTERNAL MEDICINE
Payer: COMMERCIAL

## 2022-06-06 ENCOUNTER — OFFICE VISIT (OUTPATIENT)
Dept: ENDOCRINOLOGY | Facility: CLINIC | Age: 52
End: 2022-06-06
Payer: COMMERCIAL

## 2022-06-06 VITALS
SYSTOLIC BLOOD PRESSURE: 122 MMHG | HEART RATE: 79 BPM | HEIGHT: 62 IN | BODY MASS INDEX: 34.37 KG/M2 | WEIGHT: 186.75 LBS | DIASTOLIC BLOOD PRESSURE: 71 MMHG

## 2022-06-06 DIAGNOSIS — E05.90 HYPERTHYROIDISM: ICD-10-CM

## 2022-06-06 PROCEDURE — 84443 ASSAY THYROID STIM HORMONE: CPT | Performed by: NURSE PRACTITIONER

## 2022-06-06 PROCEDURE — 3008F PR BODY MASS INDEX (BMI) DOCUMENTED: ICD-10-PCS | Mod: CPTII,S$GLB,, | Performed by: NURSE PRACTITIONER

## 2022-06-06 PROCEDURE — 1159F MED LIST DOCD IN RCRD: CPT | Mod: CPTII,S$GLB,, | Performed by: NURSE PRACTITIONER

## 2022-06-06 PROCEDURE — 4010F PR ACE/ARB THEARPY RXD/TAKEN: ICD-10-PCS | Mod: CPTII,S$GLB,, | Performed by: NURSE PRACTITIONER

## 2022-06-06 PROCEDURE — 1160F RVW MEDS BY RX/DR IN RCRD: CPT | Mod: CPTII,S$GLB,, | Performed by: NURSE PRACTITIONER

## 2022-06-06 PROCEDURE — 84445 ASSAY OF TSI GLOBULIN: CPT | Performed by: NURSE PRACTITIONER

## 2022-06-06 PROCEDURE — 3078F DIAST BP <80 MM HG: CPT | Mod: CPTII,S$GLB,, | Performed by: NURSE PRACTITIONER

## 2022-06-06 PROCEDURE — 99204 PR OFFICE/OUTPT VISIT, NEW, LEVL IV, 45-59 MIN: ICD-10-PCS | Mod: S$GLB,,, | Performed by: NURSE PRACTITIONER

## 2022-06-06 PROCEDURE — 3008F BODY MASS INDEX DOCD: CPT | Mod: CPTII,S$GLB,, | Performed by: NURSE PRACTITIONER

## 2022-06-06 PROCEDURE — 1160F PR REVIEW ALL MEDS BY PRESCRIBER/CLIN PHARMACIST DOCUMENTED: ICD-10-PCS | Mod: CPTII,S$GLB,, | Performed by: NURSE PRACTITIONER

## 2022-06-06 PROCEDURE — 83520 IMMUNOASSAY QUANT NOS NONAB: CPT | Performed by: NURSE PRACTITIONER

## 2022-06-06 PROCEDURE — 3044F HG A1C LEVEL LT 7.0%: CPT | Mod: CPTII,S$GLB,, | Performed by: NURSE PRACTITIONER

## 2022-06-06 PROCEDURE — 99999 PR PBB SHADOW E&M-EST. PATIENT-LVL IV: ICD-10-PCS | Mod: PBBFAC,,, | Performed by: NURSE PRACTITIONER

## 2022-06-06 PROCEDURE — 3044F PR MOST RECENT HEMOGLOBIN A1C LEVEL <7.0%: ICD-10-PCS | Mod: CPTII,S$GLB,, | Performed by: NURSE PRACTITIONER

## 2022-06-06 PROCEDURE — 84439 ASSAY OF FREE THYROXINE: CPT | Performed by: NURSE PRACTITIONER

## 2022-06-06 PROCEDURE — 1159F PR MEDICATION LIST DOCUMENTED IN MEDICAL RECORD: ICD-10-PCS | Mod: CPTII,S$GLB,, | Performed by: NURSE PRACTITIONER

## 2022-06-06 PROCEDURE — 99999 PR PBB SHADOW E&M-EST. PATIENT-LVL IV: CPT | Mod: PBBFAC,,, | Performed by: NURSE PRACTITIONER

## 2022-06-06 PROCEDURE — 99204 OFFICE O/P NEW MOD 45 MIN: CPT | Mod: S$GLB,,, | Performed by: NURSE PRACTITIONER

## 2022-06-06 PROCEDURE — 3074F PR MOST RECENT SYSTOLIC BLOOD PRESSURE < 130 MM HG: ICD-10-PCS | Mod: CPTII,S$GLB,, | Performed by: NURSE PRACTITIONER

## 2022-06-06 PROCEDURE — 3074F SYST BP LT 130 MM HG: CPT | Mod: CPTII,S$GLB,, | Performed by: NURSE PRACTITIONER

## 2022-06-06 PROCEDURE — 3078F PR MOST RECENT DIASTOLIC BLOOD PRESSURE < 80 MM HG: ICD-10-PCS | Mod: CPTII,S$GLB,, | Performed by: NURSE PRACTITIONER

## 2022-06-06 PROCEDURE — 84480 ASSAY TRIIODOTHYRONINE (T3): CPT | Performed by: NURSE PRACTITIONER

## 2022-06-06 PROCEDURE — 4010F ACE/ARB THERAPY RXD/TAKEN: CPT | Mod: CPTII,S$GLB,, | Performed by: NURSE PRACTITIONER

## 2022-06-06 NOTE — ASSESSMENT & PLAN NOTE
-- Differential diagnosis includes Graves, MNG/toxic nodule, thyroiditis.    -- Cause-specific treatment options and associated risks discussed with patient.   -- Thionamide monitoring: I have reviewed the risk of agranulocytosis that can occur in 1 of 500 patients who are taking either PTU or methimazole. I have also reviewed the even rarer risk of hepatic dysfunction. A baseline CBC with differential and Liver function tests are available. The instruction sheet was given to the patient that describes these risks, warning symptoms, and instructions to stop the medication, contact the covering endocrinology fellow, and check blood tests.   -- Labs today with thyroid Ab.  -- Schedule thyroid US.   -- If Ab negative, schedule NM Thyroid Uptake and scan.  -- Treatment:  ATD pending labs

## 2022-06-07 LAB
T3 SERPL-MCNC: 182 NG/DL (ref 60–180)
T4 FREE SERPL-MCNC: 1.71 NG/DL (ref 0.71–1.51)
TSH SERPL DL<=0.005 MIU/L-ACNC: <0.01 UIU/ML (ref 0.4–4)

## 2022-06-08 ENCOUNTER — PATIENT MESSAGE (OUTPATIENT)
Dept: ENDOCRINOLOGY | Facility: CLINIC | Age: 52
End: 2022-06-08
Payer: COMMERCIAL

## 2022-06-08 ENCOUNTER — TELEPHONE (OUTPATIENT)
Dept: ENDOCRINOLOGY | Facility: CLINIC | Age: 52
End: 2022-06-08
Payer: COMMERCIAL

## 2022-06-08 DIAGNOSIS — E05.90 HYPERTHYROIDISM: Primary | ICD-10-CM

## 2022-06-08 LAB — TSH RECEP AB SER-ACNC: 4.77 IU/L (ref 0–1.75)

## 2022-06-08 RX ORDER — METHIMAZOLE 10 MG/1
10 TABLET ORAL 2 TIMES DAILY
Qty: 60 TABLET | Refills: 11 | Status: SHIPPED | OUTPATIENT
Start: 2022-06-08 | End: 2022-07-05 | Stop reason: SDUPTHER

## 2022-06-08 NOTE — TELEPHONE ENCOUNTER
Latest Reference Range & Units 06/06/22 14:40   TSH 0.400 - 4.000 uIU/mL <0.010 (L)   T3, Total 60 - 180 ng/dL 182 (H)   Free T4 0.71 - 1.51 ng/dL 1.71 (H)   Thyrotropin Receptor Ab 0.00 - 1.75 IU/L 4.77 (H)   (L): Data is abnormally low  (H): Data is abnormally high    - You have Graves disease. I will give you information below  - We need to start medications to block your thyroid hormone level.   - We will start methimazole 10 mg twice daily and need to check your labs again in 4 weeks.

## 2022-06-09 LAB — TSI SER-ACNC: 2.29 IU/L

## 2022-06-20 ENCOUNTER — HOSPITAL ENCOUNTER (OUTPATIENT)
Dept: RADIOLOGY | Facility: HOSPITAL | Age: 52
Discharge: HOME | End: 2022-06-20
Attending: NURSE PRACTITIONER
Payer: COMMERCIAL

## 2022-06-20 DIAGNOSIS — E05.90 HYPERTHYROIDISM: ICD-10-CM

## 2022-06-20 PROCEDURE — 76536 US EXAM OF HEAD AND NECK: CPT | Mod: 26,,, | Performed by: RADIOLOGY

## 2022-06-20 PROCEDURE — 76536 US SOFT TISSUE HEAD NECK THYROID: ICD-10-PCS | Mod: 26,,, | Performed by: RADIOLOGY

## 2022-06-20 PROCEDURE — 76536 US EXAM OF HEAD AND NECK: CPT | Mod: TC

## 2022-06-21 ENCOUNTER — PATIENT MESSAGE (OUTPATIENT)
Dept: ENDOCRINOLOGY | Facility: CLINIC | Age: 52
End: 2022-06-21
Payer: COMMERCIAL

## 2022-06-21 NOTE — TELEPHONE ENCOUNTER
Right -  ill-defined hypoechoic nodule containing punctate echogenic focus measuring 1.0 x 0.5 x 0.9 cm   Left  -  ill-defined peripheral hypoechoic, centrally isoechoic nodule measuring approximately 1.8 x 1.2 x 1.3 cm    Impression:  Multinodular thyroid.  One nodule in each lobe which meets criteria for FNA (above labeled nodule 1 and 3).

## 2022-06-23 ENCOUNTER — PATIENT MESSAGE (OUTPATIENT)
Dept: ENDOCRINOLOGY | Facility: CLINIC | Age: 52
End: 2022-06-23
Payer: COMMERCIAL

## 2022-06-27 DIAGNOSIS — E04.2 MULTINODULAR GOITER: Primary | ICD-10-CM

## 2022-07-13 ENCOUNTER — PATIENT MESSAGE (OUTPATIENT)
Dept: ADMINISTRATIVE | Facility: OTHER | Age: 52
End: 2022-07-13
Payer: COMMERCIAL

## 2022-07-15 ENCOUNTER — LAB VISIT (OUTPATIENT)
Dept: LAB | Facility: HOSPITAL | Age: 52
End: 2022-07-15
Attending: INTERNAL MEDICINE
Payer: COMMERCIAL

## 2022-07-15 DIAGNOSIS — I10 ESSENTIAL HYPERTENSION: ICD-10-CM

## 2022-07-15 LAB
ALBUMIN SERPL BCP-MCNC: 3.5 G/DL (ref 3.5–5.2)
ALP SERPL-CCNC: 87 U/L (ref 55–135)
ALT SERPL W/O P-5'-P-CCNC: 12 U/L (ref 10–44)
ANION GAP SERPL CALC-SCNC: 10 MMOL/L (ref 8–16)
AST SERPL-CCNC: 14 U/L (ref 10–40)
BILIRUB SERPL-MCNC: 0.5 MG/DL (ref 0.1–1)
BUN SERPL-MCNC: 13 MG/DL (ref 6–20)
CALCIUM SERPL-MCNC: 9.9 MG/DL (ref 8.7–10.5)
CHLORIDE SERPL-SCNC: 101 MMOL/L (ref 95–110)
CO2 SERPL-SCNC: 26 MMOL/L (ref 23–29)
CREAT SERPL-MCNC: 0.7 MG/DL (ref 0.5–1.4)
EST. GFR  (AFRICAN AMERICAN): >60 ML/MIN/1.73 M^2
EST. GFR  (NON AFRICAN AMERICAN): >60 ML/MIN/1.73 M^2
GLUCOSE SERPL-MCNC: 84 MG/DL (ref 70–110)
POTASSIUM SERPL-SCNC: 3.7 MMOL/L (ref 3.5–5.1)
PROT SERPL-MCNC: 7.7 G/DL (ref 6–8.4)
SODIUM SERPL-SCNC: 137 MMOL/L (ref 136–145)

## 2022-07-15 PROCEDURE — 36415 COLL VENOUS BLD VENIPUNCTURE: CPT | Mod: PO | Performed by: INTERNAL MEDICINE

## 2022-07-15 PROCEDURE — 80053 COMPREHEN METABOLIC PANEL: CPT | Performed by: INTERNAL MEDICINE

## 2022-07-25 ENCOUNTER — OFFICE VISIT (OUTPATIENT)
Dept: INTERNAL MEDICINE | Facility: CLINIC | Age: 52
End: 2022-07-25
Payer: COMMERCIAL

## 2022-07-25 VITALS
DIASTOLIC BLOOD PRESSURE: 70 MMHG | WEIGHT: 182.75 LBS | OXYGEN SATURATION: 99 % | SYSTOLIC BLOOD PRESSURE: 110 MMHG | HEART RATE: 66 BPM | BODY MASS INDEX: 33.63 KG/M2 | HEIGHT: 62 IN

## 2022-07-25 DIAGNOSIS — E04.2 MULTINODULAR THYROID: ICD-10-CM

## 2022-07-25 DIAGNOSIS — E05.90 HYPERTHYROIDISM: ICD-10-CM

## 2022-07-25 DIAGNOSIS — I10 ESSENTIAL HYPERTENSION: ICD-10-CM

## 2022-07-25 DIAGNOSIS — Z00.00 ANNUAL PHYSICAL EXAM: ICD-10-CM

## 2022-07-25 DIAGNOSIS — Z12.31 SCREENING MAMMOGRAM FOR BREAST CANCER: ICD-10-CM

## 2022-07-25 DIAGNOSIS — M48.00 SPINAL STENOSIS, UNSPECIFIED SPINAL REGION: Primary | ICD-10-CM

## 2022-07-25 PROCEDURE — 3044F HG A1C LEVEL LT 7.0%: CPT | Mod: CPTII,S$GLB,, | Performed by: INTERNAL MEDICINE

## 2022-07-25 PROCEDURE — 3044F PR MOST RECENT HEMOGLOBIN A1C LEVEL <7.0%: ICD-10-PCS | Mod: CPTII,S$GLB,, | Performed by: INTERNAL MEDICINE

## 2022-07-25 PROCEDURE — 4010F PR ACE/ARB THEARPY RXD/TAKEN: ICD-10-PCS | Mod: CPTII,S$GLB,, | Performed by: INTERNAL MEDICINE

## 2022-07-25 PROCEDURE — 99999 PR PBB SHADOW E&M-EST. PATIENT-LVL IV: CPT | Mod: PBBFAC,,, | Performed by: INTERNAL MEDICINE

## 2022-07-25 PROCEDURE — 4010F ACE/ARB THERAPY RXD/TAKEN: CPT | Mod: CPTII,S$GLB,, | Performed by: INTERNAL MEDICINE

## 2022-07-25 PROCEDURE — 99214 OFFICE O/P EST MOD 30 MIN: CPT | Mod: S$GLB,,, | Performed by: INTERNAL MEDICINE

## 2022-07-25 PROCEDURE — 1159F PR MEDICATION LIST DOCUMENTED IN MEDICAL RECORD: ICD-10-PCS | Mod: CPTII,S$GLB,, | Performed by: INTERNAL MEDICINE

## 2022-07-25 PROCEDURE — 99999 PR PBB SHADOW E&M-EST. PATIENT-LVL IV: ICD-10-PCS | Mod: PBBFAC,,, | Performed by: INTERNAL MEDICINE

## 2022-07-25 PROCEDURE — 3074F SYST BP LT 130 MM HG: CPT | Mod: CPTII,S$GLB,, | Performed by: INTERNAL MEDICINE

## 2022-07-25 PROCEDURE — 3074F PR MOST RECENT SYSTOLIC BLOOD PRESSURE < 130 MM HG: ICD-10-PCS | Mod: CPTII,S$GLB,, | Performed by: INTERNAL MEDICINE

## 2022-07-25 PROCEDURE — 3078F PR MOST RECENT DIASTOLIC BLOOD PRESSURE < 80 MM HG: ICD-10-PCS | Mod: CPTII,S$GLB,, | Performed by: INTERNAL MEDICINE

## 2022-07-25 PROCEDURE — 99214 PR OFFICE/OUTPT VISIT, EST, LEVL IV, 30-39 MIN: ICD-10-PCS | Mod: S$GLB,,, | Performed by: INTERNAL MEDICINE

## 2022-07-25 PROCEDURE — 3008F BODY MASS INDEX DOCD: CPT | Mod: CPTII,S$GLB,, | Performed by: INTERNAL MEDICINE

## 2022-07-25 PROCEDURE — 3078F DIAST BP <80 MM HG: CPT | Mod: CPTII,S$GLB,, | Performed by: INTERNAL MEDICINE

## 2022-07-25 PROCEDURE — 1159F MED LIST DOCD IN RCRD: CPT | Mod: CPTII,S$GLB,, | Performed by: INTERNAL MEDICINE

## 2022-07-25 PROCEDURE — 3008F PR BODY MASS INDEX (BMI) DOCUMENTED: ICD-10-PCS | Mod: CPTII,S$GLB,, | Performed by: INTERNAL MEDICINE

## 2022-07-25 RX ORDER — HYDROCHLOROTHIAZIDE 25 MG/1
25 TABLET ORAL DAILY
Qty: 90 TABLET | Refills: 1 | Status: SHIPPED | OUTPATIENT
Start: 2022-07-25 | End: 2022-11-01 | Stop reason: SDUPTHER

## 2022-07-25 NOTE — PROGRESS NOTES
Patient ID: Isaura Mancini is a 51 y.o. female.    Chief Complaint: Follow-up, Hypertension, and Back Pain    HPI Isaura is a 51 y.o. female with hypertension, hyperthyroidism, and spinal stenosis causing chronic back pain who presents for routine follow-up of medical conditions.  Blood pressure is well controlled in clinic today.  Patient was referred to Endocrinology for labs consistent with hyperthyroidism.  She is now taking methimazole for treatment.  She had a thyroid ultrasound which revealed two concerning nodules.  She will undergo FNA of these nodules next month.  She will continue to follow with Endocrinology for follow-up of these issues.  Patient was scheduled to have lumbar spinal fusion with Dr Keen in neurosurgery months ago.  However the surgeon canceled.  Patient has not rescheduled.  She has found ways to live with the chronic back pain.  After discussion of options for treatment (return to surgeon verses medication adjustment versus physical therapy versus pain management) patient has decided she wants to proceed with pain management referral.  No new acute complaints today.     Review of Systems   Musculoskeletal: Positive for back pain (chronic).   All other systems reviewed and are negative.     Objective:     Vitals:    07/25/22 1431   BP: 110/70   Pulse: 66        Physical Exam  Vitals reviewed.   Constitutional:       General: She is not in acute distress.     Appearance: Normal appearance. She is well-developed. She is obese. She is not ill-appearing, toxic-appearing or diaphoretic.   HENT:      Head: Normocephalic and atraumatic.      Right Ear: External ear normal.      Left Ear: External ear normal.      Nose: Nose normal.   Eyes:      General: No scleral icterus.        Right eye: No discharge.         Left eye: No discharge.      Extraocular Movements: Extraocular movements intact.      Conjunctiva/sclera: Conjunctivae normal.   Cardiovascular:      Rate and Rhythm: Normal rate  and regular rhythm.      Heart sounds: Normal heart sounds. No murmur heard.    No friction rub. No gallop.   Pulmonary:      Effort: Pulmonary effort is normal. No respiratory distress.      Breath sounds: Normal breath sounds. No stridor. No wheezing, rhonchi or rales.   Skin:     General: Skin is warm and dry.   Neurological:      General: No focal deficit present.      Mental Status: She is alert and oriented to person, place, and time. Mental status is at baseline.   Psychiatric:         Mood and Affect: Mood normal.         Behavior: Behavior normal.         Thought Content: Thought content normal.         Judgment: Judgment normal.         Assessment:       1. Spinal stenosis, unspecified spinal region Chronic   2. Essential hypertension Well controlled   3. Hyperthyroidism Active   4. Multinodular thyroid Active   5. Screening mammogram for breast cancer    6. Annual physical exam        Plan:         Spinal stenosis, unspecified spinal region  -     Ambulatory referral/consult to Pain Clinic; Future; Expected date: 08/01/2022    Essential hypertension  Comments:  Continue current medication  Orders:  -     hydroCHLOROthiazide (HYDRODIURIL) 25 MG tablet; Take 1 tablet (25 mg total) by mouth once daily.  Dispense: 90 tablet; Refill: 1    Hyperthyroidism  Comments:  Being managed by endocrinology     Multinodular thyroid  Comments:  Going for FNA next month. Managed by endocrinology     Screening mammogram for breast cancer  -     Mammo Digital Screening Bilat; Future; Expected date: 07/25/2022    Annual physical exam  -     CBC Auto Differential; Future; Expected date: 01/01/2023  -     Comprehensive Metabolic Panel; Future; Expected date: 01/01/2023  -     Hemoglobin A1C; Future; Expected date: 01/01/2023  -     Lipid Panel; Future; Expected date: 01/01/2023        RTC 6 months for annual exam    Warning signs discussed, patient to call with any further issues or worsening of symptoms.       Parts of the  above note were dictated using a voice dictation software. Please excuse any grammatical or typographical errors.

## 2022-07-26 ENCOUNTER — TELEPHONE (OUTPATIENT)
Dept: ADMINISTRATIVE | Facility: OTHER | Age: 52
End: 2022-07-26
Payer: COMMERCIAL

## 2022-08-18 ENCOUNTER — HOSPITAL ENCOUNTER (OUTPATIENT)
Dept: RADIOLOGY | Facility: HOSPITAL | Age: 52
Discharge: HOME | End: 2022-08-18
Attending: INTERNAL MEDICINE
Payer: COMMERCIAL

## 2022-08-18 DIAGNOSIS — Z12.31 SCREENING MAMMOGRAM FOR BREAST CANCER: ICD-10-CM

## 2022-08-18 PROCEDURE — 77063 BREAST TOMOSYNTHESIS BI: CPT | Mod: TC

## 2022-08-18 PROCEDURE — 77063 MAMMO DIGITAL SCREENING BILAT WITH TOMO: ICD-10-PCS | Mod: 26,,, | Performed by: RADIOLOGY

## 2022-08-18 PROCEDURE — 77067 MAMMO DIGITAL SCREENING BILAT WITH TOMO: ICD-10-PCS | Mod: 26,,, | Performed by: RADIOLOGY

## 2022-08-18 PROCEDURE — 77067 SCR MAMMO BI INCL CAD: CPT | Mod: TC

## 2022-08-18 PROCEDURE — 77063 BREAST TOMOSYNTHESIS BI: CPT | Mod: 26,,, | Performed by: RADIOLOGY

## 2022-08-18 PROCEDURE — 77067 SCR MAMMO BI INCL CAD: CPT | Mod: 26,,, | Performed by: RADIOLOGY

## 2022-08-24 ENCOUNTER — HOSPITAL ENCOUNTER (OUTPATIENT)
Dept: ENDOCRINOLOGY | Facility: CLINIC | Age: 52
Discharge: HOME | End: 2022-08-24
Attending: NURSE PRACTITIONER
Payer: COMMERCIAL

## 2022-08-24 DIAGNOSIS — E04.2 MULTINODULAR GOITER: ICD-10-CM

## 2022-08-24 PROCEDURE — 88173 CYTOPATH EVAL FNA REPORT: CPT | Mod: 26,,, | Performed by: PATHOLOGY

## 2022-08-24 PROCEDURE — 88173 PR  INTERPRETATION OF FNA SMEAR: ICD-10-PCS | Mod: 26,59,, | Performed by: STUDENT IN AN ORGANIZED HEALTH CARE EDUCATION/TRAINING PROGRAM

## 2022-08-24 PROCEDURE — 88173 CYTOPATH EVAL FNA REPORT: CPT | Mod: 26,59,, | Performed by: STUDENT IN AN ORGANIZED HEALTH CARE EDUCATION/TRAINING PROGRAM

## 2022-08-24 PROCEDURE — 10006 US FINE NEEDLE ASPIRATION THYROID EA ADDITIONAL LESION: ICD-10-PCS | Mod: S$GLB,,, | Performed by: INTERNAL MEDICINE

## 2022-08-24 PROCEDURE — 10006 FNA BX W/US GDN EA ADDL: CPT | Mod: S$GLB,,, | Performed by: INTERNAL MEDICINE

## 2022-08-24 PROCEDURE — 88173 PR  INTERPRETATION OF FNA SMEAR: ICD-10-PCS | Mod: 26,,, | Performed by: PATHOLOGY

## 2022-08-24 PROCEDURE — 10005 US FINE NEEDLE ASPIRATION THYROID, FIRST LESION: ICD-10-PCS | Mod: S$GLB,,, | Performed by: INTERNAL MEDICINE

## 2022-08-24 PROCEDURE — 88173 CYTOPATH EVAL FNA REPORT: CPT | Performed by: PATHOLOGY

## 2022-08-24 PROCEDURE — 88173 CYTOPATH EVAL FNA REPORT: CPT | Mod: 59 | Performed by: STUDENT IN AN ORGANIZED HEALTH CARE EDUCATION/TRAINING PROGRAM

## 2022-08-24 PROCEDURE — 10005 FNA BX W/US GDN 1ST LES: CPT | Mod: S$GLB,,, | Performed by: INTERNAL MEDICINE

## 2022-08-25 LAB
FINAL PATHOLOGIC DIAGNOSIS: NORMAL
Lab: NORMAL

## 2022-09-01 ENCOUNTER — TELEPHONE (OUTPATIENT)
Dept: ENDOCRINOLOGY | Facility: CLINIC | Age: 52
End: 2022-09-01
Payer: COMMERCIAL

## 2022-09-01 ENCOUNTER — PATIENT MESSAGE (OUTPATIENT)
Dept: INTERNAL MEDICINE | Facility: CLINIC | Age: 52
End: 2022-09-01
Payer: COMMERCIAL

## 2022-09-01 LAB
COMMENT: ABNORMAL
FINAL PATHOLOGIC DIAGNOSIS: ABNORMAL
Lab: ABNORMAL

## 2022-09-01 NOTE — TELEPHONE ENCOUNTER
----- Message from Aida Pinto NP sent at 2022  2:47 PM CDT -----  Please call the patient with the followin. Is she taking her Methimazole? She missed her labs: TSH free T4 - these needs to be done.   Thank you,   Aida

## 2022-09-01 NOTE — TELEPHONE ENCOUNTER
Left voice mail for patient that Aida Pinto Np is covering for Yee Cosby Np and she would like to know if you are taking your Rx Methimazole? And you have missed your labs: TSH free T4 - these needs to be done soon .

## 2022-09-02 ENCOUNTER — TELEPHONE (OUTPATIENT)
Dept: ENDOCRINOLOGY | Facility: CLINIC | Age: 52
End: 2022-09-02
Payer: COMMERCIAL

## 2022-09-02 ENCOUNTER — LAB VISIT (OUTPATIENT)
Dept: LAB | Facility: HOSPITAL | Age: 52
End: 2022-09-02
Attending: NURSE PRACTITIONER
Payer: COMMERCIAL

## 2022-09-02 ENCOUNTER — TELEPHONE (OUTPATIENT)
Dept: SURGERY | Facility: CLINIC | Age: 52
End: 2022-09-02
Payer: COMMERCIAL

## 2022-09-02 DIAGNOSIS — E04.1 THYROID NODULE: Primary | ICD-10-CM

## 2022-09-02 DIAGNOSIS — R89.6 ABNORMAL CYTOLOGY: ICD-10-CM

## 2022-09-02 DIAGNOSIS — E05.90 HYPERTHYROIDISM: ICD-10-CM

## 2022-09-02 LAB
T4 FREE SERPL-MCNC: 0.85 NG/DL (ref 0.71–1.51)
TSH SERPL DL<=0.005 MIU/L-ACNC: 2.99 UIU/ML (ref 0.4–4)

## 2022-09-02 PROCEDURE — 84443 ASSAY THYROID STIM HORMONE: CPT | Performed by: NURSE PRACTITIONER

## 2022-09-02 PROCEDURE — 84439 ASSAY OF FREE THYROXINE: CPT | Performed by: NURSE PRACTITIONER

## 2022-09-02 PROCEDURE — 36415 COLL VENOUS BLD VENIPUNCTURE: CPT | Mod: PO | Performed by: NURSE PRACTITIONER

## 2022-09-02 NOTE — TELEPHONE ENCOUNTER
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 Mancini  Recommended surgical appointment with Dr. Hayes

## 2022-09-02 NOTE — TELEPHONE ENCOUNTER
----- Message from Radha Plummer MA sent at 9/2/2022  8:12 AM CDT -----  Regarding: FW: Suspicious for Follicular Neoplasm, Hurthle cell type    ----- Message -----  From: Jackson Hendrix MD  Sent: 9/1/2022   1:39 PM CDT  To: Enrique Hansen MD  Subject: Suspicious for Follicular Neoplasm, Hurthle #    Hello,    I wanted to reach out in regards to this patient's FNA. I will copy below, but signing this out as Suspicious for follicular neoplasm, Hurthle cell type. I am just messaging to make sure this doesn't fall through the cracks because the contralateral sample has already been reported as benign. Please feel free to reach out with any questions or concerns. My office number is [phone] and my cell is [phone].    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       The specimen is modestly cellular, consisting of a near-exclusive population of Hurthle cells showing a notable component of microfollicular architecture, sparse inspissated colloid, and scattered intraepithelial lymphocytes.  The Hurthle cells display nuclear enlargement, irregular contours, hyperchromatic nuclei, and occasional nuclear grooves.  The overall features not entirely specific, but are suspicious for a Hurthle cell neoplasm (i.e., Hürthle cell adenoma vs. Hürthle cell carcinoma). However, the possibility of a hyperplastic Hürthle cell nodule within chronic lymphocytic thyroiditis cannot be excluded.  Surgical excision is required for a more definitive subclassification, if clinically indicated.    This case was reviewed in consultation with Dr. Barreto, who concurs with the above findings.

## 2022-09-02 NOTE — TELEPHONE ENCOUNTER
Discussed need to get repeat TFTs which are currently in process.  Patient has been on Methimazole 10mg twice daily.  Clinically euthyroid.    Discussed cytology results in detail and recommendations.  Referral in for Endocrine surgery.  All questions answered.

## 2022-09-06 ENCOUNTER — PATIENT MESSAGE (OUTPATIENT)
Dept: ENDOCRINOLOGY | Facility: CLINIC | Age: 52
End: 2022-09-06
Payer: COMMERCIAL

## 2022-09-06 ENCOUNTER — TELEPHONE (OUTPATIENT)
Dept: ENDOCRINOLOGY | Facility: CLINIC | Age: 52
End: 2022-09-06
Payer: COMMERCIAL

## 2022-09-06 DIAGNOSIS — E04.1 THYROID NODULE: Primary | ICD-10-CM

## 2022-09-06 DIAGNOSIS — E05.90 HYPERTHYROIDISM: ICD-10-CM

## 2022-09-06 NOTE — TELEPHONE ENCOUNTER
Diagnosis: Graves disease    Current Medication:  ATD   Start: 6/2022  Methimazole 10mg twice daily    Results:  Lab Results   Component Value Date    TSH 2.987 09/02/2022    P3BLIHA 182 (H) 06/06/2022    FREET4 0.85 09/02/2022     Recommended Medication Changes:  Methimazole 10mg daily    Labs in 6 weeks.    Reviewed the above findings and recommendations.  All questions answered.

## 2022-09-20 NOTE — PROGRESS NOTES
Endocrine Surgery History & Physical     REFERRING PROVIDER: Alexia Foley MD    REASON FOR VISIT: Graves hyperthyroidism and left thyroid nodule, suspicious for follicular neoplasm    HPI: Isaura Mancini is a 52 y.o. female patient with a history notable for HTN, chronic back pain, and hyperthyroidism who presents in consultation for management of a follicular neoplasm.     She was found to have a thyroid nodule 6/2022.  Biochemical testing of thyroid function including a TSH documented hyperthyroidism.  Her symptoms included palpitations, unintentional weight loss with 30 lbs since January.  Labs including TSI and TRAb were elevated for a diagnosis consistent with Grave's disease. She has since been started on methimazole which has improved her symptoms.     She had a thyroid ultrasound 6/2022 which revealed two nodules meeting criteria for FNA. There was one on the left with ill-defined peripheral hypoechoic, centrally isoechoic nodule measuring approximately 1.8 x 1.2 x 1.3 cm and two one the right with one ill-defined hypoechoic nodule containing punctate echogenic focus measuring 1.0 x 0.5 x 0.9 cm another hypoechoic subcentimeter nodule with ill-defined margin measuring up to 0.5 cm (not meeting FNA). 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     The patient admits to hyperthyroid as mentioned above. The patient admits to heat / cold intolerance, palpitations, and weight changes.  She denies dysphagia, globus sensation, compression symptoms, or anterior neck pain.  The patient has no hoarseness, voice changes or increased need to clear the throat.  The patient has not had prior neck surgery. The patient has no history of radiation exposure or goitrogens.  The patient has not recently been on lithium, biotin, amiodarone or received iodine contrast. There no a  significant history of thyroid cancer, thyroid disease or familial endocrinopathies.  Calcium levels have been normal and she denies symptoms of hypercalcemia, therefore is low risk for concurrent parathyroid disease.      LABORATORY STUDIES:  I personally and independently reviewed relevant lab test results, including the following:    TSH   Date Value Ref Range Status   2022 2.987 0.400 - 4.000 uIU/mL Final   2022 <0.010 (L) 0.400 - 4.000 uIU/mL Final   2022 <0.010 (L) 0.400 - 4.000 uIU/mL Final     Free T4   Date Value Ref Range Status   2022 0.85 0.71 - 1.51 ng/dL Final   2022 1.71 (H) 0.71 - 1.51 ng/dL Final   2022 2.05 (H) 0.71 - 1.51 ng/dL Final     Calcium   Date Value Ref Range Status   07/15/2022 9.9 8.7 - 10.5 mg/dL Final   2022 9.8 8.7 - 10.5 mg/dL Final   2021 9.9 8.7 - 10.5 mg/dL Final     Albumin   Date Value Ref Range Status   07/15/2022 3.5 3.5 - 5.2 g/dL Final   2022 3.8 3.5 - 5.2 g/dL Final   2021 3.7 3.5 - 5.2 g/dL Final        PAST MEDICAL HISTORY:  Patient Active Problem List   Diagnosis    Essential hypertension    Thrombocytosis    Snoring    Obesity (BMI 30-39.9)    Insomnia    Chronic bilateral low back pain with left-sided sciatica    Hypokalemia    Class 2 obesity with body mass index (BMI) of 37.0 to 37.9 in adult    Lumbar facet arthropathy    Spondylolisthesis at L4-L5 level    Sacroiliitis    Muscle strain of right forearm    Palpitations    Headache    Hematuria    Constipation    Urinary tract infection without hematuria    Chest pain    Gastroesophageal reflux disease    Family history of premature CAD    Degenerative disc disease, lumbar    Uterine leiomyoma    Abnormal uterine bleeding (AUB)    Hyperthyroidism    Left thyroid nodule        PAST SURGICAL HISTORY:  Past Surgical History:   Procedure Laterality Date     SECTION      TUBAL LIGATION          MEDICATIONS:  Current Outpatient Medications   Medication  Sig Dispense Refill    aspirin (ECOTRIN) 81 MG EC tablet Take 81 mg by mouth once daily.      hydroCHLOROthiazide (HYDRODIURIL) 25 MG tablet Take 1 tablet (25 mg total) by mouth once daily. 90 tablet 1    ibuprofen (ADVIL,MOTRIN) 800 MG tablet Take 1 tablet (800 mg total) by mouth every 8 (eight) hours as needed for Pain. 60 tablet 2    losartan (COZAAR) 25 MG tablet Take 1 tablet (25 mg total) by mouth once daily. 90 tablet 1    methIMAzole (TAPAZOLE) 10 MG Tab Take 1 tablet (10 mg total) by mouth 2 (two) times daily. 60 tablet 9    calcitRIOL (ROCALTROL) 0.25 MCG Cap Take 1 capsule (0.25 mcg total) by mouth 2 (two) times daily. Start taking 3 days prior to your thyroid surgery. 60 capsule 0    gabapentin (NEURONTIN) 100 MG capsule Take 1 capsule (100 mg total) by mouth 3 (three) times daily as needed (back and leg pain). (Patient not taking: Reported on 9/22/2022) 270 capsule 3    potassium iodide (SSKI) 1 gram/mL solution Take 0.2 mLs (4 drops total) by mouth 3 (three) times daily. for 7 days 5 mL 0     No current facility-administered medications for this visit.       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   Tobacco Use    Smoking status: Never    Smokeless tobacco: Never   Substance and Sexual Activity    Alcohol use: No    Drug use: No    Sexual activity: Yes     Partners: Male     Birth control/protection: Surgical     Comment: BTL     Social Determinants of Health     Financial Resource Strain: Medium Risk    Difficulty of Paying Living Expenses: Somewhat hard   Food Insecurity: Food Insecurity Present    Worried About Running Out of Food in the Last Year: Sometimes true    Ran Out of Food in the Last Year: Sometimes true   Transportation Needs: No Transportation  "Needs    Lack of Transportation (Medical): No    Lack of Transportation (Non-Medical): No   Physical Activity: Inactive    Days of Exercise per Week: 0 days    Minutes of Exercise per Session: 0 min   Stress: Stress Concern Present    Feeling of Stress : To some extent   Social Connections: Unknown    Frequency of Communication with Friends and Family: More than three times a week    Frequency of Social Gatherings with Friends and Family: Twice a week    Active Member of Clubs or Organizations: No    Attends Club or Organization Meetings: Never    Marital Status:    Housing Stability: High Risk    Unable to Pay for Housing in the Last Year: Yes    Number of Places Lived in the Last Year: 1    Unstable Housing in the Last Year: No         FAMILY HISTORY:  Family History   Problem Relation Age of Onset    Heart disease Father     Hypertension Father     Breast cancer Paternal Aunt 60    Diabetes Neg Hx     Colon cancer Neg Hx     Ovarian cancer Neg Hx          REVIEW OF SYSTEMS:  A detailed review of systems was reviewed with the patient, pertinent positives and negatives are presented in the note and is otherwise negative.    PHYSICAL EXAMINATION:  Vital Signs: /75 (BP Location: Left arm, Patient Position: Sitting, BP Method: Medium (Automatic))   Pulse 62   Resp 18   Ht 5' 2" (1.575 m)   Wt 81.4 kg (179 lb 7.3 oz)   LMP 09/18/2022 (Exact Date)   SpO2 99%   BMI 32.82 kg/m²     Constitutional: no acute distress, comfortable, well appearing  HENT: no lid lag, no exophthalmos, no scleral icterus, moist mucous membranes  Neck: supple, trachea in midline, thyroid is not enlarged and moves well with swallowing, no palpable nodules, normal neck extension  Heme/Lymph: no cervical or supraclavicular lymphadenopathy  Respiratory: clear to ascultation bilaterally, no wheezes or stridor  Cardiovascular: regular rate and rhythm  Extremities: no edema  Skin: warm and dry, no rashes  Neurologic: no resting " tremor of outstretched hands, voice normal  Vascular: radial pulses palpable bilaterally  Psychiatric: affect normal    IMAGING STUDIES:  I personally and independently reviewed, visualized and interpreted the images of the below listed radiology studies (including ultrasounds) and my findings are notable for multiple nodules, dominant left thyroid nodule measuring 1.8 cm and a second 1.0 cm right thyroid nodule.  Reports below for reference.    US Soft Tissue Head Neck Thyroid 06/20/2022  FINDINGS:  Right thyroid lobe measures 4.9 x 1.2 x 2.0 cm.  Measured nodule as follows: (1) ill-defined hypoechoic nodule containing punctate echogenic focus measuring 1.0 x 0.5 x 0.9 cm; (2) hypoechoic subcentimeter nodule with ill-defined margin measuring up to 0.5 cm.    Left thyroid lobe measures 5.1 x 1.5 x 1.5 cm.  Measured nodule as follows: (3) ill-defined peripheral hypoechoic, centrally isoechoic nodule measuring approximately 1.8 x 1.2 x 1.3 cm; (4) hypoechoic subcentimeter nodule with ill-defined margin measuring up to 0.5 cm.    Isthmus measures 0.2 cm.    No evidence for cervical adenopathy.    Impression  Multinodular thyroid.  One nodule in each lobe which meets criteria for FNA (above labeled nodule 1 and 3).    This report was flagged in Epic as abnormal.      Electronically signed by: Darin Clemons  Date:    06/20/2022  Time:    17:49      CYTOLOGY:  Final Pathologic Diagnosis   Date Value Ref Range Status   08/24/2022   Final    FINE NEEDLE ASPIRATION CYTOLOGY SPECIMEN OF RIGHT THYROID:  THE [facility] SYSTEM FOR REPORTING THYROID CYTOPATHOLOGY  II  BENIGN  Innocuous follicular clusters.  Colloid present.       Comment:     Interp By Christian Saab M.D., Signed on 08/25/2022 at 15:43   08/24/2022 (A)  Final    Thyroid, left lobe, fine needle aspiration:  [facility] Thyroid Cytology Category: Suspicious for Follicular  Neoplasm. Hurthle cell type  See comment.       Comment:     Interp By Jackson  KAR Hendrix, Signed on 09/01/2022 at 13:45         IMPRESSION:  I had the pleasure of seeing Ms. 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  I have discussed with Ms. Mancini at length regarding the current surgical and non-surgical treatment options.  Based on the current clinical findings and patient's preference she will necessitate a total thyroidectomy.     I have discussed in detail the meaning of this thyroid disease process.  I have discussed in detail the possible complications associated with thyroid surgery, which may include (but not limited to) hoarseness, recurrent laryngeal nerve injury, superior laryngeal nerve injury - temporary or permanent, hypocalcemia and hypoparathyroidism - temporary or permanent, neck hematoma, infection, scarring, death and imponderables.  I discussed the potential need for a staged completion thyroidectomy in the event of unexpected intraoperative findings or recurrent laryngeal nerve dysfunction and thus the initial surgery would be limited to a thyroid lobectomy.  She understood that thyroid hormone replacement will be necessary lifelong. Thyroid function will need to be monitored.     The patient expressed understanding of all the risks at hand, agreed with this plan and informed consent was signed and witnessed.       Problem List Items Addressed This Visit          Endocrine    Hyperthyroidism     Graves hyperthyroidism, positive TSI/TRAb.  Recommend total thyroidectomy for definitive surgical treatment, see below.  Higher risk for postoperative hypocalcemia and hypoparathyroidism due to hyperthyroid state.  Will plan for preoperative optimization with SSKI and calcitriol.           Relevant Orders    Vitamin D    Left thyroid nodule - Primary     Left thyroid nodule with FNA suspicious for follicular neoplasm, if only considering the mutlinodular  goiter would recommend a diagnostic left thyroid lobectomy or total thyroidectomy given the presence of contralateral nodules.  However, due to the underlying Graves disease I recommend upfront total thyroidectomy as she would likely have persistent Grave's disease if only a lobectomy were performed.  Options were presented to the patient with associated risks, benefits and alternatives, she el;ects to proceed with total thyroidectomy.    - OR for total thyroidectomy  - Consent obtained in clinic. Surgery will be scheduled in coordination with the patient's schedule  - She will need SSKI x7 days pre-op and calcitriol x3 days pre-op  - Will also obtain Vitamin D level to help evaluate risk of post operative hypocalcemia          Other Visit Diagnoses       Abnormal cytology        Thyroid nodule            Patient seen and evaluated with surgery resident Nanda Abreu MD.     Isaura Hayes MD  Endocrine Surgery   9/22/22

## 2022-09-21 ENCOUNTER — TELEPHONE (OUTPATIENT)
Dept: SURGERY | Facility: CLINIC | Age: 52
End: 2022-09-21
Payer: COMMERCIAL

## 2022-09-21 ENCOUNTER — PATIENT MESSAGE (OUTPATIENT)
Dept: SURGERY | Facility: CLINIC | Age: 52
End: 2022-09-21
Payer: COMMERCIAL

## 2022-09-22 ENCOUNTER — OFFICE VISIT (OUTPATIENT)
Dept: SURGERY | Facility: CLINIC | Age: 52
End: 2022-09-22
Payer: COMMERCIAL

## 2022-09-22 VITALS
HEIGHT: 62 IN | OXYGEN SATURATION: 99 % | WEIGHT: 179.44 LBS | RESPIRATION RATE: 18 BRPM | DIASTOLIC BLOOD PRESSURE: 75 MMHG | BODY MASS INDEX: 33.02 KG/M2 | SYSTOLIC BLOOD PRESSURE: 136 MMHG | HEART RATE: 62 BPM

## 2022-09-22 DIAGNOSIS — E05.00 GRAVES DISEASE: ICD-10-CM

## 2022-09-22 DIAGNOSIS — R89.6 ABNORMAL CYTOLOGY: ICD-10-CM

## 2022-09-22 DIAGNOSIS — E05.90 HYPERTHYROIDISM: ICD-10-CM

## 2022-09-22 DIAGNOSIS — E04.1 THYROID NODULE: ICD-10-CM

## 2022-09-22 DIAGNOSIS — E04.1 LEFT THYROID NODULE: Primary | ICD-10-CM

## 2022-09-22 DIAGNOSIS — E04.1 THYROID NODULE: Primary | ICD-10-CM

## 2022-09-22 DIAGNOSIS — E05.00 GRAVES DISEASE: Primary | ICD-10-CM

## 2022-09-22 PROCEDURE — 3075F SYST BP GE 130 - 139MM HG: CPT | Mod: CPTII,S$GLB,, | Performed by: STUDENT IN AN ORGANIZED HEALTH CARE EDUCATION/TRAINING PROGRAM

## 2022-09-22 PROCEDURE — 3044F HG A1C LEVEL LT 7.0%: CPT | Mod: CPTII,S$GLB,, | Performed by: STUDENT IN AN ORGANIZED HEALTH CARE EDUCATION/TRAINING PROGRAM

## 2022-09-22 PROCEDURE — 99204 OFFICE O/P NEW MOD 45 MIN: CPT | Mod: S$GLB,,, | Performed by: STUDENT IN AN ORGANIZED HEALTH CARE EDUCATION/TRAINING PROGRAM

## 2022-09-22 PROCEDURE — 99204 PR OFFICE/OUTPT VISIT, NEW, LEVL IV, 45-59 MIN: ICD-10-PCS | Mod: S$GLB,,, | Performed by: STUDENT IN AN ORGANIZED HEALTH CARE EDUCATION/TRAINING PROGRAM

## 2022-09-22 PROCEDURE — 3075F PR MOST RECENT SYSTOLIC BLOOD PRESS GE 130-139MM HG: ICD-10-PCS | Mod: CPTII,S$GLB,, | Performed by: STUDENT IN AN ORGANIZED HEALTH CARE EDUCATION/TRAINING PROGRAM

## 2022-09-22 PROCEDURE — 3078F PR MOST RECENT DIASTOLIC BLOOD PRESSURE < 80 MM HG: ICD-10-PCS | Mod: CPTII,S$GLB,, | Performed by: STUDENT IN AN ORGANIZED HEALTH CARE EDUCATION/TRAINING PROGRAM

## 2022-09-22 PROCEDURE — 1159F PR MEDICATION LIST DOCUMENTED IN MEDICAL RECORD: ICD-10-PCS | Mod: CPTII,S$GLB,, | Performed by: STUDENT IN AN ORGANIZED HEALTH CARE EDUCATION/TRAINING PROGRAM

## 2022-09-22 PROCEDURE — 3078F DIAST BP <80 MM HG: CPT | Mod: CPTII,S$GLB,, | Performed by: STUDENT IN AN ORGANIZED HEALTH CARE EDUCATION/TRAINING PROGRAM

## 2022-09-22 PROCEDURE — 4010F ACE/ARB THERAPY RXD/TAKEN: CPT | Mod: CPTII,S$GLB,, | Performed by: STUDENT IN AN ORGANIZED HEALTH CARE EDUCATION/TRAINING PROGRAM

## 2022-09-22 PROCEDURE — 4010F PR ACE/ARB THEARPY RXD/TAKEN: ICD-10-PCS | Mod: CPTII,S$GLB,, | Performed by: STUDENT IN AN ORGANIZED HEALTH CARE EDUCATION/TRAINING PROGRAM

## 2022-09-22 PROCEDURE — 1159F MED LIST DOCD IN RCRD: CPT | Mod: CPTII,S$GLB,, | Performed by: STUDENT IN AN ORGANIZED HEALTH CARE EDUCATION/TRAINING PROGRAM

## 2022-09-22 PROCEDURE — 3008F PR BODY MASS INDEX (BMI) DOCUMENTED: ICD-10-PCS | Mod: CPTII,S$GLB,, | Performed by: STUDENT IN AN ORGANIZED HEALTH CARE EDUCATION/TRAINING PROGRAM

## 2022-09-22 PROCEDURE — 3044F PR MOST RECENT HEMOGLOBIN A1C LEVEL <7.0%: ICD-10-PCS | Mod: CPTII,S$GLB,, | Performed by: STUDENT IN AN ORGANIZED HEALTH CARE EDUCATION/TRAINING PROGRAM

## 2022-09-22 PROCEDURE — 3008F BODY MASS INDEX DOCD: CPT | Mod: CPTII,S$GLB,, | Performed by: STUDENT IN AN ORGANIZED HEALTH CARE EDUCATION/TRAINING PROGRAM

## 2022-09-22 RX ORDER — ONDANSETRON 2 MG/ML
4 INJECTION INTRAMUSCULAR; INTRAVENOUS EVERY 12 HOURS PRN
Status: CANCELLED | OUTPATIENT
Start: 2022-09-22

## 2022-09-22 RX ORDER — POTASSIUM IODIDE 1 G/ML
0.2 SOLUTION ORAL 3 TIMES DAILY
Qty: 5 ML | Refills: 0 | Status: SHIPPED | OUTPATIENT
Start: 2022-09-22 | End: 2022-09-28 | Stop reason: SDUPTHER

## 2022-09-22 RX ORDER — SODIUM CHLORIDE 9 MG/ML
INJECTION, SOLUTION INTRAVENOUS CONTINUOUS
Status: CANCELLED | OUTPATIENT
Start: 2022-09-22

## 2022-09-22 RX ORDER — LIDOCAINE HYDROCHLORIDE 10 MG/ML
1 INJECTION, SOLUTION EPIDURAL; INFILTRATION; INTRACAUDAL; PERINEURAL ONCE
Status: CANCELLED | OUTPATIENT
Start: 2022-09-22 | End: 2022-09-22

## 2022-09-22 RX ORDER — CALCITRIOL 0.25 UG/1
0.25 CAPSULE ORAL 2 TIMES DAILY
Qty: 60 CAPSULE | Refills: 0 | Status: ON HOLD | OUTPATIENT
Start: 2022-09-22 | End: 2022-10-29 | Stop reason: HOSPADM

## 2022-09-23 NOTE — ASSESSMENT & PLAN NOTE
Left thyroid nodule with FNA suspicious for follicular neoplasm, if only considering the mutlinodular goiter would recommend a diagnostic left thyroid lobectomy or total thyroidectomy given the presence of contralateral nodules.  However, due to the underlying Graves disease I recommend upfront total thyroidectomy as she would likely have persistent Grave's disease if only a lobectomy were performed.  Options were presented to the patient with associated risks, benefits and alternatives, she el;ects to proceed with total thyroidectomy.    - OR for total thyroidectomy  - Consent obtained in clinic. Surgery will be scheduled in coordination with the patient's schedule  - She will need SSKI x7 days pre-op and calcitriol x3 days pre-op  - Will also obtain Vitamin D level to help evaluate risk of post operative hypocalcemia

## 2022-09-27 ENCOUNTER — PATIENT MESSAGE (OUTPATIENT)
Dept: SURGERY | Facility: CLINIC | Age: 52
End: 2022-09-27
Payer: COMMERCIAL

## 2022-09-28 RX ORDER — POTASSIUM IODIDE 1 G/ML
0.2 SOLUTION ORAL 3 TIMES DAILY
Qty: 5 ML | Refills: 0
Start: 2022-09-28 | End: 2022-10-05

## 2022-09-28 NOTE — ASSESSMENT & PLAN NOTE
Graves hyperthyroidism, positive TSI/TRAb.  Recommend total thyroidectomy for definitive surgical treatment, see below.  Higher risk for postoperative hypocalcemia and hypoparathyroidism due to hyperthyroid state.  Will plan for preoperative optimization with SSKI and calcitriol.

## 2022-10-04 ENCOUNTER — TELEPHONE (OUTPATIENT)
Dept: INTERNAL MEDICINE | Facility: CLINIC | Age: 52
End: 2022-10-04
Payer: COMMERCIAL

## 2022-10-04 NOTE — TELEPHONE ENCOUNTER
----- Message from Delisa Shah MD sent at 9/26/2022  4:44 PM CDT -----  Patient needs a preop visit. Thanks    
Patient notified that she has an appointment on 10/17/2022 with Dr. Gutiérrez. Patient voices understanding.  
sudden onset
Patient

## 2022-10-17 ENCOUNTER — OFFICE VISIT (OUTPATIENT)
Dept: FAMILY MEDICINE | Facility: CLINIC | Age: 52
End: 2022-10-17
Payer: COMMERCIAL

## 2022-10-17 VITALS
OXYGEN SATURATION: 99 % | DIASTOLIC BLOOD PRESSURE: 76 MMHG | TEMPERATURE: 99 F | SYSTOLIC BLOOD PRESSURE: 102 MMHG | HEIGHT: 62 IN | WEIGHT: 173.75 LBS | BODY MASS INDEX: 31.97 KG/M2 | HEART RATE: 64 BPM

## 2022-10-17 DIAGNOSIS — Z01.818 PRE-OP EXAMINATION: Primary | ICD-10-CM

## 2022-10-17 PROCEDURE — 3044F HG A1C LEVEL LT 7.0%: CPT | Mod: CPTII,S$GLB,, | Performed by: FAMILY MEDICINE

## 2022-10-17 PROCEDURE — 3074F PR MOST RECENT SYSTOLIC BLOOD PRESSURE < 130 MM HG: ICD-10-PCS | Mod: CPTII,S$GLB,, | Performed by: FAMILY MEDICINE

## 2022-10-17 PROCEDURE — 4010F ACE/ARB THERAPY RXD/TAKEN: CPT | Mod: CPTII,S$GLB,, | Performed by: FAMILY MEDICINE

## 2022-10-17 PROCEDURE — 99214 PR OFFICE/OUTPT VISIT, EST, LEVL IV, 30-39 MIN: ICD-10-PCS | Mod: S$GLB,,, | Performed by: FAMILY MEDICINE

## 2022-10-17 PROCEDURE — 1159F MED LIST DOCD IN RCRD: CPT | Mod: CPTII,S$GLB,, | Performed by: FAMILY MEDICINE

## 2022-10-17 PROCEDURE — 1159F PR MEDICATION LIST DOCUMENTED IN MEDICAL RECORD: ICD-10-PCS | Mod: CPTII,S$GLB,, | Performed by: FAMILY MEDICINE

## 2022-10-17 PROCEDURE — 4010F PR ACE/ARB THEARPY RXD/TAKEN: ICD-10-PCS | Mod: CPTII,S$GLB,, | Performed by: FAMILY MEDICINE

## 2022-10-17 PROCEDURE — 3078F DIAST BP <80 MM HG: CPT | Mod: CPTII,S$GLB,, | Performed by: FAMILY MEDICINE

## 2022-10-17 PROCEDURE — 99999 PR PBB SHADOW E&M-EST. PATIENT-LVL IV: ICD-10-PCS | Mod: PBBFAC,,, | Performed by: FAMILY MEDICINE

## 2022-10-17 PROCEDURE — 3044F PR MOST RECENT HEMOGLOBIN A1C LEVEL <7.0%: ICD-10-PCS | Mod: CPTII,S$GLB,, | Performed by: FAMILY MEDICINE

## 2022-10-17 PROCEDURE — 3074F SYST BP LT 130 MM HG: CPT | Mod: CPTII,S$GLB,, | Performed by: FAMILY MEDICINE

## 2022-10-17 PROCEDURE — 3078F PR MOST RECENT DIASTOLIC BLOOD PRESSURE < 80 MM HG: ICD-10-PCS | Mod: CPTII,S$GLB,, | Performed by: FAMILY MEDICINE

## 2022-10-17 PROCEDURE — 99214 OFFICE O/P EST MOD 30 MIN: CPT | Mod: S$GLB,,, | Performed by: FAMILY MEDICINE

## 2022-10-17 PROCEDURE — 99999 PR PBB SHADOW E&M-EST. PATIENT-LVL IV: CPT | Mod: PBBFAC,,, | Performed by: FAMILY MEDICINE

## 2022-10-17 NOTE — PROGRESS NOTES
(Portions of this note were dictated using voice recognition software and may contain dictation related errors in spelling/grammar/syntax not found on text review)    CC:   Chief Complaint   Patient presents with    Pre-op Exam       HPI: 52 y.o. female, pt of Dr Shah, presented for preop examination.  She is new to me.  She has medical history significant for hypertension, thyroid nodules, hypothyroidism.  She is scheduled to have total thyroidectomy on 10/28 with Dr Isaura Hayes. She has blood workup ordered by the surgeon, needs order for chest x-ray and EKG.  She takes hydrochlorothiazide and losartan, reports adherence with medications, blood pressure has been stable.  She takes aspirin 81 mg, no cardiac history and takes it for primary prevention.  She denies having any symptoms, denies fever, chills, cough, shortness of breath, chest pain, nausea, vomiting, abdominal pain, changes in bowel habits, urine problems including burning and dysuria. She does not smoke, has no toxic habits.     Past Medical History:   Diagnosis Date    Hypertension        Past Surgical History:   Procedure Laterality Date     SECTION      TUBAL LIGATION         Family History   Problem Relation Age of Onset    Heart disease Father     Hypertension Father     Breast cancer Paternal Aunt 60    Diabetes Neg Hx     Colon cancer Neg Hx     Ovarian cancer Neg Hx        Social History     Tobacco Use    Smoking status: Never    Smokeless tobacco: Never   Substance Use Topics    Alcohol use: No    Drug use: No       Lab Results   Component Value Date    WBC 8.55 2022    HGB 12.6 2022    HCT 39.6 2022    MCV 89 2022     2022    CHOL 200 (H) 2022    TRIG 129 2022    HDL 54 2022    ALT 12 07/15/2022    AST 14 07/15/2022    BILITOT 0.5 07/15/2022    ALKPHOS 87 07/15/2022     07/15/2022    K 3.7 07/15/2022     07/15/2022    CREATININE 0.7 07/15/2022    ESTGFRAFRICA  >60.0 07/15/2022    EGFRNONAA >60.0 07/15/2022    CALCIUM 9.9 07/15/2022    ALBUMIN 3.5 07/15/2022    BUN 13 07/15/2022    CO2 26 07/15/2022    TSH 2.987 09/02/2022    INR 0.9 08/28/2020    HGBA1C 5.2 01/19/2022    MICALBCREAT 8.4 05/13/2016    LDLCALC 120.2 01/19/2022    GLU 84 07/15/2022             Vital signs reviewed  PE:   APPEARANCE: Well nourished, well developed, in no acute distress.    HEAD: Normocephalic, atraumatic.  EYES: EOMI.  Conjunctivae noninjected.  NECK: Supple with no cervical lymphadenopathy.    CHEST: Good inspiratory effort. Lungs clear to auscultation with no wheezes or crackles.  CARDIOVASCULAR: Normal S1, S2. No rubs, murmurs, or gallops.  ABDOMEN: Bowel sounds normal. Not distended. Soft. No tenderness or masses. No organomegaly.  EXTREMITIES: No edema, cyanosis, or clubbing.    Review of Systems   Constitutional:  Negative for chills, fatigue and fever.   HENT: Negative.     Respiratory:  Negative for cough, shortness of breath and wheezing.    Cardiovascular:  Negative for chest pain, palpitations and leg swelling.   Gastrointestinal: Negative.    Genitourinary: Negative.    Neurological: Negative.    Psychiatric/Behavioral: Negative.     All other systems reviewed and are negative.    IMPRESSION  1. Pre-op examination            PLAN      1. Pre-op examination    - Protime-INR; Future  - X-Ray Chest PA And Lateral; Future  - SCHEDULED EKG 12-LEAD (to [place]); Future     Labs, CXR and EKG reviewed    ACC/AHA 2007 Guidelines for clearance    Step 1: No need for emergency non cardiac surgery  Step 2: No active cardiac conditions  Step 3: No low risk surgery  Step4: Yes - Functional capacity greater than or equal to 4 METs without symptoms - YES - Class IIa, DIOMEDES B - Proceed with surgery    Her medical conditions are optimized and she can proceed to scheduled procedure      Age/demographic appropriate health maintenance:    Health Maintenance Due   Topic Date Due    COVID-19 Vaccine (3 -  Booster for Moderna series) 08/29/2021    Shingles Vaccine (1 of 2) Never done    Influenza Vaccine (1) 09/01/2022           Dimas Gutiérrez   10/17/2022       No pertinent past medical history

## 2022-10-18 ENCOUNTER — HOSPITAL ENCOUNTER (OUTPATIENT)
Dept: RADIOLOGY | Facility: HOSPITAL | Age: 52
Discharge: HOME | End: 2022-10-18
Attending: FAMILY MEDICINE
Payer: COMMERCIAL

## 2022-10-18 ENCOUNTER — LAB VISIT (OUTPATIENT)
Dept: LAB | Facility: HOSPITAL | Age: 52
End: 2022-10-18
Attending: NURSE PRACTITIONER
Payer: COMMERCIAL

## 2022-10-18 ENCOUNTER — CLINICAL SUPPORT (OUTPATIENT)
Dept: LAB | Facility: HOSPITAL | Age: 52
End: 2022-10-18
Attending: FAMILY MEDICINE
Payer: COMMERCIAL

## 2022-10-18 DIAGNOSIS — Z01.818 PRE-OP EXAMINATION: ICD-10-CM

## 2022-10-18 DIAGNOSIS — E05.90 HYPERTHYROIDISM: ICD-10-CM

## 2022-10-18 LAB
25(OH)D3+25(OH)D2 SERPL-MCNC: 20 NG/ML (ref 30–96)
INR PPP: 1 (ref 0.8–1.2)
PROTHROMBIN TIME: 10.2 SEC (ref 9–12.5)
T4 FREE SERPL-MCNC: 0.84 NG/DL (ref 0.71–1.51)
TSH SERPL DL<=0.005 MIU/L-ACNC: 3.95 UIU/ML (ref 0.4–4)

## 2022-10-18 PROCEDURE — 36415 COLL VENOUS BLD VENIPUNCTURE: CPT | Mod: PO | Performed by: FAMILY MEDICINE

## 2022-10-18 PROCEDURE — 71046 X-RAY EXAM CHEST 2 VIEWS: CPT | Mod: 26,,, | Performed by: RADIOLOGY

## 2022-10-18 PROCEDURE — 71046 X-RAY EXAM CHEST 2 VIEWS: CPT | Mod: TC,FY

## 2022-10-18 PROCEDURE — 84443 ASSAY THYROID STIM HORMONE: CPT | Performed by: NURSE PRACTITIONER

## 2022-10-18 PROCEDURE — 82306 VITAMIN D 25 HYDROXY: CPT | Performed by: STUDENT IN AN ORGANIZED HEALTH CARE EDUCATION/TRAINING PROGRAM

## 2022-10-18 PROCEDURE — 84439 ASSAY OF FREE THYROXINE: CPT | Performed by: NURSE PRACTITIONER

## 2022-10-18 PROCEDURE — 85610 PROTHROMBIN TIME: CPT | Performed by: FAMILY MEDICINE

## 2022-10-18 PROCEDURE — 71046 XR CHEST PA AND LATERAL: ICD-10-PCS | Mod: 26,,, | Performed by: RADIOLOGY

## 2022-10-19 ENCOUNTER — PATIENT MESSAGE (OUTPATIENT)
Dept: ENDOCRINOLOGY | Facility: CLINIC | Age: 52
End: 2022-10-19
Payer: COMMERCIAL

## 2022-10-19 NOTE — TELEPHONE ENCOUNTER
Lab Results   Component Value Date    TSH 3.951 10/18/2022    P5BSBSO 182 (H) 06/06/2022    FREET4 0.84 10/18/2022

## 2022-10-20 NOTE — PROGRESS NOTES
Vitamin D insufficiency  - Start 2000 IU Vit D 3 OTC daily     Graves disease, total thyroidectomy planned 10/23.  Planning for 3 days calcitriol preop and 7 days SSKI preop, orders placed at last visit.    CC/FYI: Freddy Hernandez / Aida Pinto NP

## 2022-10-25 ENCOUNTER — TELEPHONE (OUTPATIENT)
Dept: SURGERY | Facility: CLINIC | Age: 52
End: 2022-10-25
Payer: COMMERCIAL

## 2022-10-25 NOTE — TELEPHONE ENCOUNTER
Called pt to remind of preop labs tomorrow. Pre-operative instructions given to pt. Instructed to have have someone bring and park on second level in patient parking garage. Report to Day of Surgery for 5a Friday, 10/28/22, case start approx 7am. Will call Thursday to confirm arrival time. Taught to shower with Hibiclens Thursday night and morning of surgery. Instructed nothing to eat after midnight. Must have a ride after procedure. Went over post op instructions, labs, and appt. Instructed to hold TUMS prior to post op labs, and then resume after lab. All questions addressed, Pt verbalized understanding.      Also sent FMLA papers back to disability desk and a copy to pt's email per request.

## 2022-10-26 ENCOUNTER — LAB VISIT (OUTPATIENT)
Dept: LAB | Facility: HOSPITAL | Age: 52
End: 2022-10-26
Attending: STUDENT IN AN ORGANIZED HEALTH CARE EDUCATION/TRAINING PROGRAM
Payer: COMMERCIAL

## 2022-10-26 DIAGNOSIS — E04.1 THYROID NODULE: ICD-10-CM

## 2022-10-26 DIAGNOSIS — E05.00 GRAVES DISEASE: ICD-10-CM

## 2022-10-26 LAB
ALBUMIN SERPL BCP-MCNC: 3.8 G/DL (ref 3.5–5.2)
ALP SERPL-CCNC: 70 U/L (ref 55–135)
ALT SERPL W/O P-5'-P-CCNC: 8 U/L (ref 10–44)
ANION GAP SERPL CALC-SCNC: 6 MMOL/L (ref 8–16)
AST SERPL-CCNC: 18 U/L (ref 10–40)
BASOPHILS # BLD AUTO: 0.04 K/UL (ref 0–0.2)
BASOPHILS NFR BLD: 0.4 % (ref 0–1.9)
BILIRUB SERPL-MCNC: 0.4 MG/DL (ref 0.1–1)
BUN SERPL-MCNC: 12 MG/DL (ref 6–20)
CALCIUM SERPL-MCNC: 9.4 MG/DL (ref 8.7–10.5)
CHLORIDE SERPL-SCNC: 102 MMOL/L (ref 95–110)
CO2 SERPL-SCNC: 29 MMOL/L (ref 23–29)
CREAT SERPL-MCNC: 0.8 MG/DL (ref 0.5–1.4)
DIFFERENTIAL METHOD: NORMAL
EOSINOPHIL # BLD AUTO: 0.2 K/UL (ref 0–0.5)
EOSINOPHIL NFR BLD: 2.2 % (ref 0–8)
ERYTHROCYTE [DISTWIDTH] IN BLOOD BY AUTOMATED COUNT: 13.4 % (ref 11.5–14.5)
EST. GFR  (NO RACE VARIABLE): >60 ML/MIN/1.73 M^2
GLUCOSE SERPL-MCNC: 84 MG/DL (ref 70–110)
HCT VFR BLD AUTO: 41 % (ref 37–48.5)
HGB BLD-MCNC: 13.2 G/DL (ref 12–16)
IMM GRANULOCYTES # BLD AUTO: 0.02 K/UL (ref 0–0.04)
IMM GRANULOCYTES NFR BLD AUTO: 0.2 % (ref 0–0.5)
LYMPHOCYTES # BLD AUTO: 2.5 K/UL (ref 1–4.8)
LYMPHOCYTES NFR BLD: 25.7 % (ref 18–48)
MCH RBC QN AUTO: 28.9 PG (ref 27–31)
MCHC RBC AUTO-ENTMCNC: 32.2 G/DL (ref 32–36)
MCV RBC AUTO: 90 FL (ref 82–98)
MONOCYTES # BLD AUTO: 0.8 K/UL (ref 0.3–1)
MONOCYTES NFR BLD: 7.7 % (ref 4–15)
NEUTROPHILS # BLD AUTO: 6.2 K/UL (ref 1.8–7.7)
NEUTROPHILS NFR BLD: 63.8 % (ref 38–73)
NRBC BLD-RTO: 0 /100 WBC
PLATELET # BLD AUTO: 430 K/UL (ref 150–450)
PMV BLD AUTO: 10.9 FL (ref 9.2–12.9)
POTASSIUM SERPL-SCNC: 3.4 MMOL/L (ref 3.5–5.1)
PROT SERPL-MCNC: 7 G/DL (ref 6–8.4)
RBC # BLD AUTO: 4.56 M/UL (ref 4–5.4)
SODIUM SERPL-SCNC: 137 MMOL/L (ref 136–145)
WBC # BLD AUTO: 9.76 K/UL (ref 3.9–12.7)

## 2022-10-26 PROCEDURE — 85025 COMPLETE CBC W/AUTO DIFF WBC: CPT | Performed by: STUDENT IN AN ORGANIZED HEALTH CARE EDUCATION/TRAINING PROGRAM

## 2022-10-26 PROCEDURE — 80053 COMPREHEN METABOLIC PANEL: CPT | Performed by: STUDENT IN AN ORGANIZED HEALTH CARE EDUCATION/TRAINING PROGRAM

## 2022-10-26 PROCEDURE — 36415 COLL VENOUS BLD VENIPUNCTURE: CPT | Mod: PO | Performed by: STUDENT IN AN ORGANIZED HEALTH CARE EDUCATION/TRAINING PROGRAM

## 2022-10-27 ENCOUNTER — ANESTHESIA EVENT (OUTPATIENT)
Dept: SURGERY | Facility: HOSPITAL | Age: 52
End: 2022-10-27
Payer: COMMERCIAL

## 2022-10-27 ENCOUNTER — TELEPHONE (OUTPATIENT)
Dept: SURGERY | Facility: CLINIC | Age: 52
End: 2022-10-27
Payer: COMMERCIAL

## 2022-10-27 NOTE — ANESTHESIA PREPROCEDURE EVALUATION
10/27/2022  Pre-operative evaluation for Procedure(s) (LRB):  THYROIDECTOMY, total (N/A)    Isaura Mancini is a 52 y.o. female     Patient Active Problem List   Diagnosis    Essential hypertension    Thrombocytosis    Snoring    Obesity (BMI 30-39.9)    Insomnia    Chronic bilateral low back pain with left-sided sciatica    Hypokalemia    Class 2 obesity with body mass index (BMI) of 37.0 to 37.9 in adult    Lumbar facet arthropathy    Spondylolisthesis at L4-L5 level    Sacroiliitis    Muscle strain of right forearm    Palpitations    Headache    Hematuria    Constipation    Urinary tract infection without hematuria    Chest pain    Gastroesophageal reflux disease    Family history of premature CAD    Degenerative disc disease, lumbar    Uterine leiomyoma    Abnormal uterine bleeding (AUB)    Hyperthyroidism    Left thyroid nodule       Review of patient's allergies indicates:  No Known Allergies    No current facility-administered medications on file prior to encounter.     Current Outpatient Medications on File Prior to Encounter   Medication Sig Dispense Refill    aspirin (ECOTRIN) 81 MG EC tablet Take 81 mg by mouth once daily.      calcitRIOL (ROCALTROL) 0.25 MCG Cap Take 1 capsule (0.25 mcg total) by mouth 2 (two) times daily. Start taking 3 days prior to your thyroid surgery. 60 capsule 0    gabapentin (NEURONTIN) 100 MG capsule Take 1 capsule (100 mg total) by mouth 3 (three) times daily as needed (back and leg pain). 270 capsule 3    hydroCHLOROthiazide (HYDRODIURIL) 25 MG tablet Take 1 tablet (25 mg total) by mouth once daily. 90 tablet 1    ibuprofen (ADVIL,MOTRIN) 800 MG tablet Take 1 tablet (800 mg total) by mouth every 8 (eight) hours as needed for Pain. 60 tablet 2    losartan (COZAAR) 25 MG tablet Take 1 tablet (25 mg total) by mouth once daily. 90 tablet 1     methIMAzole (TAPAZOLE) 10 MG Tab Take 1 tablet (10 mg total) by mouth 2 (two) times daily. 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   Tobacco Use    Smoking status: Never    Smokeless tobacco: Never   Substance and Sexual Activity    Alcohol use: No    Drug use: No    Sexual activity: Yes     Partners: Male     Birth control/protection: Surgical     Comment: BTL     Social Determinants of Health     Financial Resource Strain: High Risk    Difficulty of Paying Living Expenses: Very hard   Food Insecurity: Food Insecurity Present    Worried About Running Out of Food in the Last Year: Often true    Ran Out of Food in the Last Year: Often true   Transportation Needs: No Transportation Needs    Lack of Transportation (Medical): No    Lack of Transportation (Non-Medical): No   Physical Activity: Inactive    Days of Exercise per Week: 0 days    Minutes of Exercise per Session: 0 min   Stress: Stress Concern Present    Feeling of Stress : To some extent   Social Connections: Unknown    Frequency of Communication with Friends and Family: More than three times a week    Frequency of Social Gatherings with Friends and Family: Twice a week    Active Member of Clubs or Organizations: No    Attends Club or Organization Meetings: Never    Marital Status:    Housing Stability: High Risk    Unable to Pay for Housing in the Last Year: Yes    Number of Places Lived in the Last Year: 1    Unstable Housing in the Last Year: No         CBC:   Recent Labs     10/26/22  0853   WBC 9.76   RBC 4.56   HGB 13.2   HCT 41.0      MCV 90   MCH 28.9   MCHC 32.2       CMP:   Recent Labs     10/26/22  0853      K 3.4*      CO2 29    BUN 12   CREATININE 0.8   GLU 84   CALCIUM 9.4   ALBUMIN 3.8   PROT 7.0   ALKPHOS 70   ALT 8*   AST 18   BILITOT 0.4       INR  No results for input(s): PT, INR, PROTIME, APTT in the last 72 hours.        Diagnostic Studies:      EKD Echo:  No results found for this or any previous visit.      Pre-op Assessment    I have reviewed the Patient Summary Reports.     I have reviewed the Nursing Notes. I have reviewed the NPO Status.   I have reviewed the Medications.     Review of Systems  Cardiovascular:   Hypertension    Hepatic/GI:   GERD    Endocrine:   Hyperthyroidism        Physical Exam  General: Well nourished and Cooperative    Airway:  Mallampati: II   Mouth Opening: Normal  TM Distance: Normal  Tongue: Normal  Neck ROM: Normal ROM    Chest/Lungs:  Clear to auscultation, Normal Respiratory Rate    Heart:  Rate: Normal  Rhythm: Regular Rhythm  Sounds: Normal        Anesthesia Plan  Type of Anesthesia, risks & benefits discussed:    Anesthesia Type: Gen ETT  Intra-op Monitoring Plan: Standard ASA Monitors  Post Op Pain Control Plan: multimodal analgesia and IV/PO Opioids PRN  Induction:  IV  Airway Plan: Direct and Video, Post-Induction  Informed Consent: Informed consent signed with the Patient and all parties understand the risks and agree with anesthesia plan.  All questions answered.   ASA Score: 2    Ready For Surgery From Anesthesia Perspective.     .

## 2022-10-28 ENCOUNTER — ANESTHESIA (OUTPATIENT)
Dept: SURGERY | Facility: HOSPITAL | Age: 52
End: 2022-10-28
Payer: COMMERCIAL

## 2022-10-28 ENCOUNTER — HOSPITAL ENCOUNTER (OUTPATIENT)
Facility: HOSPITAL | Age: 52
LOS: 1 days | Discharge: HOME | End: 2022-10-29
Attending: STUDENT IN AN ORGANIZED HEALTH CARE EDUCATION/TRAINING PROGRAM | Admitting: STUDENT IN AN ORGANIZED HEALTH CARE EDUCATION/TRAINING PROGRAM
Payer: COMMERCIAL

## 2022-10-28 DIAGNOSIS — E04.1 THYROID NODULE: Primary | ICD-10-CM

## 2022-10-28 DIAGNOSIS — E05.00 GRAVES DISEASE: ICD-10-CM

## 2022-10-28 LAB
B-HCG UR QL: NEGATIVE
CALCIUM SERPL-MCNC: 9.3 MG/DL (ref 8.7–10.5)
CTP QC/QA: YES
PTH-INTACT SERPL-MCNC: 92.1 PG/ML (ref 9–77)

## 2022-10-28 PROCEDURE — 60240 REMOVAL OF THYROID: CPT | Mod: ,,, | Performed by: STUDENT IN AN ORGANIZED HEALTH CARE EDUCATION/TRAINING PROGRAM

## 2022-10-28 PROCEDURE — 71000033 HC RECOVERY, INTIAL HOUR: Performed by: STUDENT IN AN ORGANIZED HEALTH CARE EDUCATION/TRAINING PROGRAM

## 2022-10-28 PROCEDURE — 83970 ASSAY OF PARATHORMONE: CPT | Performed by: STUDENT IN AN ORGANIZED HEALTH CARE EDUCATION/TRAINING PROGRAM

## 2022-10-28 PROCEDURE — 27201423 OPTIME MED/SURG SUP & DEVICES STERILE SUPPLY: Performed by: STUDENT IN AN ORGANIZED HEALTH CARE EDUCATION/TRAINING PROGRAM

## 2022-10-28 PROCEDURE — 71000015 HC POSTOP RECOV 1ST HR: Performed by: STUDENT IN AN ORGANIZED HEALTH CARE EDUCATION/TRAINING PROGRAM

## 2022-10-28 PROCEDURE — 25000003 PHARM REV CODE 250: Performed by: STUDENT IN AN ORGANIZED HEALTH CARE EDUCATION/TRAINING PROGRAM

## 2022-10-28 PROCEDURE — 63600175 PHARM REV CODE 636 W HCPCS: Performed by: STUDENT IN AN ORGANIZED HEALTH CARE EDUCATION/TRAINING PROGRAM

## 2022-10-28 PROCEDURE — 99900035 HC TECH TIME PER 15 MIN (STAT)

## 2022-10-28 PROCEDURE — D9220A PRA ANESTHESIA: Mod: ,,, | Performed by: STUDENT IN AN ORGANIZED HEALTH CARE EDUCATION/TRAINING PROGRAM

## 2022-10-28 PROCEDURE — 36000706: Performed by: STUDENT IN AN ORGANIZED HEALTH CARE EDUCATION/TRAINING PROGRAM

## 2022-10-28 PROCEDURE — 71000016 HC POSTOP RECOV ADDL HR: Performed by: STUDENT IN AN ORGANIZED HEALTH CARE EDUCATION/TRAINING PROGRAM

## 2022-10-28 PROCEDURE — 81025 URINE PREGNANCY TEST: CPT | Performed by: STUDENT IN AN ORGANIZED HEALTH CARE EDUCATION/TRAINING PROGRAM

## 2022-10-28 PROCEDURE — 36000707: Performed by: STUDENT IN AN ORGANIZED HEALTH CARE EDUCATION/TRAINING PROGRAM

## 2022-10-28 PROCEDURE — 60240 PR THYROIDECTOMY: ICD-10-PCS | Mod: ,,, | Performed by: STUDENT IN AN ORGANIZED HEALTH CARE EDUCATION/TRAINING PROGRAM

## 2022-10-28 PROCEDURE — 94761 N-INVAS EAR/PLS OXIMETRY MLT: CPT

## 2022-10-28 PROCEDURE — 27000221 HC OXYGEN, UP TO 24 HOURS

## 2022-10-28 PROCEDURE — 82310 ASSAY OF CALCIUM: CPT | Performed by: STUDENT IN AN ORGANIZED HEALTH CARE EDUCATION/TRAINING PROGRAM

## 2022-10-28 PROCEDURE — 37000009 HC ANESTHESIA EA ADD 15 MINS: Performed by: STUDENT IN AN ORGANIZED HEALTH CARE EDUCATION/TRAINING PROGRAM

## 2022-10-28 PROCEDURE — 37000008 HC ANESTHESIA 1ST 15 MINUTES: Performed by: STUDENT IN AN ORGANIZED HEALTH CARE EDUCATION/TRAINING PROGRAM

## 2022-10-28 PROCEDURE — D9220A PRA ANESTHESIA: ICD-10-PCS | Mod: ,,, | Performed by: STUDENT IN AN ORGANIZED HEALTH CARE EDUCATION/TRAINING PROGRAM

## 2022-10-28 RX ORDER — ACETAMINOPHEN 500 MG
1000 TABLET ORAL EVERY 8 HOURS
Status: DISCONTINUED | OUTPATIENT
Start: 2022-10-28 | End: 2022-10-29 | Stop reason: HOSPADM

## 2022-10-28 RX ORDER — PHENYLEPHRINE HYDROCHLORIDE 10 MG/ML
INJECTION INTRAVENOUS
Status: DISCONTINUED | OUTPATIENT
Start: 2022-10-28 | End: 2022-10-28

## 2022-10-28 RX ORDER — ONDANSETRON 2 MG/ML
4 INJECTION INTRAMUSCULAR; INTRAVENOUS EVERY 12 HOURS PRN
Status: DISCONTINUED | OUTPATIENT
Start: 2022-10-28 | End: 2022-10-28

## 2022-10-28 RX ORDER — DEXAMETHASONE SODIUM PHOSPHATE 4 MG/ML
INJECTION, SOLUTION INTRA-ARTICULAR; INTRALESIONAL; INTRAMUSCULAR; INTRAVENOUS; SOFT TISSUE
Status: DISCONTINUED | OUTPATIENT
Start: 2022-10-28 | End: 2022-10-28

## 2022-10-28 RX ORDER — OXYCODONE HYDROCHLORIDE 5 MG/1
5 TABLET ORAL
Status: DISCONTINUED | OUTPATIENT
Start: 2022-10-28 | End: 2022-10-28

## 2022-10-28 RX ORDER — SUCCINYLCHOLINE CHLORIDE 20 MG/ML
INJECTION INTRAMUSCULAR; INTRAVENOUS
Status: DISCONTINUED | OUTPATIENT
Start: 2022-10-28 | End: 2022-10-28

## 2022-10-28 RX ORDER — CALCIUM CARBONATE 200(500)MG
2000 TABLET,CHEWABLE ORAL 2 TIMES DAILY WITH MEALS
Status: DISCONTINUED | OUTPATIENT
Start: 2022-10-28 | End: 2022-10-29 | Stop reason: HOSPADM

## 2022-10-28 RX ORDER — BUPIVACAINE HYDROCHLORIDE 2.5 MG/ML
INJECTION, SOLUTION EPIDURAL; INFILTRATION; INTRACAUDAL
Status: DISCONTINUED | OUTPATIENT
Start: 2022-10-28 | End: 2022-10-28 | Stop reason: HOSPADM

## 2022-10-28 RX ORDER — LIDOCAINE HYDROCHLORIDE 20 MG/ML
INJECTION INTRAVENOUS
Status: DISCONTINUED | OUTPATIENT
Start: 2022-10-28 | End: 2022-10-28

## 2022-10-28 RX ORDER — SODIUM CHLORIDE 9 MG/ML
INJECTION, SOLUTION INTRAVENOUS CONTINUOUS
Status: DISCONTINUED | OUTPATIENT
Start: 2022-10-28 | End: 2022-10-28

## 2022-10-28 RX ORDER — MIDAZOLAM HYDROCHLORIDE 1 MG/ML
INJECTION, SOLUTION INTRAMUSCULAR; INTRAVENOUS
Status: DISCONTINUED | OUTPATIENT
Start: 2022-10-28 | End: 2022-10-28

## 2022-10-28 RX ORDER — PROMETHAZINE HYDROCHLORIDE 12.5 MG/1
12.5 TABLET ORAL EVERY 6 HOURS PRN
Status: DISCONTINUED | OUTPATIENT
Start: 2022-10-28 | End: 2022-10-29 | Stop reason: HOSPADM

## 2022-10-28 RX ORDER — KETAMINE HCL IN 0.9 % NACL 50 MG/5 ML
SYRINGE (ML) INTRAVENOUS
Status: DISCONTINUED | OUTPATIENT
Start: 2022-10-28 | End: 2022-10-28

## 2022-10-28 RX ORDER — ONDANSETRON 4 MG/1
4 TABLET, ORALLY DISINTEGRATING ORAL EVERY 6 HOURS PRN
Status: DISCONTINUED | OUTPATIENT
Start: 2022-10-28 | End: 2022-10-29 | Stop reason: HOSPADM

## 2022-10-28 RX ORDER — PROPOFOL 10 MG/ML
VIAL (ML) INTRAVENOUS
Status: DISCONTINUED | OUTPATIENT
Start: 2022-10-28 | End: 2022-10-28

## 2022-10-28 RX ORDER — FENTANYL CITRATE 50 UG/ML
25 INJECTION, SOLUTION INTRAMUSCULAR; INTRAVENOUS EVERY 5 MIN PRN
Status: DISCONTINUED | OUTPATIENT
Start: 2022-10-28 | End: 2022-10-28 | Stop reason: HOSPADM

## 2022-10-28 RX ORDER — FENTANYL CITRATE 50 UG/ML
INJECTION, SOLUTION INTRAMUSCULAR; INTRAVENOUS
Status: DISCONTINUED | OUTPATIENT
Start: 2022-10-28 | End: 2022-10-28

## 2022-10-28 RX ORDER — OXYCODONE HYDROCHLORIDE 5 MG/1
5 TABLET ORAL EVERY 4 HOURS PRN
Status: DISCONTINUED | OUTPATIENT
Start: 2022-10-28 | End: 2022-10-29 | Stop reason: HOSPADM

## 2022-10-28 RX ORDER — HYDROMORPHONE HYDROCHLORIDE 1 MG/ML
0.2 INJECTION, SOLUTION INTRAMUSCULAR; INTRAVENOUS; SUBCUTANEOUS EVERY 5 MIN PRN
Status: DISCONTINUED | OUTPATIENT
Start: 2022-10-28 | End: 2022-10-28 | Stop reason: HOSPADM

## 2022-10-28 RX ORDER — ONDANSETRON 2 MG/ML
4 INJECTION INTRAMUSCULAR; INTRAVENOUS DAILY PRN
Status: DISCONTINUED | OUTPATIENT
Start: 2022-10-28 | End: 2022-10-28

## 2022-10-28 RX ORDER — LIDOCAINE HYDROCHLORIDE 10 MG/ML
1 INJECTION, SOLUTION EPIDURAL; INFILTRATION; INTRACAUDAL; PERINEURAL ONCE
Status: DISCONTINUED | OUTPATIENT
Start: 2022-10-28 | End: 2022-10-28

## 2022-10-28 RX ORDER — ONDANSETRON 2 MG/ML
INJECTION INTRAMUSCULAR; INTRAVENOUS
Status: DISCONTINUED | OUTPATIENT
Start: 2022-10-28 | End: 2022-10-28

## 2022-10-28 RX ORDER — HYDROMORPHONE HYDROCHLORIDE 2 MG/ML
INJECTION, SOLUTION INTRAMUSCULAR; INTRAVENOUS; SUBCUTANEOUS
Status: DISCONTINUED | OUTPATIENT
Start: 2022-10-28 | End: 2022-10-28

## 2022-10-28 RX ORDER — HALOPERIDOL 5 MG/ML
0.5 INJECTION INTRAMUSCULAR EVERY 10 MIN PRN
Status: DISCONTINUED | OUTPATIENT
Start: 2022-10-28 | End: 2022-10-28 | Stop reason: HOSPADM

## 2022-10-28 RX ADMIN — PHENYLEPHRINE HYDROCHLORIDE 100 MCG: 10 INJECTION INTRAVENOUS at 07:10

## 2022-10-28 RX ADMIN — PHENYLEPHRINE HYDROCHLORIDE 100 MCG: 10 INJECTION INTRAVENOUS at 08:10

## 2022-10-28 RX ADMIN — Medication 20 MG: at 08:10

## 2022-10-28 RX ADMIN — PHENYLEPHRINE HYDROCHLORIDE 200 MCG: 10 INJECTION INTRAVENOUS at 07:10

## 2022-10-28 RX ADMIN — Medication 10 MG: at 09:10

## 2022-10-28 RX ADMIN — FENTANYL CITRATE 100 MCG: 50 INJECTION, SOLUTION INTRAMUSCULAR; INTRAVENOUS at 07:10

## 2022-10-28 RX ADMIN — HYDROMORPHONE HYDROCHLORIDE 0.2 MG: 2 INJECTION INTRAMUSCULAR; INTRAVENOUS; SUBCUTANEOUS at 10:10

## 2022-10-28 RX ADMIN — ACETAMINOPHEN 1000 MG: 500 TABLET ORAL at 09:10

## 2022-10-28 RX ADMIN — MIDAZOLAM HYDROCHLORIDE 2 MG: 1 INJECTION, SOLUTION INTRAMUSCULAR; INTRAVENOUS at 07:10

## 2022-10-28 RX ADMIN — HYDROMORPHONE HYDROCHLORIDE 0.2 MG: 1 INJECTION, SOLUTION INTRAMUSCULAR; INTRAVENOUS; SUBCUTANEOUS at 01:10

## 2022-10-28 RX ADMIN — PHENYLEPHRINE HYDROCHLORIDE 100 MCG: 10 INJECTION INTRAVENOUS at 09:10

## 2022-10-28 RX ADMIN — GLYCOPYRROLATE 0.4 MG: 0.2 INJECTION, SOLUTION INTRAMUSCULAR; INTRAVENOUS at 07:10

## 2022-10-28 RX ADMIN — HYDROMORPHONE HYDROCHLORIDE 0.2 MG: 1 INJECTION, SOLUTION INTRAMUSCULAR; INTRAVENOUS; SUBCUTANEOUS at 02:10

## 2022-10-28 RX ADMIN — SODIUM CHLORIDE: 9 INJECTION, SOLUTION INTRAVENOUS at 07:10

## 2022-10-28 RX ADMIN — SODIUM CHLORIDE, SODIUM GLUCONATE, SODIUM ACETATE, POTASSIUM CHLORIDE, MAGNESIUM CHLORIDE, SODIUM PHOSPHATE, DIBASIC, AND POTASSIUM PHOSPHATE: .53; .5; .37; .037; .03; .012; .00082 INJECTION, SOLUTION INTRAVENOUS at 07:10

## 2022-10-28 RX ADMIN — OXYCODONE 5 MG: 5 TABLET ORAL at 01:10

## 2022-10-28 RX ADMIN — CALCIUM CARBONATE (ANTACID) CHEW TAB 500 MG 2000 MG: 500 CHEW TAB at 05:10

## 2022-10-28 RX ADMIN — OXYCODONE 5 MG: 5 TABLET ORAL at 09:10

## 2022-10-28 RX ADMIN — DEXAMETHASONE SODIUM PHOSPHATE 4 MG: 4 INJECTION, SOLUTION INTRAMUSCULAR; INTRAVENOUS at 07:10

## 2022-10-28 RX ADMIN — HYDROMORPHONE HYDROCHLORIDE 0.1 MG: 2 INJECTION INTRAMUSCULAR; INTRAVENOUS; SUBCUTANEOUS at 09:10

## 2022-10-28 RX ADMIN — PROPOFOL 130 MG: 10 INJECTION, EMULSION INTRAVENOUS at 07:10

## 2022-10-28 RX ADMIN — SUCCINYLCHOLINE CHLORIDE 100 MG: 20 INJECTION, SOLUTION INTRAMUSCULAR; INTRAVENOUS at 07:10

## 2022-10-28 RX ADMIN — Medication 10 MG: at 10:10

## 2022-10-28 RX ADMIN — HYDROMORPHONE HYDROCHLORIDE 0.4 MG: 2 INJECTION INTRAMUSCULAR; INTRAVENOUS; SUBCUTANEOUS at 07:10

## 2022-10-28 RX ADMIN — PROPOFOL 50 MG: 10 INJECTION, EMULSION INTRAVENOUS at 08:10

## 2022-10-28 RX ADMIN — ACETAMINOPHEN 1000 MG: 500 TABLET ORAL at 01:10

## 2022-10-28 RX ADMIN — ONDANSETRON 4 MG: 2 INJECTION INTRAMUSCULAR; INTRAVENOUS at 11:10

## 2022-10-28 RX ADMIN — HYDROMORPHONE HYDROCHLORIDE 0.2 MG: 2 INJECTION INTRAMUSCULAR; INTRAVENOUS; SUBCUTANEOUS at 11:10

## 2022-10-28 RX ADMIN — HYDROMORPHONE HYDROCHLORIDE 0.1 MG: 2 INJECTION INTRAMUSCULAR; INTRAVENOUS; SUBCUTANEOUS at 08:10

## 2022-10-28 RX ADMIN — LIDOCAINE HYDROCHLORIDE 100 MG: 20 INJECTION INTRAVENOUS at 07:10

## 2022-10-28 NOTE — ANESTHESIA PROCEDURE NOTES
Intubation    Date/Time: 10/28/2022 7:13 AM  Performed by: Mallory Pradhan MD  Authorized by: Mallory Pradhan MD     Intubation:     Induction:  Intravenous    Intubated:  Postinduction    Mask Ventilation:  N/a    Attempts:  1    Attempted By:  Staff anesthesiologist    Method of Intubation:  Video laryngoscopy    Blade:  Shah 3    Laryngeal View Grade: Grade I - full view of cords      Difficult Airway Encountered?: No      Complications:  None    Airway Device:  EMG ETT (NIMS)    Airway Device Size:  7.0    Style/Cuff Inflation:  Cuffed    Inflation Amount (mL):  6    Tube secured:  20    Secured at:  The lips    Placement Verified By:  Capnometry    Complicating Factors:  None    Findings Post-Intubation:  BS equal bilateral

## 2022-10-28 NOTE — H&P
History & Physical    SUBJECTIVE:     Chief Complaint: Presents to total thyroidectomy    History of Present Illness:  Isaura Mancini is a 52 y.o. female patient with a history notable for HTN, chronic back pain, and hyperthyroidism who presents in consultation for management of a follicular neoplasm.      She was found to have a thyroid nodule 6/2022.  Biochemical testing of thyroid function including a TSH documented hyperthyroidism.  Her symptoms included palpitations, unintentional weight loss with 30 lbs since January.  Labs including TSI and TRAb were elevated for a diagnosis consistent with Grave's disease. She has since been started on methimazole which has improved her symptoms.      She had a thyroid ultrasound 6/2022 which revealed two nodules meeting criteria for FNA. There was one on the left with ill-defined peripheral hypoechoic, centrally isoechoic nodule measuring approximately 1.8 x 1.2 x 1.3 cm and two one the right with one ill-defined hypoechoic nodule containing punctate echogenic focus measuring 1.0 x 0.5 x 0.9 cm another hypoechoic subcentimeter nodule with ill-defined margin measuring up to 0.5 cm (not meeting FNA). 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      The patient admits to hyperthyroid as mentioned above. The patient admits to heat / cold intolerance, palpitations, and weight changes.  She denies dysphagia, globus sensation, compression symptoms, or anterior neck pain.  The patient has no hoarseness, voice changes or increased need to clear the throat.  The patient has not had prior neck surgery. The patient has no history of radiation exposure or goitrogens.  The patient has not recently been on lithium, biotin, amiodarone or received iodine contrast. There no a significant history of thyroid cancer, thyroid disease or familial  endocrinopathies.  Calcium levels have been normal and she denies symptoms of hypercalcemia, therefore is low risk for concurrent parathyroid disease.    Review of patient's allergies indicates:  No Known Allergies    Past Medical History:   Diagnosis Date    Hypertension      Past Surgical History:   Procedure Laterality Date     SECTION      TUBAL LIGATION       Family History   Problem Relation Age of Onset    Heart disease Father     Hypertension Father     Breast cancer Paternal Aunt 60    Diabetes Neg Hx     Colon cancer Neg Hx     Ovarian cancer Neg Hx      Social History     Tobacco Use    Smoking status: Never    Smokeless tobacco: Never   Substance Use Topics    Alcohol use: No    Drug use: No        Review of Systems:  See HPI for pertinent ROS    OBJECTIVE:     Vital Signs (Most Recent)  Temp: 98.5 °F (36.9 °C) (10/28/22 0614)  Pulse: 60 (10/28/22 0614)  Resp: 16 (10/28/22 0614)  BP: 117/74 (10/28/22 0614)  SpO2: 98 % (10/28/22 0614)    Physical Exam:  Constitutional: no acute distress, comfortable, well appearing  HENT: no lid lag, no exophthalmos, no scleral icterus, moist mucous membranes  Neck: supple, trachea in midline, thyroid is not enlarged and moves well with swallowing, no palpable nodules, normal neck extension  Heme/Lymph: no cervical or supraclavicular lymphadenopathy  Respiratory: clear to ascultation bilaterally, no wheezes or stridor  Cardiovascular: regular rate and rhythm  Extremities: no edema  Skin: warm and dry, no rashes  Neurologic: no resting tremor of outstretched hands, voice normal  Vascular: radial pulses palpable bilaterally  Psychiatric: affect normal    Laboratory  Reviewed    Diagnostic Results:  Reviewed    ASSESSMENT/PLAN:     Admission paperwork was accomplished including operative consent and consent for blood and blood product administration.    The procedure was explained to the patient including indications, risks and alternatives. The patient verbalized  understanding and has given informed consent.    Plan:  Proceed with operative procedure as planned.    Follow Up:  After hospitalization.    Itzel Jolley MD  General Surgery PGY4

## 2022-10-28 NOTE — NURSING TRANSFER
Nursing Transfer Note      10/28/2022     Reason patient is being transferred: post procedure    Transfer To: 1009    Transfer via bed    Transfer with none    Transported by PCT    Medicines sent: none    Any special needs or follow-up needed: routine    Chart send with patient: Yes    Notified:  son    Patient reassessed at: 10/28/2022  at 1600

## 2022-10-28 NOTE — OP NOTE
[facility]  Endocrine Surgery  Operative Report         Date of Procedure: 10/28/2022     Procedure: Procedure(s) (LRB):  THYROIDECTOMY, total (N/A)     Indications: This patient presents with a left 1.8 cm thyroid nodule with FNA suspicious for follicular neoplasm and underlying Graves hyperthyroidism. The patient now presents for a total thyroidectomy.     Surgeon(s) and Role:     * Isaura Hayes MD - Primary     * Itzel Jolley MD - Resident - Assisting (PGY-4)      Pre-Operative Diagnosis: Graves disease [E05.00]  Thyroid nodule [E04.1]    Post-Operative Diagnosis: Graves disease [E05.00]  Thyroid nodule [E04.1]    Anesthesia: General    Procedures:   Total thyroidectomy  Intraoperative monitoring and interpretation of bilateral cranial nerves (vagus and recurrent laryngeal nerves) using the [employer] NIM system    Intraoperative Findings:   Palpable left thyroid nodule without gross invasion into surrounding structures.  Thyroid hypervascular with surrounding inflammation.   The bilateral recurrent laryngeal and vagus nerves were identified.  Function was verified using the NIM nerve monitoring system.  Parathyroid tissue was identified and preserved with a viable blood supply (right superior, right inferior and left inferior identified and preserved).    Description of the Procedure:  The patient was seen in the Holding Room. The risks, benefits, complications, treatment options, and expected outcomes were discussed with the patient.  I discussed in detail the possible complications associated with thyroid surgery, which may include (but not limited to) hoarseness or voice changes, recurrent laryngeal nerve injury - temporary or permanent, superior laryngeal nerve injury - temporary or permanent, hypocalcemia and hypoparathyroidism - temporary or permanent, neck hematoma, bleeding, infection, scarring, reaction to medication, pulmonary aspiration, perforation of viscus, failure to diagnose a  condition, complications requiring transfusion, death and imponderables.  I discussed the potential need for a staged completion thyroidectomy in the event of unexpected intraoperative findings or recurrent laryngeal nerve dysfunction and thus the initial surgery would be limited to a thyroid lobectomy.  Thyroid hormone replacement will be necessary lifelong. Thyroid function will need to be monitored.  The patient agreed to the procedure despite the risks involved with the proposed plan, giving informed consent.  The site of surgery properly noted/marked. The patient was taken to Operating Room, identified as Isaura Mancini and the procedure verified as total thyroidectomy.     The patient was placed supine after induction of a general anesthetic.  Appropriate lines and access were confirmed by the anesthesia team.  The neck was supported in an extended position and the surgical field was prepped and draped in sterile fashion.  A comprehensive time out was performed.  A 6 cm transverse cervical incision was created below the cricoid cartilage within a natural skin fold.  Dissection was carried down through the platysma layer.  Once this was completed, sub-platysmal flaps were raised superiorly to the thyroid cartilage and inferiorly to the sternal notch. The strap muscles were identified and divided at the midline. Sharp and blunt dissection were used to mobilize the left thyroid lobe in a medial direction.  There was a small palpable thyroid nodule on the left.  The middle thyroid vein was ligated with a silk tie and divided with the Ligasure.  Dissection continued posterolaterally to expose the tracheoesophageal groove and right carotid artery.  Vagus nerve signal was confirmed with the NIM system.  The superior pole was carefully dissected free and the superior pole vessels were individually ligated with silk ties and divided with the Ligasure.  The left thyroid lobe was mobilized further, the inferior pole  vessels were similarly ligated along the thyroid capsule with silk ties and divided with the Ligasure allowing for delivery of the thyroid lobe.  The left recurrent laryngeal nerve was identified and preserved.  Function was verified using the NIM system.  The ligament of Bedolla was carefully dissected and divided taking care to preserve the recurrent laryngeal nerve.  The superior gland was not clearly identified and the inferior parathyroid gland was preserved in situ on a viable vascular pedicle.  The thyroid and isthmus were dissected off the trachea using the Ligasure and bipolar cautery.   A small pyramidal lobe was identified on the right side, dissected free from the anterior surface of the cricothyroid muscle and thyroid cartilage and divided at the level of the thyroid cartilage.  Vagus and recurrent laryngeal nerve function were confirmed with the NIM system prior to proceeding to the contralateral dissection.      Attention was then given to the contralateral lobe.  Sharp and blunt dissection were again used to mobilize the right thyroid lobe in a medial direction.  The middle thyroid vein was ligated with a silk tie and divided with the Ligasure.  Dissection continued posteriorly to expose the tracheoesophageal groove and right carotid artery.  Vagus nerve signal was confirmed with the NIM system.  Similar to the contralateral side, the right thyroid lobe was mobilized further and the superior and inferior pole vessels were ligated with silk ties and divided with the Ligasure. The middle thyroid vein was similarly divided. The right recurrent laryngeal nerve was identified and preserved.  Function was verified using the NIMS.  The ligament of Bedolla was carefully dissected and divided taking care to preserve the recurrent laryngeal nerve.  The right superior and inferior parathyroid glands were close in proximity and adherent to the tubercle of Zuckerkandl and the recurrent laryngeal nerve but were able  to be preserved in situ on a viable vascular pedicle with tedious dissection.  Several small clips were used for hemostasis along the parathyroid glands.  The thyroid was dissected off the trachea using the Ligasure and bipolar cautery.  Vagus and recurrent laryngeal nerve function were confirmed with the NIM system.  The specimen was submitted to pathology for permanent evaluation.  A suture was placed for orientation purposes, stitch marks the right superior pole.     The wound was irrigated and inspected carefully.  Multiple Valsalva maneuvers were performed at 30-35 cm of water and additional hemostasis was achieved as necessary with focal application of bipolar cautery. This was augmented with Fibrillar which was placed in the thyroid fossa.  The right superior and inferior and left inferior parathyroid glands were inspected and confirmed to appear viable.  Function of the bilateral recurrent laryngeal and vagus nerves were again verified using the NIM system prior to closure.  The strap muscles were closed with interrupted 3-0 Vicryl suture.  The platysma was closed with interrupted 3-0 Vicryl suture, 0.25% Marcaine was injected into the subcutaneous tissue of the incision for post-op analgesia and the skin incision was closed with a 6-0 Vicryl subcuticular knot-less closure.  Sterile skin glue was applied to the incision.    Instrument, sponge and needle counts were reported correct prior to closure and at the conclusion of the case.  Procedures and specimens were confirmed with the circulating nurse at the completion of the case.  The family was updated at the completion of the case.    Complications: No    Estimated Blood Loss (EBL):  50 mL           Drains: None    Specimens:   Specimen (24h ago, onward)       Start     Ordered    10/28/22 1127  Specimen to Pathology, Surgery General Surgery  Once        Comments: Pre-op Diagnosis: Graves disease [E05.00]Thyroid nodule [E04.1]Procedure(s):THYROIDECTOMY,  total Number of specimens: 1Name of specimens: 1. Total Thyroid with tumor stitch marked Right Superior Pole - permanent     References:    Click here for ordering Quick Tip   Question Answer Comment   Procedure Type: General Surgery    Specimen Class: Known or suspected malignancy    Which provider would you like to cc? ALICE PALOMINO    Release to patient Immediate        10/28/22 7560                            Condition: stable    Disposition: PACU - hemodynamically stable.    Attestation: I was present and scrubbed for the entire procedure.

## 2022-10-28 NOTE — H&P
Endocrine Surgery History & Physical      REFERRING PROVIDER: Alexia Foley MD     REASON FOR VISIT: Graves hyperthyroidism and left thyroid nodule, suspicious for follicular neoplasm     HPI: Isaura Mancini is a 52 y.o. female patient with a history notable for HTN, chronic back pain, and hyperthyroidism who presents in consultation for management of a follicular neoplasm.      She was found to have a thyroid nodule 6/2022.  Biochemical testing of thyroid function including a TSH documented hyperthyroidism.  Her symptoms included palpitations, unintentional weight loss with 30 lbs since January.  Labs including TSI and TRAb were elevated for a diagnosis consistent with Grave's disease. She has since been started on methimazole which has improved her symptoms.      She had a thyroid ultrasound 6/2022 which revealed two nodules meeting criteria for FNA. There was one on the left with ill-defined peripheral hypoechoic, centrally isoechoic nodule measuring approximately 1.8 x 1.2 x 1.3 cm and two one the right with one ill-defined hypoechoic nodule containing punctate echogenic focus measuring 1.0 x 0.5 x 0.9 cm another hypoechoic subcentimeter nodule with ill-defined margin measuring up to 0.5 cm (not meeting FNA). 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      The patient admits to hyperthyroid as mentioned above. The patient admits to heat / cold intolerance, palpitations, and weight changes.  She denies dysphagia, globus sensation, compression symptoms, or anterior neck pain.  The patient has no hoarseness, voice changes or increased need to clear the throat.  The patient has not had prior neck surgery. The patient has no history of radiation exposure or goitrogens.  The patient has not recently been on lithium, biotin, amiodarone or received iodine contrast. There no a  significant history of thyroid cancer, thyroid disease or familial endocrinopathies.  Calcium levels have been normal and she denies symptoms of hypercalcemia, therefore is low risk for concurrent parathyroid disease.        LABORATORY STUDIES:  I personally and independently reviewed relevant lab test results, including the following:           TSH   Date Value Ref Range Status   2022 2.987 0.400 - 4.000 uIU/mL Final   2022 <0.010 (L) 0.400 - 4.000 uIU/mL Final   2022 <0.010 (L) 0.400 - 4.000 uIU/mL Final            Free T4   Date Value Ref Range Status   2022 0.85 0.71 - 1.51 ng/dL Final   2022 1.71 (H) 0.71 - 1.51 ng/dL Final   2022 2.05 (H) 0.71 - 1.51 ng/dL Final            Calcium   Date Value Ref Range Status   07/15/2022 9.9 8.7 - 10.5 mg/dL Final   2022 9.8 8.7 - 10.5 mg/dL Final   2021 9.9 8.7 - 10.5 mg/dL Final            Albumin   Date Value Ref Range Status   07/15/2022 3.5 3.5 - 5.2 g/dL Final   2022 3.8 3.5 - 5.2 g/dL Final   2021 3.7 3.5 - 5.2 g/dL Final         PAST MEDICAL HISTORY:      Patient Active Problem List   Diagnosis    Essential hypertension    Thrombocytosis    Snoring    Obesity (BMI 30-39.9)    Insomnia    Chronic bilateral low back pain with left-sided sciatica    Hypokalemia    Class 2 obesity with body mass index (BMI) of 37.0 to 37.9 in adult    Lumbar facet arthropathy    Spondylolisthesis at L4-L5 level    Sacroiliitis    Muscle strain of right forearm    Palpitations    Headache    Hematuria    Constipation    Urinary tract infection without hematuria    Chest pain    Gastroesophageal reflux disease    Family history of premature CAD    Degenerative disc disease, lumbar    Uterine leiomyoma    Abnormal uterine bleeding (AUB)    Hyperthyroidism    Left thyroid nodule         PAST SURGICAL HISTORY:        Past Surgical History:   Procedure Laterality Date     SECTION        TUBAL LIGATION              MEDICATIONS:  Current Medications          Current Outpatient Medications   Medication Sig Dispense Refill    aspirin (ECOTRIN) 81 MG EC tablet Take 81 mg by mouth once daily.        hydroCHLOROthiazide (HYDRODIURIL) 25 MG tablet Take 1 tablet (25 mg total) by mouth once daily. 90 tablet 1    ibuprofen (ADVIL,MOTRIN) 800 MG tablet Take 1 tablet (800 mg total) by mouth every 8 (eight) hours as needed for Pain. 60 tablet 2    losartan (COZAAR) 25 MG tablet Take 1 tablet (25 mg total) by mouth once daily. 90 tablet 1    methIMAzole (TAPAZOLE) 10 MG Tab Take 1 tablet (10 mg total) by mouth 2 (two) times daily. 60 tablet 9    calcitRIOL (ROCALTROL) 0.25 MCG Cap Take 1 capsule (0.25 mcg total) by mouth 2 (two) times daily. Start taking 3 days prior to your thyroid surgery. 60 capsule 0    gabapentin (NEURONTIN) 100 MG capsule Take 1 capsule (100 mg total) by mouth 3 (three) times daily as needed (back and leg pain). (Patient not taking: Reported on 9/22/2022) 270 capsule 3    potassium iodide (SSKI) 1 gram/mL solution Take 0.2 mLs (4 drops total) by mouth 3 (three) times daily. for 7 days 5 mL 0      No current facility-administered medications for this visit.            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   Tobacco Use    Smoking status: Never    Smokeless tobacco: Never   Substance and Sexual Activity    Alcohol use: No    Drug use: No    Sexual activity: Yes       Partners: Male       Birth control/protection: Surgical       Comment: BTL      Social Determinants of Health          Financial Resource Strain: Medium Risk    Difficulty of Paying Living Expenses: Somewhat hard   Food Insecurity: Food Insecurity Present    Worried About Running Out of Food in the  "Last Year: Sometimes true    Ran Out of Food in the Last Year: Sometimes true   Transportation Needs: No Transportation Needs    Lack of Transportation (Medical): No    Lack of Transportation (Non-Medical): No   Physical Activity: Inactive    Days of Exercise per Week: 0 days    Minutes of Exercise per Session: 0 min   Stress: Stress Concern Present    Feeling of Stress : To some extent   Social Connections: Unknown    Frequency of Communication with Friends and Family: More than three times a week    Frequency of Social Gatherings with Friends and Family: Twice a week    Active Member of Clubs or Organizations: No    Attends Club or Organization Meetings: Never    Marital Status:    Housing Stability: High Risk    Unable to Pay for Housing in the Last Year: Yes    Number of Places Lived in the Last Year: 1    Unstable Housing in the Last Year: No             FAMILY HISTORY:        Family History   Problem Relation Age of Onset    Heart disease Father      Hypertension Father      Breast cancer Paternal Aunt 60    Diabetes Neg Hx      Colon cancer Neg Hx      Ovarian cancer Neg Hx            REVIEW OF SYSTEMS:  A detailed review of systems was reviewed with the patient, pertinent positives and negatives are presented in the note and is otherwise negative.     PHYSICAL EXAMINATION:  Vital Signs: /75 (BP Location: Left arm, Patient Position: Sitting, BP Method: Medium (Automatic))   Pulse 62   Resp 18   Ht 5' 2" (1.575 m)   Wt 81.4 kg (179 lb 7.3 oz)   LMP 09/18/2022 (Exact Date)   SpO2 99%   BMI 32.82 kg/m²      Constitutional: no acute distress, comfortable, well appearing  HENT: no lid lag, no exophthalmos, no scleral icterus, moist mucous membranes  Neck: supple, trachea in midline, thyroid is not enlarged and moves well with swallowing, no palpable nodules, normal neck extension  Heme/Lymph: no cervical or supraclavicular lymphadenopathy  Respiratory: clear to ascultation bilaterally, no " wheezes or stridor  Cardiovascular: regular rate and rhythm  Extremities: no edema  Skin: warm and dry, no rashes  Neurologic: no resting tremor of outstretched hands, voice normal  Vascular: radial pulses palpable bilaterally  Psychiatric: affect normal     IMAGING STUDIES:  I personally and independently reviewed, visualized and interpreted the images of the below listed radiology studies (including ultrasounds) and my findings are notable for multiple nodules, dominant left thyroid nodule measuring 1.8 cm and a second 1.0 cm right thyroid nodule.  Reports below for reference.     US Soft Tissue Head Neck Thyroid 06/20/2022  FINDINGS:  Right thyroid lobe measures 4.9 x 1.2 x 2.0 cm.  Measured nodule as follows: (1) ill-defined hypoechoic nodule containing punctate echogenic focus measuring 1.0 x 0.5 x 0.9 cm; (2) hypoechoic subcentimeter nodule with ill-defined margin measuring up to 0.5 cm.     Left thyroid lobe measures 5.1 x 1.5 x 1.5 cm.  Measured nodule as follows: (3) ill-defined peripheral hypoechoic, centrally isoechoic nodule measuring approximately 1.8 x 1.2 x 1.3 cm; (4) hypoechoic subcentimeter nodule with ill-defined margin measuring up to 0.5 cm.     Isthmus measures 0.2 cm.     No evidence for cervical adenopathy.     Impression  Multinodular thyroid.  One nodule in each lobe which meets criteria for FNA (above labeled nodule 1 and 3).     This report was flagged in Epic as abnormal.        Electronically signed by:     Darin Clemons  Date:                                                06/20/2022  Time:                                               17:49        CYTOLOGY:          Final Pathologic Diagnosis   Date Value Ref Range Status   08/24/2022     Final     FINE NEEDLE ASPIRATION CYTOLOGY SPECIMEN OF RIGHT THYROID:  THE [place] SYSTEM FOR REPORTING THYROID CYTOPATHOLOGY  II  BENIGN  Innocuous follicular clusters.  Colloid present.          Comment:       Interp By Christian Saab M.D.,  Signed on 08/25/2022 at 15:43   08/24/2022 (A)   Final     Thyroid, left lobe, fine needle aspiration:  [place] [facility] Thyroid Cytology Category: Suspicious for Follicular  Neoplasm. Hurthle cell type  See comment.          Comment:       Interp By Jackson Hendrix M.D., Signed on 09/01/2022 at 13:45            IMPRESSION:  I had the pleasure of seeing Ms. 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  I have discussed with Ms. Mancini at length regarding the current surgical and non-surgical treatment options.  Based on the current clinical findings and patient's preference she will necessitate a total thyroidectomy.      I have discussed in detail the meaning of this thyroid disease process.  I have discussed in detail the possible complications associated with thyroid surgery, which may include (but not limited to) hoarseness, recurrent laryngeal nerve injury, superior laryngeal nerve injury - temporary or permanent, hypocalcemia and hypoparathyroidism - temporary or permanent, neck hematoma, infection, scarring, death and imponderables.  I discussed the potential need for a staged completion thyroidectomy in the event of unexpected intraoperative findings or recurrent laryngeal nerve dysfunction and thus the initial surgery would be limited to a thyroid lobectomy.  She understood that thyroid hormone replacement will be necessary lifelong. Thyroid function will need to be monitored.     The patient expressed understanding of all the risks at hand, agreed with this plan and informed consent was signed and witnessed.         Problem List Items Addressed This Visit                  Endocrine     Hyperthyroidism       Graves hyperthyroidism, positive TSI/TRAb.  Recommend total thyroidectomy for definitive surgical treatment, see below.  Higher risk for postoperative hypocalcemia and hypoparathyroidism  due to hyperthyroid state.  Will plan for preoperative optimization with SSKI and calcitriol.             Relevant Orders     Vitamin D     Left thyroid nodule - Primary       Left thyroid nodule with FNA suspicious for follicular neoplasm, if only considering the mutlinodular goiter would recommend a diagnostic left thyroid lobectomy or total thyroidectomy given the presence of contralateral nodules.  However, due to the underlying Graves disease I recommend upfront total thyroidectomy as she would likely have persistent Grave's disease if only a lobectomy were performed.  Options were presented to the patient with associated risks, benefits and alternatives, she el;ects to proceed with total thyroidectomy.     - OR for total thyroidectomy  - Consent obtained in clinic. Surgery will be scheduled in coordination with the patient's schedule  - She will need SSKI x7 days pre-op and calcitriol x3 days pre-op  - Will also obtain Vitamin D level to help evaluate risk of post operative hypocalcemia            Other Visit Diagnoses         Abnormal cytology         Thyroid nodule                   Isaura Hayes MD  Endocrine Surgery

## 2022-10-28 NOTE — ANESTHESIA POSTPROCEDURE EVALUATION
Anesthesia Post Evaluation    Patient: Isaura Mancini    Procedure(s) Performed: Procedure(s) (LRB):  THYROIDECTOMY, total (N/A)    Final Anesthesia Type: general      Patient location during evaluation: PACU  Patient participation: Yes- Able to Participate  Level of consciousness: awake and alert  Post-procedure vital signs: reviewed and stable  Pain management: adequate  Airway patency: patent  STAS mitigation strategies: Multimodal analgesia  PONV status at discharge: No PONV  Anesthetic complications: no      Cardiovascular status: blood pressure returned to baseline and hemodynamically stable  Respiratory status: unassisted  Hydration status: euvolemic  Follow-up not needed.          Vitals Value Taken Time   /65 10/28/22 1208   Temp 36.4 °C (97.5 °F) 10/28/22 1205   Pulse 78 10/28/22 1213   Resp 18 10/28/22 1213   SpO2 100 % 10/28/22 1213   Vitals shown include unvalidated device data.      No case tracking events are documented in the log.      Pain/Siddhartha Score: Siddhartha Score: 5 (10/28/2022 12:05 PM)

## 2022-10-28 NOTE — ANESTHESIA RELEASE NOTE
"Anesthesia Release from PACU Note    Patient: Isaura Mancini    Procedure(s) Performed: Procedure(s) (LRB):  THYROIDECTOMY, total (N/A)    Anesthesia type: general    Post pain: Adequate analgesia    Post assessment: no apparent anesthetic complications    Last Vitals:   Visit Vitals  /65 (BP Location: Right arm, Patient Position: Lying)   Pulse 75   Temp 36.4 °C (97.5 °F) (Temporal)   Resp 15   Ht 5' 2" (1.575 m)   Wt 78.8 kg (173 lb 11.6 oz)   SpO2 100%   Breastfeeding No   BMI 31.77 kg/m²       Post vital signs: stable    Level of consciousness: awake    Nausea/Vomiting: no nausea/no vomiting    Complications: none    Airway Patency: patent    Respiratory: unassisted    Cardiovascular: stable and blood pressure at baseline    Hydration: euvolemic  "

## 2022-10-28 NOTE — PLAN OF CARE
Pt arrived from PACU Aox4. VSS. No signs of respiratory distress on RA. S/P total thyroidectomy.  Incision close with DB. Pure wick in place. Pt remained injury free through shift. Will continue POC.

## 2022-10-28 NOTE — BRIEF OP NOTE
[facility] - Surgery ([facility])  Brief Operative Note    SUMMARY     Surgery Date: 10/28/2022     Surgeon(s) and Role:     * Alice Hayes MD - Primary     * Itzel Jolley MD - Resident - Assisting    Pre-op Diagnosis:  Graves disease [E05.00]  Thyroid nodule [E04.1]    Post-op Diagnosis:  Post-Op Diagnosis Codes:     * Graves disease [E05.00]     * Thyroid nodule [E04.1]    Procedure(s) (LRB):  THYROIDECTOMY, total (N/A)    Anesthesia: General    Procedures:   Total thyroidectomy  Intraoperative monitoring and interpretation of bilateral cranial nerves (vagus and recurrent laryngeal nerves) using the [employer] NIM system     Intraoperative Findings:   Palpable left thyroid nodule without gross invasion into surrounding structures.  Thyroid hypervascular with surrounding inflammation.   The bilateral recurrent laryngeal and vagus nerves were identified.  Function was verified using the NIM nerve monitoring system.  Parathyroid tissue was identified and preserved with a viable blood supply (right superior, right inferior and left inferior identified and preserved).    Estimated Blood Loss: 50 ml    Estimated Blood Loss has been documented.         Specimens:   Specimen (24h ago, onward)       Start     Ordered    10/28/22 1127  Specimen to Pathology, Surgery General Surgery  Once        Comments: Pre-op Diagnosis: Graves disease [E05.00]Thyroid nodule [E04.1]Procedure(s):THYROIDECTOMY, total Number of specimens: 1Name of specimens: 1. Total Thyroid with tumor stitch marked Right Superior Pole - permanent     References:    Click here for ordering Quick Tip   Question Answer Comment   Procedure Type: General Surgery    Specimen Class: Known or suspected malignancy    Which provider would you like to cc? ALICE HAYES    Release to patient Immediate        10/28/22 1139                    [id number]    Itzel Jolley MD  General Surgery PGY4

## 2022-10-29 VITALS
WEIGHT: 173.75 LBS | SYSTOLIC BLOOD PRESSURE: 105 MMHG | BODY MASS INDEX: 31.97 KG/M2 | TEMPERATURE: 98 F | HEART RATE: 64 BPM | RESPIRATION RATE: 20 BRPM | HEIGHT: 62 IN | OXYGEN SATURATION: 91 % | DIASTOLIC BLOOD PRESSURE: 62 MMHG

## 2022-10-29 LAB — CALCIUM SERPL-MCNC: 9.6 MG/DL (ref 8.7–10.5)

## 2022-10-29 PROCEDURE — 25000003 PHARM REV CODE 250: Performed by: STUDENT IN AN ORGANIZED HEALTH CARE EDUCATION/TRAINING PROGRAM

## 2022-10-29 PROCEDURE — 94761 N-INVAS EAR/PLS OXIMETRY MLT: CPT

## 2022-10-29 PROCEDURE — 36415 COLL VENOUS BLD VENIPUNCTURE: CPT | Performed by: STUDENT IN AN ORGANIZED HEALTH CARE EDUCATION/TRAINING PROGRAM

## 2022-10-29 PROCEDURE — 82310 ASSAY OF CALCIUM: CPT | Performed by: STUDENT IN AN ORGANIZED HEALTH CARE EDUCATION/TRAINING PROGRAM

## 2022-10-29 RX ORDER — CALCIUM CARBONATE 400(1000)
2 TABLET,CHEWABLE ORAL 2 TIMES DAILY WITH MEALS
Refills: 0 | COMMUNITY
Start: 2022-10-29 | End: 2022-11-10 | Stop reason: ALTCHOICE

## 2022-10-29 RX ORDER — OXYCODONE HYDROCHLORIDE 5 MG/1
5 TABLET ORAL EVERY 4 HOURS PRN
Qty: 12 TABLET | Refills: 0 | Status: SHIPPED | OUTPATIENT
Start: 2022-10-29 | End: 2022-11-10 | Stop reason: ALTCHOICE

## 2022-10-29 RX ADMIN — CALCIUM CARBONATE (ANTACID) CHEW TAB 500 MG 2000 MG: 500 CHEW TAB at 07:10

## 2022-10-29 RX ADMIN — LEVOTHYROXINE SODIUM 125 MCG: 25 TABLET ORAL at 05:10

## 2022-10-29 RX ADMIN — ACETAMINOPHEN 1000 MG: 500 TABLET ORAL at 05:10

## 2022-10-29 NOTE — PROGRESS NOTES
[facility]  General Surgery  Progress Note    Subjective:     History of Present Illness:  No notes on file    Post-Op Info:  Procedure(s) (LRB):  THYROIDECTOMY, total (N/A)   1 Day Post-Op     Interval History: POD 1 total thyroidectomy. Doing well. Tolerated diet. Phonation adequate. Having some pain with coughing but otherwise no complaints.      Medications:  Continuous Infusions:  Scheduled Meds:   acetaminophen  1,000 mg Oral Q8H    calcium carbonate  2,000 mg Oral BID WM    levothyroxine  125 mcg Oral Before breakfast     PRN Meds:ondansetron, oxyCODONE, promethazine     Review of patient's allergies indicates:  No Known Allergies  Objective:     Vital Signs (Most Recent):  Temp: 97.5 °F (36.4 °C) (10/29/22 0435)  Pulse: (!) 49 (10/29/22 0435)  Resp: 18 (10/29/22 0435)  BP: 115/65 (10/29/22 0435)  SpO2: 98 % (10/29/22 0435)   Vital Signs (24h Range):  Temp:  [97.5 °F (36.4 °C)-98.7 °F (37.1 °C)] 97.5 °F (36.4 °C)  Pulse:  [49-78] 49  Resp:  [10-20] 18  SpO2:  [95 %-100 %] 98 %  BP: ()/(56-69) 115/65     Weight: 78.8 kg (173 lb 11.6 oz)  Body mass index is 31.77 kg/m².    Intake/Output - Last 3 Shifts         10/27 0700  10/28 0659 10/28 0700  10/29 0659 10/29 0700  10/30 0659    IV Piggyback  1000     Total Intake(mL/kg)  1000 (12.7)     Urine (mL/kg/hr)  800 (0.4)     Stool  0     Total Output  800     Net  +200            Urine Occurrence  2 x     Stool Occurrence  0 x             Physical Exam  Vitals and nursing note reviewed.   Constitutional:       General: She is not in acute distress.     Appearance: Normal appearance. She is not ill-appearing, toxic-appearing or diaphoretic.   Neck:      Comments: Cervical incision c/d/I with dermabond  Appropriately tender  Minimal swelling. No hematoma  Cardiovascular:      Rate and Rhythm: Normal rate and regular rhythm.   Pulmonary:      Effort: Pulmonary effort is normal. No respiratory distress.   Skin:     General: Skin is warm.      Capillary  Refill: Capillary refill takes less than 2 seconds.   Neurological:      Mental Status: She is alert and oriented to person, place, and time. Mental status is at baseline.       Significant Labs:  Recent Labs     10/28/22  1214 10/28/22  1429   CALCIUM  --  9.3   PTH 92.1*  --         Significant Diagnostics:  none    Assessment/Plan:     * Thyroid nodule  52 y.o. female s/p total thyroidectomy 10/28    Plan to discharge today. Waiting for calcium result this morning  Regular diet  Ice pack to surgical site continuously. 20 minutes on/20 minutes off  PRN pain control + scheduled tylenol  PRN anti emetics         Itzel Jolley MD  General Surgery  [facility]

## 2022-10-29 NOTE — ASSESSMENT & PLAN NOTE
52 y.o. female s/p total thyroidectomy 10/28    Plan to discharge today. Waiting for calcium result this morning  Regular diet  Ice pack to surgical site continuously. 20 minutes on/20 minutes off  PRN pain control + scheduled tylenol  PRN anti emetics

## 2022-10-29 NOTE — HOSPITAL COURSE
ORTIZ STATONICA W 52 y.o.female underwent: Procedure(s) (LRB):  THYROIDECTOMY, total (N/A). The patient tolerated the procedure well, was transferred to recovery post-op, and then transferred to the floor for continuation of medical care. The patient's clinical condition progressively improved. By the time of discharge, she was tolerating a diet without nausea or vomiting, pain was well controlled with oral medications, her phonation was adequate and her calcium levels were appropriate. On POD 1 the patient was discharged to home in good condition. On discharge, the patient's incisions were c/d/i and the surgical site was soft and appropriately tender to palpation. The patient will follow up with Dr. Hayes as previously instructed.

## 2022-10-29 NOTE — SUBJECTIVE & OBJECTIVE
Interval History: POD 1 total thyroidectomy. Doing well. Tolerated diet. Phonation adequate. Having some pain with coughing but otherwise no complaints.      Medications:  Continuous Infusions:  Scheduled Meds:   acetaminophen  1,000 mg Oral Q8H    calcium carbonate  2,000 mg Oral BID WM    levothyroxine  125 mcg Oral Before breakfast     PRN Meds:ondansetron, oxyCODONE, promethazine     Review of patient's allergies indicates:  No Known Allergies  Objective:     Vital Signs (Most Recent):  Temp: 97.5 °F (36.4 °C) (10/29/22 0435)  Pulse: (!) 49 (10/29/22 0435)  Resp: 18 (10/29/22 0435)  BP: 115/65 (10/29/22 0435)  SpO2: 98 % (10/29/22 0435)   Vital Signs (24h Range):  Temp:  [97.5 °F (36.4 °C)-98.7 °F (37.1 °C)] 97.5 °F (36.4 °C)  Pulse:  [49-78] 49  Resp:  [10-20] 18  SpO2:  [95 %-100 %] 98 %  BP: ()/(56-69) 115/65     Weight: 78.8 kg (173 lb 11.6 oz)  Body mass index is 31.77 kg/m².    Intake/Output - Last 3 Shifts         10/27 0700  10/28 0659 10/28 0700  10/29 0659 10/29 0700  10/30 0659    IV Piggyback  1000     Total Intake(mL/kg)  1000 (12.7)     Urine (mL/kg/hr)  800 (0.4)     Stool  0     Total Output  800     Net  +200            Urine Occurrence  2 x     Stool Occurrence  0 x             Physical Exam  Vitals and nursing note reviewed.   Constitutional:       General: She is not in acute distress.     Appearance: Normal appearance. She is not ill-appearing, toxic-appearing or diaphoretic.   Neck:      Comments: Cervical incision c/d/I with dermabond  Appropriately tender  Minimal swelling. No hematoma  Cardiovascular:      Rate and Rhythm: Normal rate and regular rhythm.   Pulmonary:      Effort: Pulmonary effort is normal. No respiratory distress.   Skin:     General: Skin is warm.      Capillary Refill: Capillary refill takes less than 2 seconds.   Neurological:      Mental Status: She is alert and oriented to person, place, and time. Mental status is at baseline.       Significant Labs:  Recent  Labs     10/28/22  1214 10/28/22  1429   CALCIUM  --  9.3   PTH 92.1*  --         Significant Diagnostics:  none

## 2022-10-29 NOTE — DISCHARGE INSTRUCTIONS
Post-Operative Instructions: Thyroidectomy    MEDICATIONS  You are being discharged home on the following medications:  Thyroid hormone  Levothyroxine (Synthroid): 125 mcg, once daily  Take first thing in the morning, on an empty stomach.  Wait 30-60 minutes before eating or taking any other medications.    Calcium Supplementation  Calcium Carbonate (Tums Ultra): 2 tablets, two times a day with meals  One Tums Ultra tablet has 1000 mg calcium carbonate which is equal to 400 mg elemental calcium.  *If you notice any numbness or tingling of the face, hands, or feet, take 2 extra tablets of Tums.  If symptoms do not subside within a half-hour, please call your surgeon's office.  Do NOT take your Tums the morning before your labs are drawn.     Pain medication  Chloraseptic lozenges or spray: Use as needed for sore throat  Please take Tylenol for mild pain.  You can take up to 1000mg every 6-8 hours.  Do not exceed 4000mg in 24 hours  You can take the narcotic pain (oxycodone) medication for more severe pain.    You may resume taking your normal medications, with the EXCEPTION of the following:  Please check with your surgeon and internist/cardiologist for specific instructions about when you should re-start:  Apixaban (Eliquis), Clopidogrel (Plavix), Dabigatran (Pradaxa), Dipyridamole (Aggrenox), Rivaroxaban (Xarelto), Warfarin (Coumadin), or any other type of blood thinner you may be taking.  Aspirin & anti-inflammatories (i.e. Goody's, Excedrin, ibuprofen, Advil, Aleve): May begin 72 hours after surgery.  Vitamins, minerals, and herbal supplements: Please wait 1 week after surgery to restart these medications  Stop taking methimazole.    WOUND CARE  Please use your ice pack for 30 minutes on / 30 minutes off for at least the first 7 days.  You will be discharged from the hospital with your incision covered by skin glue that will remain on until your postoperative appointment.  It is normal to develop a lump under the  incision, this is expected and is related to the healing process.  The lump will gradually go away with time.  You may shower when you return home after the surgery. No bathing or swimming (do not submerge in water) until cleared by your surgeon.  Please notify your physician if your incision is red, warm/hot, if you have an increase in swelling, any new drainage, or if you have a fever >101.5 F.  Please call 911 or proceed to the Emergency Room if you have difficulty breathing.    ACTIVITIES  You may resume normal activities, depending on your energy level.  Please refrain from driving for at least 3 days, or until you have complete mobility of your neck. Do not drive if you are taking narcotic pain medication.  Please refrain from heavy lifting (10 lbs.) for 10 days.    If you have any questions or concerns, please call the surgeon's office at [phone].      Future Appointments   Date Time Provider Department Center   11/9/2022  9:00 AM LAB, [facility] LAB [place]   11/10/2022 11:00 AM Isaura Hayes MD [facility] END [facility]   1/18/2023  8:15 AM LAB, [facility] [place]   1/24/2023  1:40 PM Delisa Shah MD [facility] IM [place]      Do NOT take your calcium supplements in the morning before your lab appointment.

## 2022-10-29 NOTE — PLAN OF CARE
Pt Aox4. VSS. No signs of respiratory distress on RA. All IV access removed. Meds delivered to BS.Pt received and reviewed discharge instructions. Pt remained injury free through shift. Pt was taken downstairs via wheelchair. Pt discharge to home via private vehicle.

## 2022-10-29 NOTE — DISCHARGE SUMMARY
[facility]  General Surgery  Discharge Summary      Patient Name: Isaura Mancini  MRN: [id number]  Admission Date: 10/28/2022  Hospital Length of Stay: 1 days  Discharge Date and Time: No discharge date for patient encounter.  Attending Physician: Isaura Hayes MD   Discharging Provider: Itzel Jolley MD  Primary Care Provider: Delisa Shah MD    HPI:   No notes on file    Procedure(s) (LRB):  THYROIDECTOMY, total (N/A)      Indwelling Lines/Drains at time of discharge:   Lines/Drains/Airways     None               Hospital Course: ISAURA MANCINI 52 y.o.female underwent: Procedure(s) (LRB):  THYROIDECTOMY, total (N/A). The patient tolerated the procedure well, was transferred to recovery post-op, and then transferred to the floor for continuation of medical care. The patient's clinical condition progressively improved. By the time of discharge, she was tolerating a diet without nausea or vomiting, pain was well controlled with oral medications, her phonation was adequate and her calcium levels were appropriate. On POD 1 the patient was discharged to home in good condition. On discharge, the patient's incisions were c/d/i and the surgical site was soft and appropriately tender to palpation. The patient will follow up with Dr. Hayes as previously instructed.      Goals of Care Treatment Preferences:  Code Status: Full Code      Consults:     Significant Diagnostic Studies: see HPI and hospital course    Pending Diagnostic Studies:     Procedure Component Value Units Date/Time    Specimen to Pathology, Surgery General Surgery [[id number]] Collected: 10/28/22 1140    Order Status: Sent Lab Status: In process Updated: 10/28/22 1652    Specimen: Tissue         Final Active Diagnoses:    Diagnosis Date Noted POA    PRINCIPAL PROBLEM:  Thyroid nodule [E04.1] 09/22/2022 Yes    Graves disease [E05.00] 10/28/2022 Yes      Problems Resolved During this Admission:      Discharged Condition:  good    Disposition: Home or Self Care    Follow Up:    Patient Instructions:      Notify your health care provider if you experience any of the following:  increased confusion or weakness     Notify your health care provider if you experience any of the following:  persistent dizziness, light-headedness, or visual disturbances     Notify your health care provider if you experience any of the following:  worsening rash     Notify your health care provider if you experience any of the following:  severe persistent headache     Notify your health care provider if you experience any of the following:  difficulty breathing or increased cough     Notify your health care provider if you experience any of the following:  redness, tenderness, or signs of infection (pain, swelling, redness, odor or green/yellow discharge around incision site)     Notify your health care provider if you experience any of the following:  severe uncontrolled pain     Notify your health care provider if you experience any of the following:  persistent nausea and vomiting or diarrhea     Notify your health care provider if you experience any of the following:  temperature >100.4     Medications:  Reconciled Home Medications:      Medication List      START taking these medications    calcium carbonate 400 mg calcium (1,000 mg) Chew  Take 2 tablets (800 mg total) by mouth 2 (two) times daily with meals.     oxyCODONE 5 MG immediate release tablet  Commonly known as: ROXICODONE  Take 1 tablet (5 mg total) by mouth every 4 (four) hours as needed for Pain.        CONTINUE taking these medications    aspirin 81 MG EC tablet  Commonly known as: ECOTRIN  Take 81 mg by mouth once daily.     gabapentin 100 MG capsule  Commonly known as: NEURONTIN  Take 1 capsule (100 mg total) by mouth 3 (three) times daily as needed (back and leg pain).     hydroCHLOROthiazide 25 MG tablet  Commonly known as: HYDRODIURIL  Take 1 tablet (25 mg total) by mouth once  daily.     ibuprofen 800 MG tablet  Commonly known as: ADVIL,MOTRIN  Take 1 tablet (800 mg total) by mouth every 8 (eight) hours as needed for Pain.     losartan 25 MG tablet  Commonly known as: COZAAR  Take 1 tablet (25 mg total) by mouth once daily.        STOP taking these medications    calcitRIOL 0.25 MCG Cap  Commonly known as: ROCALTROL     methIMAzole 10 MG Tab  Commonly known as: TAPAZOLE          Time spent on the discharge of patient: 10 minutes    Itzel Jolley MD  General Surgery  [facility]

## 2022-10-30 ENCOUNTER — PATIENT MESSAGE (OUTPATIENT)
Dept: SURGERY | Facility: CLINIC | Age: 52
End: 2022-10-30
Payer: COMMERCIAL

## 2022-10-31 ENCOUNTER — PATIENT MESSAGE (OUTPATIENT)
Dept: SURGERY | Facility: CLINIC | Age: 52
End: 2022-10-31
Payer: COMMERCIAL

## 2022-10-31 RX ORDER — LEVOTHYROXINE SODIUM 125 UG/1
125 TABLET ORAL
Qty: 30 TABLET | Refills: 11 | Status: SHIPPED | OUTPATIENT
Start: 2022-10-31 | End: 2022-12-31 | Stop reason: SDUPTHER

## 2022-10-31 RX ORDER — LEVOTHYROXINE SODIUM 125 UG/1
125 TABLET ORAL
Qty: 30 TABLET | Refills: 11 | Status: CANCELLED | OUTPATIENT
Start: 2022-10-31 | End: 2023-10-31

## 2022-11-09 ENCOUNTER — LAB VISIT (OUTPATIENT)
Dept: LAB | Facility: HOSPITAL | Age: 52
End: 2022-11-09
Attending: STUDENT IN AN ORGANIZED HEALTH CARE EDUCATION/TRAINING PROGRAM
Payer: COMMERCIAL

## 2022-11-09 DIAGNOSIS — E05.00 GRAVES DISEASE: ICD-10-CM

## 2022-11-09 DIAGNOSIS — E04.1 THYROID NODULE: ICD-10-CM

## 2022-11-09 LAB
ALBUMIN SERPL BCP-MCNC: 3.6 G/DL (ref 3.5–5.2)
ANION GAP SERPL CALC-SCNC: 10 MMOL/L (ref 8–16)
BUN SERPL-MCNC: 17 MG/DL (ref 6–20)
CALCIUM SERPL-MCNC: 9.4 MG/DL (ref 8.7–10.5)
CHLORIDE SERPL-SCNC: 104 MMOL/L (ref 95–110)
CO2 SERPL-SCNC: 23 MMOL/L (ref 23–29)
CREAT SERPL-MCNC: 0.8 MG/DL (ref 0.5–1.4)
EST. GFR  (NO RACE VARIABLE): >60 ML/MIN/1.73 M^2
GLUCOSE SERPL-MCNC: 88 MG/DL (ref 70–110)
PHOSPHATE SERPL-MCNC: 2.3 MG/DL (ref 2.7–4.5)
POTASSIUM SERPL-SCNC: 3.4 MMOL/L (ref 3.5–5.1)
SODIUM SERPL-SCNC: 137 MMOL/L (ref 136–145)

## 2022-11-09 PROCEDURE — 80069 RENAL FUNCTION PANEL: CPT | Performed by: STUDENT IN AN ORGANIZED HEALTH CARE EDUCATION/TRAINING PROGRAM

## 2022-11-09 PROCEDURE — 36415 COLL VENOUS BLD VENIPUNCTURE: CPT | Mod: PO | Performed by: STUDENT IN AN ORGANIZED HEALTH CARE EDUCATION/TRAINING PROGRAM

## 2022-11-09 PROCEDURE — 83970 ASSAY OF PARATHORMONE: CPT | Performed by: STUDENT IN AN ORGANIZED HEALTH CARE EDUCATION/TRAINING PROGRAM

## 2022-11-09 NOTE — PROGRESS NOTES
Postoperative Endocrine Surgery Clinic Note    Reason for visit / Chief complaint: Postoperative evaluation  Procedure:  THYROIDECTOMY, total  Procedure Date: 10/28/2022      Subjective:     Isaura Mancini returns today for postoperative evaluation, she is approximately 2 weeks post op.    Procedures:   Total thyroidectomy  Intraoperative monitoring and interpretation of bilateral cranial nerves (vagus and recurrent laryngeal nerves) using the [employer] NIM system     Intraoperative Findings:   Palpable left thyroid nodule without gross invasion into surrounding structures.  Thyroid hypervascular with surrounding inflammation.   The bilateral recurrent laryngeal and vagus nerves were identified.  Function was verified using the NIM nerve monitoring system.  Parathyroid tissue was identified and preserved with a viable blood supply (right superior, right inferior and left inferior identified and preserved).    She has had an uncomplicated postoperative course.  She denies signs or symptoms of hypocalcemia.  She is currently taking Tums, but did not take any prior to the lab draw.  Her phonation is at baseline.  She denies hyperthyroid or hypothyroid symptoms.  She is currently on 125 mcg levothyroxine and reports taking it in the morning on an empty stomach and 30-60 minutes before breakfast or other medications.  She will start taking Vitamin D now, but has not started taking it yet.      Current Outpatient Medications   Medication Sig Dispense Refill    aspirin (ECOTRIN) 81 MG EC tablet Take 81 mg by mouth once daily.      hydroCHLOROthiazide (HYDRODIURIL) 25 MG tablet Take 1 tablet (25 mg total) by mouth once daily. 90 tablet 1    levothyroxine (SYNTHROID) 125 MCG tablet Take 1 tablet (125 mcg total) by mouth before breakfast. 30 tablet 11    losartan (COZAAR) 25 MG tablet Take 1 tablet (25 mg total) by mouth once daily. 90 tablet 1    cholecalciferol, vitamin D3, (VITAMIN D3) 25 mcg (1,000 unit) capsule Take 2  "capsules (2,000 Units total) by mouth once daily.  0    gabapentin (NEURONTIN) 100 MG capsule Take 1 capsule (100 mg total) by mouth 3 (three) times daily as needed (back and leg pain). (Patient not taking: Reported on 11/10/2022) 270 capsule 3    ibuprofen (ADVIL,MOTRIN) 800 MG tablet Take 1 tablet (800 mg total) by mouth every 8 (eight) hours as needed for Pain. (Patient not taking: Reported on 11/10/2022) 60 tablet 2     No current facility-administered medications for this visit.     Review of patient's allergies indicates:  No Known Allergies     Review of Systems  Negative except as per HPI.     Objective:   /82 (BP Location: Right arm, Patient Position: Sitting, BP Method: Medium (Automatic))   Pulse 61   Resp 18   Ht 5' 2" (1.575 m)   Wt 78.1 kg (172 lb 4.6 oz)   BMI 31.51 kg/m²     General: alert, well appearing, and in no distress  Neck: neck is flat, no erythema or edema  Incision: well approximated, healing well  Neurological: phonation is normal    Labs:  Lab Results   Component Value Date    CALCIUM 9.4 11/09/2022    ALBUMIN 3.6 11/09/2022    PTH 38.4 11/09/2022       Pathology  Final Pathologic Diagnosis   Date Value Ref Range Status   10/28/2022   Final    [facility] DIAGNOSIS:  THYROID, TOTAL THYROIDECTOMY:  -Hyperplastic/adenomatous nodule with Hurthle cell change in a background of  chronic thyroiditis.  -Two(2) benign lymph nodes, negative for tumor.  -Negative for malignancy.  Susan Owen M.D.  Report attached.  Performing site:  [place] [address]  "Disclaimer:  This case diagnosis was rendered completely by the outside  consultation pathologist and the case is electronically signed by an [facility]  pathologist listed below solely to release the report into the medical  record."       Comment:     Interp By Nelly Klein M.D., Signed on 11/03/2022 at 13:11       Assessment and Plan:     S/p THYROIDECTOMY, total on 10/28/2022.  Doing well " postoperatively.     Problem List Items Addressed This Visit          Endocrine    Thyroid nodule    Current Assessment & Plan     Final pathology benign hyperplastic/adenomatous nodule with Hurthle cell changes.         Graves disease - Primary    Overview     Left thyroid nodule with FNA suspicious for follicular neoplasm with underlying Graves hyperthyroidism s/p total thyroidectomy 10/28/22. Benign final pathology.           Current Assessment & Plan     - Levothyroxine 125 mcg daily, reviewed instructions to take in the morning on an empty stomach  - Discontinue postoperative calcium supplementation  - Recommended taking daily calcium via dietary sources or supplementation equal to about 1200 mg daily and vitamin D3 supplementation,equal to 2000 IU a day  - Incision care discussed, scar massage and routine scar care information provided  - Reviewed pathology  - Discontinue narcotics  - Obtain thyroid function tests in approximately 4-6 weeks  - Encouraged dietary intake of phosphorus and potassium containing foods; will update PCP for future lab checks          Orders Placed This Encounter   Procedures    TSH     Standing Status:   Future     Standing Expiration Date:   1/9/2024    T4, Free     Standing Status:   Future     Standing Expiration Date:   1/9/2024       Isaura Hayes MD  Endocrine Surgery  11/10/22

## 2022-11-10 ENCOUNTER — OFFICE VISIT (OUTPATIENT)
Dept: SURGERY | Facility: CLINIC | Age: 52
End: 2022-11-10
Payer: COMMERCIAL

## 2022-11-10 ENCOUNTER — PATIENT MESSAGE (OUTPATIENT)
Dept: SURGERY | Facility: CLINIC | Age: 52
End: 2022-11-10
Payer: COMMERCIAL

## 2022-11-10 VITALS
SYSTOLIC BLOOD PRESSURE: 137 MMHG | HEART RATE: 61 BPM | BODY MASS INDEX: 31.71 KG/M2 | HEIGHT: 62 IN | RESPIRATION RATE: 18 BRPM | WEIGHT: 172.31 LBS | DIASTOLIC BLOOD PRESSURE: 82 MMHG

## 2022-11-10 DIAGNOSIS — E04.1 THYROID NODULE: ICD-10-CM

## 2022-11-10 DIAGNOSIS — E05.00 GRAVES DISEASE: Primary | ICD-10-CM

## 2022-11-10 LAB — PTH-INTACT SERPL-MCNC: 38.4 PG/ML (ref 9–77)

## 2022-11-10 PROCEDURE — 99024 POSTOP FOLLOW-UP VISIT: CPT | Mod: S$GLB,,, | Performed by: STUDENT IN AN ORGANIZED HEALTH CARE EDUCATION/TRAINING PROGRAM

## 2022-11-10 PROCEDURE — 3079F PR MOST RECENT DIASTOLIC BLOOD PRESSURE 80-89 MM HG: ICD-10-PCS | Mod: CPTII,S$GLB,, | Performed by: STUDENT IN AN ORGANIZED HEALTH CARE EDUCATION/TRAINING PROGRAM

## 2022-11-10 PROCEDURE — 3044F HG A1C LEVEL LT 7.0%: CPT | Mod: CPTII,S$GLB,, | Performed by: STUDENT IN AN ORGANIZED HEALTH CARE EDUCATION/TRAINING PROGRAM

## 2022-11-10 PROCEDURE — 4010F PR ACE/ARB THEARPY RXD/TAKEN: ICD-10-PCS | Mod: CPTII,S$GLB,, | Performed by: STUDENT IN AN ORGANIZED HEALTH CARE EDUCATION/TRAINING PROGRAM

## 2022-11-10 PROCEDURE — 99024 PR POST-OP FOLLOW-UP VISIT: ICD-10-PCS | Mod: S$GLB,,, | Performed by: STUDENT IN AN ORGANIZED HEALTH CARE EDUCATION/TRAINING PROGRAM

## 2022-11-10 PROCEDURE — 4010F ACE/ARB THERAPY RXD/TAKEN: CPT | Mod: CPTII,S$GLB,, | Performed by: STUDENT IN AN ORGANIZED HEALTH CARE EDUCATION/TRAINING PROGRAM

## 2022-11-10 PROCEDURE — 3075F SYST BP GE 130 - 139MM HG: CPT | Mod: CPTII,S$GLB,, | Performed by: STUDENT IN AN ORGANIZED HEALTH CARE EDUCATION/TRAINING PROGRAM

## 2022-11-10 PROCEDURE — 3044F PR MOST RECENT HEMOGLOBIN A1C LEVEL <7.0%: ICD-10-PCS | Mod: CPTII,S$GLB,, | Performed by: STUDENT IN AN ORGANIZED HEALTH CARE EDUCATION/TRAINING PROGRAM

## 2022-11-10 PROCEDURE — 3008F PR BODY MASS INDEX (BMI) DOCUMENTED: ICD-10-PCS | Mod: CPTII,S$GLB,, | Performed by: STUDENT IN AN ORGANIZED HEALTH CARE EDUCATION/TRAINING PROGRAM

## 2022-11-10 PROCEDURE — 3075F PR MOST RECENT SYSTOLIC BLOOD PRESS GE 130-139MM HG: ICD-10-PCS | Mod: CPTII,S$GLB,, | Performed by: STUDENT IN AN ORGANIZED HEALTH CARE EDUCATION/TRAINING PROGRAM

## 2022-11-10 PROCEDURE — 3008F BODY MASS INDEX DOCD: CPT | Mod: CPTII,S$GLB,, | Performed by: STUDENT IN AN ORGANIZED HEALTH CARE EDUCATION/TRAINING PROGRAM

## 2022-11-10 PROCEDURE — 3079F DIAST BP 80-89 MM HG: CPT | Mod: CPTII,S$GLB,, | Performed by: STUDENT IN AN ORGANIZED HEALTH CARE EDUCATION/TRAINING PROGRAM

## 2022-11-10 RX ORDER — VIT C/E/ZN/COPPR/LUTEIN/ZEAXAN 250MG-90MG
2000 CAPSULE ORAL DAILY
Refills: 0 | COMMUNITY
Start: 2022-11-10

## 2022-11-10 NOTE — ASSESSMENT & PLAN NOTE
- Levothyroxine 125 mcg daily, reviewed instructions to take in the morning on an empty stomach  - Discontinue postoperative calcium supplementation  - Recommended taking daily calcium via dietary sources or supplementation equal to about 1200 mg daily and vitamin D3 supplementation,equal to 2000 IU a day  - Incision care discussed, scar massage and routine scar care information provided  - Reviewed pathology  - Discontinue narcotics  - Obtain thyroid function tests in approximately 4-6 weeks  - Encouraged dietary intake of phosphorus and potassium containing foods; will update PCP for future lab checks

## 2022-11-10 NOTE — LETTER
November 10, 2022     Patient: Isaura Mancini   YOB: 1970   Date of Visit: 11/10/2022       To whom it may concern,    I saw Isaura Mancini today in clinic. Isaura has been under my care in Endocrine Surgery.  Her family member Xochitl Lombardi has been caring for my patient in her perioperative time.      Please excuse Xochitl Lombardi from work until 11/29/2022.    If you have any questions or concerns, please don't hesitate to contact my office. Thank you for accomodating my patient during this time of her treatment.        Sincerely,    Isaura Hayes MD   Endocrine Surgery & General Surgery  [facility] [address] [place] [address]  Phone: [phone]  Fax: [phone]

## 2022-11-10 NOTE — Clinical Note
[employer] FLORES, saw Ms. Mancini for her post op visit today. I recommended she start taking 2000 IU D3 daily and have her TFTs ordered for 4-6 weeks.  [employer] FLORES, I got post op labs on Ms. Mancini and her phos and K were both low, I encouraged her to increase dietary phosphorus and potassium, wanted to let you know for future labs or medication adjustments.  Thanks! -Freddy

## 2022-12-01 ENCOUNTER — TELEPHONE (OUTPATIENT)
Dept: SURGERY | Facility: CLINIC | Age: 52
End: 2022-12-01
Payer: COMMERCIAL

## 2022-12-01 ENCOUNTER — PATIENT MESSAGE (OUTPATIENT)
Dept: SURGERY | Facility: CLINIC | Age: 52
End: 2022-12-01
Payer: COMMERCIAL

## 2022-12-05 ENCOUNTER — OFFICE VISIT (OUTPATIENT)
Dept: SURGERY | Facility: CLINIC | Age: 52
End: 2022-12-05
Payer: COMMERCIAL

## 2022-12-05 VITALS
BODY MASS INDEX: 30.73 KG/M2 | HEIGHT: 62 IN | WEIGHT: 167 LBS | OXYGEN SATURATION: 98 % | DIASTOLIC BLOOD PRESSURE: 70 MMHG | HEART RATE: 60 BPM | SYSTOLIC BLOOD PRESSURE: 130 MMHG

## 2022-12-05 DIAGNOSIS — E04.1 THYROID NODULE: ICD-10-CM

## 2022-12-05 DIAGNOSIS — E87.6 HYPOKALEMIA: Primary | ICD-10-CM

## 2022-12-05 PROCEDURE — 99999 PR PBB SHADOW E&M-EST. PATIENT-LVL III: ICD-10-PCS | Mod: PBBFAC,,, | Performed by: STUDENT IN AN ORGANIZED HEALTH CARE EDUCATION/TRAINING PROGRAM

## 2022-12-05 PROCEDURE — 3078F DIAST BP <80 MM HG: CPT | Mod: CPTII,S$GLB,, | Performed by: STUDENT IN AN ORGANIZED HEALTH CARE EDUCATION/TRAINING PROGRAM

## 2022-12-05 PROCEDURE — 1159F PR MEDICATION LIST DOCUMENTED IN MEDICAL RECORD: ICD-10-PCS | Mod: CPTII,S$GLB,, | Performed by: STUDENT IN AN ORGANIZED HEALTH CARE EDUCATION/TRAINING PROGRAM

## 2022-12-05 PROCEDURE — 4010F PR ACE/ARB THEARPY RXD/TAKEN: ICD-10-PCS | Mod: CPTII,S$GLB,, | Performed by: STUDENT IN AN ORGANIZED HEALTH CARE EDUCATION/TRAINING PROGRAM

## 2022-12-05 PROCEDURE — 3044F HG A1C LEVEL LT 7.0%: CPT | Mod: CPTII,S$GLB,, | Performed by: STUDENT IN AN ORGANIZED HEALTH CARE EDUCATION/TRAINING PROGRAM

## 2022-12-05 PROCEDURE — 4010F ACE/ARB THERAPY RXD/TAKEN: CPT | Mod: CPTII,S$GLB,, | Performed by: STUDENT IN AN ORGANIZED HEALTH CARE EDUCATION/TRAINING PROGRAM

## 2022-12-05 PROCEDURE — 3044F PR MOST RECENT HEMOGLOBIN A1C LEVEL <7.0%: ICD-10-PCS | Mod: CPTII,S$GLB,, | Performed by: STUDENT IN AN ORGANIZED HEALTH CARE EDUCATION/TRAINING PROGRAM

## 2022-12-05 PROCEDURE — 99024 PR POST-OP FOLLOW-UP VISIT: ICD-10-PCS | Mod: S$GLB,,, | Performed by: STUDENT IN AN ORGANIZED HEALTH CARE EDUCATION/TRAINING PROGRAM

## 2022-12-05 PROCEDURE — 1159F MED LIST DOCD IN RCRD: CPT | Mod: CPTII,S$GLB,, | Performed by: STUDENT IN AN ORGANIZED HEALTH CARE EDUCATION/TRAINING PROGRAM

## 2022-12-05 PROCEDURE — 3075F SYST BP GE 130 - 139MM HG: CPT | Mod: CPTII,S$GLB,, | Performed by: STUDENT IN AN ORGANIZED HEALTH CARE EDUCATION/TRAINING PROGRAM

## 2022-12-05 PROCEDURE — 99999 PR PBB SHADOW E&M-EST. PATIENT-LVL III: CPT | Mod: PBBFAC,,, | Performed by: STUDENT IN AN ORGANIZED HEALTH CARE EDUCATION/TRAINING PROGRAM

## 2022-12-05 PROCEDURE — 3008F BODY MASS INDEX DOCD: CPT | Mod: CPTII,S$GLB,, | Performed by: STUDENT IN AN ORGANIZED HEALTH CARE EDUCATION/TRAINING PROGRAM

## 2022-12-05 PROCEDURE — 3075F PR MOST RECENT SYSTOLIC BLOOD PRESS GE 130-139MM HG: ICD-10-PCS | Mod: CPTII,S$GLB,, | Performed by: STUDENT IN AN ORGANIZED HEALTH CARE EDUCATION/TRAINING PROGRAM

## 2022-12-05 PROCEDURE — 3008F PR BODY MASS INDEX (BMI) DOCUMENTED: ICD-10-PCS | Mod: CPTII,S$GLB,, | Performed by: STUDENT IN AN ORGANIZED HEALTH CARE EDUCATION/TRAINING PROGRAM

## 2022-12-05 PROCEDURE — 3078F PR MOST RECENT DIASTOLIC BLOOD PRESSURE < 80 MM HG: ICD-10-PCS | Mod: CPTII,S$GLB,, | Performed by: STUDENT IN AN ORGANIZED HEALTH CARE EDUCATION/TRAINING PROGRAM

## 2022-12-05 PROCEDURE — 99024 POSTOP FOLLOW-UP VISIT: CPT | Mod: S$GLB,,, | Performed by: STUDENT IN AN ORGANIZED HEALTH CARE EDUCATION/TRAINING PROGRAM

## 2022-12-05 RX ORDER — POTASSIUM CHLORIDE 20 MEQ/1
20 TABLET, EXTENDED RELEASE ORAL 2 TIMES DAILY
Qty: 20 TABLET | Refills: 0 | Status: SHIPPED | OUTPATIENT
Start: 2022-12-05 | End: 2022-12-15

## 2022-12-05 NOTE — Clinical Note
SANDRA Reynaga - I saw Ms. Mancini today for muscle cramps and what she describes as numbness of her lower extremities.  Her calcium is normal though her potassium is low.  I am having her supplement with K-Cl tablets and checking her electrolytes and magnesium level again on/about 12/19 with her thyroid function tests.    I noted she is on HCTZ and wanted to keep you updated in case you would consider adjusting her antihypertensive medications.    Thanks! Sincerely, Freddy

## 2022-12-05 NOTE — PROGRESS NOTES
"Postoperative Endocrine Surgery Clinic Note    Reason for visit / Chief complaint: Postoperative evaluation  Procedure:  THYROIDECTOMY, total  Procedure Date: 10/28/2022      Subjective:     Isaura Mancini returns today for a return visit, she is approximately 10 weeks post op.    Procedures:   Total thyroidectomy  Intraoperative monitoring and interpretation of bilateral cranial nerves (vagus and recurrent laryngeal nerves) using the [employer] NIM system     Intraoperative Findings:   Palpable left thyroid nodule without gross invasion into surrounding structures.  Thyroid hypervascular with surrounding inflammation.   The bilateral recurrent laryngeal and vagus nerves were identified.  Function was verified using the NIM nerve monitoring system.  Parathyroid tissue was identified and preserved with a viable blood supply (right superior, right inferior and left inferior identified and preserved).    Interval History 12/5/22: She presents for repeat evaluation. C/o a "lump in her throat" when swallowing. She otherwise can tolerate a diet. No drainage from incision. She is doing the incisional massage at home and notes a small area of puffiness below the incision. She also c/o mild numbness on her BLE. The numbness is transient and associated with muscle cramping.  She is currently on 125 mcg levothyroxine and she is taking daily vitamin D supplementation.      Current Outpatient Medications   Medication Sig Dispense Refill    aspirin (ECOTRIN) 81 MG EC tablet Take 81 mg by mouth once daily.      cholecalciferol, vitamin D3, (VITAMIN D3) 25 mcg (1,000 unit) capsule Take 2 capsules (2,000 Units total) by mouth once daily.  0    gabapentin (NEURONTIN) 100 MG capsule Take 1 capsule (100 mg total) by mouth 3 (three) times daily as needed (back and leg pain). 270 capsule 3    hydroCHLOROthiazide (HYDRODIURIL) 25 MG tablet Take 1 tablet (25 mg total) by mouth once daily. 90 tablet 1    ibuprofen (ADVIL,MOTRIN) 800 MG " "tablet Take 1 tablet (800 mg total) by mouth every 8 (eight) hours as needed for Pain. 60 tablet 2    levothyroxine (SYNTHROID) 125 MCG tablet Take 1 tablet (125 mcg total) by mouth before breakfast. 30 tablet 11    losartan (COZAAR) 25 MG tablet Take 1 tablet (25 mg total) by mouth once daily. 90 tablet 1    potassium chloride SA (K-DUR,KLOR-CON) 20 MEQ tablet Take 1 tablet (20 mEq total) by mouth 2 (two) times daily. for 10 days 20 tablet 0     No current facility-administered medications for this visit.     Review of patient's allergies indicates:  No Known Allergies     Review of Systems  Negative except as per HPI.     Objective:   /70 (BP Location: Left arm, Patient Position: Sitting, BP Method: Large (Automatic))   Pulse 60   Ht 5' 2" (1.575 m)   Wt 75.8 kg (167 lb)   LMP 11/04/2022 (Approximate)   SpO2 98%   BMI 30.54 kg/m²     General: alert, well appearing, and in no distress  Neck: neck is flat, no erythema, very small superficial seroma at incision, no surrounding erythema, no purulence or drainage  Incision: well approximated, healing well  Neurological: phonation is normal; intact motor and sensation BLE    Labs:  Lab Results   Component Value Date    CALCIUM 9.4 11/09/2022    ALBUMIN 3.6 11/09/2022    PTH 38.4 11/09/2022    K 3.4 (L) 11/09/2022       Pathology  Final Pathologic Diagnosis   Date Value Ref Range Status   10/28/2022   Final    [facility] DIAGNOSIS:  THYROID, TOTAL THYROIDECTOMY:  -Hyperplastic/adenomatous nodule with Hurthle cell change in a background of  chronic thyroiditis.  -Two(2) benign lymph nodes, negative for tumor.  -Negative for malignancy.  Susan Owen M.D.  Report attached.  Performing site:  [address]  [address]  "Disclaimer:  This case diagnosis was rendered completely by the outside  consultation pathologist and the case is electronically signed by an [facility]  pathologist listed below solely to release the report " "into the medical  record."       Comment:     Interp By Nelly Klein M.D., Signed on 11/03/2022 at 13:11       Assessment and Plan:     S/p THYROIDECTOMY, total on 10/28/2022.  Presents with postop seroma and mild numbness and muscle cramping in bilateral lower extremities.     Problem List Items Addressed This Visit          Renal/    Hypokalemia - Primary    Current Assessment & Plan     - 10 day course of KCl replacement, will add renal function panel and magnesium level to upcoming labs            Endocrine    Thyroid nodule    Current Assessment & Plan     - Pt with postop seroma, but no evidence of infection. Reassured patient that this will improve with time  - F/u TSH, FT4 scheduled in 2 weeks  - F/u prn        Patient was seen and evaluated with surgery resident Farooq Maurer.    Isaura Hayes MD  Endocrine Surgery  12/5/22                  "

## 2022-12-05 NOTE — ASSESSMENT & PLAN NOTE
- 10 day course of KCl replacement, will add renal function panel and magnesium level to upcoming labs

## 2022-12-05 NOTE — ASSESSMENT & PLAN NOTE
- Pt with postop seroma, but no evidence of infection. Reassured patient that this will improve with time  - F/u TSH, FT4 scheduled in 2 weeks  - F/u prn

## 2022-12-19 ENCOUNTER — LAB VISIT (OUTPATIENT)
Dept: LAB | Facility: HOSPITAL | Age: 52
End: 2022-12-19
Attending: STUDENT IN AN ORGANIZED HEALTH CARE EDUCATION/TRAINING PROGRAM
Payer: COMMERCIAL

## 2022-12-19 DIAGNOSIS — E87.6 HYPOKALEMIA: ICD-10-CM

## 2022-12-19 DIAGNOSIS — E05.00 GRAVES DISEASE: ICD-10-CM

## 2022-12-19 LAB
ALBUMIN SERPL BCP-MCNC: 3.7 G/DL (ref 3.5–5.2)
ANION GAP SERPL CALC-SCNC: 8 MMOL/L (ref 8–16)
BUN SERPL-MCNC: 11 MG/DL (ref 6–20)
CALCIUM SERPL-MCNC: 9.4 MG/DL (ref 8.7–10.5)
CHLORIDE SERPL-SCNC: 102 MMOL/L (ref 95–110)
CO2 SERPL-SCNC: 25 MMOL/L (ref 23–29)
CREAT SERPL-MCNC: 0.7 MG/DL (ref 0.5–1.4)
EST. GFR  (NO RACE VARIABLE): >60 ML/MIN/1.73 M^2
GLUCOSE SERPL-MCNC: 78 MG/DL (ref 70–110)
MAGNESIUM SERPL-MCNC: 1.7 MG/DL (ref 1.6–2.6)
PHOSPHATE SERPL-MCNC: 2.8 MG/DL (ref 2.7–4.5)
POTASSIUM SERPL-SCNC: 3.5 MMOL/L (ref 3.5–5.1)
SODIUM SERPL-SCNC: 135 MMOL/L (ref 136–145)
T4 FREE SERPL-MCNC: 1.2 NG/DL (ref 0.71–1.51)
TSH SERPL DL<=0.005 MIU/L-ACNC: 2.02 UIU/ML (ref 0.4–4)

## 2022-12-19 PROCEDURE — 84439 ASSAY OF FREE THYROXINE: CPT | Performed by: STUDENT IN AN ORGANIZED HEALTH CARE EDUCATION/TRAINING PROGRAM

## 2022-12-19 PROCEDURE — 36415 COLL VENOUS BLD VENIPUNCTURE: CPT | Mod: PO | Performed by: STUDENT IN AN ORGANIZED HEALTH CARE EDUCATION/TRAINING PROGRAM

## 2022-12-19 PROCEDURE — 84443 ASSAY THYROID STIM HORMONE: CPT | Performed by: STUDENT IN AN ORGANIZED HEALTH CARE EDUCATION/TRAINING PROGRAM

## 2022-12-19 PROCEDURE — 83735 ASSAY OF MAGNESIUM: CPT | Performed by: STUDENT IN AN ORGANIZED HEALTH CARE EDUCATION/TRAINING PROGRAM

## 2022-12-19 PROCEDURE — 80069 RENAL FUNCTION PANEL: CPT | Performed by: STUDENT IN AN ORGANIZED HEALTH CARE EDUCATION/TRAINING PROGRAM

## 2022-12-19 NOTE — PROGRESS NOTES
Patient is s/p total thyroidectomy on/about 10/28/2022.  Reviewed results, TSH within normal limits.  Will maintain current dose of levothyroxine.    S/p 10 days KCl supplementation for hypokalemia, K is low-normal.  Will update PCP for ongoing supplementation or medication adjustment as needed.    CC: [facility] / PCP   CC/FYI: [facility] / Endocrinology

## 2023-01-05 ENCOUNTER — LAB VISIT (OUTPATIENT)
Dept: LAB | Facility: HOSPITAL | Age: 53
End: 2023-01-05
Attending: NURSE PRACTITIONER
Payer: COMMERCIAL

## 2023-01-05 ENCOUNTER — OFFICE VISIT (OUTPATIENT)
Dept: OBSTETRICS AND GYNECOLOGY | Facility: CLINIC | Age: 53
End: 2023-01-05
Payer: COMMERCIAL

## 2023-01-05 VITALS
WEIGHT: 166.88 LBS | HEIGHT: 62 IN | SYSTOLIC BLOOD PRESSURE: 130 MMHG | BODY MASS INDEX: 30.71 KG/M2 | DIASTOLIC BLOOD PRESSURE: 72 MMHG

## 2023-01-05 DIAGNOSIS — N94.10 DYSPAREUNIA, FEMALE: ICD-10-CM

## 2023-01-05 DIAGNOSIS — N84.1 CERVICAL POLYP: ICD-10-CM

## 2023-01-05 DIAGNOSIS — K59.00 CONSTIPATION, UNSPECIFIED CONSTIPATION TYPE: ICD-10-CM

## 2023-01-05 DIAGNOSIS — N94.6 DYSMENORRHEA: ICD-10-CM

## 2023-01-05 DIAGNOSIS — N92.1 MENORRHAGIA WITH IRREGULAR CYCLE: ICD-10-CM

## 2023-01-05 DIAGNOSIS — N92.1 MENORRHAGIA WITH IRREGULAR CYCLE: Primary | ICD-10-CM

## 2023-01-05 LAB
B-HCG UR QL: NEGATIVE
BASOPHILS # BLD AUTO: 0.04 K/UL (ref 0–0.2)
BASOPHILS NFR BLD: 0.4 % (ref 0–1.9)
CTP QC/QA: YES
DIFFERENTIAL METHOD: ABNORMAL
EOSINOPHIL # BLD AUTO: 0.2 K/UL (ref 0–0.5)
EOSINOPHIL NFR BLD: 2.1 % (ref 0–8)
ERYTHROCYTE [DISTWIDTH] IN BLOOD BY AUTOMATED COUNT: 12.5 % (ref 11.5–14.5)
HCT VFR BLD AUTO: 42.6 % (ref 37–48.5)
HGB BLD-MCNC: 13.6 G/DL (ref 12–16)
IMM GRANULOCYTES # BLD AUTO: 0.03 K/UL (ref 0–0.04)
IMM GRANULOCYTES NFR BLD AUTO: 0.3 % (ref 0–0.5)
LYMPHOCYTES # BLD AUTO: 3.1 K/UL (ref 1–4.8)
LYMPHOCYTES NFR BLD: 32 % (ref 18–48)
MCH RBC QN AUTO: 29.6 PG (ref 27–31)
MCHC RBC AUTO-ENTMCNC: 31.9 G/DL (ref 32–36)
MCV RBC AUTO: 93 FL (ref 82–98)
MONOCYTES # BLD AUTO: 0.6 K/UL (ref 0.3–1)
MONOCYTES NFR BLD: 6.5 % (ref 4–15)
NEUTROPHILS # BLD AUTO: 5.8 K/UL (ref 1.8–7.7)
NEUTROPHILS NFR BLD: 58.7 % (ref 38–73)
NRBC BLD-RTO: 0 /100 WBC
PLATELET # BLD AUTO: 435 K/UL (ref 150–450)
PMV BLD AUTO: 11.1 FL (ref 9.2–12.9)
RBC # BLD AUTO: 4.6 M/UL (ref 4–5.4)
WBC # BLD AUTO: 9.81 K/UL (ref 3.9–12.7)

## 2023-01-05 PROCEDURE — 3075F PR MOST RECENT SYSTOLIC BLOOD PRESS GE 130-139MM HG: ICD-10-PCS | Mod: CPTII,S$GLB,, | Performed by: NURSE PRACTITIONER

## 2023-01-05 PROCEDURE — 3078F PR MOST RECENT DIASTOLIC BLOOD PRESSURE < 80 MM HG: ICD-10-PCS | Mod: CPTII,S$GLB,, | Performed by: NURSE PRACTITIONER

## 2023-01-05 PROCEDURE — 36415 COLL VENOUS BLD VENIPUNCTURE: CPT | Mod: PN | Performed by: NURSE PRACTITIONER

## 2023-01-05 PROCEDURE — 81025 POCT URINE PREGNANCY: ICD-10-PCS | Mod: S$GLB,,, | Performed by: NURSE PRACTITIONER

## 2023-01-05 PROCEDURE — 82670 ASSAY OF TOTAL ESTRADIOL: CPT | Performed by: NURSE PRACTITIONER

## 2023-01-05 PROCEDURE — 81025 URINE PREGNANCY TEST: CPT | Mod: S$GLB,,, | Performed by: NURSE PRACTITIONER

## 2023-01-05 PROCEDURE — 4010F ACE/ARB THERAPY RXD/TAKEN: CPT | Mod: CPTII,S$GLB,, | Performed by: NURSE PRACTITIONER

## 2023-01-05 PROCEDURE — 88305 TISSUE EXAM BY PATHOLOGIST: ICD-10-PCS | Mod: 26,,, | Performed by: PATHOLOGY

## 2023-01-05 PROCEDURE — 1159F MED LIST DOCD IN RCRD: CPT | Mod: CPTII,S$GLB,, | Performed by: NURSE PRACTITIONER

## 2023-01-05 PROCEDURE — 99213 OFFICE O/P EST LOW 20 MIN: CPT | Mod: S$GLB,,, | Performed by: NURSE PRACTITIONER

## 2023-01-05 PROCEDURE — 3008F BODY MASS INDEX DOCD: CPT | Mod: CPTII,S$GLB,, | Performed by: NURSE PRACTITIONER

## 2023-01-05 PROCEDURE — 85025 COMPLETE CBC W/AUTO DIFF WBC: CPT | Performed by: NURSE PRACTITIONER

## 2023-01-05 PROCEDURE — 88305 TISSUE EXAM BY PATHOLOGIST: CPT | Mod: 26,,, | Performed by: PATHOLOGY

## 2023-01-05 PROCEDURE — 3008F PR BODY MASS INDEX (BMI) DOCUMENTED: ICD-10-PCS | Mod: CPTII,S$GLB,, | Performed by: NURSE PRACTITIONER

## 2023-01-05 PROCEDURE — 99213 PR OFFICE/OUTPT VISIT, EST, LEVL III, 20-29 MIN: ICD-10-PCS | Mod: S$GLB,,, | Performed by: NURSE PRACTITIONER

## 2023-01-05 PROCEDURE — 99999 PR PBB SHADOW E&M-EST. PATIENT-LVL III: CPT | Mod: PBBFAC,,, | Performed by: NURSE PRACTITIONER

## 2023-01-05 PROCEDURE — 83002 ASSAY OF GONADOTROPIN (LH): CPT | Performed by: NURSE PRACTITIONER

## 2023-01-05 PROCEDURE — 3075F SYST BP GE 130 - 139MM HG: CPT | Mod: CPTII,S$GLB,, | Performed by: NURSE PRACTITIONER

## 2023-01-05 PROCEDURE — 83001 ASSAY OF GONADOTROPIN (FSH): CPT | Performed by: NURSE PRACTITIONER

## 2023-01-05 PROCEDURE — 99999 PR PBB SHADOW E&M-EST. PATIENT-LVL III: ICD-10-PCS | Mod: PBBFAC,,, | Performed by: NURSE PRACTITIONER

## 2023-01-05 PROCEDURE — 4010F PR ACE/ARB THEARPY RXD/TAKEN: ICD-10-PCS | Mod: CPTII,S$GLB,, | Performed by: NURSE PRACTITIONER

## 2023-01-05 PROCEDURE — 88305 TISSUE EXAM BY PATHOLOGIST: CPT | Performed by: PATHOLOGY

## 2023-01-05 PROCEDURE — 1159F PR MEDICATION LIST DOCUMENTED IN MEDICAL RECORD: ICD-10-PCS | Mod: CPTII,S$GLB,, | Performed by: NURSE PRACTITIONER

## 2023-01-05 PROCEDURE — 3078F DIAST BP <80 MM HG: CPT | Mod: CPTII,S$GLB,, | Performed by: NURSE PRACTITIONER

## 2023-01-05 RX ORDER — POLYETHYLENE GLYCOL 3350 17 G/17G
17 POWDER, FOR SOLUTION ORAL DAILY
Qty: 119 G | Refills: 3 | Status: SHIPPED | OUTPATIENT
Start: 2023-01-05 | End: 2024-01-05

## 2023-01-05 NOTE — PROGRESS NOTES
CC: pelvic pain, prolonged cycle     HPI: Pt is a 52 y.o.  female who presents c/o pelvic pain and prolonged cycle. Reports on and off bleeding for the past 4 weeks. The bleeding has become heavy with clots. She is feeling weal and fatigued. She also reports painful intercourse and bleeding after intercourse. She has h/o uterine fibroids. She reports some constipation.   She is s/p thyroidectomy in 10/22.  UPT is negative.     ROS:  GENERAL: Feeling well overall. Denies fever or chills.   SKIN: Denies rash or lesions.   HEAD: Denies head injury or headache.   NODES: Denies enlarged lymph nodes.   CHEST: Denies chest pain or shortness of breath.   CARDIOVASCULAR: Denies palpitations or left sided chest pain.   ABDOMEN: No abdominal pain, constipation, diarrhea, nausea, vomiting or rectal bleeding.   URINARY: No dysuria, hematuria, or burning on urination.  REPRODUCTIVE: See HPI.   BREASTS: Denies pain, lumps, or nipple discharge.   HEMATOLOGIC: No easy bruisability or excessive bleeding.   MUSCULOSKELETAL: Denies joint pain or swelling.   NEUROLOGIC: Denies syncope or weakness.   PSYCHIATRIC: Denies depression, anxiety or mood swings.    PE:   APPEARANCE: Well nourished, well developed,  female in no acute distress.  VULVA: No lesions. Normal external female genitalia.  URETHRAL MEATUS: Normal size and location, no lesions, no prolapse.  URETHRA: No masses, tenderness, or prolapse.  VAGINA: Moist. No lesions or lacerations noted. No abnormal discharge present. No odor present.   CERVIX: No lesions or discharge. No cervical motion tenderness. + cervical polyp removed with ring forceps and sent to pathology   UTERUS: Normal size, regular shape, mobile, non-tender.  ADNEXA: No tenderness. No fullness or masses palpated in the adnexal regions.   ANUS PERINEUM: Normal.      Diagnosis:  1. Menorrhagia with irregular cycle    2. Dysmenorrhea    3. Dyspareunia, female    4. Constipation, unspecified constipation type     5. Cervical polyp        Plan:   UPT is negative  Pelvic US   Labs for eval   Cervical polyp to pathology   Glycolax as needed for constipation   Discussed if AUB persists and work up is negative can plan for EMBX and will refer to MD if needed for surgical consult     Orders Placed This Encounter    US Pelvis Comp with Transvag NON-OB (xpd    Follicle Stimulating Hormone    LUTEINIZING HORMONE    CBC W/ AUTO DIFFERENTIAL    ESTRADIOL    POCT Urine Pregnancy    Specimen to Pathology Other    polyethylene glycol (GLYCOLAX) 17 gram/dose powder         Follow-up PRN no resolution of symptoms.    Leeann Jovel, ALONDRAP-C

## 2023-01-06 ENCOUNTER — PATIENT MESSAGE (OUTPATIENT)
Dept: OBSTETRICS AND GYNECOLOGY | Facility: CLINIC | Age: 53
End: 2023-01-06
Payer: COMMERCIAL

## 2023-01-06 ENCOUNTER — HOSPITAL ENCOUNTER (OUTPATIENT)
Dept: RADIOLOGY | Facility: HOSPITAL | Age: 53
Discharge: HOME | End: 2023-01-06
Attending: NURSE PRACTITIONER
Payer: COMMERCIAL

## 2023-01-06 DIAGNOSIS — N94.10 DYSPAREUNIA, FEMALE: ICD-10-CM

## 2023-01-06 DIAGNOSIS — N83.209 CYST OF OVARY, UNSPECIFIED LATERALITY: Primary | ICD-10-CM

## 2023-01-06 DIAGNOSIS — N94.6 DYSMENORRHEA: ICD-10-CM

## 2023-01-06 DIAGNOSIS — N92.1 MENORRHAGIA WITH IRREGULAR CYCLE: ICD-10-CM

## 2023-01-06 LAB
ESTRADIOL SERPL-MCNC: 110 PG/ML
FSH SERPL-ACNC: 6.75 MIU/ML
LH SERPL-ACNC: 2.8 MIU/ML

## 2023-01-06 PROCEDURE — 76830 TRANSVAGINAL US NON-OB: CPT | Mod: 26,,, | Performed by: RADIOLOGY

## 2023-01-06 PROCEDURE — 76856 US EXAM PELVIC COMPLETE: CPT | Mod: TC

## 2023-01-06 PROCEDURE — 76856 US PELVIS COMP WITH TRANSVAG NON-OB (XPD): ICD-10-PCS | Mod: 26,,, | Performed by: RADIOLOGY

## 2023-01-06 PROCEDURE — 76856 US EXAM PELVIC COMPLETE: CPT | Mod: 26,,, | Performed by: RADIOLOGY

## 2023-01-06 PROCEDURE — 76830 US PELVIS COMP WITH TRANSVAG NON-OB (XPD): ICD-10-PCS | Mod: 26,,, | Performed by: RADIOLOGY

## 2023-01-09 ENCOUNTER — PATIENT MESSAGE (OUTPATIENT)
Dept: ADMINISTRATIVE | Facility: OTHER | Age: 53
End: 2023-01-09
Payer: COMMERCIAL

## 2023-01-11 ENCOUNTER — HOSPITAL ENCOUNTER (OUTPATIENT)
Dept: RADIOLOGY | Facility: HOSPITAL | Age: 53
Discharge: HOME | End: 2023-01-11
Attending: NURSE PRACTITIONER
Payer: COMMERCIAL

## 2023-01-11 DIAGNOSIS — N83.209 CYST OF OVARY, UNSPECIFIED LATERALITY: ICD-10-CM

## 2023-01-11 PROCEDURE — 76856 US EXAM PELVIC COMPLETE: CPT | Mod: 26,,, | Performed by: RADIOLOGY

## 2023-01-11 PROCEDURE — 76856 US PELVIS COMP WITH TRANSVAG NON-OB (XPD): ICD-10-PCS | Mod: 26,,, | Performed by: RADIOLOGY

## 2023-01-11 PROCEDURE — 76830 TRANSVAGINAL US NON-OB: CPT | Mod: 26,,, | Performed by: RADIOLOGY

## 2023-01-11 PROCEDURE — 76856 US EXAM PELVIC COMPLETE: CPT | Mod: TC

## 2023-01-11 PROCEDURE — 76830 US PELVIS COMP WITH TRANSVAG NON-OB (XPD): ICD-10-PCS | Mod: 26,,, | Performed by: RADIOLOGY

## 2023-01-12 ENCOUNTER — PATIENT MESSAGE (OUTPATIENT)
Dept: OBSTETRICS AND GYNECOLOGY | Facility: CLINIC | Age: 53
End: 2023-01-12
Payer: COMMERCIAL

## 2023-01-12 LAB
FINAL PATHOLOGIC DIAGNOSIS: NORMAL
GROSS: NORMAL
Lab: NORMAL

## 2023-01-13 ENCOUNTER — PATIENT MESSAGE (OUTPATIENT)
Dept: OBSTETRICS AND GYNECOLOGY | Facility: CLINIC | Age: 53
End: 2023-01-13
Payer: COMMERCIAL

## 2023-01-15 ENCOUNTER — PATIENT MESSAGE (OUTPATIENT)
Dept: ADMINISTRATIVE | Facility: HOSPITAL | Age: 53
End: 2023-01-15
Payer: COMMERCIAL

## 2023-01-16 DIAGNOSIS — Z12.11 SCREENING FOR COLON CANCER: ICD-10-CM

## 2023-01-17 ENCOUNTER — TELEPHONE (OUTPATIENT)
Dept: OBSTETRICS AND GYNECOLOGY | Facility: CLINIC | Age: 53
End: 2023-01-17
Payer: COMMERCIAL

## 2023-01-17 NOTE — TELEPHONE ENCOUNTER
Thank you Dr Jovel yes i am interested of removing my fibroids its giving me so much pain and the bleeding too

## 2023-01-18 ENCOUNTER — LAB VISIT (OUTPATIENT)
Dept: LAB | Facility: HOSPITAL | Age: 53
End: 2023-01-18
Attending: INTERNAL MEDICINE
Payer: COMMERCIAL

## 2023-01-18 ENCOUNTER — TELEPHONE (OUTPATIENT)
Dept: OBSTETRICS AND GYNECOLOGY | Facility: CLINIC | Age: 53
End: 2023-01-18
Payer: COMMERCIAL

## 2023-01-18 DIAGNOSIS — Z00.00 ANNUAL PHYSICAL EXAM: ICD-10-CM

## 2023-01-18 LAB
ALBUMIN SERPL BCP-MCNC: 3.7 G/DL (ref 3.5–5.2)
ALP SERPL-CCNC: 56 U/L (ref 55–135)
ALT SERPL W/O P-5'-P-CCNC: 6 U/L (ref 10–44)
ANION GAP SERPL CALC-SCNC: 7 MMOL/L (ref 8–16)
AST SERPL-CCNC: 17 U/L (ref 10–40)
BASOPHILS # BLD AUTO: 0.04 K/UL (ref 0–0.2)
BASOPHILS NFR BLD: 0.6 % (ref 0–1.9)
BILIRUB SERPL-MCNC: 0.7 MG/DL (ref 0.1–1)
BUN SERPL-MCNC: 12 MG/DL (ref 6–20)
CALCIUM SERPL-MCNC: 9.9 MG/DL (ref 8.7–10.5)
CHLORIDE SERPL-SCNC: 103 MMOL/L (ref 95–110)
CHOLEST SERPL-MCNC: 196 MG/DL (ref 120–199)
CHOLEST/HDLC SERPL: 3.1 {RATIO} (ref 2–5)
CO2 SERPL-SCNC: 26 MMOL/L (ref 23–29)
CREAT SERPL-MCNC: 0.7 MG/DL (ref 0.5–1.4)
DIFFERENTIAL METHOD: NORMAL
EOSINOPHIL # BLD AUTO: 0.2 K/UL (ref 0–0.5)
EOSINOPHIL NFR BLD: 2.7 % (ref 0–8)
ERYTHROCYTE [DISTWIDTH] IN BLOOD BY AUTOMATED COUNT: 12.2 % (ref 11.5–14.5)
EST. GFR  (NO RACE VARIABLE): >60 ML/MIN/1.73 M^2
ESTIMATED AVG GLUCOSE: 97 MG/DL (ref 68–131)
GLUCOSE SERPL-MCNC: 82 MG/DL (ref 70–110)
HBA1C MFR BLD: 5 % (ref 4–5.6)
HCT VFR BLD AUTO: 40.7 % (ref 37–48.5)
HDLC SERPL-MCNC: 63 MG/DL (ref 40–75)
HDLC SERPL: 32.1 % (ref 20–50)
HGB BLD-MCNC: 13.2 G/DL (ref 12–16)
IMM GRANULOCYTES # BLD AUTO: 0.01 K/UL (ref 0–0.04)
IMM GRANULOCYTES NFR BLD AUTO: 0.2 % (ref 0–0.5)
LDLC SERPL CALC-MCNC: 123.8 MG/DL (ref 63–159)
LYMPHOCYTES # BLD AUTO: 2.4 K/UL (ref 1–4.8)
LYMPHOCYTES NFR BLD: 35.7 % (ref 18–48)
MCH RBC QN AUTO: 29.3 PG (ref 27–31)
MCHC RBC AUTO-ENTMCNC: 32.4 G/DL (ref 32–36)
MCV RBC AUTO: 90 FL (ref 82–98)
MONOCYTES # BLD AUTO: 0.5 K/UL (ref 0.3–1)
MONOCYTES NFR BLD: 8.2 % (ref 4–15)
NEUTROPHILS # BLD AUTO: 3.5 K/UL (ref 1.8–7.7)
NEUTROPHILS NFR BLD: 52.6 % (ref 38–73)
NONHDLC SERPL-MCNC: 133 MG/DL
NRBC BLD-RTO: 0 /100 WBC
PLATELET # BLD AUTO: 380 K/UL (ref 150–450)
PMV BLD AUTO: 11.5 FL (ref 9.2–12.9)
POTASSIUM SERPL-SCNC: 3.7 MMOL/L (ref 3.5–5.1)
PROT SERPL-MCNC: 7.2 G/DL (ref 6–8.4)
RBC # BLD AUTO: 4.51 M/UL (ref 4–5.4)
SODIUM SERPL-SCNC: 136 MMOL/L (ref 136–145)
TRIGL SERPL-MCNC: 46 MG/DL (ref 30–150)
WBC # BLD AUTO: 6.59 K/UL (ref 3.9–12.7)

## 2023-01-18 PROCEDURE — 83036 HEMOGLOBIN GLYCOSYLATED A1C: CPT | Performed by: INTERNAL MEDICINE

## 2023-01-18 PROCEDURE — 85025 COMPLETE CBC W/AUTO DIFF WBC: CPT | Performed by: INTERNAL MEDICINE

## 2023-01-18 PROCEDURE — 80061 LIPID PANEL: CPT | Performed by: INTERNAL MEDICINE

## 2023-01-18 PROCEDURE — 36415 COLL VENOUS BLD VENIPUNCTURE: CPT | Mod: PO | Performed by: INTERNAL MEDICINE

## 2023-01-18 PROCEDURE — 80053 COMPREHEN METABOLIC PANEL: CPT | Performed by: INTERNAL MEDICINE

## 2023-01-20 ENCOUNTER — TELEPHONE (OUTPATIENT)
Dept: INTERNAL MEDICINE | Facility: CLINIC | Age: 53
End: 2023-01-20
Payer: COMMERCIAL

## 2023-01-20 NOTE — TELEPHONE ENCOUNTER
Called patient to reschedule upcoming appt due to Dr. Shah being out of the clinic. Rescheduled with patient over the phone.

## 2023-01-27 ENCOUNTER — OFFICE VISIT (OUTPATIENT)
Dept: OBSTETRICS AND GYNECOLOGY | Facility: CLINIC | Age: 53
End: 2023-01-27
Payer: COMMERCIAL

## 2023-01-27 VITALS
BODY MASS INDEX: 31.16 KG/M2 | WEIGHT: 169.31 LBS | DIASTOLIC BLOOD PRESSURE: 70 MMHG | HEIGHT: 62 IN | SYSTOLIC BLOOD PRESSURE: 124 MMHG

## 2023-01-27 DIAGNOSIS — N95.1 PERIMENOPAUSAL: ICD-10-CM

## 2023-01-27 DIAGNOSIS — D21.9 FIBROIDS: Primary | ICD-10-CM

## 2023-01-27 DIAGNOSIS — D25.9 UTERINE LEIOMYOMA, UNSPECIFIED LOCATION: ICD-10-CM

## 2023-01-27 DIAGNOSIS — N92.1 MENOMETRORRHAGIA: ICD-10-CM

## 2023-01-27 PROCEDURE — 1160F RVW MEDS BY RX/DR IN RCRD: CPT | Mod: CPTII,S$GLB,, | Performed by: OBSTETRICS & GYNECOLOGY

## 2023-01-27 PROCEDURE — 1160F PR REVIEW ALL MEDS BY PRESCRIBER/CLIN PHARMACIST DOCUMENTED: ICD-10-PCS | Mod: CPTII,S$GLB,, | Performed by: OBSTETRICS & GYNECOLOGY

## 2023-01-27 PROCEDURE — 1159F MED LIST DOCD IN RCRD: CPT | Mod: CPTII,S$GLB,, | Performed by: OBSTETRICS & GYNECOLOGY

## 2023-01-27 PROCEDURE — 99214 PR OFFICE/OUTPT VISIT, EST, LEVL IV, 30-39 MIN: ICD-10-PCS | Mod: S$GLB,,, | Performed by: OBSTETRICS & GYNECOLOGY

## 2023-01-27 PROCEDURE — 1159F PR MEDICATION LIST DOCUMENTED IN MEDICAL RECORD: ICD-10-PCS | Mod: CPTII,S$GLB,, | Performed by: OBSTETRICS & GYNECOLOGY

## 2023-01-27 PROCEDURE — 3044F PR MOST RECENT HEMOGLOBIN A1C LEVEL <7.0%: ICD-10-PCS | Mod: CPTII,S$GLB,, | Performed by: OBSTETRICS & GYNECOLOGY

## 2023-01-27 PROCEDURE — 99999 PR PBB SHADOW E&M-EST. PATIENT-LVL III: ICD-10-PCS | Mod: PBBFAC,,, | Performed by: OBSTETRICS & GYNECOLOGY

## 2023-01-27 PROCEDURE — 3074F PR MOST RECENT SYSTOLIC BLOOD PRESSURE < 130 MM HG: ICD-10-PCS | Mod: CPTII,S$GLB,, | Performed by: OBSTETRICS & GYNECOLOGY

## 2023-01-27 PROCEDURE — 3078F DIAST BP <80 MM HG: CPT | Mod: CPTII,S$GLB,, | Performed by: OBSTETRICS & GYNECOLOGY

## 2023-01-27 PROCEDURE — 4010F PR ACE/ARB THEARPY RXD/TAKEN: ICD-10-PCS | Mod: CPTII,S$GLB,, | Performed by: OBSTETRICS & GYNECOLOGY

## 2023-01-27 PROCEDURE — 4010F ACE/ARB THERAPY RXD/TAKEN: CPT | Mod: CPTII,S$GLB,, | Performed by: OBSTETRICS & GYNECOLOGY

## 2023-01-27 PROCEDURE — 3044F HG A1C LEVEL LT 7.0%: CPT | Mod: CPTII,S$GLB,, | Performed by: OBSTETRICS & GYNECOLOGY

## 2023-01-27 PROCEDURE — 3078F PR MOST RECENT DIASTOLIC BLOOD PRESSURE < 80 MM HG: ICD-10-PCS | Mod: CPTII,S$GLB,, | Performed by: OBSTETRICS & GYNECOLOGY

## 2023-01-27 PROCEDURE — 99999 PR PBB SHADOW E&M-EST. PATIENT-LVL III: CPT | Mod: PBBFAC,,, | Performed by: OBSTETRICS & GYNECOLOGY

## 2023-01-27 PROCEDURE — 3074F SYST BP LT 130 MM HG: CPT | Mod: CPTII,S$GLB,, | Performed by: OBSTETRICS & GYNECOLOGY

## 2023-01-27 PROCEDURE — 99214 OFFICE O/P EST MOD 30 MIN: CPT | Mod: S$GLB,,, | Performed by: OBSTETRICS & GYNECOLOGY

## 2023-01-27 PROCEDURE — 3008F PR BODY MASS INDEX (BMI) DOCUMENTED: ICD-10-PCS | Mod: CPTII,S$GLB,, | Performed by: OBSTETRICS & GYNECOLOGY

## 2023-01-27 PROCEDURE — 3008F BODY MASS INDEX DOCD: CPT | Mod: CPTII,S$GLB,, | Performed by: OBSTETRICS & GYNECOLOGY

## 2023-01-27 NOTE — PROGRESS NOTES
"CC:     Isaura Mancini is a 52 y.o. female  presents for  fibroids  Bleeding for three weeks straight , using 5 pads a day.  NO SOB.  Having light headness symptoms.  NO LOC  Dyspareunia for over a year.  Pressure.  NO dysuria.    Normal BM    Past Medical History:   Diagnosis Date    Hypertension        Past Surgical History:   Procedure Laterality Date     SECTION      THYROIDECTOMY N/A 10/28/2022    Procedure: THYROIDECTOMY, total;  Surgeon: Isaura Hayes MD;  Location: [facility] OR [address] [place] [address];  Service: General;  Laterality: N/A;    TUBAL LIGATION         OB History    Para Term  AB Living   3 3 3     3   SAB IAB Ectopic Multiple Live Births           3      # Outcome Date GA Lbr Kevin/2nd Weight Sex Delivery Anes PTL Lv   3 Term 99    F CS-LVertical   JINA   2 Term 10/15/92    M CS-LVertical   JINA   1 Term 10/07/90    F CS-LVertical   JINA       Family History   Problem Relation Age of Onset    Heart disease Father     Hypertension Father     Breast cancer Paternal Aunt 60    Diabetes Neg Hx     Colon cancer Neg Hx     Ovarian cancer Neg Hx        Social History     Tobacco Use    Smoking status: Never    Smokeless tobacco: Never   Substance Use Topics    Alcohol use: No    Drug use: No       /70   Ht 5' 2" (1.575 m)   Wt 76.8 kg (169 lb 5 oz)   LMP 2022 (Exact Date)   BMI 30.97 kg/m²     ROS:  GENERAL: Denies weight gain or weight loss. Feeling well overall.   SKIN: Denies rash or lesions.   HEAD: Denies head injury or headache.   NODES: Denies enlarged lymph nodes.   CHEST: Denies chest pain or shortness of breath.   CARDIOVASCULAR: Denies palpitations or left sided chest pain.   ABDOMEN: No abdominal pain, constipation, diarrhea, nausea, vomiting or rectal bleeding.   URINARY: No frequency, dysuria, hematuria, or burning on urination.  REPRODUCTIVE: See HPI.   BREASTS: The patient performs breast self-examination and denies pain, lumps, or nipple discharge. "   HEMATOLOGIC: No easy bruisability or excessive bleeding.  MUSCULOSKELETAL: Denies joint pain or swelling.   NEUROLOGIC: Denies syncope or weakness.   PSYCHIATRIC: Denies depression, anxiety or mood swings.    Physical Exam:    APPEARANCE: Well nourished, well developed, in no acute distress.  AFFECT: WNL, alert and oriented x 3  SKIN: No acne or hirsutism      ASSESSMENT AND PLAN  1. Fibroids        2. Perimenopausal        3. Uterine leiomyoma, unspecified location  MRI Female Pelvis W W/O Contrast      4. Menometrorrhagia              Discussed fibroid treatment options- Possible UAE, VS myomectomy vs HYST. Treat menorrhagia with OCPs, Lysteda or medical management.   Risks and benefits of fibroid procedures discussed.   Will consider options and notify the office once able to review options.     She wants to consider RA myomectomy  Will schedule EMBx   Refer over to Dr Slaughter for myomectomy    Answers submitted by the patient for this visit:  Vaginal Pain Questionnaire (Submitted on 2023)  Chief Complaint: Vaginal pain  Chronicity: recurrent  Onset: more than 1 month ago  Frequency: intermittently  Progression since onset: gradually worsening  Pain severity: severe  Affected side: both  Pregnant now?: No  abdominal pain: Yes  anorexia: No  chills: No  discolored urine: No  dysuria: No  fever: No  frequency: Yes  painful intercourse: Yes  urgency: No  vomiting: No  Aggravated by: intercourse  treatments tried: nothing  Improvement on treatment: no relief  Sexual activity: sexually active  Partner with STD symptoms: no  Menstrual history: regular  STD: No  abdominal surgery: No   section: Yes  Ectopic pregnancy: No  Endometriosis: Yes  herpes simplex: No  gynecological surgery: No  menorrhagia: Yes  metrorrhagia: No  miscarriage: No  ovarian cysts: Yes  perineal abscess: No  PID: No  terminated pregnancy: No  vaginosis: No

## 2023-01-28 ENCOUNTER — HOSPITAL ENCOUNTER (OUTPATIENT)
Dept: RADIOLOGY | Facility: HOSPITAL | Age: 53
Discharge: HOME | End: 2023-01-28
Attending: OBSTETRICS & GYNECOLOGY
Payer: COMMERCIAL

## 2023-01-28 DIAGNOSIS — D25.9 UTERINE LEIOMYOMA, UNSPECIFIED LOCATION: ICD-10-CM

## 2023-01-28 PROCEDURE — 72197 MRI PELVIS W/O & W/DYE: CPT | Mod: TC

## 2023-01-28 PROCEDURE — 72197 MRI FEMALE PELVIS W W/O CONTRAST: ICD-10-PCS | Mod: 26,,, | Performed by: RADIOLOGY

## 2023-01-28 PROCEDURE — 25500020 PHARM REV CODE 255: Performed by: OBSTETRICS & GYNECOLOGY

## 2023-01-28 PROCEDURE — A9585 GADOBUTROL INJECTION: HCPCS | Performed by: OBSTETRICS & GYNECOLOGY

## 2023-01-28 PROCEDURE — 72197 MRI PELVIS W/O & W/DYE: CPT | Mod: 26,,, | Performed by: RADIOLOGY

## 2023-01-28 RX ORDER — GADOBUTROL 604.72 MG/ML
7.5 INJECTION INTRAVENOUS
Status: COMPLETED | OUTPATIENT
Start: 2023-01-28 | End: 2023-01-28

## 2023-01-28 RX ADMIN — GADOBUTROL 7.5 ML: 604.72 INJECTION INTRAVENOUS at 03:01

## 2023-02-16 ENCOUNTER — PROCEDURE VISIT (OUTPATIENT)
Dept: OBSTETRICS AND GYNECOLOGY | Facility: CLINIC | Age: 53
End: 2023-02-16
Payer: COMMERCIAL

## 2023-02-16 VITALS — WEIGHT: 165.56 LBS | BODY MASS INDEX: 30.47 KG/M2 | HEIGHT: 62 IN

## 2023-02-16 DIAGNOSIS — D21.9 FIBROIDS: ICD-10-CM

## 2023-02-16 DIAGNOSIS — N92.4 EXCESSIVE BLEEDING IN PREMENOPAUSAL PERIOD: Primary | ICD-10-CM

## 2023-02-16 PROCEDURE — 58100 BIOPSY OF UTERUS LINING: CPT | Mod: S$GLB,,, | Performed by: OBSTETRICS & GYNECOLOGY

## 2023-02-16 PROCEDURE — 58100 ENDOMETRIAL BIOPSY: ICD-10-PCS | Mod: S$GLB,,, | Performed by: OBSTETRICS & GYNECOLOGY

## 2023-02-16 PROCEDURE — 88305 TISSUE EXAM BY PATHOLOGIST: CPT | Performed by: PATHOLOGY

## 2023-02-16 PROCEDURE — 88305 TISSUE EXAM BY PATHOLOGIST: CPT | Mod: 26,,, | Performed by: PATHOLOGY

## 2023-02-16 PROCEDURE — 88305 TISSUE EXAM BY PATHOLOGIST: ICD-10-PCS | Mod: 26,,, | Performed by: PATHOLOGY

## 2023-02-16 NOTE — PROCEDURES
Endometrial biopsy    Date/Time: 2/16/2023 2:00 PM  Performed by: Silvia Ram MD  Authorized by: Silvia Ram MD     Consent:     Consent obtained:  Written    Consent given by:  Patient    Patient questions answered: yes      Patient agrees, verbalizes understanding, and wants to proceed: yes      Educational handouts given: yes      Instructions and paperwork completed: yes    Indication:     Indications: Menorrhagia    Pre-procedure:     Pre-procedure timeout performed: yes    Procedure:     Cervix cleaned and prepped: no      A paracervical block was performed: no      An intracervical block was performed: no      The cervix was dilated: yes      Uterus sounded: yes      Uterus sound depth (cm):  8    Specimen collected: specimen collected and sent to pathology      Patient tolerated procedure well with no complications: no    Comments:     Procedure comments:  Patient is planning a possible myomectomy  she is not ready for a hysterectomy

## 2023-02-22 LAB
FINAL PATHOLOGIC DIAGNOSIS: NORMAL
GROSS: NORMAL
Lab: NORMAL

## 2023-02-24 ENCOUNTER — OFFICE VISIT (OUTPATIENT)
Dept: INTERNAL MEDICINE | Facility: CLINIC | Age: 53
End: 2023-02-24
Payer: COMMERCIAL

## 2023-02-24 ENCOUNTER — PATIENT OUTREACH (OUTPATIENT)
Dept: ADMINISTRATIVE | Facility: OTHER | Age: 53
End: 2023-02-24
Payer: COMMERCIAL

## 2023-02-24 VITALS
HEART RATE: 62 BPM | HEIGHT: 62 IN | WEIGHT: 172.19 LBS | DIASTOLIC BLOOD PRESSURE: 64 MMHG | OXYGEN SATURATION: 100 % | SYSTOLIC BLOOD PRESSURE: 118 MMHG | BODY MASS INDEX: 31.68 KG/M2

## 2023-02-24 DIAGNOSIS — E66.9 OBESITY (BMI 30-39.9): ICD-10-CM

## 2023-02-24 DIAGNOSIS — Z23 NEED FOR INFLUENZA VACCINATION: ICD-10-CM

## 2023-02-24 DIAGNOSIS — Z00.00 ANNUAL PHYSICAL EXAM: Primary | ICD-10-CM

## 2023-02-24 DIAGNOSIS — N93.9 ABNORMAL UTERINE BLEEDING (AUB): ICD-10-CM

## 2023-02-24 DIAGNOSIS — I10 ESSENTIAL HYPERTENSION: ICD-10-CM

## 2023-02-24 DIAGNOSIS — E89.0 S/P TOTAL THYROIDECTOMY: ICD-10-CM

## 2023-02-24 PROCEDURE — 3074F PR MOST RECENT SYSTOLIC BLOOD PRESSURE < 130 MM HG: ICD-10-PCS | Mod: CPTII,S$GLB,, | Performed by: INTERNAL MEDICINE

## 2023-02-24 PROCEDURE — 90686 FLU VACCINE (QUAD) GREATER THAN OR EQUAL TO 3YO PRESERVATIVE FREE IM: ICD-10-PCS | Mod: S$GLB,,, | Performed by: INTERNAL MEDICINE

## 2023-02-24 PROCEDURE — 3078F PR MOST RECENT DIASTOLIC BLOOD PRESSURE < 80 MM HG: ICD-10-PCS | Mod: CPTII,S$GLB,, | Performed by: INTERNAL MEDICINE

## 2023-02-24 PROCEDURE — 4010F PR ACE/ARB THEARPY RXD/TAKEN: ICD-10-PCS | Mod: CPTII,S$GLB,, | Performed by: INTERNAL MEDICINE

## 2023-02-24 PROCEDURE — 3074F SYST BP LT 130 MM HG: CPT | Mod: CPTII,S$GLB,, | Performed by: INTERNAL MEDICINE

## 2023-02-24 PROCEDURE — 99396 PR PREVENTIVE VISIT,EST,40-64: ICD-10-PCS | Mod: 25,S$GLB,, | Performed by: INTERNAL MEDICINE

## 2023-02-24 PROCEDURE — 99999 PR PBB SHADOW E&M-EST. PATIENT-LVL IV: ICD-10-PCS | Mod: PBBFAC,,, | Performed by: INTERNAL MEDICINE

## 2023-02-24 PROCEDURE — 4010F ACE/ARB THERAPY RXD/TAKEN: CPT | Mod: CPTII,S$GLB,, | Performed by: INTERNAL MEDICINE

## 2023-02-24 PROCEDURE — 3008F BODY MASS INDEX DOCD: CPT | Mod: CPTII,S$GLB,, | Performed by: INTERNAL MEDICINE

## 2023-02-24 PROCEDURE — 1159F PR MEDICATION LIST DOCUMENTED IN MEDICAL RECORD: ICD-10-PCS | Mod: CPTII,S$GLB,, | Performed by: INTERNAL MEDICINE

## 2023-02-24 PROCEDURE — 90686 IIV4 VACC NO PRSV 0.5 ML IM: CPT | Mod: S$GLB,,, | Performed by: INTERNAL MEDICINE

## 2023-02-24 PROCEDURE — 3044F HG A1C LEVEL LT 7.0%: CPT | Mod: CPTII,S$GLB,, | Performed by: INTERNAL MEDICINE

## 2023-02-24 PROCEDURE — 1160F PR REVIEW ALL MEDS BY PRESCRIBER/CLIN PHARMACIST DOCUMENTED: ICD-10-PCS | Mod: CPTII,S$GLB,, | Performed by: INTERNAL MEDICINE

## 2023-02-24 PROCEDURE — 90471 FLU VACCINE (QUAD) GREATER THAN OR EQUAL TO 3YO PRESERVATIVE FREE IM: ICD-10-PCS | Mod: S$GLB,,, | Performed by: INTERNAL MEDICINE

## 2023-02-24 PROCEDURE — 99999 PR PBB SHADOW E&M-EST. PATIENT-LVL IV: CPT | Mod: PBBFAC,,, | Performed by: INTERNAL MEDICINE

## 2023-02-24 PROCEDURE — 3078F DIAST BP <80 MM HG: CPT | Mod: CPTII,S$GLB,, | Performed by: INTERNAL MEDICINE

## 2023-02-24 PROCEDURE — 99396 PREV VISIT EST AGE 40-64: CPT | Mod: 25,S$GLB,, | Performed by: INTERNAL MEDICINE

## 2023-02-24 PROCEDURE — 3008F PR BODY MASS INDEX (BMI) DOCUMENTED: ICD-10-PCS | Mod: CPTII,S$GLB,, | Performed by: INTERNAL MEDICINE

## 2023-02-24 PROCEDURE — 90471 IMMUNIZATION ADMIN: CPT | Mod: S$GLB,,, | Performed by: INTERNAL MEDICINE

## 2023-02-24 PROCEDURE — 1159F MED LIST DOCD IN RCRD: CPT | Mod: CPTII,S$GLB,, | Performed by: INTERNAL MEDICINE

## 2023-02-24 PROCEDURE — 1160F RVW MEDS BY RX/DR IN RCRD: CPT | Mod: CPTII,S$GLB,, | Performed by: INTERNAL MEDICINE

## 2023-02-24 PROCEDURE — 3044F PR MOST RECENT HEMOGLOBIN A1C LEVEL <7.0%: ICD-10-PCS | Mod: CPTII,S$GLB,, | Performed by: INTERNAL MEDICINE

## 2023-02-24 RX ORDER — HYDROCHLOROTHIAZIDE 12.5 MG/1
12.5 TABLET ORAL DAILY
Qty: 90 TABLET | Refills: 1 | Status: SHIPPED | OUTPATIENT
Start: 2023-02-24 | End: 2023-03-15

## 2023-02-24 NOTE — PROGRESS NOTES
Patient ID: Isaura Mancini is a 52 y.o. female.    Chief Complaint: Annual Exam    HPI Isaura is a 52 y.o. female with  hypertension, fibroids, spinal stenosis causing chronic back pain and s/p thyroidectomy (Oct 2022 for Graves disease and suspicious thyroid nodule) who presents for annual exam.   No new acute complaints. Reviewed labs from 1/18/23.   Reviewed last TSH value. She says Dr. Hayes told her she can follow up with me now for the thyroid function and levothyroxine.   She had issues with abnormal uterine bleeding. This was managed by OB-GYN. We reviewed those pathology reports. She was treated with medication (name unknown to her) and no longer having issues with bleeding.   At end of visit, says she lost 30 lbs but gained it back. Wants help to lose weight again.     Health Maintenance Topics with due status: Not Due       Topic Last Completion Date    Cervical Cancer Screening 01/19/2021    TETANUS VACCINE 03/24/2021    Mammogram 08/18/2022    Hemoglobin A1c (Diabetic Prevention Screening) 01/18/2023    Lipid Panel 01/18/2023        Review of Systems   All other systems reviewed and are negative.      Objective:     Vitals:    02/24/23 0738   BP: 118/64   Pulse: 62        Physical Exam  Vitals reviewed.   Constitutional:       General: She is not in acute distress.     Appearance: Normal appearance. She is well-developed. She is not ill-appearing, toxic-appearing or diaphoretic.   HENT:      Head: Normocephalic and atraumatic.      Right Ear: External ear normal.      Left Ear: External ear normal.      Nose: Nose normal.   Eyes:      General: No scleral icterus.        Right eye: No discharge.         Left eye: No discharge.      Extraocular Movements: Extraocular movements intact.      Conjunctiva/sclera: Conjunctivae normal.   Neck:      Thyroid: No thyromegaly.      Trachea: No tracheal deviation.        Comments: Well healed horizontal incision scar across mid neck   Cardiovascular:      Rate  and Rhythm: Normal rate and regular rhythm.      Pulses: Normal pulses.      Heart sounds: Normal heart sounds. No murmur heard.    No friction rub. No gallop.   Pulmonary:      Effort: Pulmonary effort is normal. No respiratory distress.      Breath sounds: Normal breath sounds. No stridor. No wheezing, rhonchi or rales.   Chest:      Chest wall: No tenderness.   Abdominal:      General: Bowel sounds are normal. There is no distension.      Palpations: Abdomen is soft. There is no mass.      Tenderness: There is no abdominal tenderness. There is no guarding or rebound.      Hernia: No hernia is present.   Musculoskeletal:         General: No tenderness or deformity.      Cervical back: Neck supple.      Right lower leg: No edema.      Left lower leg: No edema.   Lymphadenopathy:      Cervical: No cervical adenopathy.   Skin:     General: Skin is warm and dry.      Findings: No erythema or rash.   Neurological:      General: No focal deficit present.      Mental Status: She is alert and oriented to person, place, and time. Mental status is at baseline.   Psychiatric:         Mood and Affect: Mood normal.         Behavior: Behavior normal.         Thought Content: Thought content normal.         Judgment: Judgment normal.       Assessment:       1. Annual physical exam    2. Need for influenza vaccination    3. Essential hypertension Well controlled   4. Abnormal uterine bleeding (AUB) Inactive   5. S/P total thyroidectomy Chronic   6. Obesity (BMI 30-39.9) Active       Plan:         Annual physical exam    Need for influenza vaccination  -     Influenza - Quadrivalent *Preferred* (6 months+) (PF)    Essential hypertension  Comments:  Cont current medication  Orders:  -     hydroCHLOROthiazide (HYDRODIURIL) 12.5 MG Tab; Take 1 tablet (12.5 mg total) by mouth once daily.  Dispense: 90 tablet; Refill: 1  -     Comprehensive Metabolic Panel; Future; Expected date: 08/24/2023    Abnormal uterine bleeding  (AUB)  Comments:  Managed by OB-GYN    S/P total thyroidectomy  Comments:  Cont current medication. Monitor TSH  Orders:  -     TSH; Future; Expected date: 08/24/2023    Obesity (BMI 30-39.9)  -     Ambulatory referral/consult to Bariatric Medicine; Future; Expected date: 03/03/2023        RTC 6 months     Warning signs discussed, patient to call with any further issues or worsening of symptoms.       Parts of the above note were dictated using a voice dictation software. Please excuse any grammatical or typographical errors.

## 2023-03-02 LAB
FINAL PATHOLOGIC DIAGNOSIS: NORMAL
Lab: NORMAL
SUPPLEMENTAL DIAGNOSIS: NORMAL

## 2023-03-02 NOTE — PROGRESS NOTES
CHW - Initial Contact    This Community Health Worker completed and updated the Social Determinant of Health questionnaire with patient via telephone today.    Pt identified barriers of most importance are: Food, Rental Assistance, Transportation, Utilities   Referrals to community agencies completed with patient/caregiver consent outside of [facility] include:  [facility] [employer], [employer]  Referrals were put through [facility] - yes: Utility and SNAP assistance  Other information discussed the patient needs / wants help with: Pt stated that she has applied for food benefits in the past but was denied due to income, CHW informed pt that due to the cost of medical transportation she can include that in her application for food for proper deductions, pt voiced understanding. Consent was sent and signed by pt. Pt also stated that she hasn't missed any of her appointments due to lack of transportation, she usually would get a family member or friend to bring her. CHW will continue to locate transportation agency that accepts [employer] Insurance  Follow up required: CHW will continue to f/u with pt  Follow-up Outreach - Due: 3/16/2023

## 2023-03-03 NOTE — PROGRESS NOTES
Graves disease and hyperplastic thyroid nodule w/Hurthle cell change s/p total thyroidectomy 10/28/2022.      Original pathology reported a hyperplastic/ adenomatous nodule with Hurthle cell change in a background of chronic thyroiditis.  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     Patient has been updated via the Patient Portal.     CC: Blair  - I think this revised diagnosis warrants ongoing surveillance with Endocrinology.  I will submit it for official Tumor Board evaluation and we can coordinate a surveillance plan.   She has at TSH level scheduled around August with Dr. Shah her PCP.    CC: Alyssa / SANDRA

## 2023-03-20 ENCOUNTER — TELEPHONE (OUTPATIENT)
Dept: ENDOCRINOLOGY | Facility: CLINIC | Age: 53
End: 2023-03-20
Payer: COMMERCIAL

## 2023-03-20 NOTE — TELEPHONE ENCOUNTER
Isuara Mancini   [id number]     52 y.o.female last seen in endocrinology clinic on 06/06/2022 for hyperthyroidism and labs returned concerning for Graves disease with elevated TRAb of 4.77, undetectable TSH with elevated free T4 of 1.71 and elevated total T3 of 182.  Methimazole was started along with beta-blocker and thyroid ultrasound 06/06/2022 demonstrated concerning thyroid nodules (review imaging)    - FNA was performed of these nodules on 08/24/2022 and FNA returned as suspicious for follicular neoplasm and patient was referred to Endocrine surgery for total thyroidectomy which took place on 10/28/2022 (review pathology: benign hyperplastic/adenomatous nodule with Hurthle cell changes)    - Methimazole was adjusted from 10 mg twice daily started at 1st visit on 06/06/2022 down to once daily on 09/06/2022 after improvement in TSH.  Continues on levothyroxine 125 mcg daily.    - Last seen by Dr. Hayes 12/5/2022 which time she had a postoperative seroma felt to improve and was given potassium replacement    - Next visit with me on 05/25/2023    Labs  Lab Results   Component Value Date    TSH 2.019 12/19/2022    TSH 3.951 10/18/2022    TSH 2.987 09/02/2022    FREET4 1.20 12/19/2022    FREET4 0.84 10/18/2022    FREET4 0.85 09/02/2022     Lab Results   Component Value Date    K 3.7 01/18/2023    K 3.5 12/19/2022     01/18/2023     (L) 12/19/2022    CREATININE 0.7 01/18/2023    CREATININE 0.7 12/19/2022     Question for the group

## 2023-03-21 ENCOUNTER — TELEPHONE (OUTPATIENT)
Dept: ENDOCRINOLOGY | Facility: CLINIC | Age: 53
End: 2023-03-21
Payer: COMMERCIAL

## 2023-03-21 DIAGNOSIS — E04.2 MULTINODULAR GOITER: Primary | ICD-10-CM

## 2023-03-21 NOTE — TELEPHONE ENCOUNTER
Reviewed Ms. Mancini's case with Endocrine tumor board today 03/21/2023.  Patient is scheduled for visit with me on 05/25/2023 regarding follow-up after undertaking total thyroidectomy on 10/28/2022 with pathology demonstrating benign hyperplastic/adenomatous nodule with Hurthle cell changes.    Tumor board consensus is to monitor for now and measure postoperative thyroglobulin along with TSH and BMP.  If insufficiently suppressed to post thyroidectomy levels or if demonstrating increase in thyroglobulin, may consider postoperative radioactive iodine ablation.

## 2023-03-22 ENCOUNTER — LAB VISIT (OUTPATIENT)
Dept: LAB | Facility: HOSPITAL | Age: 53
End: 2023-03-22
Attending: STUDENT IN AN ORGANIZED HEALTH CARE EDUCATION/TRAINING PROGRAM
Payer: COMMERCIAL

## 2023-03-22 DIAGNOSIS — E04.2 MULTINODULAR GOITER: ICD-10-CM

## 2023-03-22 LAB
ANION GAP SERPL CALC-SCNC: 8 MMOL/L (ref 8–16)
BUN SERPL-MCNC: 19 MG/DL (ref 6–20)
CALCIUM SERPL-MCNC: 9.2 MG/DL (ref 8.7–10.5)
CHLORIDE SERPL-SCNC: 106 MMOL/L (ref 95–110)
CO2 SERPL-SCNC: 25 MMOL/L (ref 23–29)
CREAT SERPL-MCNC: 0.9 MG/DL (ref 0.5–1.4)
EST. GFR  (NO RACE VARIABLE): >60 ML/MIN/1.73 M^2
GLUCOSE SERPL-MCNC: 66 MG/DL (ref 70–110)
POTASSIUM SERPL-SCNC: 4.2 MMOL/L (ref 3.5–5.1)
SODIUM SERPL-SCNC: 139 MMOL/L (ref 136–145)
TSH SERPL DL<=0.005 MIU/L-ACNC: 0.76 UIU/ML (ref 0.4–4)

## 2023-03-22 PROCEDURE — 80048 BASIC METABOLIC PNL TOTAL CA: CPT | Performed by: STUDENT IN AN ORGANIZED HEALTH CARE EDUCATION/TRAINING PROGRAM

## 2023-03-22 PROCEDURE — 84432 ASSAY OF THYROGLOBULIN: CPT | Performed by: STUDENT IN AN ORGANIZED HEALTH CARE EDUCATION/TRAINING PROGRAM

## 2023-03-22 PROCEDURE — 84443 ASSAY THYROID STIM HORMONE: CPT | Performed by: STUDENT IN AN ORGANIZED HEALTH CARE EDUCATION/TRAINING PROGRAM

## 2023-03-23 ENCOUNTER — TELEPHONE (OUTPATIENT)
Dept: BARIATRICS | Facility: CLINIC | Age: 53
End: 2023-03-23
Payer: COMMERCIAL

## 2023-03-23 LAB
THRYOGLOBULIN INTERPRETATION: ABNORMAL
THYROGLOB AB SERPL-ACNC: <1.8 IU/ML
THYROGLOB SERPL-MCNC: 0.2 NG/ML

## 2023-03-24 ENCOUNTER — PATIENT MESSAGE (OUTPATIENT)
Dept: ENDOCRINOLOGY | Facility: CLINIC | Age: 53
End: 2023-03-24
Payer: COMMERCIAL

## 2023-03-24 NOTE — PROGRESS NOTES
Patient we discussed at tumor board that had Hurthle cell changes    Thyroglobulin mildly elevated at 0.2 with negative thyroglobulin antibody after total thyroidectomy 10/28/2022  TSH of 0.76; while on levothyroxine 125 mcg    - May ask to increase levothyroxine dose by having her take an extra 125 mcg once weekly and recheck TSH in 8 weeks for goal less than 0.5 if we are worried about recurrence with Hurthle cell changes  - Plan to continue with plan to follow-up in May with me and recheck TSH at that time as well as get postoperative thyroid ultrasound   - Will recheck of thyroglobulin in 6 months to assess for change    Please let me know if you agree with plan

## 2023-04-13 ENCOUNTER — PATIENT OUTREACH (OUTPATIENT)
Dept: ADMINISTRATIVE | Facility: OTHER | Age: 53
End: 2023-04-13
Payer: COMMERCIAL

## 2023-04-13 NOTE — PROGRESS NOTES
CHW - Follow Up    This Community Health Worker completed a follow up visit with patient via telephone today.  Community Health Worker provided: CHW placed another (2) referral for utility assistance due to previously entered referral not getting a response. Referral was placed to [facility] [employer].  Follow up required: CHW will continue to f/u

## 2023-04-17 ENCOUNTER — PATIENT MESSAGE (OUTPATIENT)
Dept: INTERNAL MEDICINE | Facility: CLINIC | Age: 53
End: 2023-04-17
Payer: COMMERCIAL

## 2023-05-03 ENCOUNTER — PATIENT OUTREACH (OUTPATIENT)
Dept: ADMINISTRATIVE | Facility: OTHER | Age: 53
End: 2023-05-03
Payer: COMMERCIAL

## 2023-05-03 NOTE — PROGRESS NOTES
CHW - Outreach Attempt    Community Health Worker could not leave a voicemail message for 1st attempt to contact patient regarding: f/u  Community Health Worker to attempt to contact patient on: 5/17

## 2023-05-23 NOTE — PROGRESS NOTES
Subjective:      Chief Complaint:  Post thyroidectomy with PCT    History of Present Illness  52 year old female seen in endocrinology clinic on 06/06/2022 for hyperthyroidism and labs returned concerning for Graves disease with elevated TRAb of 4.77, undetectable TSH with elevated free T4 of 1.71 and elevated total T3 of 182. Methimazole was started along with beta-blocker and thyroid ultrasound 06/06/2022 demonstrated concerning thyroid nodules.     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 The carcinoma does not extend beyond the thyroid gland, and vascular invasion is not identified. Margins negative for tumor.    - Occupation: [employer] in [employer] department    - Interval history: Lethargy, lack of energy, leg cramps, no perioral numbness, some hand numbness, having headaches as well      Regarding minimally invasive Hurthle cell carcinoma:    - Pre-surgical thyroid ultrasound 06/20/2022: FINDINGS: Right thyroid lobe measures 4.9 x 1.2 x 2.0 cm.  Measured nodule as follows: (1) ill-defined hypoechoic nodule containing punctate echogenic focus measuring 1.0 x 0.5 x 0.9 cm; (2) hypoechoic subcentimeter nodule with ill-defined margin measuring up to 0.5 cm. Left thyroid lobe measures 5.1 x 1.5 x 1.5 cm.  Measured nodule as follows: (3) ill-defined peripheral hypoechoic, centrally isoechoic nodule measuring approximately 1.8 x 1.2 x 1.3 cm; (4) hypoechoic subcentimeter nodule with ill-defined margin measuring up to 0.5 cm. Isthmus measures 0.2 cm. No evidence for cervical adenopathy. Impression: Multinodular thyroid.  One nodule in each lobe which meets criteria for FNA (above labeled nodule 1 and 3)    - FNA was performed of these nodules on 08/24/2022 and FNA returned as suspicious for follicular neoplasm   - Underwent total thyroidectomy 10/28/2022  - Pathology 10/28/2022: Minimally invasive  Hurthle cell carcinoma (0.8 cm, left lobe). The carcinoma does not extend beyond the thyroid gland, and vascular invasion is not identified. Margins negative for tumor.  - Tumor board consensus is to monitor for now and measure postoperative thyroglobulin along with TSH and BMP. If insufficiently suppressed to post thyroidectomy levels or if demonstrating increase in thyroglobulin, may consider postoperative radioactive iodine ablation.    Lab Results   Component Value Date    TSH 0.762 03/22/2023    TSH 2.019 12/19/2022    TSH 3.951 10/18/2022    THYGLBTUM 0.2 (H) 03/22/2023    THGABSCRN <1.8 03/22/2023     - Risk Factors: None. Denies radiation to the head/neck, personal history of colon or breast cancer, tobacco use, or family history of thyroid cancer    - Compressive Symptoms: Anterior neck pressure (inconsistently sometimes in the morning, not worsening)  - Denies dysphagia or voice changes      Regarding Postoperative Hypothyroidism:    Lab Results   Component Value Date    TSH 0.762 03/22/2023    TSH 2.019 12/19/2022    TSH 3.951 10/18/2022    FREET4 1.20 12/19/2022    FREET4 0.84 10/18/2022    FREET4 0.85 09/02/2022    THYROIDSTIMI 2.29 (H) 06/06/2022     03/22/2023     01/18/2023    GLU 66 (L) 03/22/2023    GLU 82 01/18/2023     - Duration of hypothyroidism:  October 2022 since total thyroidectomy  - Current relevant medications: levothyroxine T4 (Synthroid) 125 mcg daily  - Weight based dosing ([74 kg] x 1.6): 118 mcg  - Takes thyroid medications properly    - Current symptoms: weight gain, fatigue, constipation, cold intolerance, muscle weakness, neck swelling  - Patient denies hair loss, brittle nails, mental fog, memory impairment, hoarseness, periorbital edema, lithium/amiodarone use, chemotherapy, prior neck radiation, recent severe illness, recent pregnancy, and infertility/abnormal menstruation     - Personal or Family History: Patient denies personal or family history of thyroid  disorders or thyroid cancer    - I independently reviewed all pertinent outside records and imaging    Objective:   /78   Pulse 74   Resp 16   Wt 74.4 kg (164 lb)   SpO2 99%   BMI 30.00 kg/m²   Physical Exam  Constitutional:       Appearance: Normal appearance.   Pulmonary:      Effort: Pulmonary effort is normal.   Musculoskeletal:      Cervical back: Neck supple. No tenderness.   Neurological:      General: No focal deficit present.      Mental Status: She is alert and oriented to person, place, and time.   Psychiatric:         Mood and Affect: Mood normal.         Behavior: Behavior normal.     BP Readings from Last 1 Encounters:   05/25/23 138/78      Wt Readings from Last 1 Encounters:   05/25/23 0858 74.4 kg (164 lb)     Body mass index is 30 kg/m².    Lab Review:   Lab Results   Component Value Date    HGBA1C 5.0 01/18/2023     Lab Results   Component Value Date    CHOL 196 01/18/2023    HDL 63 01/18/2023    LDLCALC 123.8 01/18/2023    TRIG 46 01/18/2023    CHOLHDL 32.1 01/18/2023     Lab Results   Component Value Date     03/22/2023    K 4.2 03/22/2023     03/22/2023    CO2 25 03/22/2023    GLU 66 (L) 03/22/2023    BUN 19 03/22/2023    CREATININE 0.9 03/22/2023    CALCIUM 9.2 03/22/2023    PROT 7.2 01/18/2023    ALBUMIN 3.7 01/18/2023    BILITOT 0.7 01/18/2023    ALKPHOS 56 01/18/2023    AST 17 01/18/2023    ALT 6 (L) 01/18/2023    ANIONGAP 8 03/22/2023    ESTGFRAFRICA >60.0 07/15/2022    EGFRNONAA >60.0 07/15/2022    TSH 0.762 03/22/2023     - All pertinent labs reviewed    Assessment and Plan     Hurthle cell carcinoma of thyroid  - Underwent total thyroidectomy 10/28/2022  - Pathology 10/28/2022: 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 The carcinoma does not extend beyond the thyroid gland, and vascular invasion is not identified. Margins negative for tumor.  - Tumor board consensus is to monitor for now and measure postoperative thyroglobulin along with TSH  and BMP. If insufficiently suppressed to post thyroidectomy levels or if demonstrating increase in thyroglobulin, may consider postoperative radioactive iodine ablation.    - Schedule thyroid ultrasound now we are 6 months postop  - Labs in mid September to re-check thyroglobulin for surveillance  - Return to clinic 2-3 weeks after thyroglobulin lab in September    Postoperative hypothyroidism  - Currently taking levothyroxine 125 mcg with extra half pill on Wednesdays.  Having some concerning hypothyroid symptoms    - Labs today with renal panel, TSH, and free T4. Will adjust levothyroxine if needed to maintain goal suppression    Class 2 obesity with body mass index (BMI) of 37.0 to 37.9 in adult  - Continue conservative lifestyle modifications      Vernon Pérez DO  [facility], [address]th [address]  [address] [place], [address]    Office: [phone]  Fax: [phone]    Disclaimer: This note has been generated using voice-recognition software. There may be typographical errors that have been missed during proof-reading.    The above history labs imaging impression and plan were discussed with attending physician who is in agreement and also took part in this patient's care.  I personally reviewed all of the patients available medications, labs, imaging, vitals, allergies, medical history

## 2023-05-25 ENCOUNTER — LAB VISIT (OUTPATIENT)
Dept: LAB | Facility: HOSPITAL | Age: 53
End: 2023-05-25
Payer: COMMERCIAL

## 2023-05-25 ENCOUNTER — OFFICE VISIT (OUTPATIENT)
Dept: ENDOCRINOLOGY | Facility: CLINIC | Age: 53
End: 2023-05-25
Payer: COMMERCIAL

## 2023-05-25 VITALS
RESPIRATION RATE: 16 BRPM | OXYGEN SATURATION: 99 % | WEIGHT: 164 LBS | BODY MASS INDEX: 30 KG/M2 | SYSTOLIC BLOOD PRESSURE: 138 MMHG | HEART RATE: 74 BPM | DIASTOLIC BLOOD PRESSURE: 78 MMHG

## 2023-05-25 DIAGNOSIS — E05.90 HYPERTHYROIDISM: ICD-10-CM

## 2023-05-25 DIAGNOSIS — C73 HURTHLE CELL CARCINOMA OF THYROID: ICD-10-CM

## 2023-05-25 DIAGNOSIS — E04.2 MULTINODULAR GOITER: Primary | ICD-10-CM

## 2023-05-25 DIAGNOSIS — E66.01 CLASS 2 SEVERE OBESITY WITH SERIOUS COMORBIDITY AND BODY MASS INDEX (BMI) OF 37.0 TO 37.9 IN ADULT, UNSPECIFIED OBESITY TYPE: ICD-10-CM

## 2023-05-25 DIAGNOSIS — E89.0 POSTOPERATIVE HYPOTHYROIDISM: ICD-10-CM

## 2023-05-25 PROBLEM — E04.1 THYROID NODULE: Status: RESOLVED | Noted: 2022-09-22 | Resolved: 2023-05-25

## 2023-05-25 PROBLEM — E05.00 GRAVES DISEASE: Status: RESOLVED | Noted: 2022-10-28 | Resolved: 2023-05-25

## 2023-05-25 LAB
ALBUMIN SERPL BCP-MCNC: 3.9 G/DL (ref 3.5–5.2)
ANION GAP SERPL CALC-SCNC: 8 MMOL/L (ref 8–16)
BUN SERPL-MCNC: 17 MG/DL (ref 6–20)
CALCIUM SERPL-MCNC: 9.8 MG/DL (ref 8.7–10.5)
CHLORIDE SERPL-SCNC: 106 MMOL/L (ref 95–110)
CO2 SERPL-SCNC: 24 MMOL/L (ref 23–29)
CREAT SERPL-MCNC: 0.8 MG/DL (ref 0.5–1.4)
EST. GFR  (NO RACE VARIABLE): >60 ML/MIN/1.73 M^2
GLUCOSE SERPL-MCNC: 83 MG/DL (ref 70–110)
PHOSPHATE SERPL-MCNC: 3.2 MG/DL (ref 2.7–4.5)
POTASSIUM SERPL-SCNC: 4.8 MMOL/L (ref 3.5–5.1)
SODIUM SERPL-SCNC: 138 MMOL/L (ref 136–145)

## 2023-05-25 PROCEDURE — 3078F DIAST BP <80 MM HG: CPT | Mod: CPTII,S$GLB,, | Performed by: STUDENT IN AN ORGANIZED HEALTH CARE EDUCATION/TRAINING PROGRAM

## 2023-05-25 PROCEDURE — 4010F ACE/ARB THERAPY RXD/TAKEN: CPT | Mod: CPTII,S$GLB,, | Performed by: STUDENT IN AN ORGANIZED HEALTH CARE EDUCATION/TRAINING PROGRAM

## 2023-05-25 PROCEDURE — 36415 COLL VENOUS BLD VENIPUNCTURE: CPT | Performed by: STUDENT IN AN ORGANIZED HEALTH CARE EDUCATION/TRAINING PROGRAM

## 2023-05-25 PROCEDURE — 80069 RENAL FUNCTION PANEL: CPT | Performed by: STUDENT IN AN ORGANIZED HEALTH CARE EDUCATION/TRAINING PROGRAM

## 2023-05-25 PROCEDURE — 3008F BODY MASS INDEX DOCD: CPT | Mod: CPTII,S$GLB,, | Performed by: STUDENT IN AN ORGANIZED HEALTH CARE EDUCATION/TRAINING PROGRAM

## 2023-05-25 PROCEDURE — 99214 OFFICE O/P EST MOD 30 MIN: CPT | Mod: S$GLB,,, | Performed by: STUDENT IN AN ORGANIZED HEALTH CARE EDUCATION/TRAINING PROGRAM

## 2023-05-25 PROCEDURE — 99999 PR PBB SHADOW E&M-EST. PATIENT-LVL IV: CPT | Mod: PBBFAC,,, | Performed by: STUDENT IN AN ORGANIZED HEALTH CARE EDUCATION/TRAINING PROGRAM

## 2023-05-25 PROCEDURE — 1160F RVW MEDS BY RX/DR IN RCRD: CPT | Mod: CPTII,S$GLB,, | Performed by: STUDENT IN AN ORGANIZED HEALTH CARE EDUCATION/TRAINING PROGRAM

## 2023-05-25 PROCEDURE — 3044F HG A1C LEVEL LT 7.0%: CPT | Mod: CPTII,S$GLB,, | Performed by: STUDENT IN AN ORGANIZED HEALTH CARE EDUCATION/TRAINING PROGRAM

## 2023-05-25 PROCEDURE — 3075F SYST BP GE 130 - 139MM HG: CPT | Mod: CPTII,S$GLB,, | Performed by: STUDENT IN AN ORGANIZED HEALTH CARE EDUCATION/TRAINING PROGRAM

## 2023-05-25 PROCEDURE — 3075F PR MOST RECENT SYSTOLIC BLOOD PRESS GE 130-139MM HG: ICD-10-PCS | Mod: CPTII,S$GLB,, | Performed by: STUDENT IN AN ORGANIZED HEALTH CARE EDUCATION/TRAINING PROGRAM

## 2023-05-25 PROCEDURE — 1159F MED LIST DOCD IN RCRD: CPT | Mod: CPTII,S$GLB,, | Performed by: STUDENT IN AN ORGANIZED HEALTH CARE EDUCATION/TRAINING PROGRAM

## 2023-05-25 PROCEDURE — 3008F PR BODY MASS INDEX (BMI) DOCUMENTED: ICD-10-PCS | Mod: CPTII,S$GLB,, | Performed by: STUDENT IN AN ORGANIZED HEALTH CARE EDUCATION/TRAINING PROGRAM

## 2023-05-25 PROCEDURE — 3078F PR MOST RECENT DIASTOLIC BLOOD PRESSURE < 80 MM HG: ICD-10-PCS | Mod: CPTII,S$GLB,, | Performed by: STUDENT IN AN ORGANIZED HEALTH CARE EDUCATION/TRAINING PROGRAM

## 2023-05-25 PROCEDURE — 1159F PR MEDICATION LIST DOCUMENTED IN MEDICAL RECORD: ICD-10-PCS | Mod: CPTII,S$GLB,, | Performed by: STUDENT IN AN ORGANIZED HEALTH CARE EDUCATION/TRAINING PROGRAM

## 2023-05-25 PROCEDURE — 99999 PR PBB SHADOW E&M-EST. PATIENT-LVL IV: ICD-10-PCS | Mod: PBBFAC,,, | Performed by: STUDENT IN AN ORGANIZED HEALTH CARE EDUCATION/TRAINING PROGRAM

## 2023-05-25 PROCEDURE — 1160F PR REVIEW ALL MEDS BY PRESCRIBER/CLIN PHARMACIST DOCUMENTED: ICD-10-PCS | Mod: CPTII,S$GLB,, | Performed by: STUDENT IN AN ORGANIZED HEALTH CARE EDUCATION/TRAINING PROGRAM

## 2023-05-25 PROCEDURE — 3044F PR MOST RECENT HEMOGLOBIN A1C LEVEL <7.0%: ICD-10-PCS | Mod: CPTII,S$GLB,, | Performed by: STUDENT IN AN ORGANIZED HEALTH CARE EDUCATION/TRAINING PROGRAM

## 2023-05-25 PROCEDURE — 4010F PR ACE/ARB THEARPY RXD/TAKEN: ICD-10-PCS | Mod: CPTII,S$GLB,, | Performed by: STUDENT IN AN ORGANIZED HEALTH CARE EDUCATION/TRAINING PROGRAM

## 2023-05-25 PROCEDURE — 99214 PR OFFICE/OUTPT VISIT, EST, LEVL IV, 30-39 MIN: ICD-10-PCS | Mod: S$GLB,,, | Performed by: STUDENT IN AN ORGANIZED HEALTH CARE EDUCATION/TRAINING PROGRAM

## 2023-05-25 RX ORDER — HYDROCHLOROTHIAZIDE 12.5 MG/1
12.5 TABLET ORAL
COMMUNITY
Start: 2023-05-23 | End: 2023-08-21

## 2023-05-25 NOTE — PROGRESS NOTES
I have reviewed and concur with Dr. Kay's history, physical, assessment, and plan.  I have personally interviewed and examined the patient.  See below addendum for my evaluation and additional findings.    Patient with history of Graves disease and minimally invasive Hurthle cell carcinoma status post total thyroidectomy in October 2022.  Her postoperative thyroglobulin is mildly detectable at 0.2.  Case was discussed at tumor board with agreement to hold on radioactive iodine and continue with surveillance including periodic imaging and thyroglobulin levels.  Discussed with patient if there is a concerning finding on imaging or a significant rise in thyroglobulin may need to be more aggressive with treatment.  Will schedule ultrasound and thyroglobulin in a few with follow-up at that time.  Will monitor TSH as she is having some concerning for hypothyroidism on current dose of levothyroxine.    Eliecer Cantor MD

## 2023-05-25 NOTE — ASSESSMENT & PLAN NOTE
- Currently taking levothyroxine 125 mcg with extra half pill on Wednesdays.  Having some concerning hypothyroid symptoms    - Labs today with renal panel, TSH, and free T4. Will adjust levothyroxine if needed to maintain goal suppression

## 2023-05-25 NOTE — PATIENT INSTRUCTIONS
- Schedule thyroid ultrasound  - Labs today with renal panel, TSH, and free T4  - Labs in mid September to re-check thyroglobulin for surveillance  - Return to clinic 2-3 weeks after thyroglobulin lab in September      Vernon Pérez DO  [facility], [address]  [address] [place], [address]    Office: [phone]  Fax: [phone]

## 2023-05-25 NOTE — ASSESSMENT & PLAN NOTE
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 The carcinoma does not extend beyond the thyroid gland, and vascular invasion is not identified. Margins negative for tumor.  - Tumor board consensus is to monitor for now and measure postoperative thyroglobulin along with TSH and BMP. If insufficiently suppressed to post thyroidectomy levels or if demonstrating increase in thyroglobulin, may consider postoperative radioactive iodine ablation.    - Schedule thyroid ultrasound now we are 6 months postop  - Labs in mid September to re-check thyroglobulin for surveillance  - Return to clinic 2-3 weeks after thyroglobulin lab in September

## 2023-05-29 ENCOUNTER — PATIENT MESSAGE (OUTPATIENT)
Dept: ENDOCRINOLOGY | Facility: CLINIC | Age: 53
End: 2023-05-29
Payer: COMMERCIAL

## 2023-06-02 ENCOUNTER — LAB VISIT (OUTPATIENT)
Dept: LAB | Facility: HOSPITAL | Age: 53
End: 2023-06-02
Attending: INTERNAL MEDICINE
Payer: COMMERCIAL

## 2023-06-02 DIAGNOSIS — E89.0 S/P TOTAL THYROIDECTOMY: ICD-10-CM

## 2023-06-02 DIAGNOSIS — I10 ESSENTIAL HYPERTENSION: ICD-10-CM

## 2023-06-02 LAB
ALBUMIN SERPL BCP-MCNC: 4 G/DL (ref 3.5–5.2)
ALP SERPL-CCNC: 55 U/L (ref 55–135)
ALT SERPL W/O P-5'-P-CCNC: 8 U/L (ref 10–44)
ANION GAP SERPL CALC-SCNC: 11 MMOL/L (ref 8–16)
AST SERPL-CCNC: 17 U/L (ref 10–40)
BILIRUB SERPL-MCNC: 0.3 MG/DL (ref 0.1–1)
BUN SERPL-MCNC: 14 MG/DL (ref 6–20)
CALCIUM SERPL-MCNC: 9.6 MG/DL (ref 8.7–10.5)
CHLORIDE SERPL-SCNC: 103 MMOL/L (ref 95–110)
CO2 SERPL-SCNC: 22 MMOL/L (ref 23–29)
CREAT SERPL-MCNC: 0.7 MG/DL (ref 0.5–1.4)
EST. GFR  (NO RACE VARIABLE): >60 ML/MIN/1.73 M^2
GLUCOSE SERPL-MCNC: 88 MG/DL (ref 70–110)
POTASSIUM SERPL-SCNC: 3.6 MMOL/L (ref 3.5–5.1)
PROT SERPL-MCNC: 7.3 G/DL (ref 6–8.4)
SODIUM SERPL-SCNC: 136 MMOL/L (ref 136–145)

## 2023-06-02 PROCEDURE — 36415 COLL VENOUS BLD VENIPUNCTURE: CPT | Mod: PO | Performed by: INTERNAL MEDICINE

## 2023-06-02 PROCEDURE — 80053 COMPREHEN METABOLIC PANEL: CPT | Performed by: INTERNAL MEDICINE

## 2023-06-02 PROCEDURE — 84443 ASSAY THYROID STIM HORMONE: CPT | Performed by: INTERNAL MEDICINE

## 2023-06-03 LAB — TSH SERPL DL<=0.005 MIU/L-ACNC: 2.15 UIU/ML (ref 0.4–4)

## 2023-06-07 ENCOUNTER — PATIENT OUTREACH (OUTPATIENT)
Dept: ADMINISTRATIVE | Facility: OTHER | Age: 53
End: 2023-06-07
Payer: COMMERCIAL

## 2023-06-07 NOTE — PROGRESS NOTES
CHW - Outreach Attempt    Community Health Worker left a voicemail message for 2nd attempt to contact patient regarding: F/U  Community Health Worker to attempt to contact patient on: 6/21

## 2023-06-22 ENCOUNTER — HOSPITAL ENCOUNTER (OUTPATIENT)
Dept: RADIOLOGY | Facility: HOSPITAL | Age: 53
Discharge: HOME | End: 2023-06-22
Attending: STUDENT IN AN ORGANIZED HEALTH CARE EDUCATION/TRAINING PROGRAM
Payer: COMMERCIAL

## 2023-06-22 ENCOUNTER — OFFICE VISIT (OUTPATIENT)
Dept: PODIATRY | Facility: CLINIC | Age: 53
End: 2023-06-22
Payer: COMMERCIAL

## 2023-06-22 VITALS
BODY MASS INDEX: 30 KG/M2 | SYSTOLIC BLOOD PRESSURE: 123 MMHG | HEART RATE: 62 BPM | HEIGHT: 62 IN | DIASTOLIC BLOOD PRESSURE: 79 MMHG

## 2023-06-22 DIAGNOSIS — M25.572 CHRONIC PAIN OF LEFT ANKLE: ICD-10-CM

## 2023-06-22 DIAGNOSIS — M76.72 PERONEAL TENDINITIS OF LEFT LOWER EXTREMITY: ICD-10-CM

## 2023-06-22 DIAGNOSIS — G89.29 CHRONIC PAIN OF LEFT ANKLE: ICD-10-CM

## 2023-06-22 DIAGNOSIS — M76.72 PERONEAL TENDINITIS OF LEFT LOWER EXTREMITY: Primary | ICD-10-CM

## 2023-06-22 PROCEDURE — 73630 X-RAY EXAM OF FOOT: CPT | Mod: 26,LT,, | Performed by: RADIOLOGY

## 2023-06-22 PROCEDURE — 3074F PR MOST RECENT SYSTOLIC BLOOD PRESSURE < 130 MM HG: ICD-10-PCS | Mod: CPTII,S$GLB,, | Performed by: STUDENT IN AN ORGANIZED HEALTH CARE EDUCATION/TRAINING PROGRAM

## 2023-06-22 PROCEDURE — 73630 X-RAY EXAM OF FOOT: CPT | Mod: TC,FY,LT

## 2023-06-22 PROCEDURE — 3074F SYST BP LT 130 MM HG: CPT | Mod: CPTII,S$GLB,, | Performed by: STUDENT IN AN ORGANIZED HEALTH CARE EDUCATION/TRAINING PROGRAM

## 2023-06-22 PROCEDURE — 73630 XR FOOT COMPLETE 3 VIEW LEFT: ICD-10-PCS | Mod: 26,LT,, | Performed by: RADIOLOGY

## 2023-06-22 PROCEDURE — 99999 PR PBB SHADOW E&M-EST. PATIENT-LVL IV: ICD-10-PCS | Mod: PBBFAC,,, | Performed by: STUDENT IN AN ORGANIZED HEALTH CARE EDUCATION/TRAINING PROGRAM

## 2023-06-22 PROCEDURE — 3008F PR BODY MASS INDEX (BMI) DOCUMENTED: ICD-10-PCS | Mod: CPTII,S$GLB,, | Performed by: STUDENT IN AN ORGANIZED HEALTH CARE EDUCATION/TRAINING PROGRAM

## 2023-06-22 PROCEDURE — 73610 X-RAY EXAM OF ANKLE: CPT | Mod: 26,LT,, | Performed by: RADIOLOGY

## 2023-06-22 PROCEDURE — 3044F PR MOST RECENT HEMOGLOBIN A1C LEVEL <7.0%: ICD-10-PCS | Mod: CPTII,S$GLB,, | Performed by: STUDENT IN AN ORGANIZED HEALTH CARE EDUCATION/TRAINING PROGRAM

## 2023-06-22 PROCEDURE — 99203 OFFICE O/P NEW LOW 30 MIN: CPT | Mod: S$GLB,,, | Performed by: STUDENT IN AN ORGANIZED HEALTH CARE EDUCATION/TRAINING PROGRAM

## 2023-06-22 PROCEDURE — 73610 XR ANKLE COMPLETE 3 VIEW LEFT: ICD-10-PCS | Mod: 26,LT,, | Performed by: RADIOLOGY

## 2023-06-22 PROCEDURE — 3044F HG A1C LEVEL LT 7.0%: CPT | Mod: CPTII,S$GLB,, | Performed by: STUDENT IN AN ORGANIZED HEALTH CARE EDUCATION/TRAINING PROGRAM

## 2023-06-22 PROCEDURE — 3078F DIAST BP <80 MM HG: CPT | Mod: CPTII,S$GLB,, | Performed by: STUDENT IN AN ORGANIZED HEALTH CARE EDUCATION/TRAINING PROGRAM

## 2023-06-22 PROCEDURE — 1159F PR MEDICATION LIST DOCUMENTED IN MEDICAL RECORD: ICD-10-PCS | Mod: CPTII,S$GLB,, | Performed by: STUDENT IN AN ORGANIZED HEALTH CARE EDUCATION/TRAINING PROGRAM

## 2023-06-22 PROCEDURE — 4010F PR ACE/ARB THEARPY RXD/TAKEN: ICD-10-PCS | Mod: CPTII,S$GLB,, | Performed by: STUDENT IN AN ORGANIZED HEALTH CARE EDUCATION/TRAINING PROGRAM

## 2023-06-22 PROCEDURE — 99203 PR OFFICE/OUTPT VISIT, NEW, LEVL III, 30-44 MIN: ICD-10-PCS | Mod: S$GLB,,, | Performed by: STUDENT IN AN ORGANIZED HEALTH CARE EDUCATION/TRAINING PROGRAM

## 2023-06-22 PROCEDURE — 3008F BODY MASS INDEX DOCD: CPT | Mod: CPTII,S$GLB,, | Performed by: STUDENT IN AN ORGANIZED HEALTH CARE EDUCATION/TRAINING PROGRAM

## 2023-06-22 PROCEDURE — 3078F PR MOST RECENT DIASTOLIC BLOOD PRESSURE < 80 MM HG: ICD-10-PCS | Mod: CPTII,S$GLB,, | Performed by: STUDENT IN AN ORGANIZED HEALTH CARE EDUCATION/TRAINING PROGRAM

## 2023-06-22 PROCEDURE — 4010F ACE/ARB THERAPY RXD/TAKEN: CPT | Mod: CPTII,S$GLB,, | Performed by: STUDENT IN AN ORGANIZED HEALTH CARE EDUCATION/TRAINING PROGRAM

## 2023-06-22 PROCEDURE — 1159F MED LIST DOCD IN RCRD: CPT | Mod: CPTII,S$GLB,, | Performed by: STUDENT IN AN ORGANIZED HEALTH CARE EDUCATION/TRAINING PROGRAM

## 2023-06-22 PROCEDURE — 99999 PR PBB SHADOW E&M-EST. PATIENT-LVL IV: CPT | Mod: PBBFAC,,, | Performed by: STUDENT IN AN ORGANIZED HEALTH CARE EDUCATION/TRAINING PROGRAM

## 2023-06-22 PROCEDURE — 73610 X-RAY EXAM OF ANKLE: CPT | Mod: TC,FY,LT

## 2023-06-22 RX ORDER — MELOXICAM 15 MG/1
15 TABLET ORAL DAILY
Qty: 30 TABLET | Refills: 0 | Status: SHIPPED | OUTPATIENT
Start: 2023-06-22 | End: 2024-02-29

## 2023-06-22 RX ORDER — DICLOFENAC SODIUM 10 MG/G
2 GEL TOPICAL 4 TIMES DAILY
Qty: 100 G | Refills: 1 | Status: SHIPPED | OUTPATIENT
Start: 2023-06-22 | End: 2023-09-27 | Stop reason: SDUPTHER

## 2023-06-22 NOTE — PROGRESS NOTES
Subjective:     Patient ID: Isaura Mancini is a 52 y.o. female.    Chief Complaint: Foot Pain (Left foot pain )    Isaura is a 52 y.o. female who presents to the podiatry clinic  with complaint of  left foot pain. Onset of the symptoms was several months ago. Precipitating event: none known. Current symptoms include:  pain to base of 5th metatarsal and to anteriolateral ankle joint of left foot . Aggravating factors: any weight bearing. Symptoms have gradually worsened. Patient has had no prior foot problems. Evaluation to date: none. Treatment to date: none. Patients rates pain 6/10 on pain scale.    Review of Systems   Constitutional: Negative for chills, decreased appetite, diaphoresis and fever.   HENT:  Negative for congestion and hearing loss.    Cardiovascular:  Negative for chest pain, claudication, leg swelling and syncope.   Respiratory:  Negative for cough and shortness of breath.    Skin:  Negative for color change, dry skin, flushing, itching, nail changes, poor wound healing and rash.   Musculoskeletal:  Positive for arthritis, back pain and joint pain. Negative for joint swelling.   Gastrointestinal:  Negative for nausea and vomiting.   Neurological:  Negative for focal weakness, numbness, paresthesias and weakness.   Psychiatric/Behavioral:  Negative for altered mental status. The patient is not nervous/anxious.       Objective:     Physical Exam  Constitutional:       General: She is not in acute distress.     Appearance: She is well-developed. She is not diaphoretic.   Cardiovascular:      Comments: Dorsalis pedis and posterior tibial pulses are within normal limits. Skin temperature is within normal limits. Toes are cool to touch and feet are warm proximally. Hair growth is within normal limits. Skin is normotrophic and without hyperpigmentation. No edema noted. No spider veins or varicosities noted, bilaterally.   Musculoskeletal:      Comments: Adequate joint range of motion without pain,  limitation, nor crepitation to bilateral feet and ankle joints. Muscle strength is 5/5 in all groups bilaterally.    Tenderness to tuberosity of 5th metatarsal at insertion of peroneal tendon and to anteriolateral gutter of ankle joint of left foot/ankle     Lymphadenopathy:      Comments: Negative lymphangitic streaking    Skin:     General: Skin is warm and dry.      Findings: No lesion.      Comments: Skin is warm and dry, no acute signs of infection noted. No open wounds, macerations or hyperkeratotic lesions, bilaterally.     Toenails are well trimmed and of normal morphology, bilaterally.    Neurological:      Mental Status: She is alert and oriented to person, place, and time.      Sensory: No sensory deficit.      Motor: No abnormal muscle tone.      Comments: Light touch within normal limits.    Psychiatric:         Behavior: Behavior normal.         Thought Content: Thought content normal.         Judgment: Judgment normal.           Assessment:      Encounter Diagnoses   Name Primary?    Peroneal tendinitis of left lower extremity Yes    Chronic pain of left ankle      Plan:     Isaura was seen today for foot pain.    Diagnoses and all orders for this visit:    Peroneal tendinitis of left lower extremity  -     Ambulatory referral/consult to Physical/Occupational Therapy; Future  -     X-Ray Foot Complete Left; Future  -     X-Ray Ankle Complete Left; Future    Chronic pain of left ankle  -     Ambulatory referral/consult to Physical/Occupational Therapy; Future  -     X-Ray Foot Complete Left; Future  -     X-Ray Ankle Complete Left; Future    Other orders  -     meloxicam (MOBIC) 15 MG tablet; Take 1 tablet (15 mg total) by mouth once daily.  -     diclofenac sodium (VOLTAREN) 1 % Gel; Apply 2 g topically 4 (four) times daily. for 10 days      I counseled the patient on her conditions, their implications and medical management.  Xray ordered, consider MRI  Tall CAM boot fitted and dispensed, pt is to  wear at all times while ambulating for 4-6 weeks  Rx PT  Consider cortisone injection if needed in the future for chronic ankle joint pain  RICE, NSAIDs PRN apin  The patient was advised that NSAID-type medications have two very important potential side effects: gastrointestinal irritation including hemorrhage and renal injuries. She was asked to take the medication with food and to stop if she experiences any GI upset. I asked her to call for vomiting, abdominal pain or black/bloody stools. The patient expresses understanding of these issues and questions were answered.   Return to clinic in 1 mo, sooner PRN

## 2023-06-24 ENCOUNTER — PATIENT MESSAGE (OUTPATIENT)
Dept: PODIATRY | Facility: CLINIC | Age: 53
End: 2023-06-24
Payer: COMMERCIAL

## 2023-06-26 RX ORDER — LEVOTHYROXINE SODIUM 125 UG/1
125 TABLET ORAL
Qty: 30 TABLET | Refills: 11 | Status: SHIPPED | OUTPATIENT
Start: 2023-06-26 | End: 2024-01-05

## 2023-06-28 ENCOUNTER — PATIENT OUTREACH (OUTPATIENT)
Dept: ADMINISTRATIVE | Facility: OTHER | Age: 53
End: 2023-06-28
Payer: COMMERCIAL

## 2023-06-28 NOTE — PROGRESS NOTES
CHW - Case Closure    This Community Health Worker spoke to patient via telephone today.   Pt/Caregiver reported: All needs have been addressed. Case Closed  Pt/Caregiver denied any additional needs at this time and agrees with episode closure at this time.  Provided patient with Community Health Worker's contact information and encouraged him/her to contact this Community Health Worker if additional needs arise.

## 2023-07-08 ENCOUNTER — PATIENT MESSAGE (OUTPATIENT)
Dept: ADMINISTRATIVE | Facility: OTHER | Age: 53
End: 2023-07-08
Payer: COMMERCIAL

## 2023-07-20 ENCOUNTER — LAB VISIT (OUTPATIENT)
Dept: LAB | Facility: HOSPITAL | Age: 53
End: 2023-07-20
Attending: STUDENT IN AN ORGANIZED HEALTH CARE EDUCATION/TRAINING PROGRAM
Payer: COMMERCIAL

## 2023-07-20 ENCOUNTER — OFFICE VISIT (OUTPATIENT)
Dept: PODIATRY | Facility: CLINIC | Age: 53
End: 2023-07-20
Payer: COMMERCIAL

## 2023-07-20 VITALS
SYSTOLIC BLOOD PRESSURE: 119 MMHG | HEART RATE: 73 BPM | DIASTOLIC BLOOD PRESSURE: 70 MMHG | BODY MASS INDEX: 30 KG/M2 | HEIGHT: 62 IN

## 2023-07-20 DIAGNOSIS — M54.32 BILATERAL SCIATICA: ICD-10-CM

## 2023-07-20 DIAGNOSIS — M79.672 PAIN OF LEFT HEEL: ICD-10-CM

## 2023-07-20 DIAGNOSIS — M25.572 CHRONIC PAIN OF LEFT ANKLE: ICD-10-CM

## 2023-07-20 DIAGNOSIS — G89.29 CHRONIC PAIN OF LEFT ANKLE: ICD-10-CM

## 2023-07-20 DIAGNOSIS — M76.72 PERONEAL TENDINITIS OF LEFT LOWER EXTREMITY: Primary | ICD-10-CM

## 2023-07-20 DIAGNOSIS — M25.579 PAIN IN JOINT INVOLVING ANKLE AND FOOT, UNSPECIFIED LATERALITY: ICD-10-CM

## 2023-07-20 DIAGNOSIS — M54.31 BILATERAL SCIATICA: ICD-10-CM

## 2023-07-20 LAB — URATE SERPL-MCNC: 5.1 MG/DL (ref 2.4–5.7)

## 2023-07-20 PROCEDURE — 84550 ASSAY OF BLOOD/URIC ACID: CPT | Performed by: STUDENT IN AN ORGANIZED HEALTH CARE EDUCATION/TRAINING PROGRAM

## 2023-07-20 PROCEDURE — 1159F MED LIST DOCD IN RCRD: CPT | Mod: CPTII,S$GLB,, | Performed by: STUDENT IN AN ORGANIZED HEALTH CARE EDUCATION/TRAINING PROGRAM

## 2023-07-20 PROCEDURE — 3078F DIAST BP <80 MM HG: CPT | Mod: CPTII,S$GLB,, | Performed by: STUDENT IN AN ORGANIZED HEALTH CARE EDUCATION/TRAINING PROGRAM

## 2023-07-20 PROCEDURE — 4010F PR ACE/ARB THEARPY RXD/TAKEN: ICD-10-PCS | Mod: CPTII,S$GLB,, | Performed by: STUDENT IN AN ORGANIZED HEALTH CARE EDUCATION/TRAINING PROGRAM

## 2023-07-20 PROCEDURE — 1159F PR MEDICATION LIST DOCUMENTED IN MEDICAL RECORD: ICD-10-PCS | Mod: CPTII,S$GLB,, | Performed by: STUDENT IN AN ORGANIZED HEALTH CARE EDUCATION/TRAINING PROGRAM

## 2023-07-20 PROCEDURE — 3008F BODY MASS INDEX DOCD: CPT | Mod: CPTII,S$GLB,, | Performed by: STUDENT IN AN ORGANIZED HEALTH CARE EDUCATION/TRAINING PROGRAM

## 2023-07-20 PROCEDURE — 3074F SYST BP LT 130 MM HG: CPT | Mod: CPTII,S$GLB,, | Performed by: STUDENT IN AN ORGANIZED HEALTH CARE EDUCATION/TRAINING PROGRAM

## 2023-07-20 PROCEDURE — 4010F ACE/ARB THERAPY RXD/TAKEN: CPT | Mod: CPTII,S$GLB,, | Performed by: STUDENT IN AN ORGANIZED HEALTH CARE EDUCATION/TRAINING PROGRAM

## 2023-07-20 PROCEDURE — 20605 PR DRAIN/INJECT INTERMEDIATE JOINT/BURSA: ICD-10-PCS | Mod: LT,S$GLB,, | Performed by: STUDENT IN AN ORGANIZED HEALTH CARE EDUCATION/TRAINING PROGRAM

## 2023-07-20 PROCEDURE — 3044F PR MOST RECENT HEMOGLOBIN A1C LEVEL <7.0%: ICD-10-PCS | Mod: CPTII,S$GLB,, | Performed by: STUDENT IN AN ORGANIZED HEALTH CARE EDUCATION/TRAINING PROGRAM

## 2023-07-20 PROCEDURE — 3078F PR MOST RECENT DIASTOLIC BLOOD PRESSURE < 80 MM HG: ICD-10-PCS | Mod: CPTII,S$GLB,, | Performed by: STUDENT IN AN ORGANIZED HEALTH CARE EDUCATION/TRAINING PROGRAM

## 2023-07-20 PROCEDURE — 99999 PR PBB SHADOW E&M-EST. PATIENT-LVL IV: CPT | Mod: PBBFAC,,, | Performed by: STUDENT IN AN ORGANIZED HEALTH CARE EDUCATION/TRAINING PROGRAM

## 2023-07-20 PROCEDURE — 3008F PR BODY MASS INDEX (BMI) DOCUMENTED: ICD-10-PCS | Mod: CPTII,S$GLB,, | Performed by: STUDENT IN AN ORGANIZED HEALTH CARE EDUCATION/TRAINING PROGRAM

## 2023-07-20 PROCEDURE — 99999 PR PBB SHADOW E&M-EST. PATIENT-LVL IV: ICD-10-PCS | Mod: PBBFAC,,, | Performed by: STUDENT IN AN ORGANIZED HEALTH CARE EDUCATION/TRAINING PROGRAM

## 2023-07-20 PROCEDURE — 36415 COLL VENOUS BLD VENIPUNCTURE: CPT | Mod: PO | Performed by: STUDENT IN AN ORGANIZED HEALTH CARE EDUCATION/TRAINING PROGRAM

## 2023-07-20 PROCEDURE — 20605 DRAIN/INJ JOINT/BURSA W/O US: CPT | Mod: LT,S$GLB,, | Performed by: STUDENT IN AN ORGANIZED HEALTH CARE EDUCATION/TRAINING PROGRAM

## 2023-07-20 PROCEDURE — 99214 PR OFFICE/OUTPT VISIT, EST, LEVL IV, 30-39 MIN: ICD-10-PCS | Mod: 25,S$GLB,, | Performed by: STUDENT IN AN ORGANIZED HEALTH CARE EDUCATION/TRAINING PROGRAM

## 2023-07-20 PROCEDURE — 3044F HG A1C LEVEL LT 7.0%: CPT | Mod: CPTII,S$GLB,, | Performed by: STUDENT IN AN ORGANIZED HEALTH CARE EDUCATION/TRAINING PROGRAM

## 2023-07-20 PROCEDURE — 99214 OFFICE O/P EST MOD 30 MIN: CPT | Mod: 25,S$GLB,, | Performed by: STUDENT IN AN ORGANIZED HEALTH CARE EDUCATION/TRAINING PROGRAM

## 2023-07-20 PROCEDURE — 3074F PR MOST RECENT SYSTOLIC BLOOD PRESSURE < 130 MM HG: ICD-10-PCS | Mod: CPTII,S$GLB,, | Performed by: STUDENT IN AN ORGANIZED HEALTH CARE EDUCATION/TRAINING PROGRAM

## 2023-07-20 RX ORDER — DEXAMETHASONE SODIUM PHOSPHATE 4 MG/ML
4 INJECTION, SOLUTION INTRA-ARTICULAR; INTRALESIONAL; INTRAMUSCULAR; INTRAVENOUS; SOFT TISSUE
Status: COMPLETED | OUTPATIENT
Start: 2023-07-20 | End: 2023-07-20

## 2023-07-20 RX ADMIN — DEXAMETHASONE SODIUM PHOSPHATE 4 MG: 4 INJECTION, SOLUTION INTRA-ARTICULAR; INTRALESIONAL; INTRAMUSCULAR; INTRAVENOUS; SOFT TISSUE at 02:07

## 2023-07-20 NOTE — PROGRESS NOTES
Subjective:     Patient ID: Isaura Mancini is a 52 y.o. female.    Chief Complaint: Follow-up    Isaura is a 52 y.o. female who presents to the podiatry clinic  with complaint of  left foot pain. Onset of the symptoms was several months ago. Precipitating event: none known. Current symptoms include:  pain to base of 5th metatarsal and to anteriolateral ankle joint of left foot . Aggravating factors: any weight bearing. Symptoms have gradually worsened. Patient has had no prior foot problems. Evaluation to date: none. Treatment to date: none. Patients rates pain 6/10 on pain scale.    7/20/23:  Pt seen today, states has been in boot but is still having pain. Relates she has a baseline pain but it flairs up from time to time with severe pain. Relates site is still swollen, points to base of 5th metatarsal to lateral hindfoot/ankle. No further pedal complaints. States history of sciatica, has followed up with neurosurgery in past, requesting for referral.     Review of Systems   Constitutional: Negative for chills, decreased appetite, diaphoresis and fever.   HENT:  Negative for congestion and hearing loss.    Cardiovascular:  Negative for chest pain, claudication, leg swelling and syncope.   Respiratory:  Negative for cough and shortness of breath.    Skin:  Negative for color change, dry skin, flushing, itching, nail changes, poor wound healing and rash.   Musculoskeletal:  Positive for arthritis, back pain and joint pain. Negative for joint swelling.   Gastrointestinal:  Negative for nausea and vomiting.   Neurological:  Negative for focal weakness, numbness, paresthesias and weakness.   Psychiatric/Behavioral:  Negative for altered mental status. The patient is not nervous/anxious.       Objective:     Physical Exam  Constitutional:       General: She is not in acute distress.     Appearance: She is well-developed. She is not diaphoretic.   Cardiovascular:      Comments: Dorsalis pedis and posterior tibial  pulses are within normal limits. Skin temperature is within normal limits. Toes are cool to touch and feet are warm proximally. Hair growth is within normal limits. Skin is normotrophic and without hyperpigmentation. No edema noted. No spider veins or varicosities noted, bilaterally.   Musculoskeletal:      Comments: Adequate joint range of motion without pain, limitation, nor crepitation to bilateral feet and ankle joints. Muscle strength is 5/5 in all groups bilaterally.    Tenderness to tuberosity of 5th metatarsal at insertion of peroneal tendon and to anteriolateral gutter of ankle joint of left foot/ankle     Lymphadenopathy:      Comments: Negative lymphangitic streaking    Skin:     General: Skin is warm and dry.      Findings: No lesion.      Comments: Skin is warm and dry, no acute signs of infection noted. No open wounds, macerations or hyperkeratotic lesions, bilaterally.     Toenails are well trimmed and of normal morphology, bilaterally.    Neurological:      Mental Status: She is alert and oriented to person, place, and time.      Sensory: No sensory deficit.      Motor: No abnormal muscle tone.      Comments: Light touch within normal limits.    Psychiatric:         Behavior: Behavior normal.         Thought Content: Thought content normal.         Judgment: Judgment normal.           Assessment:      Encounter Diagnoses   Name Primary?    Peroneal tendinitis of left lower extremity Yes    Chronic pain of left ankle     Pain of left heel     Pain in joint involving ankle and foot, unspecified laterality     Bilateral sciatica      Plan:     Isaura was seen today for follow-up.    Diagnoses and all orders for this visit:    Peroneal tendinitis of left lower extremity  -     MRI Foot ([facility]) Left W W/O Contrast; Future    Chronic pain of left ankle  -     MRI Foot ([facility]) Left W W/O Contrast; Future  -     Uric acid; Future    Pain of left heel  -     MRI Foot ([facility]) Left W W/O Contrast;  Future  -     Uric acid; Future    Pain in joint involving ankle and foot, unspecified laterality  -     MRI Ankle W WO Contrast Left; Future    Bilateral sciatica  -     Ambulatory referral/consult to Neurosurgery; Future    Other orders  -     dexAMETHasone injection 4 mg      I counseled the patient on her conditions, their implications and medical management.  Xray reviewed, MRI ordered  She elects for dexamethasone injection for left lateral ankle and sinus tarsi pain. Discussed possible side effects from injection including but NOT limited to discoloration of skin, erosion of soft tissue, and increased likelihood of rupture of a soft tissue structure (ie. Plantar fascia, muscle, tendon, ligament, or capsule in the area of injection). Patient indicates understanding of my explanation, and patient gives verbal consent for injection of affected area. A time out was performed to verify the  correct  patient and site, prior to initiation of procedure. Injection administered to above mentioned sites. Patient tolerated procedure well.   KARUNA DE LA ROSA/ARDEN Stern, patient relates it made her feel nauseous. Ok for Ibuprofen  Return to clinic in 1 mo, sooner PRN

## 2023-07-25 ENCOUNTER — TELEPHONE (OUTPATIENT)
Dept: NEUROSURGERY | Facility: CLINIC | Age: 53
End: 2023-07-25
Payer: COMMERCIAL

## 2023-07-25 ENCOUNTER — PATIENT MESSAGE (OUTPATIENT)
Dept: PODIATRY | Facility: CLINIC | Age: 53
End: 2023-07-25
Payer: COMMERCIAL

## 2023-07-25 DIAGNOSIS — M47.816 LUMBAR FACET ARTHROPATHY: Primary | ICD-10-CM

## 2023-07-25 RX ORDER — METHYLPREDNISOLONE 4 MG/1
TABLET ORAL
Qty: 1 EACH | Refills: 0 | Status: SHIPPED | OUTPATIENT
Start: 2023-07-25 | End: 2023-08-15

## 2023-07-25 NOTE — TELEPHONE ENCOUNTER
Contacted pt in regards of the referral that was put in the system for the pt. I asked pt if she completed an MRI of her back within the last 6 months. Pt stated that she did not complete an MRI . I stated to pt that she will have to see Dr.Denis Radha's PA since she does not have the required imaging needed to see him. I scheduled pt to complete xrays on Tuesday, August 1 at 930am and she is scheduled to see Radha on Wednesday, August 2 at 2pm. Pt confirmed and voiced understanding

## 2023-08-01 ENCOUNTER — HOSPITAL ENCOUNTER (OUTPATIENT)
Dept: RADIOLOGY | Facility: HOSPITAL | Age: 53
Discharge: HOME | End: 2023-08-01
Attending: NEUROLOGICAL SURGERY
Payer: COMMERCIAL

## 2023-08-01 DIAGNOSIS — M47.816 LUMBAR FACET ARTHROPATHY: ICD-10-CM

## 2023-08-01 PROCEDURE — 72110 X-RAY EXAM L-2 SPINE 4/>VWS: CPT | Mod: 26,,, | Performed by: RADIOLOGY

## 2023-08-01 PROCEDURE — 72110 XR LUMBAR SPINE AP AND LAT WITH FLEX/EXT: ICD-10-PCS | Mod: 26,,, | Performed by: RADIOLOGY

## 2023-08-01 PROCEDURE — 72110 X-RAY EXAM L-2 SPINE 4/>VWS: CPT | Mod: TC,FY

## 2023-08-02 ENCOUNTER — HOSPITAL ENCOUNTER (OUTPATIENT)
Dept: RADIOLOGY | Facility: HOSPITAL | Age: 53
Discharge: HOME | End: 2023-08-02
Attending: STUDENT IN AN ORGANIZED HEALTH CARE EDUCATION/TRAINING PROGRAM
Payer: COMMERCIAL

## 2023-08-02 DIAGNOSIS — G89.29 CHRONIC PAIN OF LEFT ANKLE: ICD-10-CM

## 2023-08-02 DIAGNOSIS — M25.572 CHRONIC PAIN OF LEFT ANKLE: ICD-10-CM

## 2023-08-02 DIAGNOSIS — M76.72 PERONEAL TENDINITIS OF LEFT LOWER EXTREMITY: ICD-10-CM

## 2023-08-02 DIAGNOSIS — M25.579 PAIN IN JOINT INVOLVING ANKLE AND FOOT, UNSPECIFIED LATERALITY: ICD-10-CM

## 2023-08-02 DIAGNOSIS — M79.672 PAIN OF LEFT HEEL: ICD-10-CM

## 2023-08-02 PROCEDURE — A9585 GADOBUTROL INJECTION: HCPCS | Performed by: STUDENT IN AN ORGANIZED HEALTH CARE EDUCATION/TRAINING PROGRAM

## 2023-08-02 PROCEDURE — 25500020 PHARM REV CODE 255: Performed by: STUDENT IN AN ORGANIZED HEALTH CARE EDUCATION/TRAINING PROGRAM

## 2023-08-02 PROCEDURE — 73723 MRI JOINT LWR EXTR W/O&W/DYE: CPT | Mod: TC,LT

## 2023-08-02 PROCEDURE — 73723 MRI ANKLE W WO CONTRAST LEFT: ICD-10-PCS | Mod: 26,LT,, | Performed by: RADIOLOGY

## 2023-08-02 PROCEDURE — 73723 MRI JOINT LWR EXTR W/O&W/DYE: CPT | Mod: 26,LT,, | Performed by: RADIOLOGY

## 2023-08-02 RX ORDER — GADOBUTROL 604.72 MG/ML
10 INJECTION INTRAVENOUS
Status: COMPLETED | OUTPATIENT
Start: 2023-08-02 | End: 2023-08-02

## 2023-08-02 RX ADMIN — GADOBUTROL 10 ML: 604.72 INJECTION INTRAVENOUS at 05:08

## 2023-08-08 ENCOUNTER — OFFICE VISIT (OUTPATIENT)
Dept: NEUROSURGERY | Facility: CLINIC | Age: 53
End: 2023-08-08
Payer: COMMERCIAL

## 2023-08-08 VITALS
HEIGHT: 62 IN | SYSTOLIC BLOOD PRESSURE: 116 MMHG | DIASTOLIC BLOOD PRESSURE: 74 MMHG | BODY MASS INDEX: 30.18 KG/M2 | WEIGHT: 164 LBS | HEART RATE: 61 BPM

## 2023-08-08 DIAGNOSIS — M51.36 DDD (DEGENERATIVE DISC DISEASE), LUMBAR: ICD-10-CM

## 2023-08-08 DIAGNOSIS — M47.26 OTHER SPONDYLOSIS WITH RADICULOPATHY, LUMBAR REGION: ICD-10-CM

## 2023-08-08 DIAGNOSIS — M48.061 SPINAL STENOSIS, LUMBAR REGION WITHOUT NEUROGENIC CLAUDICATION: ICD-10-CM

## 2023-08-08 DIAGNOSIS — M54.31 BILATERAL SCIATICA: ICD-10-CM

## 2023-08-08 DIAGNOSIS — M48.07 SPINAL STENOSIS, LUMBOSACRAL REGION: Primary | ICD-10-CM

## 2023-08-08 DIAGNOSIS — M43.16 SPONDYLOLISTHESIS, LUMBAR REGION: ICD-10-CM

## 2023-08-08 DIAGNOSIS — M54.41 CHRONIC BILATERAL LOW BACK PAIN WITH BILATERAL SCIATICA: ICD-10-CM

## 2023-08-08 DIAGNOSIS — M54.16 LUMBAR RADICULOPATHY: ICD-10-CM

## 2023-08-08 DIAGNOSIS — M54.42 CHRONIC BILATERAL LOW BACK PAIN WITH BILATERAL SCIATICA: ICD-10-CM

## 2023-08-08 DIAGNOSIS — G89.29 CHRONIC BILATERAL LOW BACK PAIN WITH BILATERAL SCIATICA: ICD-10-CM

## 2023-08-08 DIAGNOSIS — M54.32 BILATERAL SCIATICA: ICD-10-CM

## 2023-08-08 PROCEDURE — 3044F HG A1C LEVEL LT 7.0%: CPT | Mod: CPTII,S$GLB,, | Performed by: PHYSICIAN ASSISTANT

## 2023-08-08 PROCEDURE — 3078F DIAST BP <80 MM HG: CPT | Mod: CPTII,S$GLB,, | Performed by: PHYSICIAN ASSISTANT

## 2023-08-08 PROCEDURE — 3074F SYST BP LT 130 MM HG: CPT | Mod: CPTII,S$GLB,, | Performed by: PHYSICIAN ASSISTANT

## 2023-08-08 PROCEDURE — 99999 PR PBB SHADOW E&M-EST. PATIENT-LVL V: ICD-10-PCS | Mod: PBBFAC,,, | Performed by: PHYSICIAN ASSISTANT

## 2023-08-08 PROCEDURE — 3008F PR BODY MASS INDEX (BMI) DOCUMENTED: ICD-10-PCS | Mod: CPTII,S$GLB,, | Performed by: PHYSICIAN ASSISTANT

## 2023-08-08 PROCEDURE — 3074F PR MOST RECENT SYSTOLIC BLOOD PRESSURE < 130 MM HG: ICD-10-PCS | Mod: CPTII,S$GLB,, | Performed by: PHYSICIAN ASSISTANT

## 2023-08-08 PROCEDURE — 3008F BODY MASS INDEX DOCD: CPT | Mod: CPTII,S$GLB,, | Performed by: PHYSICIAN ASSISTANT

## 2023-08-08 PROCEDURE — 1159F MED LIST DOCD IN RCRD: CPT | Mod: CPTII,S$GLB,, | Performed by: PHYSICIAN ASSISTANT

## 2023-08-08 PROCEDURE — 3044F PR MOST RECENT HEMOGLOBIN A1C LEVEL <7.0%: ICD-10-PCS | Mod: CPTII,S$GLB,, | Performed by: PHYSICIAN ASSISTANT

## 2023-08-08 PROCEDURE — 1160F RVW MEDS BY RX/DR IN RCRD: CPT | Mod: CPTII,S$GLB,, | Performed by: PHYSICIAN ASSISTANT

## 2023-08-08 PROCEDURE — 1159F PR MEDICATION LIST DOCUMENTED IN MEDICAL RECORD: ICD-10-PCS | Mod: CPTII,S$GLB,, | Performed by: PHYSICIAN ASSISTANT

## 2023-08-08 PROCEDURE — 99204 OFFICE O/P NEW MOD 45 MIN: CPT | Mod: S$GLB,,, | Performed by: PHYSICIAN ASSISTANT

## 2023-08-08 PROCEDURE — 4010F PR ACE/ARB THEARPY RXD/TAKEN: ICD-10-PCS | Mod: CPTII,S$GLB,, | Performed by: PHYSICIAN ASSISTANT

## 2023-08-08 PROCEDURE — 99999 PR PBB SHADOW E&M-EST. PATIENT-LVL V: CPT | Mod: PBBFAC,,, | Performed by: PHYSICIAN ASSISTANT

## 2023-08-08 PROCEDURE — 99204 PR OFFICE/OUTPT VISIT, NEW, LEVL IV, 45-59 MIN: ICD-10-PCS | Mod: S$GLB,,, | Performed by: PHYSICIAN ASSISTANT

## 2023-08-08 PROCEDURE — 1160F PR REVIEW ALL MEDS BY PRESCRIBER/CLIN PHARMACIST DOCUMENTED: ICD-10-PCS | Mod: CPTII,S$GLB,, | Performed by: PHYSICIAN ASSISTANT

## 2023-08-08 PROCEDURE — 4010F ACE/ARB THERAPY RXD/TAKEN: CPT | Mod: CPTII,S$GLB,, | Performed by: PHYSICIAN ASSISTANT

## 2023-08-08 PROCEDURE — 3078F PR MOST RECENT DIASTOLIC BLOOD PRESSURE < 80 MM HG: ICD-10-PCS | Mod: CPTII,S$GLB,, | Performed by: PHYSICIAN ASSISTANT

## 2023-08-08 NOTE — PROGRESS NOTES
Subjective:     Patient ID:  Isaura Mancini is a 52 y.o. female.    [place]    Chief Complaint:  Back pain bilateral leg pain    HPI    Isaura Mancini is a 52 y.o. female who presents with the above CC.  Patient was scheduled for surgery in 2020.  She states the surgery was canceled and then she started having medical problems with her thyroid and surgery so she put off returning back to clinic.  Patient states the back pain has gotten progressively worse and is constant across the low back.  It is better with flexion and worse with walking or standing and also when laying down.  She has pain in the left foot and has seen podiatry for this.  She has bilateral posterior leg pain and numbness mainly when lying down at night.  The left leg is worse than the right leg.      The back and legs bother her equally.    She would physical therapy 3 years ago without relief.  No interventional pain procedures.  No spine surgery.  She takes gabapentin 100 mg and ibuprofen.    Patient denies any recent accidents or trauma, no saddle anesthesias, and no bowel or bladder incontinence.      Review of Systems:    Review of Systems   Constitutional:  Negative for chills, diaphoresis, fever, malaise/fatigue and weight loss.   HENT:  Negative for congestion, ear discharge, ear pain, hearing loss, nosebleeds, sinus pain, sore throat and tinnitus.    Eyes:  Negative for blurred vision, double vision, photophobia, pain, discharge and redness.   Respiratory:  Negative for cough, hemoptysis, sputum production, shortness of breath, wheezing and stridor.    Cardiovascular:  Negative for chest pain, palpitations, orthopnea, leg swelling and PND.   Gastrointestinal:  Positive for constipation and heartburn. Negative for abdominal pain, blood in stool, diarrhea, melena, nausea and vomiting.   Genitourinary:  Negative for dysuria, flank pain, frequency, hematuria and urgency.   Musculoskeletal:  Positive for back pain, joint pain and myalgias.  Negative for falls and neck pain.   Skin:  Negative for itching and rash.   Neurological:  Positive for tremors and headaches. Negative for dizziness, tingling, sensory change, speech change, seizures, loss of consciousness and weakness.   Endo/Heme/Allergies:  Negative for environmental allergies and polydipsia. Does not bruise/bleed easily.   Psychiatric/Behavioral:  Negative for depression, hallucinations, memory loss and substance abuse. The patient is not nervous/anxious and does not have insomnia.        Past Medical History:   Diagnosis Date    Hypertension      Past Surgical History:   Procedure Laterality Date     SECTION      THYROIDECTOMY N/A 10/28/2022    Procedure: THYROIDECTOMY, total;  Surgeon: Isaura Hayes MD;  Location: [facility] OR [address] [place] [address];  Service: General;  Laterality: N/A;    TUBAL LIGATION       Current Outpatient Medications on File Prior to Visit   Medication Sig Dispense Refill    aspirin (ECOTRIN) 81 MG EC tablet Take 81 mg by mouth once daily.      cholecalciferol, vitamin D3, (VITAMIN D3) 25 mcg (1,000 unit) capsule Take 2 capsules (2,000 Units total) by mouth once daily.  0    diclofenac sodium (VOLTAREN) 1 % Gel Apply 2 g topically 4 (four) times daily. for 10 days 100 g 1    gabapentin (NEURONTIN) 100 MG capsule Take 1 capsule (100 mg total) by mouth 3 (three) times daily as needed (back and leg pain). 270 capsule 3    hydroCHLOROthiazide (HYDRODIURIL) 12.5 MG Tab Take 12.5 mg by mouth.      ibuprofen (ADVIL,MOTRIN) 800 MG tablet Take 1 tablet (800 mg total) by mouth every 8 (eight) hours as needed for Pain. 60 tablet 2    levothyroxine (SYNTHROID) 125 MCG tablet Take 1 tablet (125 mcg total) by mouth before breakfast. 30 tablet 11    losartan (COZAAR) 25 MG tablet Take 1 tablet (25 mg total) by mouth once daily. 90 tablet 1    methylPREDNISolone (MEDROL DOSEPACK) 4 mg tablet use as directed 1 each 0    polyethylene glycol (GLYCOLAX) 17 gram/dose powder Take 17 g by mouth once  daily. 119 g 3    meloxicam (MOBIC) 15 MG tablet Take 1 tablet (15 mg total) by mouth once daily. (Patient not taking: Reported on 7/20/2023) 30 tablet 0     No current facility-administered medications on file prior to visit.     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   Tobacco Use    Smoking status: Never    Smokeless tobacco: Never   Substance and Sexual Activity    Alcohol use: No    Drug use: No    Sexual activity: Yes     Partners: Male     Birth control/protection: Surgical     Comment: BTL     Social Determinants of Health     Financial Resource Strain: Medium Risk (5/19/2023)    Overall Financial Resource Strain (CARDIA)     Difficulty of Paying Living Expenses: Somewhat hard   Food Insecurity: Food Insecurity Present (5/19/2023)    Hunger Vital Sign     Worried About Running Out of Food in the Last Year: Sometimes true     Ran Out of Food in the Last Year: Sometimes true   Transportation Needs: No Transportation Needs (5/19/2023)    PRAPARE - Transportation     Lack of Transportation (Medical): No     Lack of Transportation (Non-Medical): No   Physical Activity: Insufficiently Active (5/19/2023)    Exercise Vital Sign     Days of Exercise per Week: 2 days     Minutes of Exercise per Session: 30 min   Stress: Stress Concern Present (5/19/2023)    [place] [employer] - Occupational Stress Questionnaire     Feeling of Stress : To some extent   Social Connections: Moderately Isolated (5/19/2023)    Social Connection and Isolation Panel [NHANES]     Frequency of Communication with Friends and Family: More than three times a week     Frequency of Social Gatherings with Friends and Family: Three times a week     Attends [employer] Services: 1 to 4 times per year     Active Member of  "Clubs or Organizations: No     Attends Club or Organization Meetings: Never     Marital Status:    Housing Stability: Low Risk  (5/19/2023)    Housing Stability Vital Sign     Unable to Pay for Housing in the Last Year: No     Number of Places Lived in the Last Year: 1     Unstable Housing in the Last Year: No   Recent Concern: Housing Stability - High Risk (4/18/2023)    Housing Stability Vital Sign     Unable to Pay for Housing in the Last Year: Yes     Number of Places Lived in the Last Year: 1     Unstable Housing in the Last Year: No     Family History   Problem Relation Age of Onset    Heart disease Father     Hypertension Father     Breast cancer Paternal Aunt 60    Diabetes Neg Hx     Colon cancer Neg Hx     Ovarian cancer Neg Hx        Objective:      Vitals:    08/08/23 1034   BP: 116/74   Pulse: 61   Weight: 74.4 kg (164 lb 0.4 oz)   Height: 5' 2" (1.575 m)   PainSc:   9   PainLoc: Back         Physical Exam:      General:  Isaura Mancini is well-developed, well-nourished, appears stated age, in no acute distress, alert and oriented to person, place, and time.    Pulmonary/Chest:  Respiratory effort normal  Abdominal: Exhibits no distension  Psychiatric:  Normal mood and affect.  Behavior is normal.  Judgement and thought content normal      Musculoskeletal:    Lumbar ROM:   Pain in lumbar flexion, extension, left lateral bending, and right lateral bending.    Lumbar Spine Inspection:  Normal with no surgical scars and no visible rashes.    Lumbar Spine Palpation:  Mild tenderness to low back palpation.    SI Joint Palpation:  No tenderness to SI Joint palpation.    Straight Leg Raise:  Positive right and left SLR.      Neurological:     Muscle strength against resistance:     Right Left   Hip flexion  5 / 5 5 / 5   Hip extension 5 / 5 5 / 5   Hip abduction 5 / 5 5 / 5   Hip adduction  5 / 5 5 / 5   Knee extension  5 / 5 5 / 5   Knee flexion 5 / 5 5 / 5   Dorsiflexion  5 / 5 5 / 5   EHL  5 / 5 5 " / 5   Plantar flexion  5 / 5 5 / 5   Inversion of the feet 5 / 5 5 / 5   Eversion of the feet  5 / 5 5 / 5       Reflexes:     Right Left   Patellar 2+ 2+   Achilles 2+ 2+     Clonus:  Negative bilaterally    On gross examination of the bilateral upper extremities, patient has full painfree ROM with no signs of clubbing, cyanosis, edema, or weakness.       XRAY/MRI Interpretation:     Lumbar spine xrays were personally reviewed today.  No fractures. Grade one spondylolisthesis at L4-5 with movement on flexion and extension.    Lumbar spine MRI was personally reviewed today from 2020.  Grade 1 spondylolisthesis at L4-5 with spinal stenosis and facet arthropathy.      Assessment:          1. Spinal stenosis, lumbosacral region    2. Bilateral sciatica    3. Chronic bilateral low back pain with bilateral sciatica    4. Other spondylosis with radiculopathy, lumbar region    5. DDD (degenerative disc disease), lumbar    6. Lumbar radiculopathy    7. Spinal stenosis, lumbar region without neurogenic claudication    8. Spondylolisthesis, lumbar region            Plan:          Orders Placed This Encounter    MRI Lumbar Spine Without Contrast       Grade 1 spondylolisthesis at L4-5 with spinal stenosis and facet arthropathy.    -Update MRI lspine and refer back to Dr. Keen for surgical consult    Follow-Up:  Follow up in about 1 month (around 9/8/2023). If there are any questions prior to this, the patient was instructed to contact the office.       Radha Alfonso, [facility], PA-C  Neurosurgery  [facility]  08/08/2023

## 2023-08-10 ENCOUNTER — HOSPITAL ENCOUNTER (OUTPATIENT)
Dept: ENDOCRINOLOGY | Facility: CLINIC | Age: 53
Discharge: HOME | End: 2023-08-10
Attending: STUDENT IN AN ORGANIZED HEALTH CARE EDUCATION/TRAINING PROGRAM
Payer: COMMERCIAL

## 2023-08-10 DIAGNOSIS — E04.2 MULTINODULAR GOITER: ICD-10-CM

## 2023-08-10 PROCEDURE — 76536 US SOFT TISSUE HEAD NECK THYROID: ICD-10-PCS | Mod: S$GLB,,, | Performed by: INTERNAL MEDICINE

## 2023-08-10 PROCEDURE — 76536 US EXAM OF HEAD AND NECK: CPT | Mod: S$GLB,,, | Performed by: INTERNAL MEDICINE

## 2023-08-16 ENCOUNTER — TELEPHONE (OUTPATIENT)
Dept: INTERNAL MEDICINE | Facility: CLINIC | Age: 53
End: 2023-08-16
Payer: COMMERCIAL

## 2023-08-18 ENCOUNTER — HOSPITAL ENCOUNTER (OUTPATIENT)
Dept: RADIOLOGY | Facility: HOSPITAL | Age: 53
Discharge: HOME | End: 2023-08-18
Attending: PHYSICIAN ASSISTANT
Payer: COMMERCIAL

## 2023-08-18 DIAGNOSIS — M48.07 SPINAL STENOSIS, LUMBOSACRAL REGION: ICD-10-CM

## 2023-08-18 PROCEDURE — 72148 MRI LUMBAR SPINE WITHOUT CONTRAST: ICD-10-PCS | Mod: 26,,, | Performed by: RADIOLOGY

## 2023-08-18 PROCEDURE — 72148 MRI LUMBAR SPINE W/O DYE: CPT | Mod: TC

## 2023-08-18 PROCEDURE — 72148 MRI LUMBAR SPINE W/O DYE: CPT | Mod: 26,,, | Performed by: RADIOLOGY

## 2023-08-20 NOTE — TELEPHONE ENCOUNTER
No care due was identified.  [facility] Embedded Care Due Messages. Reference number: [id number].   8/20/2023 11:18:03 AM CDT

## 2023-08-21 RX ORDER — HYDROCHLOROTHIAZIDE 12.5 MG/1
12.5 TABLET ORAL
Qty: 90 TABLET | Refills: 0 | Status: SHIPPED | OUTPATIENT
Start: 2023-08-21 | End: 2023-11-17

## 2023-08-21 NOTE — TELEPHONE ENCOUNTER
Refill Routing Note   Medication(s) are not appropriate for processing by [facility] for the following reason(s):      No active prescription written by provider    [facility] action(s):  Defer Care Due:  None identified            Appointments  past 12m or future 3m with PCP    Date Provider   Last Visit   2/24/2023 Delisa Shah MD   Next Visit   8/29/2023 Delisa Shah MD   ED visits in past 90 days: 0        Note composed:12:22 PM 08/21/2023

## 2023-08-29 ENCOUNTER — OFFICE VISIT (OUTPATIENT)
Dept: INTERNAL MEDICINE | Facility: CLINIC | Age: 53
End: 2023-08-29
Payer: COMMERCIAL

## 2023-08-29 VITALS
BODY MASS INDEX: 31.4 KG/M2 | OXYGEN SATURATION: 98 % | DIASTOLIC BLOOD PRESSURE: 64 MMHG | HEART RATE: 62 BPM | WEIGHT: 170.63 LBS | HEIGHT: 62 IN | SYSTOLIC BLOOD PRESSURE: 118 MMHG

## 2023-08-29 DIAGNOSIS — C73 HURTHLE CELL CARCINOMA OF THYROID: ICD-10-CM

## 2023-08-29 DIAGNOSIS — Z12.31 SCREENING MAMMOGRAM FOR BREAST CANCER: ICD-10-CM

## 2023-08-29 DIAGNOSIS — E66.09 CLASS 1 OBESITY DUE TO EXCESS CALORIES WITH SERIOUS COMORBIDITY AND BODY MASS INDEX (BMI) OF 31.0 TO 31.9 IN ADULT: ICD-10-CM

## 2023-08-29 DIAGNOSIS — D25.9 UTERINE LEIOMYOMA, UNSPECIFIED LOCATION: ICD-10-CM

## 2023-08-29 DIAGNOSIS — Z00.00 ANNUAL PHYSICAL EXAM: ICD-10-CM

## 2023-08-29 DIAGNOSIS — I10 ESSENTIAL HYPERTENSION: ICD-10-CM

## 2023-08-29 DIAGNOSIS — G89.29 CHRONIC BILATERAL LOW BACK PAIN WITH LEFT-SIDED SCIATICA: ICD-10-CM

## 2023-08-29 DIAGNOSIS — M54.42 CHRONIC BILATERAL LOW BACK PAIN WITH LEFT-SIDED SCIATICA: ICD-10-CM

## 2023-08-29 DIAGNOSIS — E89.0 POSTOPERATIVE HYPOTHYROIDISM: ICD-10-CM

## 2023-08-29 PROCEDURE — 3074F SYST BP LT 130 MM HG: CPT | Mod: CPTII,S$GLB,, | Performed by: INTERNAL MEDICINE

## 2023-08-29 PROCEDURE — 3044F PR MOST RECENT HEMOGLOBIN A1C LEVEL <7.0%: ICD-10-PCS | Mod: CPTII,S$GLB,, | Performed by: INTERNAL MEDICINE

## 2023-08-29 PROCEDURE — 99999 PR PBB SHADOW E&M-EST. PATIENT-LVL IV: ICD-10-PCS | Mod: PBBFAC,,, | Performed by: INTERNAL MEDICINE

## 2023-08-29 PROCEDURE — 3078F PR MOST RECENT DIASTOLIC BLOOD PRESSURE < 80 MM HG: ICD-10-PCS | Mod: CPTII,S$GLB,, | Performed by: INTERNAL MEDICINE

## 2023-08-29 PROCEDURE — 99214 OFFICE O/P EST MOD 30 MIN: CPT | Mod: S$GLB,,, | Performed by: INTERNAL MEDICINE

## 2023-08-29 PROCEDURE — 99999 PR PBB SHADOW E&M-EST. PATIENT-LVL IV: CPT | Mod: PBBFAC,,, | Performed by: INTERNAL MEDICINE

## 2023-08-29 PROCEDURE — 3044F HG A1C LEVEL LT 7.0%: CPT | Mod: CPTII,S$GLB,, | Performed by: INTERNAL MEDICINE

## 2023-08-29 PROCEDURE — 99214 PR OFFICE/OUTPT VISIT, EST, LEVL IV, 30-39 MIN: ICD-10-PCS | Mod: S$GLB,,, | Performed by: INTERNAL MEDICINE

## 2023-08-29 PROCEDURE — 1159F MED LIST DOCD IN RCRD: CPT | Mod: CPTII,S$GLB,, | Performed by: INTERNAL MEDICINE

## 2023-08-29 PROCEDURE — 1159F PR MEDICATION LIST DOCUMENTED IN MEDICAL RECORD: ICD-10-PCS | Mod: CPTII,S$GLB,, | Performed by: INTERNAL MEDICINE

## 2023-08-29 PROCEDURE — 3008F PR BODY MASS INDEX (BMI) DOCUMENTED: ICD-10-PCS | Mod: CPTII,S$GLB,, | Performed by: INTERNAL MEDICINE

## 2023-08-29 PROCEDURE — 3008F BODY MASS INDEX DOCD: CPT | Mod: CPTII,S$GLB,, | Performed by: INTERNAL MEDICINE

## 2023-08-29 PROCEDURE — 1160F PR REVIEW ALL MEDS BY PRESCRIBER/CLIN PHARMACIST DOCUMENTED: ICD-10-PCS | Mod: CPTII,S$GLB,, | Performed by: INTERNAL MEDICINE

## 2023-08-29 PROCEDURE — 3078F DIAST BP <80 MM HG: CPT | Mod: CPTII,S$GLB,, | Performed by: INTERNAL MEDICINE

## 2023-08-29 PROCEDURE — 4010F PR ACE/ARB THEARPY RXD/TAKEN: ICD-10-PCS | Mod: CPTII,S$GLB,, | Performed by: INTERNAL MEDICINE

## 2023-08-29 PROCEDURE — 1160F RVW MEDS BY RX/DR IN RCRD: CPT | Mod: CPTII,S$GLB,, | Performed by: INTERNAL MEDICINE

## 2023-08-29 PROCEDURE — 3074F PR MOST RECENT SYSTOLIC BLOOD PRESSURE < 130 MM HG: ICD-10-PCS | Mod: CPTII,S$GLB,, | Performed by: INTERNAL MEDICINE

## 2023-08-29 PROCEDURE — 4010F ACE/ARB THERAPY RXD/TAKEN: CPT | Mod: CPTII,S$GLB,, | Performed by: INTERNAL MEDICINE

## 2023-08-29 NOTE — PROGRESS NOTES
Patient ID: Isaura Mancini is a 53 y.o. female.    Chief Complaint: Hypertension    HPI Isaura is a 53 y.o. 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 No new acute complaints.   Reviewed recent notes from neurosurgery and endocrinology.   Reviewed lab results from 7/20/23 and 6/2/23.  She would like to see Bariatric Medicine regarding weight loss.     Health Maintenance Topics with due status: Not Due       Topic Last Completion Date    Cervical Cancer Screening 01/19/2021    TETANUS VACCINE 03/24/2021    Hemoglobin A1c (Diabetic Prevention Screening) 01/18/2023    Lipid Panel 01/18/2023    Influenza Vaccine 02/24/2023        Review of Systems   All other systems reviewed and are negative.      Objective:     Vitals:    08/29/23 1443   BP: 118/64   Pulse: 62        Physical Exam  Vitals reviewed.   Constitutional:       General: She is not in acute distress.     Appearance: Normal appearance. She is well-developed. She is obese. She is not ill-appearing, toxic-appearing or diaphoretic.   HENT:      Head: Normocephalic and atraumatic.      Right Ear: External ear normal.      Left Ear: External ear normal.      Nose: Nose normal.   Eyes:      General: No scleral icterus.        Right eye: No discharge.         Left eye: No discharge.      Extraocular Movements: Extraocular movements intact.      Conjunctiva/sclera: Conjunctivae normal.   Cardiovascular:      Rate and Rhythm: Normal rate and regular rhythm.      Heart sounds: Normal heart sounds. No murmur heard.     No friction rub. No gallop.   Pulmonary:      Effort: Pulmonary effort is normal. No respiratory distress.      Breath sounds: Normal breath sounds. No stridor. No wheezing, rhonchi or rales.   Skin:     General: Skin is warm and dry.   Neurological:      General: No focal deficit  present.      Mental Status: She is alert and oriented to person, place, and time. Mental status is at baseline.   Psychiatric:         Mood and Affect: Mood normal.         Behavior: Behavior normal.         Thought Content: Thought content normal.         Judgment: Judgment normal.         Assessment:       1. Essential hypertension Well controlled   2. Class 1 obesity due to excess calories with serious comorbidity and body mass index (BMI) of 31.0 to 31.9 in adult Chronic   3. Hurthle cell carcinoma of thyroid Inactive   4. Uterine leiomyoma, unspecified location Chronic   5. Postoperative hypothyroidism Chronic   6. Chronic bilateral low back pain with left-sided sciatica Chronic   7. Screening mammogram for breast cancer    8. Annual physical exam        Plan:         Essential hypertension  Comments:  Continue current medication    Class 1 obesity due to excess calories with serious comorbidity and body mass index (BMI) of 31.0 to 31.9 in adult  -     Ambulatory referral/consult to Bariatric Medicine; Future; Expected date: 09/05/2023    Hurthle cell carcinoma of thyroid  Comments:  Continue to follow with endocrinology for surveillance.    Uterine leiomyoma, unspecified location  Comments:  Continue to follow with OBGYN    Postoperative hypothyroidism  Comments:  Continue to follow with Endocrinology.    Chronic bilateral low back pain with left-sided sciatica  Comments:  Continue to follow with Neurosurgery    Screening mammogram for breast cancer  -     Mammo Digital Screening Bilat; Future; Expected date: 08/29/2023    Annual physical exam  -     CBC Auto Differential; Future; Expected date: 02/01/2024  -     Comprehensive Metabolic Panel; Future; Expected date: 02/01/2024  -     Hemoglobin A1C; Future; Expected date: 02/01/2024  -     Lipid Panel; Future; Expected date: 02/01/2024  -     TSH; Future; Expected date: 02/01/2024        RTC 6 months for annual exam    Warning signs discussed, patient to call  with any further issues or worsening of symptoms.       Parts of the above note were dictated using a voice dictation software. Please excuse any grammatical or typographical errors.

## 2023-09-10 ENCOUNTER — OFFICE VISIT (OUTPATIENT)
Dept: URGENT CARE | Facility: CLINIC | Age: 53
End: 2023-09-10
Payer: COMMERCIAL

## 2023-09-10 VITALS
SYSTOLIC BLOOD PRESSURE: 142 MMHG | WEIGHT: 170 LBS | DIASTOLIC BLOOD PRESSURE: 92 MMHG | RESPIRATION RATE: 18 BRPM | HEIGHT: 62 IN | BODY MASS INDEX: 31.28 KG/M2 | HEART RATE: 60 BPM | TEMPERATURE: 98 F | OXYGEN SATURATION: 100 %

## 2023-09-10 DIAGNOSIS — J02.9 SORE THROAT: Primary | ICD-10-CM

## 2023-09-10 DIAGNOSIS — J04.0 LARYNGITIS: ICD-10-CM

## 2023-09-10 DIAGNOSIS — J02.8 ACUTE PHARYNGITIS DUE TO OTHER SPECIFIED ORGANISMS: ICD-10-CM

## 2023-09-10 LAB
CTP QC/QA: YES
CTP QC/QA: YES
MOLECULAR STREP A: NEGATIVE
SARS-COV-2 AG RESP QL IA.RAPID: NEGATIVE

## 2023-09-10 PROCEDURE — 99214 PR OFFICE/OUTPT VISIT, EST, LEVL IV, 30-39 MIN: ICD-10-PCS | Mod: 25,S$GLB,, | Performed by: FAMILY MEDICINE

## 2023-09-10 PROCEDURE — 87811 SARS CORONAVIRUS 2 ANTIGEN POCT, MANUAL READ: ICD-10-PCS | Mod: QW,S$GLB,, | Performed by: FAMILY MEDICINE

## 2023-09-10 PROCEDURE — 87811 SARS-COV-2 COVID19 W/OPTIC: CPT | Mod: QW,S$GLB,, | Performed by: FAMILY MEDICINE

## 2023-09-10 PROCEDURE — 87651 POCT STREP A MOLECULAR: ICD-10-PCS | Mod: QW,S$GLB,, | Performed by: FAMILY MEDICINE

## 2023-09-10 PROCEDURE — 96372 THER/PROPH/DIAG INJ SC/IM: CPT | Mod: S$GLB,,, | Performed by: FAMILY MEDICINE

## 2023-09-10 PROCEDURE — 96372 PR INJECTION,THERAP/PROPH/DIAG2ST, IM OR SUBCUT: ICD-10-PCS | Mod: S$GLB,,, | Performed by: FAMILY MEDICINE

## 2023-09-10 PROCEDURE — 99214 OFFICE O/P EST MOD 30 MIN: CPT | Mod: 25,S$GLB,, | Performed by: FAMILY MEDICINE

## 2023-09-10 PROCEDURE — 87651 STREP A DNA AMP PROBE: CPT | Mod: QW,S$GLB,, | Performed by: FAMILY MEDICINE

## 2023-09-10 RX ORDER — LORATADINE 10 MG/1
10 TABLET ORAL DAILY
Qty: 30 TABLET | Refills: 2 | Status: SHIPPED | OUTPATIENT
Start: 2023-09-10 | End: 2024-02-29 | Stop reason: SDUPTHER

## 2023-09-10 RX ORDER — GUAIFENESIN 600 MG/1
600 TABLET, EXTENDED RELEASE ORAL 2 TIMES DAILY
Qty: 14 TABLET | Refills: 2 | Status: SHIPPED | OUTPATIENT
Start: 2023-09-10 | End: 2023-09-17

## 2023-09-10 RX ORDER — DEXAMETHASONE SODIUM PHOSPHATE 100 MG/10ML
10 INJECTION INTRAMUSCULAR; INTRAVENOUS
Status: COMPLETED | OUTPATIENT
Start: 2023-09-10 | End: 2023-09-10

## 2023-09-10 RX ADMIN — DEXAMETHASONE SODIUM PHOSPHATE 10 MG: 100 INJECTION INTRAMUSCULAR; INTRAVENOUS at 01:09

## 2023-09-10 NOTE — PROGRESS NOTES
"Subjective:      Patient ID: Isaura Mancini is a 53 y.o. female.    Vitals:  height is 5' 2" (1.575 m) and weight is 77.1 kg (170 lb). Her oral temperature is 98.3 °F (36.8 °C). Her blood pressure is 142/92 (abnormal) and her pulse is 60. Her respiration is 18 and oxygen saturation is 100%.     Chief Complaint: Sore Throat (Hoarse voice.)    53 yr female present with sore throat hoarse voice cough itchy throat post nasal drip. Onset Wednesday. Treatments at home include Theraflu    Denies fever or chills  Sx for 5 days, hurts to swallow and now loss of voice      Sore Throat   This is a new problem. The current episode started in the past 7 days. The problem has been gradually worsening. There has been no fever. The pain is at a severity of 8/10. The pain is moderate. Associated symptoms include congestion, coughing, ear pain, headaches, a hoarse voice, neck pain, swollen glands and trouble swallowing. She has had no exposure to strep or mono. Treatments tried: theraflu. The treatment provided no relief.       HENT:  Positive for ear pain, congestion, sore throat and trouble swallowing.    Neck: Positive for neck pain.   Respiratory:  Positive for cough.    Neurological:  Positive for headaches.      Objective:     Physical Exam   Constitutional: She is oriented to person, place, and time. She appears well-developed. She is cooperative.   HENT:   Head: Normocephalic and atraumatic.   Ears:   Right Ear: Hearing, tympanic membrane, external ear and ear canal normal.   Left Ear: Hearing, tympanic membrane, external ear and ear canal normal.   Nose: Nose normal. No mucosal edema or nasal deformity. No epistaxis. Right sinus exhibits no maxillary sinus tenderness and no frontal sinus tenderness. Left sinus exhibits no maxillary sinus tenderness and no frontal sinus tenderness.   Mouth/Throat: Uvula is midline and mucous membranes are normal. Mucous membranes are moist. No trismus in the jaw. Normal dentition. No uvula " swelling. Posterior oropharyngeal erythema present. No oropharyngeal exudate. Oropharynx is clear.   Eyes: Conjunctivae and lids are normal.   Neck: Trachea normal and phonation normal. Neck supple.   Cardiovascular: Normal rate, regular rhythm, normal heart sounds and normal pulses.   Pulmonary/Chest: Effort normal and breath sounds normal.   Abdominal: Normal appearance and bowel sounds are normal. Soft.   Musculoskeletal: Normal range of motion.         General: Normal range of motion.   Neurological: She is alert and oriented to person, place, and time. She exhibits normal muscle tone.   Skin: Skin is warm, dry and intact.   Psychiatric: Her speech is normal and behavior is normal. Judgment and thought content normal.   Nursing note and vitals reviewed.      Assessment:     1. Sore throat    2. Laryngitis    3. Acute pharyngitis due to other specified organisms        Plan:       Sore throat  -     SARS Coronavirus 2 Antigen, POCT Manual Read  -     POCT Strep A, Molecular    Laryngitis    Acute pharyngitis due to other specified organisms    Other orders  -     loratadine (CLARITIN) 10 mg tablet; Take 1 tablet (10 mg total) by mouth once daily.  Dispense: 30 tablet; Refill: 2  -     guaiFENesin (MUCINEX) 600 mg 12 hr tablet; Take 1 tablet (600 mg total) by mouth 2 (two) times daily. for 7 days  Dispense: 14 tablet; Refill: 2  -     dexAMETHasone injection 10 mg            Results for orders placed or performed in visit on 09/10/23   SARS Coronavirus 2 Antigen, POCT Manual Read   Result Value Ref Range    SARS Coronavirus 2 Antigen Negative Negative     Acceptable Yes    POCT Strep A, Molecular   Result Value Ref Range    Molecular Strep A, POC Negative Negative     Acceptable Yes                 Pt advised to gargle with warm salt water q 4 hours  Tylenol alternate with Ibuprofen 2 po q 6 hours prn  Clraitin one daily

## 2023-09-12 ENCOUNTER — LAB VISIT (OUTPATIENT)
Dept: LAB | Facility: HOSPITAL | Age: 53
End: 2023-09-12
Attending: STUDENT IN AN ORGANIZED HEALTH CARE EDUCATION/TRAINING PROGRAM
Payer: COMMERCIAL

## 2023-09-12 DIAGNOSIS — E05.90 HYPERTHYROIDISM: ICD-10-CM

## 2023-09-12 DIAGNOSIS — E04.2 MULTINODULAR GOITER: ICD-10-CM

## 2023-09-12 LAB
T4 FREE SERPL-MCNC: 1.12 NG/DL (ref 0.71–1.51)
TSH SERPL DL<=0.005 MIU/L-ACNC: 4.15 UIU/ML (ref 0.4–4)

## 2023-09-12 PROCEDURE — 84439 ASSAY OF FREE THYROXINE: CPT | Performed by: STUDENT IN AN ORGANIZED HEALTH CARE EDUCATION/TRAINING PROGRAM

## 2023-09-12 PROCEDURE — 36415 COLL VENOUS BLD VENIPUNCTURE: CPT | Mod: PO | Performed by: STUDENT IN AN ORGANIZED HEALTH CARE EDUCATION/TRAINING PROGRAM

## 2023-09-12 PROCEDURE — 86800 THYROGLOBULIN ANTIBODY: CPT | Performed by: STUDENT IN AN ORGANIZED HEALTH CARE EDUCATION/TRAINING PROGRAM

## 2023-09-12 PROCEDURE — 84443 ASSAY THYROID STIM HORMONE: CPT | Performed by: STUDENT IN AN ORGANIZED HEALTH CARE EDUCATION/TRAINING PROGRAM

## 2023-09-13 ENCOUNTER — PATIENT MESSAGE (OUTPATIENT)
Dept: ADMINISTRATIVE | Facility: HOSPITAL | Age: 53
End: 2023-09-13
Payer: COMMERCIAL

## 2023-09-13 LAB
THRYOGLOBULIN INTERPRETATION: ABNORMAL
THYROGLOB AB SERPL-ACNC: <1.8 IU/ML
THYROGLOB SERPL-MCNC: 0.3 NG/ML

## 2023-09-18 LAB — HEMOCCULT STL QL IA: NORMAL

## 2023-09-27 ENCOUNTER — OFFICE VISIT (OUTPATIENT)
Dept: NEUROSURGERY | Facility: CLINIC | Age: 53
End: 2023-09-27
Payer: COMMERCIAL

## 2023-09-27 VITALS
HEART RATE: 60 BPM | HEIGHT: 62 IN | DIASTOLIC BLOOD PRESSURE: 92 MMHG | SYSTOLIC BLOOD PRESSURE: 142 MMHG | BODY MASS INDEX: 31.28 KG/M2 | WEIGHT: 170 LBS

## 2023-09-27 DIAGNOSIS — M48.061 SPINAL STENOSIS OF LUMBAR REGION WITH RADICULOPATHY: ICD-10-CM

## 2023-09-27 DIAGNOSIS — M54.16 SPINAL STENOSIS OF LUMBAR REGION WITH RADICULOPATHY: ICD-10-CM

## 2023-09-27 DIAGNOSIS — M43.16 SPONDYLOLISTHESIS AT L4-L5 LEVEL: Primary | ICD-10-CM

## 2023-09-27 DIAGNOSIS — M48.061 FORAMINAL STENOSIS OF LUMBAR REGION: ICD-10-CM

## 2023-09-27 PROCEDURE — 3008F BODY MASS INDEX DOCD: CPT | Mod: CPTII,S$GLB,, | Performed by: NEUROLOGICAL SURGERY

## 2023-09-27 PROCEDURE — 99999 PR PBB SHADOW E&M-EST. PATIENT-LVL III: CPT | Mod: PBBFAC,,, | Performed by: NEUROLOGICAL SURGERY

## 2023-09-27 PROCEDURE — 1159F MED LIST DOCD IN RCRD: CPT | Mod: CPTII,S$GLB,, | Performed by: NEUROLOGICAL SURGERY

## 2023-09-27 PROCEDURE — 99214 PR OFFICE/OUTPT VISIT, EST, LEVL IV, 30-39 MIN: ICD-10-PCS | Mod: S$GLB,,, | Performed by: NEUROLOGICAL SURGERY

## 2023-09-27 PROCEDURE — 3044F PR MOST RECENT HEMOGLOBIN A1C LEVEL <7.0%: ICD-10-PCS | Mod: CPTII,S$GLB,, | Performed by: NEUROLOGICAL SURGERY

## 2023-09-27 PROCEDURE — 3080F PR MOST RECENT DIASTOLIC BLOOD PRESSURE >= 90 MM HG: ICD-10-PCS | Mod: CPTII,S$GLB,, | Performed by: NEUROLOGICAL SURGERY

## 2023-09-27 PROCEDURE — 99214 OFFICE O/P EST MOD 30 MIN: CPT | Mod: S$GLB,,, | Performed by: NEUROLOGICAL SURGERY

## 2023-09-27 PROCEDURE — 99999 PR PBB SHADOW E&M-EST. PATIENT-LVL III: ICD-10-PCS | Mod: PBBFAC,,, | Performed by: NEUROLOGICAL SURGERY

## 2023-09-27 PROCEDURE — 3077F SYST BP >= 140 MM HG: CPT | Mod: CPTII,S$GLB,, | Performed by: NEUROLOGICAL SURGERY

## 2023-09-27 PROCEDURE — 4010F PR ACE/ARB THEARPY RXD/TAKEN: ICD-10-PCS | Mod: CPTII,S$GLB,, | Performed by: NEUROLOGICAL SURGERY

## 2023-09-27 PROCEDURE — 1159F PR MEDICATION LIST DOCUMENTED IN MEDICAL RECORD: ICD-10-PCS | Mod: CPTII,S$GLB,, | Performed by: NEUROLOGICAL SURGERY

## 2023-09-27 PROCEDURE — 3044F HG A1C LEVEL LT 7.0%: CPT | Mod: CPTII,S$GLB,, | Performed by: NEUROLOGICAL SURGERY

## 2023-09-27 PROCEDURE — 4010F ACE/ARB THERAPY RXD/TAKEN: CPT | Mod: CPTII,S$GLB,, | Performed by: NEUROLOGICAL SURGERY

## 2023-09-27 PROCEDURE — 3008F PR BODY MASS INDEX (BMI) DOCUMENTED: ICD-10-PCS | Mod: CPTII,S$GLB,, | Performed by: NEUROLOGICAL SURGERY

## 2023-09-27 PROCEDURE — 3077F PR MOST RECENT SYSTOLIC BLOOD PRESSURE >= 140 MM HG: ICD-10-PCS | Mod: CPTII,S$GLB,, | Performed by: NEUROLOGICAL SURGERY

## 2023-09-27 PROCEDURE — 3080F DIAST BP >= 90 MM HG: CPT | Mod: CPTII,S$GLB,, | Performed by: NEUROLOGICAL SURGERY

## 2023-09-27 RX ORDER — DICLOFENAC SODIUM 10 MG/G
2 GEL TOPICAL 4 TIMES DAILY
Qty: 100 G | Refills: 1 | Status: SHIPPED | OUTPATIENT
Start: 2023-09-27 | End: 2024-02-29

## 2023-09-27 NOTE — PROGRESS NOTES
[employer] Low Back Pain Disability Questionnaire    DATE OF SERVICE:  09/27/2023    ATTENDING PHYSICIAN:  Marvin Keen MD    Instructions    This questionnaire has been designed to give us information as to how your back or leg pain is affecting your ability to manage in everyday life. Please answer by checking ONE box in each section for the statement which best applies to you. We realize you may consider that two or more statements in any one section apply but please just shade out the spot that indicates the statement which most clearly describes your problem.    Section 1 - Pain intensity    * I have no pain at the moment.  * The pain is very mild at the moment.  * The pain is moderate at the moment.  * The pain is fairly severe at the moment.  * The pain is very severe at the moment.  * The pain is the worst imaginable at the moment.    Score : 4    Section 2 - Personal care (washing, dressing etc)    * I can look after myself normally without causing extra pain.  * I can look after myself normally but it causes extra pain.  * It is painful to look after myself and I am slow and careful.  * I need some help but manage most of my personal care.  * I need help every day in most aspects of self-care.  * I do not get dressed, I wash with difficulty and stay in bed.    Score : 1    Section 3 - Lifting    * I can lift heavy weights without extra pain.  * I can lift heavy weights but it gives extra pain.  * Pain prevents me from lifting heavy weights off the floor, but I can manage if they are conveniently placed eg. on a table.  * Pain prevents me from lifting heavy weights, but I can manage light to medium weights if they are conveniently positioned.  * I can lift very light weights.  * I cannot lift or carry anything at all.    Score : 1    Section 4 - Walking    * Pain does not prevent me walking any distance.  * Pain prevents me from walking more than 1 mile.         * Pain prevents me from walking more than  1/2 mile.         * Pain prevents me from walking more than 100 yards.            * I can only walk using a stick or crutches.  * I am in bed most of the time.    Score : 2    Section 5 - Sitting    * I can sit in any chair as long as I like.  * I can only sit in my favourite chair as long as I like.  * Pain prevents me sitting more than one hour.  * Pain prevents me from sitting more than 30 minutes.  * Pain prevents me from sitting more than 10 minutes.  * Pain prevents me from sitting at all.    Score : 2    Section 6 - Standing    * I can stand as long as I want without extra pain.  * I can stand as long as I want but it gives me extra pain.  * Pain prevents me from standing for more than 1 hour  * Pain prevents me from standing for more than 30 minutes.  * Pain prevents me from standing for more than 10 minutes.  * Pain prevents me from standing at all.     Score : 2    Section 7 - Sleeping    * My sleep is never disturbed by pain.  * My sleep is occasionally disturbed by pain.  * Because of pain I have less than 6 hours sleep.   * Because of pain I have less than 4 hours sleep.   * Because of pain I have less than 2 hours sleep.  * Pain prevents me from sleeping at all.    Score : 1    Section 8 - Sex life (if applicable)    * My sex life is normal and causes no extra pain.  * My sex life is normal but causes some extra pain.  * My sex life is nearly normal but is very painful.   * My sex life is severely restricted by pain.  * My sex life is nearly absent because of pain.   * Pain prevents any sex life at all.    Score : 1    Section 9 - Social life    * My social life is normal and gives me no extra pain.  * My social life is normal but increases the degree of pain.  * Pain has no significant effect on my social life apart from limiting my more energetic interests eg, sport.  * Pain has restricted my social life and I do not go out as often.  * Pain has restricted my social life to my home.   * I have no  social life because of pain.     Score : 2    Section 10 - Travelling    * I can travel anywhere without pain.  * I can travel anywhere but it gives me extra pain.  * Pain is bad but I manage journeys over two hours.  * Pain restricts me to journeys of less than one hour.  * Pain restricts me to short necessary journeys under 30 minutes.  * Pain prevents me from travelling except to receive treatment.    Score : 1      TOTAL SCORE :  17 / 50 x 2 = 34 %      References  1. [facility] DAMON, Kathie PB. The [employer] Disability Index. Spine 2000 Nov 15;25(22):2948-52; discussion 52.  from standing for more than from standing for more than from standing for more than from standing at all

## 2023-09-27 NOTE — PROGRESS NOTES
NEUROSURGICAL PROGRESS NOTE    DATE OF SERVICE:  09/27/2023    ATTENDING PHYSICIAN:  Marvin Keen MD    STEFFANY 34%    SUBJECTIVE:  08/05/2020  This is a very pleasant 49 y.o. female who presents as referral for low back pain and leg pain. She says the back pain started around 3 years ago but has gotten worse over the past 6 months. Patient also has pain going down the posterolateral leg to the dorsum of the foot that is almost constant on the right and intermittent on the left. No specific positions make the pain better.  Right more than left leg pain.  Back pain is as severe as leg pain.  She be up and moving for 1-2 hours before having to rest due to severe pain in the back and legs. Denies any numbness or weakness.  No sphincter dysfunction symptoms.  She has done physical therapy 2 years ago without significant pain relief.  She is taking gabapentin with partial pain relief.  Pain is interfering with walking.  She is now limping because of the pain.  Pain is interfering with working.  She is working at [employer].  Pain is interfering with quality of life.      08/08/2023  Isaura Mancini is a 52 y.o. female who presents with the above CC.  Patient was scheduled for surgery in 2020.  She states the surgery was canceled and then she started having medical problems with her thyroid and surgery so she put off returning back to clinic.  Patient states the back pain has gotten progressively worse and is constant across the low back.  It is better with flexion and worse with walking or standing and also when laying down.  She has pain in the left foot and has seen podiatry for this.  She has bilateral posterior leg pain and numbness mainly when lying down at night.  The left leg is worse than the right leg.       The back and legs bother her equally.     She would physical therapy 3 years ago without relief.  No interventional pain procedures.  No spine surgery.  She takes gabapentin 100 mg and ibuprofen.     Patient  denies any recent accidents or trauma, no saddle anesthesias, and no bowel or bladder incontinence.    INTERIM HISTORY:  09/27/23  Six to 10/10 of low back pain, she reports severe left leg pain.  Pain is interfering with ability to stand, walk.  She works at [employer] in the [employer] department.  She has to lift up to 50 lb.  She also has a manager positioned.  Pain is affecting her quality of life and functional status.  She has completed a full conservative management including physical therapy              PAST MEDICAL HISTORY:  Active Ambulatory Problems     Diagnosis Date Noted    Essential hypertension 11/12/2013    Thrombocytosis 12/23/2013    Snoring 08/20/2014    Obesity (BMI 30-39.9) 08/20/2014    Insomnia 11/28/2016    Chronic bilateral low back pain with left-sided sciatica 11/28/2016    Hypokalemia 11/28/2016    Class 2 obesity with body mass index (BMI) of 37.0 to 37.9 in adult 11/28/2016    Lumbar facet arthropathy 12/20/2016    Spondylolisthesis at L4-L5 level 12/20/2016    Sacroiliitis 12/20/2016    Muscle strain of right forearm 04/04/2017    Palpitations 07/03/2017    Headache 07/03/2017    Hematuria 08/13/2018    Constipation 08/13/2018    Urinary tract infection without hematuria 08/13/2018    Chest pain 09/11/2019    Gastroesophageal reflux disease 09/11/2019    Family history of premature CAD 11/07/2019    Degenerative disc disease, lumbar 09/22/2020    Uterine leiomyoma 10/07/2021    Abnormal uterine bleeding (AUB) 10/07/2021    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    Postoperative hypothyroidism 05/25/2023     Resolved Ambulatory Problems     Diagnosis Date Noted    Preventative health care 11/18/2015    Routine general medical examination at a health care facility 11/18/2015    Chronic low back pain with left-sided sciatica 10/11/2018    Impaired gait and mobility 10/11/2018    Hyperthyroidism 06/06/2022    Thyroid nodule 09/22/2022    Graves disease 10/28/2022     Past Medical  History:   Diagnosis Date    Hypertension        PAST SURGICAL HISTORY:  Past Surgical History:   Procedure Laterality Date     SECTION      THYROIDECTOMY N/A 10/28/2022    Procedure: THYROIDECTOMY, total;  Surgeon: Isaura Hayes MD;  Location: [address];  Service: General;  Laterality: N/A;    TUBAL LIGATION         SOCIAL HISTORY:   Social History     Socioeconomic History    Marital status: Single    Number of children: 3   Occupational History     Employer: [id number]     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   Tobacco Use    Smoking status: Never    Smokeless tobacco: Never   Substance and Sexual Activity    Alcohol use: No    Drug use: No    Sexual activity: Yes     Partners: Male     Birth control/protection: Surgical     Comment: BTL     Social Determinants of Health     Financial Resource Strain: Medium Risk (2023)    Overall Financial Resource Strain (CARDIA)     Difficulty of Paying Living Expenses: Somewhat hard   Food Insecurity: Food Insecurity Present (2023)    Hunger Vital Sign     Worried About Running Out of Food in the Last Year: Sometimes true     Ran Out of Food in the Last Year: Sometimes true   Transportation Needs: Unmet Transportation Needs (2023)    PRAPARE - Transportation     Lack of Transportation (Medical): Yes     Lack of Transportation (Non-Medical): No   Physical Activity: Inactive (2023)    Exercise Vital Sign     Days of Exercise per Week: 0 days     Minutes of Exercise per Session: 0 min   Stress: No Stress Concern Present (2023)    [employer] [place] [employer] - Occupational Stress Questionnaire     Feeling of Stress : Only a little   Social Connections: Moderately Isolated (2023)    Social Connection and Isolation Panel [NHANES]     Frequency of Communication with Friends and Family: More than three times a week     Frequency of Social Gatherings with Friends and Family: Twice a week     Attends Yazidi  Services: 1 to 4 times per year     Active Member of Clubs or Organizations: No     Attends Club or Organization Meetings: Never     Marital Status:    Housing Stability: Low Risk  (8/22/2023)    Housing Stability Vital Sign     Unable to Pay for Housing in the Last Year: No     Number of Places Lived in the Last Year: 1     Unstable Housing in the Last Year: No       FAMILY HISTORY:  Family History   Problem Relation Age of Onset    Heart disease Father     Hypertension Father     Breast cancer Paternal Aunt 60    Diabetes Neg Hx     Colon cancer Neg Hx     Ovarian cancer Neg Hx        CURRENTS MEDICATIONS:  Current Outpatient Medications on File Prior to Visit   Medication Sig Dispense Refill    aspirin (ECOTRIN) 81 MG EC tablet Take 81 mg by mouth once daily.      cholecalciferol, vitamin D3, (VITAMIN D3) 25 mcg (1,000 unit) capsule Take 2 capsules (2,000 Units total) by mouth once daily.  0    hydroCHLOROthiazide (HYDRODIURIL) 12.5 MG Tab Take 1 tablet by mouth once daily 90 tablet 0    ibuprofen (ADVIL,MOTRIN) 800 MG tablet Take 1 tablet (800 mg total) by mouth every 8 (eight) hours as needed for Pain. 60 tablet 2    levothyroxine (SYNTHROID) 125 MCG tablet Take 1 tablet (125 mcg total) by mouth before breakfast. 30 tablet 11    loratadine (CLARITIN) 10 mg tablet Take 1 tablet (10 mg total) by mouth once daily. 30 tablet 2    losartan (COZAAR) 25 MG tablet Take 1 tablet (25 mg total) by mouth once daily. 90 tablet 1    meloxicam (MOBIC) 15 MG tablet Take 1 tablet (15 mg total) by mouth once daily. 30 tablet 0    polyethylene glycol (GLYCOLAX) 17 gram/dose powder Take 17 g by mouth once daily. 119 g 3    [DISCONTINUED] diclofenac sodium (VOLTAREN) 1 % Gel Apply 2 g topically 4 (four) times daily. for 10 days 100 g 1    gabapentin (NEURONTIN) 100 MG capsule Take 1 capsule (100 mg total) by mouth 3 (three) times daily as needed (back and leg pain). 270 capsule 3     No current facility-administered  medications on file prior to visit.       ALLERGIES:  Review of patient's allergies indicates:  No Known Allergies    REVIEW OF SYSTEMS:  Review of Systems   Constitutional:  Negative for diaphoresis, fever and weight loss.   Respiratory:  Negative for shortness of breath.    Cardiovascular:  Negative for chest pain.   Gastrointestinal:  Negative for blood in stool.   Genitourinary:  Negative for hematuria.   Endo/Heme/Allergies:  Does not bruise/bleed easily.   All other systems reviewed and are negative.        OBJECTIVE:    PHYSICAL EXAMINATION:   Vitals:    09/27/23 0836   BP: (!) 142/92   Pulse: 60       Physical Exam:  Vitals reviewed.    Constitutional: She appears well-developed and well-nourished.     Eyes: Pupils are equal, round, and reactive to light. Conjunctivae and EOM are normal.     Cardiovascular: Normal distal pulses and no edema.     Abdominal: Soft.     Skin: Skin displays no rash on trunk and no rash on extremities. Skin displays no lesions on trunk and no lesions on extremities.     Psych/Behavior: She is alert. She is oriented to person, place, and time. She has a normal mood and affect.     Musculoskeletal:        Neck: Range of motion is full.     Neurological:        DTRs: Tricep reflexes are 2+ on the right side and 2+ on the left side. Bicep reflexes are 2+ on the right side and 2+ on the left side. Brachioradialis reflexes are 2+ on the right side and 2+ on the left side. Patellar reflexes are 2+ on the right side and 2+ on the left side. Achilles reflexes are 0 on the right side and 0 on the left side.       Back Exam     Tenderness   The patient is experiencing tenderness in the lumbar.    Range of Motion   Extension:  abnormal   Flexion:  abnormal   Lateral bend right:  abnormal   Lateral bend left:  abnormal   Rotation right:  abnormal   Rotation left:  abnormal     Muscle Strength   Right Quadriceps:  5/5   Left Quadriceps:  5/5   Right Hamstrings:  5/5   Left Hamstrings:  5/5      Tests   Straight leg raise right: positive  Straight leg raise left: positive    Other   Toe walk: abnormal  Heel walk: abnormal                Neurologic Exam     Mental Status   Oriented to person, place, and time.   Speech: speech is normal   Level of consciousness: alert    Cranial Nerves   Cranial nerves II through XII intact.     CN III, IV, VI   Pupils are equal, round, and reactive to light.  Extraocular motions are normal.     Motor Exam   Muscle bulk: normal  Overall muscle tone: normal    Strength   Right deltoid: 5/5  Left deltoid: 5/5  Right biceps: 5/5  Left biceps: 5/5  Right triceps: 5/5  Left triceps: 5/5  Right wrist flexion: 5/5  Left wrist flexion: 5/5  Right wrist extension: 5/5  Left wrist extension: 5/5  Right interossei: 5/5  Left interossei: 5/5  Right iliopsoas: 5/5  Left iliopsoas: 5/5  Right quadriceps: 5/5  Left quadriceps: 5/5  Right hamstrin/5  Left hamstrin/5  Right anterior tibial: 5/5  Left anterior tibial: 5/5  Right posterior tibial: 5/5  Left posterior tibial: 5/5  Right peroneal: 5/5  Left peroneal: 5/5  Right gastroc: 5/5  Left gastroc: 5/5    Sensory Exam   Light touch normal.   Pinprick normal.     Gait, Coordination, and Reflexes     Reflexes   Right brachioradialis: 2+  Left brachioradialis: 2+  Right biceps: 2+  Left biceps: 2+  Right triceps: 2+  Left triceps: 2+  Right patellar: 2+  Left patellar: 2+  Right achilles: 0  Left achilles: 0  Right plantar: normal  Left plantar: normal  Right Gillis: absent  Left Gillis: absent  Right ankle clonus: absent  Left ankle clonus: absent        DIAGNOSTIC DATA:  I personally interpreted the following imaging:   Lumbar spine MRI  compared to  shows progression of the L4-5 spondylolisthesis with severe spinal stenosis,  Foraminal stenosis    Lumbar x-ray: 8 mm of anterolisthesis in flexion at L4-5    ASSESMENT:  This is a 53 y.o. female with     Problem List Items Addressed This Visit          Orthopedic     Spondylolisthesis at L4-L5 level - Primary     Other Visit Diagnoses       Spinal stenosis of lumbar region with radiculopathy        Foraminal stenosis of lumbar region                  PLAN:  I explained the natural history of the disease and all treatment options. I recommended a L4-5 oblique interbody fusion, placement of interbody spacer, Gl[employer]us [employer] TPS cage filled with allograft BMP and DBM, L4-5 posterior nonsegmental instrumentation using Globus Cre screws, L4-5 posterolateral arthrodesis using autograft harvested from the same incision, L4-5 laminectomy, medial facetectomy, foraminotomy.  I think it is possible to achieve a full decompression without the stabilizing further the spondylolisthesis therefore a lumbar fusion is indicated.    Goals of the surgery is to improve the patient's pain, disability, ability to walk and stand.      We have discussed the risks of surgery including death, coma, bleeding, infection, failure of surgery, CSF leak, nerve root injury, spinal cord injury, ureter injury, weakness, paralysis, peripheral neuropathy, malplaced hardware, migration of hardware, non-union, need for reoperation. Patient understands the risks and would like to proceed with surgery.    The patient has increase perioperative risks because of these comorbidities:  Essential hypertension.         Marvin Keen MD  Cell:[phone]

## 2023-10-06 ENCOUNTER — HOSPITAL ENCOUNTER (OUTPATIENT)
Dept: RADIOLOGY | Facility: HOSPITAL | Age: 53
Discharge: HOME | End: 2023-10-06
Attending: INTERNAL MEDICINE
Payer: COMMERCIAL

## 2023-10-06 DIAGNOSIS — Z12.31 SCREENING MAMMOGRAM FOR BREAST CANCER: ICD-10-CM

## 2023-10-06 PROCEDURE — 77063 MAMMO DIGITAL SCREENING BILAT WITH TOMO: ICD-10-PCS | Mod: 26,,, | Performed by: RADIOLOGY

## 2023-10-06 PROCEDURE — 77063 BREAST TOMOSYNTHESIS BI: CPT | Mod: 26,,, | Performed by: RADIOLOGY

## 2023-10-06 PROCEDURE — 77067 SCR MAMMO BI INCL CAD: CPT | Mod: TC

## 2023-10-06 PROCEDURE — 77067 SCR MAMMO BI INCL CAD: CPT | Mod: 26,,, | Performed by: RADIOLOGY

## 2023-10-06 PROCEDURE — 77067 MAMMO DIGITAL SCREENING BILAT WITH TOMO: ICD-10-PCS | Mod: 26,,, | Performed by: RADIOLOGY

## 2023-10-11 ENCOUNTER — TELEPHONE (OUTPATIENT)
Dept: NEUROSURGERY | Facility: CLINIC | Age: 53
End: 2023-10-11
Payer: COMMERCIAL

## 2023-10-11 DIAGNOSIS — M51.36 DDD (DEGENERATIVE DISC DISEASE), LUMBAR: Primary | ICD-10-CM

## 2023-10-11 DIAGNOSIS — M48.061 SPINAL STENOSIS OF LUMBAR REGION, UNSPECIFIED WHETHER NEUROGENIC CLAUDICATION PRESENT: ICD-10-CM

## 2023-10-11 DIAGNOSIS — M54.16 LUMBAR RADICULOPATHY: ICD-10-CM

## 2023-10-16 ENCOUNTER — PATIENT MESSAGE (OUTPATIENT)
Dept: INTERNAL MEDICINE | Facility: CLINIC | Age: 53
End: 2023-10-16
Payer: COMMERCIAL

## 2023-10-17 ENCOUNTER — HOSPITAL ENCOUNTER (OUTPATIENT)
Dept: RADIOLOGY | Facility: HOSPITAL | Age: 53
Discharge: HOME | End: 2023-10-17
Attending: NEUROLOGICAL SURGERY
Payer: COMMERCIAL

## 2023-10-17 DIAGNOSIS — M54.16 LUMBAR RADICULOPATHY: ICD-10-CM

## 2023-10-17 DIAGNOSIS — M51.36 DDD (DEGENERATIVE DISC DISEASE), LUMBAR: ICD-10-CM

## 2023-10-17 DIAGNOSIS — M48.061 SPINAL STENOSIS OF LUMBAR REGION, UNSPECIFIED WHETHER NEUROGENIC CLAUDICATION PRESENT: ICD-10-CM

## 2023-10-17 PROCEDURE — 72083 X-RAY EXAM ENTIRE SPI 4/5 VW: CPT | Mod: 26,,, | Performed by: STUDENT IN AN ORGANIZED HEALTH CARE EDUCATION/TRAINING PROGRAM

## 2023-10-17 PROCEDURE — 72082 X-RAY EXAM ENTIRE SPI 2/3 VW: CPT | Mod: TC,FY

## 2023-10-17 PROCEDURE — 72083 XR SCOLIOSIS COMPLETE: ICD-10-PCS | Mod: 26,,, | Performed by: STUDENT IN AN ORGANIZED HEALTH CARE EDUCATION/TRAINING PROGRAM

## 2023-10-17 PROCEDURE — 72100 X-RAY EXAM L-S SPINE 2/3 VWS: CPT | Mod: TC,FY,59

## 2023-10-24 ENCOUNTER — HOSPITAL ENCOUNTER (OUTPATIENT)
Dept: RADIOLOGY | Facility: HOSPITAL | Age: 53
Discharge: HOME | End: 2023-10-24
Attending: NEUROLOGICAL SURGERY
Payer: COMMERCIAL

## 2023-10-24 DIAGNOSIS — M48.061 SPINAL STENOSIS OF LUMBAR REGION, UNSPECIFIED WHETHER NEUROGENIC CLAUDICATION PRESENT: ICD-10-CM

## 2023-10-24 DIAGNOSIS — M51.36 DDD (DEGENERATIVE DISC DISEASE), LUMBAR: ICD-10-CM

## 2023-10-24 DIAGNOSIS — M54.16 LUMBAR RADICULOPATHY: ICD-10-CM

## 2023-10-24 PROCEDURE — 72100 X-RAY EXAM L-S SPINE 2/3 VWS: CPT | Mod: 26,,, | Performed by: RADIOLOGY

## 2023-10-24 PROCEDURE — 72100 X-RAY EXAM L-S SPINE 2/3 VWS: CPT | Mod: TC,FY

## 2023-10-24 PROCEDURE — 72100 XR LUMBAR SPINE AP AND LATERAL: ICD-10-PCS | Mod: 26,,, | Performed by: RADIOLOGY

## 2023-11-11 ENCOUNTER — OFFICE VISIT (OUTPATIENT)
Dept: URGENT CARE | Facility: CLINIC | Age: 53
End: 2023-11-11
Payer: COMMERCIAL

## 2023-11-11 VITALS
WEIGHT: 169 LBS | DIASTOLIC BLOOD PRESSURE: 80 MMHG | BODY MASS INDEX: 31.1 KG/M2 | HEIGHT: 62 IN | RESPIRATION RATE: 17 BRPM | OXYGEN SATURATION: 98 % | HEART RATE: 65 BPM | TEMPERATURE: 98 F | SYSTOLIC BLOOD PRESSURE: 117 MMHG

## 2023-11-11 DIAGNOSIS — N89.8 VAGINAL DISCHARGE: ICD-10-CM

## 2023-11-11 DIAGNOSIS — B37.9 YEAST INFECTION: Primary | ICD-10-CM

## 2023-11-11 LAB
BILIRUB UR QL STRIP: NEGATIVE
GLUCOSE UR QL STRIP: NEGATIVE
KETONES UR QL STRIP: NEGATIVE
LEUKOCYTE ESTERASE UR QL STRIP: NEGATIVE
PH, POC UA: 5 (ref 5–8)
POC BLOOD, URINE: POSITIVE
POC NITRATES, URINE: NEGATIVE
PROT UR QL STRIP: NEGATIVE
SP GR UR STRIP: 1.02 (ref 1–1.03)
UROBILINOGEN UR STRIP-ACNC: ABNORMAL (ref 0.1–1.1)

## 2023-11-11 PROCEDURE — 99213 PR OFFICE/OUTPT VISIT, EST, LEVL III, 20-29 MIN: ICD-10-PCS | Mod: S$GLB,,, | Performed by: FAMILY MEDICINE

## 2023-11-11 PROCEDURE — 81003 URINALYSIS AUTO W/O SCOPE: CPT | Mod: QW,S$GLB,, | Performed by: FAMILY MEDICINE

## 2023-11-11 PROCEDURE — 81003 POCT URINALYSIS, DIPSTICK, AUTOMATED, W/O SCOPE: ICD-10-PCS | Mod: QW,S$GLB,, | Performed by: FAMILY MEDICINE

## 2023-11-11 PROCEDURE — 81514 NFCT DS BV&VAGINITIS DNA ALG: CPT | Performed by: FAMILY MEDICINE

## 2023-11-11 PROCEDURE — 99213 OFFICE O/P EST LOW 20 MIN: CPT | Mod: S$GLB,,, | Performed by: FAMILY MEDICINE

## 2023-11-11 RX ORDER — METRONIDAZOLE 500 MG/1
500 TABLET ORAL EVERY 12 HOURS
Qty: 14 TABLET | Refills: 0 | Status: SHIPPED | OUTPATIENT
Start: 2023-11-11 | End: 2023-11-18

## 2023-11-11 RX ORDER — FLUCONAZOLE 150 MG/1
150 TABLET ORAL ONCE
Qty: 1 TABLET | Refills: 0 | Status: SHIPPED | OUTPATIENT
Start: 2023-11-11 | End: 2023-11-11

## 2023-11-11 NOTE — PROGRESS NOTES
"Subjective:      Patient ID: Isaura Mancini is a 53 y.o. female.    Vitals:  height is 5' 2" (1.575 m) and weight is 76.7 kg (169 lb). Her oral temperature is 97.6 °F (36.4 °C). Her blood pressure is 117/80 and her pulse is 65. Her respiration is 17 and oxygen saturation is 98%.     Chief Complaint: Vaginitis    This is a 53 y.o. female who presents today with a chief complaint of possible yeast infection. Patients states she is having itchiness and yellow discharge. Patient symptoms started 4 days ago.      Gynecologic Exam  The patient's primary symptoms include genital itching, a genital odor and vaginal discharge. The patient's pertinent negatives include no genital lesions, genital rash, missed menses, pelvic pain or vaginal bleeding. This is a new problem. The current episode started in the past 7 days. The problem occurs constantly. The problem has been unchanged. The patient is experiencing no pain. The problem affects both sides. She is not pregnant. Associated symptoms include frequency, painful intercourse and urgency. Pertinent negatives include no back pain, chills, constipation, headaches, hematuria, rash or sore throat. The vaginal discharge was milky. There has been no bleeding. She has not been passing clots. She has not been passing tissue. Nothing aggravates the symptoms. She has tried nothing for the symptoms. She is sexually active. No, her partner does not have an STD. She uses nothing for contraception. Her menstrual history has been irregular.     Constitution: Negative for chills.   HENT:  Negative for sore throat.    Gastrointestinal:  Negative for constipation.   Genitourinary:  Positive for frequency, urgency and vaginal discharge. Negative for hematuria, missed menses and pelvic pain.   Musculoskeletal:  Negative for back pain.   Skin:  Negative for rash.   Neurological:  Negative for headaches.      Objective:     Physical Exam   Constitutional: She is oriented to person, place, and " time. She appears well-developed.   HENT:   Head: Normocephalic and atraumatic.   Ears:   Right Ear: External ear normal.   Left Ear: External ear normal.   Nose: Nose normal. No nasal deformity. No epistaxis.   Mouth/Throat: Oropharynx is clear and moist and mucous membranes are normal.   Eyes: Lids are normal.   Neck: Trachea normal and phonation normal. Neck supple.   Cardiovascular: Normal pulses.   Pulmonary/Chest: Effort normal.   Abdominal: Normal appearance and bowel sounds are normal. She exhibits no distension. Soft. There is no abdominal tenderness.   Neurological: She is alert and oriented to person, place, and time.   Skin: Skin is warm, dry and intact.   Psychiatric: Her speech is normal and behavior is normal.   Nursing note and vitals reviewed.      Assessment:     1. Yeast infection    2. Vaginal discharge        Plan:       Yeast infection  -     fluconazole (DIFLUCAN) 150 MG Tab; Take 1 tablet (150 mg total) by mouth once. for 1 dose  Dispense: 1 tablet; Refill: 0    Vaginal discharge  -     POCT Urinalysis, Dipstick, Automated, W/O Scope  -     VAGINOSIS SCREEN BY DNA PROBE  -     metroNIDAZOLE (FLAGYL) 500 MG tablet; Take 1 tablet (500 mg total) by mouth every 12 (twelve) hours. for 7 days  Dispense: 14 tablet; Refill: 0      Thank you for choosing [facility]!     Our goal in the Urgent Care is to always provide outstanding medical care. You may receive a survey by mail or e-mail in the next week regarding your experience today. We would greatly appreciate you completing and returning the survey. Your feedback provides us with a way to recognize our staff who provide very good care, and it helps us learn how to improve when your experience was below our aspiration of excellence.       We appreciate you trusting us with your medical care. We hope you feel better soon. We will be happy to take care of you for all of your future medical needs.  You must understand that you've received an  Urgent Care treatment only and that you may be released before all your medical problems are known or treated. You, the patient, will arrange for follow up care as instructed.  Follow up with your PCP or specialty clinic as directed in the next 1-2 weeks if not improved or as needed.  You can call [phone] to schedule an appointment with the appropriate provider.  Another option is to follow up with [facility] ([email or web address]) virtually for quick simple medical advice.  If your condition worsens we recommend that you receive another evaluation at the emergency room immediately or contact your primary medical clinics after hours call service to discuss your concerns.  Please return here or go to the Emergency Department for any concerns or worsening of condition.      *If you were prescribed a narcotic or controlled medication, do not drive or operate heavy equipment or machinery while taking these medications.

## 2023-11-14 ENCOUNTER — TELEPHONE (OUTPATIENT)
Dept: URGENT CARE | Facility: CLINIC | Age: 53
End: 2023-11-14
Payer: COMMERCIAL

## 2023-11-14 DIAGNOSIS — B96.89 BACTERIAL VAGINOSIS: Primary | ICD-10-CM

## 2023-11-14 DIAGNOSIS — N76.0 BACTERIAL VAGINOSIS: Primary | ICD-10-CM

## 2023-11-14 LAB
BACTERIAL VAGINOSIS DNA: POSITIVE
CANDIDA GLABRATA DNA: NEGATIVE
CANDIDA KRUSEI DNA: NEGATIVE
CANDIDA RRNA VAG QL PROBE: NEGATIVE
T VAGINALIS RRNA GENITAL QL PROBE: NEGATIVE

## 2023-11-14 NOTE — TELEPHONE ENCOUNTER
Spoke with pt about positive BV. Already treated with flagyl in clinic. COntinue taking full 7 days. Discussed boric acid otc to prevent future BV infections. Discussed causes of BV such as intercourse, vaginal bleeding, baths etc anything to throw off vaginal luci.

## 2023-11-17 RX ORDER — HYDROCHLOROTHIAZIDE 12.5 MG/1
12.5 TABLET ORAL
Qty: 90 TABLET | Refills: 1 | Status: SHIPPED | OUTPATIENT
Start: 2023-11-17 | End: 2024-02-29 | Stop reason: SDUPTHER

## 2023-11-17 NOTE — TELEPHONE ENCOUNTER
Refill Routing Note   Medication(s) are not appropriate for processing by [facility] for the following reason(s):        New or recently adjusted medication    [facility] action(s):  Defer               Appointments  past 12m or future 3m with PCP    Date Provider   Last Visit   8/29/2023 Delisa Shah MD   Next Visit   2/29/2024 Delisa Shah MD   ED visits in past 90 days: 0        Note composed:11:20 AM 11/17/2023

## 2023-11-17 NOTE — TELEPHONE ENCOUNTER
No care due was identified.  [facility] Embedded Care Due Messages. Reference number: [id number].   11/17/2023 9:27:42 AM CST

## 2023-12-12 ENCOUNTER — PATIENT MESSAGE (OUTPATIENT)
Dept: ADMINISTRATIVE | Facility: HOSPITAL | Age: 53
End: 2023-12-12
Payer: COMMERCIAL

## 2023-12-13 DIAGNOSIS — R10.2 PELVIC PAIN IN FEMALE: ICD-10-CM

## 2023-12-13 RX ORDER — IBUPROFEN 800 MG/1
800 TABLET ORAL EVERY 8 HOURS PRN
Qty: 60 TABLET | Refills: 2 | Status: SHIPPED | OUTPATIENT
Start: 2023-12-13 | End: 2024-02-29 | Stop reason: SDUPTHER

## 2023-12-21 ENCOUNTER — PATIENT MESSAGE (OUTPATIENT)
Dept: NEUROSURGERY | Facility: CLINIC | Age: 53
End: 2023-12-21
Payer: COMMERCIAL

## 2024-01-05 ENCOUNTER — OFFICE VISIT (OUTPATIENT)
Dept: ENDOCRINOLOGY | Facility: CLINIC | Age: 54
End: 2024-01-05
Payer: COMMERCIAL

## 2024-01-05 ENCOUNTER — PATIENT MESSAGE (OUTPATIENT)
Dept: ADMINISTRATIVE | Facility: OTHER | Age: 54
End: 2024-01-05
Payer: COMMERCIAL

## 2024-01-05 VITALS
DIASTOLIC BLOOD PRESSURE: 80 MMHG | HEIGHT: 62 IN | BODY MASS INDEX: 32.94 KG/M2 | OXYGEN SATURATION: 99 % | WEIGHT: 179 LBS | SYSTOLIC BLOOD PRESSURE: 130 MMHG | HEART RATE: 71 BPM

## 2024-01-05 DIAGNOSIS — E89.0 POSTOPERATIVE HYPOTHYROIDISM: ICD-10-CM

## 2024-01-05 DIAGNOSIS — C73 HURTHLE CELL CARCINOMA OF THYROID: Primary | ICD-10-CM

## 2024-01-05 PROCEDURE — 1159F MED LIST DOCD IN RCRD: CPT | Mod: CPTII,S$GLB,, | Performed by: INTERNAL MEDICINE

## 2024-01-05 PROCEDURE — 3008F BODY MASS INDEX DOCD: CPT | Mod: CPTII,S$GLB,, | Performed by: INTERNAL MEDICINE

## 2024-01-05 PROCEDURE — 99999 PR PBB SHADOW E&M-EST. PATIENT-LVL IV: CPT | Mod: PBBFAC,,, | Performed by: INTERNAL MEDICINE

## 2024-01-05 PROCEDURE — 3079F DIAST BP 80-89 MM HG: CPT | Mod: CPTII,S$GLB,, | Performed by: INTERNAL MEDICINE

## 2024-01-05 PROCEDURE — 99214 OFFICE O/P EST MOD 30 MIN: CPT | Mod: S$GLB,,, | Performed by: INTERNAL MEDICINE

## 2024-01-05 PROCEDURE — 3075F SYST BP GE 130 - 139MM HG: CPT | Mod: CPTII,S$GLB,, | Performed by: INTERNAL MEDICINE

## 2024-01-05 PROCEDURE — 1160F RVW MEDS BY RX/DR IN RCRD: CPT | Mod: CPTII,S$GLB,, | Performed by: INTERNAL MEDICINE

## 2024-01-05 RX ORDER — LEVOTHYROXINE SODIUM 150 UG/1
150 TABLET ORAL
Qty: 30 TABLET | Refills: 11 | Status: SHIPPED | OUTPATIENT
Start: 2024-01-05 | End: 2025-01-04

## 2024-01-05 NOTE — ASSESSMENT & PLAN NOTE
TSH above goal  Patient Instructions   Increase levothyroxine to 150 mcg daily. No more extra pills for now  We will repeat TSH in 6 weeks to make sure it is coming to goal, lower end of normal range (0.4-2.0)    We will check an ultrasound and labs again (TSH, thyroglobulin) in August of this year

## 2024-01-05 NOTE — PATIENT INSTRUCTIONS
Increase levothyroxine to 150 mcg daily. No more extra pills for now  We will repeat TSH in 6 weeks to make sure it is coming to goal, lower end of normal range (0.4-2.0)    We will check an ultrasound and labs again (TSH, thyroglobulin) in August of this year

## 2024-01-05 NOTE — ASSESSMENT & PLAN NOTE
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 The carcinoma does not extend beyond the thyroid gland, and vascular invasion is not identified. Margins negative for tumor.  - Tumor board consensus is to monitor for now and measure postoperative thyroglobulin along with TSH and BMP. If insufficiently suppressed to post thyroidectomy levels or if demonstrating increase in thyroglobulin, may consider postoperative radioactive iodine ablation.    Thyroglobulin stable despite higher TSH. Will adjust thyroid hormone as below  Repeat labs and US in 8/2024 but last reassuring

## 2024-01-05 NOTE — PROGRESS NOTES
Isaura Mancini is a 53 y.o. female presenting for follow-up of Hurthle cell carcinoma    Previously seen by multiple other providers, new to me      History of Present Illness  Hurthle cell carcinoma  Initially seen 06/06/2022 for hyperthyroidism  Labs consistent with Graves disease with elevated TRAb of 4.77, undetectable TSH with elevated free T4 of 1.71 and elevated total T3 of 182.   Methimazole was started along with beta-blocker and thyroid ultrasound 06/06/2022 demonstrated concerning thyroid nodules.    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 The carcinoma does not extend beyond the thyroid gland, and vascular invasion is not identified. Margins negative for tumor.    Post-op thyroglobulin 0.2 with TSH 0.762      Tumor board consensus was to monitor and measure postoperative thyroglobulin along with TSH and BMP. If insufficiently suppressed to post thyroidectomy levels or if demonstrating increase in thyroglobulin, may consider postoperative radioactive iodine ablation.    Neck US 8/2023:  THYROID GLAND is surgically absent.   RIGHT BED: No evidence of residual/recurrent thyroid tissue.  LEFT BED: In the superior aspect of the left thyroid bed there is a 0.6 x 0.3 x 0.3 cm isoechoic focus likely representing small thyroid remnant.  Vascularity is grade 1 (absent).  Borders are regular and no microcalcifications are identified.     LYMPH NODES:   Real-time survey of the bilateral central and lateral neck was performed.  No abnormal appearing lymph nodes were identified.     Impression:   1. Superior left thyroid bed isoechoic focus likely representing small remnant of normal thyroid tissue.  2. No abnormal lymph nodes are identified.     RECOMMENDATIONS:  Correlate with trend of thyroglobulin titers.   Follow-up ultrasound in 1-2 years is recommended.    Component       Latest Ref Rng 9/12/2023   Thyroglobulin, Tumor Marker      ng/mL 0.3 (H)    Thyroglobulin Antibody Screen      <1.8 IU/mL <1.8    Thyroglobulin Interpretation SEE BELOW    TSH      0.400 - 4.000 uIU/mL 4.153 (H)    Free T4      0.71 - 1.51 ng/dL 1.12       Postsurgical hypothyroidism  Taking LT4 125 mcg daily with extra tab on Wednesdays with total 8 tabs weekly  Taking appropriately and without missed doses    Feeling well since last visit. Described leg cramps at night     Weight gain in last few months. She had weight loss with hyperthyroidism    Lab Results   Component Value Date    TSH 4.153 (H) 09/12/2023   No dose adjustment since this lab        Current Outpatient Medications:     aspirin (ECOTRIN) 81 MG EC tablet, Take 81 mg by mouth once daily., Disp: , Rfl:     cholecalciferol, vitamin D3, (VITAMIN D3) 25 mcg (1,000 unit) capsule, Take 2 capsules (2,000 Units total) by mouth once daily., Disp: , Rfl: 0    hydroCHLOROthiazide (HYDRODIURIL) 12.5 MG Tab, Take 1 tablet by mouth once daily, Disp: 90 tablet, Rfl: 1    ibuprofen (ADVIL,MOTRIN) 800 MG tablet, Take 1 tablet (800 mg total) by mouth every 8 (eight) hours as needed for Pain., Disp: 60 tablet, Rfl: 2    loratadine (CLARITIN) 10 mg tablet, Take 1 tablet (10 mg total) by mouth once daily., Disp: 30 tablet, Rfl: 2    losartan (COZAAR) 25 MG tablet, Take 1 tablet (25 mg total) by mouth once daily., Disp: 90 tablet, Rfl: 1    meloxicam (MOBIC) 15 MG tablet, Take 1 tablet (15 mg total) by mouth once daily., Disp: 30 tablet, Rfl: 0    polyethylene glycol (GLYCOLAX) 17 gram/dose powder, Take 17 g by mouth once daily., Disp: 119 g, Rfl: 3    diclofenac sodium (VOLTAREN) 1 % Gel, Apply 2 g topically 4 (four) times daily. for 10 days (Patient not taking: Reported on 11/11/2023), Disp: 100 g, Rfl: 1    gabapentin (NEURONTIN) 100 MG capsule, Take 1 capsule (100 mg total) by mouth 3 (three) times daily as needed (back and leg pain). (Patient not taking: Reported  on 11/11/2023), Disp: 270 capsule, Rfl: 3    levothyroxine (SYNTHROID) 150 MCG tablet, Take 1 tablet (150 mcg total) by mouth before breakfast., Disp: 30 tablet, Rfl: 11    ROS as above    Objective:     Vitals:    01/05/24 0835   BP: 130/80   Pulse: 71     Wt Readings from Last 3 Encounters:   01/05/24 0835 81.2 kg (179 lb 0.2 oz)   11/11/23 1438 76.7 kg (169 lb)   09/27/23 0836 77.1 kg (169 lb 15.6 oz)     Body mass index is 32.74 kg/m².  Physical Exam  Constitutional:       Appearance: She is well-developed.   HENT:      Head: Normocephalic.   Eyes:      Conjunctiva/sclera: Conjunctivae normal.   Neck:      Comments: Well healed scar base of neck  Pulmonary:      Effort: Pulmonary effort is normal.   Musculoskeletal:         General: Normal range of motion.   Skin:     General: Skin is warm.      Findings: No rash.   Neurological:      Mental Status: She is alert and oriented to person, place, and time.         LABS    Chemistry        Component Value Date/Time     06/02/2023 1335    K 3.6 06/02/2023 1335     06/02/2023 1335    CO2 22 (L) 06/02/2023 1335    BUN 14 06/02/2023 1335    CREATININE 0.7 06/02/2023 1335    GLU 88 06/02/2023 1335        Component Value Date/Time    CALCIUM 9.6 06/02/2023 1335    ALKPHOS 55 06/02/2023 1335    AST 17 06/02/2023 1335    ALT 8 (L) 06/02/2023 1335    BILITOT 0.3 06/02/2023 1335    ESTGFRAFRICA >60.0 07/15/2022 0721    EGFRNONAA >60.0 07/15/2022 0721              Assessment and Plan     Hurthle cell carcinoma of thyroid  Total thyroidectomy 10/28/2022  - Pathology 10/28/2022: 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 The carcinoma does not extend beyond the thyroid gland, and vascular invasion is not identified. Margins negative for tumor.  - Tumor board consensus is to monitor for now and measure postoperative thyroglobulin along with TSH and BMP. If insufficiently suppressed to post thyroidectomy levels or if demonstrating increase in  thyroglobulin, may consider postoperative radioactive iodine ablation.    Thyroglobulin stable despite higher TSH. Will adjust thyroid hormone as below  Repeat labs and US in 8/2024 but last reassuring      Postoperative hypothyroidism  TSH above goal  Patient Instructions   Increase levothyroxine to 150 mcg daily. No more extra pills for now  We will repeat TSH in 6 weeks to make sure it is coming to goal, lower end of normal range (0.4-2.0)    We will check an ultrasound and labs again (TSH, thyroglobulin) in August of this year        RTC August with labs, US          Sarahi Whelan MD

## 2024-02-15 ENCOUNTER — DOCUMENTATION ONLY (OUTPATIENT)
Dept: REHABILITATION | Facility: HOSPITAL | Age: 54
End: 2024-02-15

## 2024-02-15 NOTE — PROGRESS NOTES
[facility]   Physical Therapy Initial Evaluation      Name: Isaura Mancini  Clinic Number: [id number]    Therapy Diagnosis: No diagnosis found.     Physician: No ref. provider found    Physician Orders: PT Eval and Treat   Medical Diagnosis from Referral:   M76.72 (ICD-10-CM) - Peroneal tendinitis, left leg   M25.572 (ICD-10-CM) - Pain in left ankle and joints of left foot     Evaluation Date: 2/15/2024  Authorization Period Expiration: 12/23/2024  Date of Surgery: TBA    Time In: 4:05 pm  Time Out: 4:23 pm  Total Billable Time: 18 minutes    Patient presents for initial evaluation stating she is here for assessment of her lumbar spine in anticipation of lumbar fusion. Referral is for ankle from podiatry office. Patient reports her ankle has been fine for the last several months. Patient reports when central scheduling called her for appointment she assumed it was for her back. Patient educated on limitations regarding referral and assessment of lumbar spine. Patient demonstrated understanding. Patient notes she has an appointment on 2/29/2024 with thien for pre op testing. Patient and PT discussed referral request for possible prehab before lumbar surgery. Patient and PT also discussed rehab process after a lumbar fusion. Patient demonstrated satisfaction. PT contact information provided for future needs.    No charges dropped.    Julianne Coy PT DPT.

## 2024-02-26 ENCOUNTER — LAB VISIT (OUTPATIENT)
Dept: LAB | Facility: HOSPITAL | Age: 54
End: 2024-02-26
Attending: INTERNAL MEDICINE
Payer: COMMERCIAL

## 2024-02-26 DIAGNOSIS — Z00.00 ANNUAL PHYSICAL EXAM: ICD-10-CM

## 2024-02-26 LAB
ALBUMIN SERPL BCP-MCNC: 3.5 G/DL (ref 3.5–5.2)
ALP SERPL-CCNC: 43 U/L (ref 55–135)
ALT SERPL W/O P-5'-P-CCNC: 8 U/L (ref 10–44)
ANION GAP SERPL CALC-SCNC: 8 MMOL/L (ref 8–16)
AST SERPL-CCNC: 16 U/L (ref 10–40)
BASOPHILS # BLD AUTO: 0.03 K/UL (ref 0–0.2)
BASOPHILS NFR BLD: 0.4 % (ref 0–1.9)
BILIRUB SERPL-MCNC: 0.6 MG/DL (ref 0.1–1)
BUN SERPL-MCNC: 12 MG/DL (ref 6–20)
CALCIUM SERPL-MCNC: 9.3 MG/DL (ref 8.7–10.5)
CHLORIDE SERPL-SCNC: 106 MMOL/L (ref 95–110)
CHOLEST SERPL-MCNC: 171 MG/DL (ref 120–199)
CHOLEST/HDLC SERPL: 2.6 {RATIO} (ref 2–5)
CO2 SERPL-SCNC: 23 MMOL/L (ref 23–29)
CREAT SERPL-MCNC: 0.7 MG/DL (ref 0.5–1.4)
DIFFERENTIAL METHOD BLD: NORMAL
EOSINOPHIL # BLD AUTO: 0.1 K/UL (ref 0–0.5)
EOSINOPHIL NFR BLD: 1.7 % (ref 0–8)
ERYTHROCYTE [DISTWIDTH] IN BLOOD BY AUTOMATED COUNT: 12.4 % (ref 11.5–14.5)
EST. GFR  (NO RACE VARIABLE): >60 ML/MIN/1.73 M^2
ESTIMATED AVG GLUCOSE: 97 MG/DL (ref 68–131)
GLUCOSE SERPL-MCNC: 82 MG/DL (ref 70–110)
HBA1C MFR BLD: 5 % (ref 4–5.6)
HCT VFR BLD AUTO: 42.2 % (ref 37–48.5)
HDLC SERPL-MCNC: 67 MG/DL (ref 40–75)
HDLC SERPL: 39.2 % (ref 20–50)
HGB BLD-MCNC: 13.5 G/DL (ref 12–16)
IMM GRANULOCYTES # BLD AUTO: 0.03 K/UL (ref 0–0.04)
IMM GRANULOCYTES NFR BLD AUTO: 0.4 % (ref 0–0.5)
LDLC SERPL CALC-MCNC: 93.4 MG/DL (ref 63–159)
LYMPHOCYTES # BLD AUTO: 2.3 K/UL (ref 1–4.8)
LYMPHOCYTES NFR BLD: 27.2 % (ref 18–48)
MCH RBC QN AUTO: 28.8 PG (ref 27–31)
MCHC RBC AUTO-ENTMCNC: 32 G/DL (ref 32–36)
MCV RBC AUTO: 90 FL (ref 82–98)
MONOCYTES # BLD AUTO: 0.6 K/UL (ref 0.3–1)
MONOCYTES NFR BLD: 6.7 % (ref 4–15)
NEUTROPHILS # BLD AUTO: 5.3 K/UL (ref 1.8–7.7)
NEUTROPHILS NFR BLD: 63.6 % (ref 38–73)
NONHDLC SERPL-MCNC: 104 MG/DL
NRBC BLD-RTO: 0 /100 WBC
PLATELET # BLD AUTO: 323 K/UL (ref 150–450)
PMV BLD AUTO: 11.3 FL (ref 9.2–12.9)
POTASSIUM SERPL-SCNC: 4.3 MMOL/L (ref 3.5–5.1)
PROT SERPL-MCNC: 6.7 G/DL (ref 6–8.4)
RBC # BLD AUTO: 4.69 M/UL (ref 4–5.4)
SODIUM SERPL-SCNC: 137 MMOL/L (ref 136–145)
T4 FREE SERPL-MCNC: 1.15 NG/DL (ref 0.71–1.51)
TRIGL SERPL-MCNC: 53 MG/DL (ref 30–150)
TSH SERPL DL<=0.005 MIU/L-ACNC: 0.2 UIU/ML (ref 0.4–4)
WBC # BLD AUTO: 8.31 K/UL (ref 3.9–12.7)

## 2024-02-26 PROCEDURE — 85025 COMPLETE CBC W/AUTO DIFF WBC: CPT | Performed by: INTERNAL MEDICINE

## 2024-02-26 PROCEDURE — 80061 LIPID PANEL: CPT | Performed by: INTERNAL MEDICINE

## 2024-02-26 PROCEDURE — 84443 ASSAY THYROID STIM HORMONE: CPT | Performed by: INTERNAL MEDICINE

## 2024-02-26 PROCEDURE — 83036 HEMOGLOBIN GLYCOSYLATED A1C: CPT | Performed by: INTERNAL MEDICINE

## 2024-02-26 PROCEDURE — 84439 ASSAY OF FREE THYROXINE: CPT | Performed by: INTERNAL MEDICINE

## 2024-02-26 PROCEDURE — 80053 COMPREHEN METABOLIC PANEL: CPT | Performed by: INTERNAL MEDICINE

## 2024-02-26 PROCEDURE — 36415 COLL VENOUS BLD VENIPUNCTURE: CPT | Mod: PO | Performed by: INTERNAL MEDICINE

## 2024-02-27 ENCOUNTER — PATIENT MESSAGE (OUTPATIENT)
Dept: ENDOCRINOLOGY | Facility: CLINIC | Age: 54
End: 2024-02-27
Payer: COMMERCIAL

## 2024-02-27 DIAGNOSIS — E89.0 POSTOPERATIVE HYPOTHYROIDISM: ICD-10-CM

## 2024-02-27 RX ORDER — LEVOTHYROXINE SODIUM 150 UG/1
150 TABLET ORAL
Qty: 30 TABLET | Refills: 11 | Status: SHIPPED | OUTPATIENT
Start: 2024-02-27 | End: 2024-04-07 | Stop reason: SDUPTHER

## 2024-02-29 ENCOUNTER — OFFICE VISIT (OUTPATIENT)
Dept: INTERNAL MEDICINE | Facility: CLINIC | Age: 54
End: 2024-02-29
Payer: COMMERCIAL

## 2024-02-29 VITALS
WEIGHT: 181 LBS | SYSTOLIC BLOOD PRESSURE: 114 MMHG | OXYGEN SATURATION: 98 % | DIASTOLIC BLOOD PRESSURE: 68 MMHG | HEIGHT: 62 IN | BODY MASS INDEX: 33.31 KG/M2 | HEART RATE: 61 BPM

## 2024-02-29 DIAGNOSIS — C73 HURTHLE CELL CARCINOMA OF THYROID: ICD-10-CM

## 2024-02-29 DIAGNOSIS — E89.0 POSTOPERATIVE HYPOTHYROIDISM: ICD-10-CM

## 2024-02-29 DIAGNOSIS — Z12.11 COLON CANCER SCREENING: ICD-10-CM

## 2024-02-29 DIAGNOSIS — R10.2 PELVIC PAIN IN FEMALE: ICD-10-CM

## 2024-02-29 DIAGNOSIS — L72.3 SEBACEOUS CYST: ICD-10-CM

## 2024-02-29 DIAGNOSIS — I10 ESSENTIAL HYPERTENSION: ICD-10-CM

## 2024-02-29 DIAGNOSIS — Z00.00 ANNUAL PHYSICAL EXAM: ICD-10-CM

## 2024-02-29 DIAGNOSIS — Z23 NEED FOR VACCINATION AGAINST STREPTOCOCCUS PNEUMONIAE: ICD-10-CM

## 2024-02-29 DIAGNOSIS — J30.9 ALLERGIC RHINITIS, UNSPECIFIED SEASONALITY, UNSPECIFIED TRIGGER: ICD-10-CM

## 2024-02-29 PROCEDURE — 99396 PREV VISIT EST AGE 40-64: CPT | Mod: 25,S$GLB,, | Performed by: INTERNAL MEDICINE

## 2024-02-29 PROCEDURE — 3074F SYST BP LT 130 MM HG: CPT | Mod: CPTII,S$GLB,, | Performed by: INTERNAL MEDICINE

## 2024-02-29 PROCEDURE — 3078F DIAST BP <80 MM HG: CPT | Mod: CPTII,S$GLB,, | Performed by: INTERNAL MEDICINE

## 2024-02-29 PROCEDURE — 99999 PR PBB SHADOW E&M-EST. PATIENT-LVL IV: CPT | Mod: PBBFAC,,, | Performed by: INTERNAL MEDICINE

## 2024-02-29 PROCEDURE — 4010F ACE/ARB THERAPY RXD/TAKEN: CPT | Mod: CPTII,S$GLB,, | Performed by: INTERNAL MEDICINE

## 2024-02-29 PROCEDURE — 3008F BODY MASS INDEX DOCD: CPT | Mod: CPTII,S$GLB,, | Performed by: INTERNAL MEDICINE

## 2024-02-29 PROCEDURE — 90677 PCV20 VACCINE IM: CPT | Mod: S$GLB,,, | Performed by: INTERNAL MEDICINE

## 2024-02-29 PROCEDURE — 1159F MED LIST DOCD IN RCRD: CPT | Mod: CPTII,S$GLB,, | Performed by: INTERNAL MEDICINE

## 2024-02-29 PROCEDURE — 1160F RVW MEDS BY RX/DR IN RCRD: CPT | Mod: CPTII,S$GLB,, | Performed by: INTERNAL MEDICINE

## 2024-02-29 PROCEDURE — 3044F HG A1C LEVEL LT 7.0%: CPT | Mod: CPTII,S$GLB,, | Performed by: INTERNAL MEDICINE

## 2024-02-29 PROCEDURE — 90471 IMMUNIZATION ADMIN: CPT | Mod: S$GLB,,, | Performed by: INTERNAL MEDICINE

## 2024-02-29 RX ORDER — HYDROCHLOROTHIAZIDE 12.5 MG/1
12.5 TABLET ORAL DAILY
Qty: 90 TABLET | Refills: 3 | Status: SHIPPED | OUTPATIENT
Start: 2024-02-29

## 2024-02-29 RX ORDER — LOSARTAN POTASSIUM 25 MG/1
25 TABLET ORAL DAILY
Qty: 90 TABLET | Refills: 3 | Status: SHIPPED | OUTPATIENT
Start: 2024-02-29 | End: 2025-02-28

## 2024-02-29 RX ORDER — LORATADINE 10 MG/1
10 TABLET ORAL DAILY
Qty: 90 TABLET | Refills: 3 | Status: SHIPPED | OUTPATIENT
Start: 2024-02-29 | End: 2025-02-28

## 2024-02-29 RX ORDER — IBUPROFEN 800 MG/1
800 TABLET ORAL EVERY 8 HOURS PRN
Qty: 60 TABLET | Refills: 2 | Status: SHIPPED | OUTPATIENT
Start: 2024-02-29 | End: 2025-02-28

## 2024-02-29 NOTE — PROGRESS NOTES
Patient ID: Isaura Mancini is a 53 y.o. female.    Chief Complaint: Annual Exam    HPI Isaura is a 53 y.o. female with  hypertension, fibroids, spinal stenosis causing chronic back pain and s/p thyroidectomy (Oct 2022 for Graves disease and Hurthle cell carcinoma) who presents for annual exam.   She complains of bump on back of neck. Onset unknown. Associated with symptom of tenderness. No further acute complaints.   Reviewed lab results from 2/26/24.  Follows with endocrinology for history of Graves disease and Hurthle cell carcinoma.    Health Maintenance Topics with due status: Not Due       Topic Last Completion Date    Cervical Cancer Screening 01/19/2021    TETANUS VACCINE 03/24/2021    Mammogram 10/06/2023    Hemoglobin A1c (Diabetic Prevention Screening) 02/26/2024    Lipid Panel 02/26/2024      Review of Systems   Skin:         See HPI   All other systems reviewed and are negative.     Objective:     Vitals:    02/29/24 0922   BP: 114/68   Pulse: 61        Physical Exam  Vitals reviewed.   Constitutional:       General: She is not in acute distress.     Appearance: Normal appearance. She is well-developed. She is not ill-appearing, toxic-appearing or diaphoretic.   HENT:      Head: Normocephalic and atraumatic.      Right Ear: External ear normal.      Left Ear: External ear normal.      Nose: Nose normal.   Eyes:      General: No scleral icterus.        Right eye: No discharge.         Left eye: No discharge.      Extraocular Movements: Extraocular movements intact.      Conjunctiva/sclera: Conjunctivae normal.   Neck:        Comments: Soft round mobile mass on posterior neck, suspect sebaceous cyst versus lipoma. No signs of infection such as erythema or drainage   Cardiovascular:      Rate and Rhythm: Normal rate and regular rhythm.      Heart sounds: Normal heart sounds. No murmur heard.     No friction rub. No gallop.   Pulmonary:      Effort: Pulmonary effort is normal. No respiratory distress.       Breath sounds: Normal breath sounds. No stridor. No wheezing, rhonchi or rales.   Abdominal:      General: Bowel sounds are normal. There is no distension.      Palpations: Abdomen is soft. There is no mass.      Tenderness: There is no abdominal tenderness. There is no guarding or rebound.      Hernia: No hernia is present.   Musculoskeletal:      Right lower leg: No edema.      Left lower leg: No edema.   Skin:     General: Skin is warm and dry.   Neurological:      General: No focal deficit present.      Mental Status: She is alert and oriented to person, place, and time. Mental status is at baseline.   Psychiatric:         Mood and Affect: Mood normal.         Behavior: Behavior normal.         Thought Content: Thought content normal.         Judgment: Judgment normal.         Assessment:       1. Annual physical exam    2. Sebaceous cyst Active   3. Colon cancer screening    4. Need for vaccination against Streptococcus pneumoniae    5. Pelvic pain in female Chronic   6. Essential hypertension *Well controlled   7. Allergic rhinitis, unspecified seasonality, unspecified trigger Chronic   8. Hurthle cell carcinoma of thyroid Inactive   9. Postoperative hypothyroidism Chronic       Plan:         Annual physical exam    Sebaceous cyst  -     Ambulatory referral/consult to General Surgery; Future; Expected date: 03/07/2024    Colon cancer screening  -     Cologuard Screening (Multitarget Stool DNA); Future; Expected date: 02/29/2024    Need for vaccination against Streptococcus pneumoniae  -     (In Office Administered) Pneumococcal Conjugate Vaccine (20 Valent) (IM) (Preferred)    Pelvic pain in female  -     ibuprofen (ADVIL,MOTRIN) 800 MG tablet; Take 1 tablet (800 mg total) by mouth every 8 (eight) hours as needed for Pain.  Dispense: 60 tablet; Refill: 2    Essential hypertension  Comments:  Continue current medication  Orders:  -     losartan (COZAAR) 25 MG tablet; Take 1 tablet (25 mg total) by mouth once  daily.  Dispense: 90 tablet; Refill: 3  -     hydroCHLOROthiazide (HYDRODIURIL) 12.5 MG Tab; Take 1 tablet (12.5 mg total) by mouth once daily.  Dispense: 90 tablet; Refill: 3  -     Comprehensive Metabolic Panel; Future; Expected date: 08/29/2024    Allergic rhinitis, unspecified seasonality, unspecified trigger  -     loratadine (CLARITIN) 10 mg tablet; Take 1 tablet (10 mg total) by mouth once daily.  Dispense: 90 tablet; Refill: 3    Hurthle cell carcinoma of thyroid  Comments:  Continue to follow with endocrinology    Postoperative hypothyroidism  Comments:  Continue to follow with endocrinology        RTC 6 months     Warning signs discussed, patient to call with any further issues or worsening of symptoms.       Parts of the above note were dictated using a voice dictation software. Please excuse any grammatical or typographical errors.

## 2024-03-08 ENCOUNTER — OFFICE VISIT (OUTPATIENT)
Dept: SURGERY | Facility: CLINIC | Age: 54
End: 2024-03-08
Payer: COMMERCIAL

## 2024-03-08 VITALS — DIASTOLIC BLOOD PRESSURE: 79 MMHG | HEART RATE: 63 BPM | SYSTOLIC BLOOD PRESSURE: 115 MMHG

## 2024-03-08 DIAGNOSIS — L72.3 SEBACEOUS CYST: Primary | ICD-10-CM

## 2024-03-08 PROCEDURE — 99213 OFFICE O/P EST LOW 20 MIN: CPT | Mod: S$GLB,,, | Performed by: STUDENT IN AN ORGANIZED HEALTH CARE EDUCATION/TRAINING PROGRAM

## 2024-03-08 PROCEDURE — 3044F HG A1C LEVEL LT 7.0%: CPT | Mod: CPTII,S$GLB,, | Performed by: STUDENT IN AN ORGANIZED HEALTH CARE EDUCATION/TRAINING PROGRAM

## 2024-03-08 PROCEDURE — 4010F ACE/ARB THERAPY RXD/TAKEN: CPT | Mod: CPTII,S$GLB,, | Performed by: STUDENT IN AN ORGANIZED HEALTH CARE EDUCATION/TRAINING PROGRAM

## 2024-03-08 PROCEDURE — 99999 PR PBB SHADOW E&M-EST. PATIENT-LVL IV: CPT | Mod: PBBFAC,,, | Performed by: STUDENT IN AN ORGANIZED HEALTH CARE EDUCATION/TRAINING PROGRAM

## 2024-03-08 PROCEDURE — 1160F RVW MEDS BY RX/DR IN RCRD: CPT | Mod: CPTII,S$GLB,, | Performed by: STUDENT IN AN ORGANIZED HEALTH CARE EDUCATION/TRAINING PROGRAM

## 2024-03-08 PROCEDURE — 3078F DIAST BP <80 MM HG: CPT | Mod: CPTII,S$GLB,, | Performed by: STUDENT IN AN ORGANIZED HEALTH CARE EDUCATION/TRAINING PROGRAM

## 2024-03-08 PROCEDURE — 1159F MED LIST DOCD IN RCRD: CPT | Mod: CPTII,S$GLB,, | Performed by: STUDENT IN AN ORGANIZED HEALTH CARE EDUCATION/TRAINING PROGRAM

## 2024-03-08 PROCEDURE — 3074F SYST BP LT 130 MM HG: CPT | Mod: CPTII,S$GLB,, | Performed by: STUDENT IN AN ORGANIZED HEALTH CARE EDUCATION/TRAINING PROGRAM

## 2024-03-08 NOTE — PROGRESS NOTES
Patient ID: Isaura Mancini is a 53 y.o. female.    Chief Complaint: No chief complaint on file.      HPI:  HPI  53F with lump in posterior neck for several months. Never noticed before that. Seems to be enlarging. Some soreness. No drainage.   Hx of total thyroidectomy for graves dz.     Review of Systems   Constitutional:  Negative for fever.   HENT:  Negative for trouble swallowing.    Respiratory:  Negative for shortness of breath.    Cardiovascular:  Negative for chest pain.   Gastrointestinal:  Negative for abdominal pain, blood in stool, nausea and vomiting.   Genitourinary:  Negative for dysuria.   Musculoskeletal:  Negative for gait problem.   Skin:  Negative for rash and wound.   Allergic/Immunologic: Negative for immunocompromised state.   Neurological:  Negative for weakness.   Hematological:  Does not bruise/bleed easily.   Psychiatric/Behavioral:  Negative for agitation.        Current Outpatient Medications   Medication Sig Dispense Refill    aspirin (ECOTRIN) 81 MG EC tablet Take 81 mg by mouth once daily.      cholecalciferol, vitamin D3, (VITAMIN D3) 25 mcg (1,000 unit) capsule Take 2 capsules (2,000 Units total) by mouth once daily.  0    hydroCHLOROthiazide (HYDRODIURIL) 12.5 MG Tab Take 1 tablet (12.5 mg total) by mouth once daily. 90 tablet 3    ibuprofen (ADVIL,MOTRIN) 800 MG tablet Take 1 tablet (800 mg total) by mouth every 8 (eight) hours as needed for Pain. 60 tablet 2    levothyroxine (SYNTHROID) 150 MCG tablet Take 1 tablet (150 mcg total) by mouth before breakfast. Take 1 tab 6 days a week and 0.5 tabs on Sundays for total of 6.5 tabs weekly 30 tablet 11    loratadine (CLARITIN) 10 mg tablet Take 1 tablet (10 mg total) by mouth once daily. 90 tablet 3    losartan (COZAAR) 25 MG tablet Take 1 tablet (25 mg total) by mouth once daily. 90 tablet 3     No current facility-administered medications for this visit.       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   Tobacco Use    Smoking status: Never     Passive exposure: Never    Smokeless tobacco: Never   Substance and Sexual Activity    Alcohol use: No    Drug use: No    Sexual activity: Yes     Partners: Male     Birth control/protection: Surgical     Comment: BTL     Social Determinants of Health     Financial Resource Strain: Medium Risk (2024)    Overall Financial Resource Strain (CARDIA)     Difficulty of Paying Living Expenses: Somewhat hard   Food Insecurity: Food Insecurity Present (2024)    Hunger Vital Sign     Worried About Running Out of Food in the Last Year: Sometimes true     Ran Out of Food in the Last Year: Sometimes true   Transportation Needs: No Transportation Needs (2024)    PRAPARE - Transportation     Lack of Transportation (Medical): No     Lack of Transportation (Non-Medical): No   Physical Activity: Inactive (2024)    Exercise Vital Sign     Days of Exercise per Week: 0 days     Minutes of Exercise per Session: 0 min   Stress: No Stress Concern Present (2024)    [place] [employer] - Occupational Stress Questionnaire     Feeling of Stress : Only a little   Social Connections: Unknown (2024)    Social Connection and Isolation Panel [NHANES]     Frequency of Communication with Friends and Family: More than three times a week     Frequency of Social Gatherings with Friends and Family: Three times a week     Active Member of Clubs or  Organizations: No     Attends Club or Organization Meetings: Never     Marital Status:    Recent Concern: Social Connections - Moderately Isolated (2/22/2024)    Social Connection and Isolation Panel [NHANES]     Frequency of Communication with Friends and Family: More than three times a week     Frequency of Social Gatherings with Friends and Family: Three times a week     Attends [employer] Services: 1 to 4 times per year     Active Member of Clubs or Organizations: No     Attends Club or Organization Meetings: Never     Marital Status:    Housing Stability: Low Risk  (2/22/2024)    Housing Stability Vital Sign     Unable to Pay for Housing in the Last Year: No     Number of Places Lived in the Last Year: 1     Unstable Housing in the Last Year: No   Recent Concern: Housing Stability - High Risk (1/1/2024)    Housing Stability Vital Sign     Unable to Pay for Housing in the Last Year: Yes     Number of Places Lived in the Last Year: 1     Unstable Housing in the Last Year: No       Vitals:    03/08/24 1446   BP: 115/79   Pulse: 63       Physical Exam  Constitutional:       General: She is not in acute distress.     Appearance: She is well-developed.   HENT:      Head: Normocephalic and atraumatic.     Eyes:      General: No scleral icterus.  Cardiovascular:      Rate and Rhythm: Normal rate.   Pulmonary:      Effort: Pulmonary effort is normal.      Breath sounds: No stridor.   Abdominal:      General: There is no distension.      Palpations: Abdomen is soft.      Tenderness: There is no abdominal tenderness.   Lymphadenopathy:      Cervical: No cervical adenopathy.   Skin:     General: Skin is warm.      Findings: No erythema.   Neurological:      Mental Status: She is alert and oriented to person, place, and time.   Psychiatric:         Behavior: Behavior normal.     BMI 33  No imaging of area  Hga1c normal  TSH now  0.198 after increase levothyroxine    Assessment & Plan:  53F with neck mass  posterior midline at hairline  Plan for OR excision  Prone  Consents done  April 9

## 2024-03-12 ENCOUNTER — TELEPHONE (OUTPATIENT)
Dept: PREADMISSION TESTING | Facility: HOSPITAL | Age: 54
End: 2024-03-12
Payer: COMMERCIAL

## 2024-03-12 DIAGNOSIS — I10 ESSENTIAL HYPERTENSION: ICD-10-CM

## 2024-03-12 DIAGNOSIS — Z01.818 PRE-OP EVALUATION: Primary | ICD-10-CM

## 2024-03-14 ENCOUNTER — CLINICAL SUPPORT (OUTPATIENT)
Dept: LAB | Facility: HOSPITAL | Age: 54
End: 2024-03-14
Attending: NURSE PRACTITIONER
Payer: COMMERCIAL

## 2024-03-14 DIAGNOSIS — I10 ESSENTIAL HYPERTENSION: ICD-10-CM

## 2024-03-14 DIAGNOSIS — Z01.818 PRE-OP EVALUATION: ICD-10-CM

## 2024-03-14 PROCEDURE — 93005 ELECTROCARDIOGRAM TRACING: CPT

## 2024-03-14 PROCEDURE — 93010 ELECTROCARDIOGRAM REPORT: CPT | Mod: ,,, | Performed by: INTERNAL MEDICINE

## 2024-03-16 LAB
OHS QRS DURATION: 88 MS
OHS QTC CALCULATION: 422 MS

## 2024-03-19 ENCOUNTER — ANESTHESIA EVENT (OUTPATIENT)
Dept: SURGERY | Facility: HOSPITAL | Age: 54
End: 2024-03-19
Payer: COMMERCIAL

## 2024-03-19 ENCOUNTER — HOSPITAL ENCOUNTER (OUTPATIENT)
Dept: PREADMISSION TESTING | Facility: HOSPITAL | Age: 54
Discharge: HOME | End: 2024-03-19
Attending: NURSE PRACTITIONER
Payer: COMMERCIAL

## 2024-03-19 NOTE — DISCHARGE INSTRUCTIONS
Your surgery is scheduled for 4/9/24.    Please report to [facility] on the 1st Floor at 830 a.m.    THIS TIME IS SUBJECT TO CHANGE.  YOU WILL RECEIVE A PHONE CALL THE DAY BEFORE SURGERY BY 3:30 PM TO CONFIRM YOUR TIME OF ARRIVAL.  IF YOU HAVE NOT RECEIVED A PHONE CALL BY 3:30 PM THE DAY BEFORE YOUR SURGERY PLEASE CALL [phone]     INSTRUCTIONS IMPORTANT!!!  ¨ Do not eat or drink after 12 midnight-including water, candy, gum, & mints. OK to brush teeth.      ____  Proceed to [facility] on *** for additional testing.        ____  Do not wear makeup, including mascara.  ____  No powder, lotions or creams to surgical area.  ____  Please remove all jewelry, including piercings and leave at home.  ____  No money or valuables needed. Please leave at home.  ____  Please bring any documents given by your doctor.  ____  If going home the same day, arrange for a ride home. You will not be able to             drive if Anesthesia was used.  ____  Children under 18 years require a parent / guardian present the entire time             they are in surgery / recovery.  ____  Wear loose fitting clothing. Allow for dressings, bandages.  ____  Stop Aspirin, Ibuprofen, Motrin, Aleve, Goody's/BC powders, Excedrine and Naproxen (NSAIDS) at least 3-5 days before surgery, unless otherwise instructed by your doctor, or the nurse.   You MAY use Tylenol/acetaminophen until day of surgery.  ____  Wash the surgical area with Hibiclens or Dial Antibacterial bar soap the night before surgery, and again the             morning of surgery.  Be sure to rinse hibiclens or Dial Antibacterial bar soap off completely (if instructed by   nurse).  ____  If you take diabetic medication including Metformin, Glimepiride, Glipizide, Glyburide, Byetta, Januvia, Actos, do not take am of surgery unless instructed by Doctor.  ____ Hold Invokana, Farxiga, and Jardiance for 3 days prior to surgery.   ____  Call MD for temperature  above 101 degrees or any other signs of infection such as Urinary (bladder) infection, Upper respiratory infection, skin boils, etc.  ____ Stop taking any Fish Oil supplement or any Vitamins that contain Vitamin E at least 5 days prior to surgery.  ____ Do Not wear your contact lenses the day of your procedure.  You may wear your glasses.      ____Do not shave surgical site for 3 days prior to surgery.  ____ Practice Good hand washing before, during, and after procedure.      I have read or had read and explained to me, and understand the above information.  Additional comments or instructions:  For additional questions call [phone]      ANESTHESIA SIDE EFFECTS  -For the first 24 hours after surgery:  Do not drive, use heavy equipment, make important decisions, or drink alcohol  -It is normal to feel sleepy for several hours.  Rest until you are more awake.  -Have someone stay with you, if needed.  They can watch for problems and help keep you safe.  -Some possible post anesthesia side effects include: nausea and vomiting, sore throat and hoarseness, sleepiness, and dizziness.        Pre-Op Bathing Instructions    Before surgery, you can play an important role in your own health.    Because skin is not sterile, we need to be sure that your skin is as free of germs as possible. By following the instructions below, you can reduce the number of germs on your skin before surgery.    IMPORTANT: You will need to shower with a special soap called Hibiclens*, available at any pharmacy.  If you are allergic to Chlorhexidine (the antiseptic in Hibiclens), use an antibacterial soap such as Dial Soap for your preoperative shower.  You will shower with Hibiclens both the night before your surgery and the morning of your surgery.  Do not use Hibiclens on the head, face or genitals to avoid injury to those areas.    STEP #1: THE NIGHT BEFORE YOUR SURGERY     Do not shave the area of your body where your surgery will be  performed.  Shower and wash your hair and body as usual with your normal soap and shampoo.  Rinse your hair and body thoroughly after you shower to remove all soap residue.  With your hand, apply one packet of Hibiclens soap to the surgical site.   Wash the site gently for five (5) minutes. Do not scrub your skin too hard.   Do not wash with your regular soap after Hibiclens is used.  Rinse your body thoroughly.  Pat yourself dry with a clean, soft towel.  Do not use lotion, cream, or powder.  Wear clean clothes.    STEP #2: THE MORNING OF YOUR SURGERY     Repeat Step #1.    * Not to be used by people allergic to Chlorhexidine.    Please take Levothyroxine the morning of surgery.

## 2024-03-19 NOTE — PRE-PROCEDURE INSTRUCTIONS
Son.    Allergies, medical, surgical, family and psychosocial histories reviewed with patient. Periop plan of care reviewed. Preop instructions given, including medications to take and to hold. Hibiclens soap and instructions on use given. Time allotted for questions to be addressed.  Patient verbalized understanding.    Arrival time 0830.

## 2024-03-19 NOTE — ANESTHESIA PREPROCEDURE EVALUATION
2024  Isaura Mancini is a 53 y.o., female scheduled for  EXCISION, MASS, NECK - Prone 24.    Past Medical History:   Diagnosis Date    Hypertension      Past Surgical History:   Procedure Laterality Date     SECTION      THYROIDECTOMY N/A 10/28/2022    Procedure: THYROIDECTOMY, total;  Surgeon: Isaura Hayes MD;  Location: [facility] OR [address] [place] [address];  Service: General;  Laterality: N/A;    TUBAL LIGATION       Review of patient's allergies indicates:  No Known Allergies    Current Outpatient Medications   Medication Instructions    aspirin (ECOTRIN) 81 mg, Daily    cholecalciferol (vitamin D3) (VITAMIN D3) 2,000 Units, Oral, Daily    hydroCHLOROthiazide (HYDRODIURIL) 12.5 mg, Oral, Daily    ibuprofen (ADVIL,MOTRIN) 800 mg, Oral, Every 8 hours PRN    levothyroxine (SYNTHROID) 150 mcg, Oral, Before breakfast, Take 1 tab 6 days a week and 0.5 tabs on Sundays for total of 6.5 tabs weekly    loratadine (CLARITIN) 10 mg, Oral, Daily    losartan (COZAAR) 25 mg, Oral, Daily       Pre-op Assessment    I have reviewed the Patient Summary Reports.     I have reviewed the Nursing Notes. I have reviewed the NPO Status.   I have reviewed the Medications.     Review of Systems  Anesthesia Hx:  No problems with previous Anesthesia               Denies Personal Hx of Anesthesia complications.                    Social:  Non-Smoker, Social Alcohol Use       Hematology/Oncology:                      Current/Recent Cancer.                Cardiovascular:  Exercise tolerance: good   Denies Pacemaker. Hypertension       Denies Angina.                                  Pulmonary:  Pulmonary Normal      Denies Shortness of breath.                  Hepatic/GI:     GERD, well controlled             Musculoskeletal:  Arthritis          Spine Disorders: lumbar Disc disease and Degenerative disease            Neurological:  Denies TIA.  Denies CVA. Neuromuscular Disease,  Headaches Denies Seizures.                                Endocrine:  Denies Diabetes. Hypothyroidism        Obesity / BMI > 30      Physical Exam  General: Cooperative, Oriented, Alert and Well nourished    Airway:  Mallampati: II   Mouth Opening: Normal  TM Distance: Normal  Tongue: Normal  Neck ROM: Normal ROM    Dental:  Upper partial denture    Lab Results   Component Value Date    WBC 8.31 02/26/2024    HGB 13.5 02/26/2024    HCT 42.2 02/26/2024     02/26/2024    CHOL 171 02/26/2024    TRIG 53 02/26/2024    HDL 67 02/26/2024    ALT 8 (L) 02/26/2024    AST 16 02/26/2024     02/26/2024    K 4.3 02/26/2024     02/26/2024    CREATININE 0.7 02/26/2024    BUN 12 02/26/2024    CO2 23 02/26/2024    TSH 0.198 (L) 02/26/2024    INR 1.0 10/18/2022    HGBA1C 5.0 02/26/2024     Results for orders placed or performed in visit on 03/14/24   EKG 12-lead    Collection Time: 03/14/24  2:05 PM   Result Value Ref Range    QRS Duration 88 ms    OHS QTC Calculation 422 ms    Narrative    Test Reason : Z01.818,I10,    Vent. Rate : 057 BPM     Atrial Rate : 057 BPM     P-R Int : 160 ms          QRS Dur : 088 ms      QT Int : 434 ms       P-R-T Axes : 045 072 053 degrees     QTc Int : 422 ms    Sinus bradycardia  Otherwise normal ECG  When compared with ECG of 18-OCT-2022 09:13,  No significant change was found  Confirmed by Efrain Hennessy MD ([id number]) on 3/16/2024 3:11:54 PM    Referred By: ILIA ROMERO           Confirmed By:Efrain Hennessy MD         Anesthesia Plan  Type of Anesthesia, risks & benefits discussed:    Anesthesia Type: Gen Natural Airway  Intra-op Monitoring Plan: Standard ASA Monitors  Post Op Pain Control Plan: multimodal analgesia and IV/PO Opioids PRN  Induction:  IV  Informed Consent: Informed consent signed with the Patient and all parties understand the risks and agree with anesthesia plan.  All questions answered.   ASA  Score: 2  Day of Surgery Review of History & Physical: H&P Update referred to the surgeon/provider.  Anesthesia Plan Notes:       Ready For Surgery From Anesthesia Perspective.     .

## 2024-03-20 LAB — NONINV COLON CA DNA+OCC BLD SCRN STL QL: NEGATIVE

## 2024-04-07 DIAGNOSIS — E89.0 POSTOPERATIVE HYPOTHYROIDISM: ICD-10-CM

## 2024-04-08 RX ORDER — LEVOTHYROXINE SODIUM 150 UG/1
150 TABLET ORAL
Qty: 30 TABLET | Refills: 11 | Status: SHIPPED | OUTPATIENT
Start: 2024-04-08 | End: 2025-04-08

## 2024-04-09 ENCOUNTER — LAB VISIT (OUTPATIENT)
Dept: LAB | Facility: HOSPITAL | Age: 54
End: 2024-04-09
Attending: INTERNAL MEDICINE
Payer: COMMERCIAL

## 2024-04-09 ENCOUNTER — ANESTHESIA (OUTPATIENT)
Dept: SURGERY | Facility: HOSPITAL | Age: 54
End: 2024-04-09
Payer: COMMERCIAL

## 2024-04-09 ENCOUNTER — HOSPITAL ENCOUNTER (OUTPATIENT)
Facility: HOSPITAL | Age: 54
Discharge: HOME | End: 2024-04-09
Attending: STUDENT IN AN ORGANIZED HEALTH CARE EDUCATION/TRAINING PROGRAM | Admitting: STUDENT IN AN ORGANIZED HEALTH CARE EDUCATION/TRAINING PROGRAM
Payer: COMMERCIAL

## 2024-04-09 VITALS
SYSTOLIC BLOOD PRESSURE: 121 MMHG | RESPIRATION RATE: 17 BRPM | TEMPERATURE: 98 F | OXYGEN SATURATION: 98 % | WEIGHT: 182 LBS | BODY MASS INDEX: 33.49 KG/M2 | HEART RATE: 58 BPM | HEIGHT: 62 IN | DIASTOLIC BLOOD PRESSURE: 59 MMHG

## 2024-04-09 DIAGNOSIS — E89.0 POSTOPERATIVE HYPOTHYROIDISM: ICD-10-CM

## 2024-04-09 DIAGNOSIS — L72.3 SEBACEOUS CYST: ICD-10-CM

## 2024-04-09 DIAGNOSIS — R22.1 MASS OF RIGHT SIDE OF NECK: Primary | ICD-10-CM

## 2024-04-09 LAB — TSH SERPL DL<=0.005 MIU/L-ACNC: 1.38 UIU/ML (ref 0.4–4)

## 2024-04-09 PROCEDURE — 88307 TISSUE EXAM BY PATHOLOGIST: CPT | Mod: 26,,, | Performed by: STUDENT IN AN ORGANIZED HEALTH CARE EDUCATION/TRAINING PROGRAM

## 2024-04-09 PROCEDURE — 88307 TISSUE EXAM BY PATHOLOGIST: CPT | Performed by: STUDENT IN AN ORGANIZED HEALTH CARE EDUCATION/TRAINING PROGRAM

## 2024-04-09 PROCEDURE — 71000015 HC POSTOP RECOV 1ST HR: Performed by: STUDENT IN AN ORGANIZED HEALTH CARE EDUCATION/TRAINING PROGRAM

## 2024-04-09 PROCEDURE — 37000009 HC ANESTHESIA EA ADD 15 MINS: Performed by: STUDENT IN AN ORGANIZED HEALTH CARE EDUCATION/TRAINING PROGRAM

## 2024-04-09 PROCEDURE — 36000707: Performed by: STUDENT IN AN ORGANIZED HEALTH CARE EDUCATION/TRAINING PROGRAM

## 2024-04-09 PROCEDURE — 63600175 PHARM REV CODE 636 W HCPCS: Performed by: NURSE ANESTHETIST, CERTIFIED REGISTERED

## 2024-04-09 PROCEDURE — D9220A PRA ANESTHESIA: Mod: CRNA,,, | Performed by: NURSE ANESTHETIST, CERTIFIED REGISTERED

## 2024-04-09 PROCEDURE — 36415 COLL VENOUS BLD VENIPUNCTURE: CPT | Mod: PO | Performed by: INTERNAL MEDICINE

## 2024-04-09 PROCEDURE — 84443 ASSAY THYROID STIM HORMONE: CPT | Performed by: INTERNAL MEDICINE

## 2024-04-09 PROCEDURE — 63600175 PHARM REV CODE 636 W HCPCS: Mod: JZ,JG | Performed by: STUDENT IN AN ORGANIZED HEALTH CARE EDUCATION/TRAINING PROGRAM

## 2024-04-09 PROCEDURE — 88304 TISSUE EXAM BY PATHOLOGIST: CPT | Performed by: STUDENT IN AN ORGANIZED HEALTH CARE EDUCATION/TRAINING PROGRAM

## 2024-04-09 PROCEDURE — 25000003 PHARM REV CODE 250: Performed by: STUDENT IN AN ORGANIZED HEALTH CARE EDUCATION/TRAINING PROGRAM

## 2024-04-09 PROCEDURE — 21555 EXC NECK LES SC < 3 CM: CPT | Mod: ,,, | Performed by: STUDENT IN AN ORGANIZED HEALTH CARE EDUCATION/TRAINING PROGRAM

## 2024-04-09 PROCEDURE — D9220A PRA ANESTHESIA: Mod: ANES,,, | Performed by: STUDENT IN AN ORGANIZED HEALTH CARE EDUCATION/TRAINING PROGRAM

## 2024-04-09 PROCEDURE — 37000008 HC ANESTHESIA 1ST 15 MINUTES: Performed by: STUDENT IN AN ORGANIZED HEALTH CARE EDUCATION/TRAINING PROGRAM

## 2024-04-09 PROCEDURE — 36000706: Performed by: STUDENT IN AN ORGANIZED HEALTH CARE EDUCATION/TRAINING PROGRAM

## 2024-04-09 PROCEDURE — 25000003 PHARM REV CODE 250: Performed by: NURSE ANESTHETIST, CERTIFIED REGISTERED

## 2024-04-09 RX ORDER — HYDROCODONE BITARTRATE AND ACETAMINOPHEN 5; 325 MG/1; MG/1
1 TABLET ORAL EVERY 4 HOURS PRN
Status: DISCONTINUED | OUTPATIENT
Start: 2024-04-09 | End: 2024-04-09 | Stop reason: HOSPADM

## 2024-04-09 RX ORDER — CEFAZOLIN SODIUM 2 G/50ML
2 SOLUTION INTRAVENOUS
Status: DISCONTINUED | OUTPATIENT
Start: 2024-04-09 | End: 2024-04-09 | Stop reason: HOSPADM

## 2024-04-09 RX ORDER — OXYCODONE HYDROCHLORIDE 5 MG/1
5 TABLET ORAL
Status: DISCONTINUED | OUTPATIENT
Start: 2024-04-09 | End: 2024-04-09 | Stop reason: HOSPADM

## 2024-04-09 RX ORDER — PROPOFOL 10 MG/ML
VIAL (ML) INTRAVENOUS CONTINUOUS PRN
Status: DISCONTINUED | OUTPATIENT
Start: 2024-04-09 | End: 2024-04-09

## 2024-04-09 RX ORDER — LIDOCAINE HYDROCHLORIDE 10 MG/ML
INJECTION, SOLUTION EPIDURAL; INFILTRATION; INTRACAUDAL; PERINEURAL
Status: DISCONTINUED | OUTPATIENT
Start: 2024-04-09 | End: 2024-04-09 | Stop reason: HOSPADM

## 2024-04-09 RX ORDER — FENTANYL CITRATE 50 UG/ML
INJECTION, SOLUTION INTRAMUSCULAR; INTRAVENOUS
Status: DISCONTINUED | OUTPATIENT
Start: 2024-04-09 | End: 2024-04-09

## 2024-04-09 RX ORDER — LIDOCAINE HYDROCHLORIDE 20 MG/ML
INJECTION INTRAVENOUS
Status: DISCONTINUED | OUTPATIENT
Start: 2024-04-09 | End: 2024-04-09

## 2024-04-09 RX ORDER — LIDOCAINE HYDROCHLORIDE 10 MG/ML
1 INJECTION, SOLUTION EPIDURAL; INFILTRATION; INTRACAUDAL; PERINEURAL ONCE
Status: DISCONTINUED | OUTPATIENT
Start: 2024-04-09 | End: 2024-04-09 | Stop reason: HOSPADM

## 2024-04-09 RX ORDER — HYDROCODONE BITARTRATE AND ACETAMINOPHEN 5; 325 MG/1; MG/1
1 TABLET ORAL EVERY 4 HOURS PRN
Qty: 10 TABLET | Refills: 0 | Status: SHIPPED | OUTPATIENT
Start: 2024-04-09 | End: 2024-06-11 | Stop reason: ALTCHOICE

## 2024-04-09 RX ORDER — MIDAZOLAM HYDROCHLORIDE 1 MG/ML
INJECTION INTRAMUSCULAR; INTRAVENOUS
Status: DISCONTINUED | OUTPATIENT
Start: 2024-04-09 | End: 2024-04-09

## 2024-04-09 RX ORDER — PROPOFOL 10 MG/ML
VIAL (ML) INTRAVENOUS
Status: DISCONTINUED | OUTPATIENT
Start: 2024-04-09 | End: 2024-04-09

## 2024-04-09 RX ORDER — AMOXICILLIN 250 MG
1 CAPSULE ORAL 2 TIMES DAILY
COMMUNITY
Start: 2024-04-09

## 2024-04-09 RX ORDER — BUPIVACAINE HYDROCHLORIDE 2.5 MG/ML
INJECTION, SOLUTION EPIDURAL; INFILTRATION; INTRACAUDAL
Status: DISCONTINUED | OUTPATIENT
Start: 2024-04-09 | End: 2024-04-09 | Stop reason: HOSPADM

## 2024-04-09 RX ORDER — HYDROMORPHONE HYDROCHLORIDE 2 MG/ML
0.5 INJECTION, SOLUTION INTRAMUSCULAR; INTRAVENOUS; SUBCUTANEOUS EVERY 5 MIN PRN
Status: DISCONTINUED | OUTPATIENT
Start: 2024-04-09 | End: 2024-04-09 | Stop reason: HOSPADM

## 2024-04-09 RX ORDER — ONDANSETRON HYDROCHLORIDE 2 MG/ML
4 INJECTION, SOLUTION INTRAVENOUS DAILY PRN
Status: DISCONTINUED | OUTPATIENT
Start: 2024-04-09 | End: 2024-04-09 | Stop reason: HOSPADM

## 2024-04-09 RX ORDER — PHENYLEPHRINE HYDROCHLORIDE 10 MG/ML
INJECTION INTRAVENOUS
Status: DISCONTINUED | OUTPATIENT
Start: 2024-04-09 | End: 2024-04-09

## 2024-04-09 RX ORDER — SODIUM CHLORIDE, SODIUM LACTATE, POTASSIUM CHLORIDE, CALCIUM CHLORIDE 600; 310; 30; 20 MG/100ML; MG/100ML; MG/100ML; MG/100ML
INJECTION, SOLUTION INTRAVENOUS CONTINUOUS
Status: DISCONTINUED | OUTPATIENT
Start: 2024-04-09 | End: 2024-04-09 | Stop reason: HOSPADM

## 2024-04-09 RX ADMIN — MIDAZOLAM HYDROCHLORIDE 2 MG: 1 INJECTION, SOLUTION INTRAMUSCULAR; INTRAVENOUS at 08:04

## 2024-04-09 RX ADMIN — SODIUM CHLORIDE: 0.9 INJECTION, SOLUTION INTRAVENOUS at 08:04

## 2024-04-09 RX ADMIN — PROPOFOL 100 MCG/KG/MIN: 10 INJECTION, EMULSION INTRAVENOUS at 08:04

## 2024-04-09 RX ADMIN — FENTANYL CITRATE 100 MCG: 50 INJECTION INTRAMUSCULAR; INTRAVENOUS at 08:04

## 2024-04-09 RX ADMIN — PROPOFOL 70 MG: 10 INJECTION, EMULSION INTRAVENOUS at 08:04

## 2024-04-09 RX ADMIN — PHENYLEPHRINE HYDROCHLORIDE 100 MCG: 10 INJECTION INTRAVENOUS at 08:04

## 2024-04-09 RX ADMIN — LIDOCAINE HYDROCHLORIDE 100 MG: 20 INJECTION, SOLUTION INTRAVENOUS at 08:04

## 2024-04-09 NOTE — ANESTHESIA POSTPROCEDURE EVALUATION
Anesthesia Post Evaluation    Patient: Isaura Mancini    Procedure(s) Performed: Procedure(s) (LRB):  EXCISION, MASS, NECK (N/A)    Final Anesthesia Type: general      Patient location during evaluation: PACU  Patient participation: Yes- Able to Participate  Level of consciousness: awake and alert  Post-procedure vital signs: reviewed and stable  Pain management: adequate  Airway patency: patent    PONV status at discharge: No PONV  Anesthetic complications: no      Cardiovascular status: stable  Respiratory status: room air  Hydration status: euvolemic  Follow-up not needed.              Vitals Value Taken Time   /58 04/09/24 0910   Temp 36.6 °C (97.9 °F) 04/09/24 0910   Pulse 61 04/09/24 0910   Resp 17 04/09/24 0910   SpO2 97 % 04/09/24 0910         No case tracking events are documented in the log.      Pain/Siddhartha Score: Siddhartha Score: 9 (4/9/2024  9:10 AM)

## 2024-04-09 NOTE — DISCHARGE INSTRUCTIONS
DRESSING CARE AND ACTIVITY  -KEEP DRESSING CLEAN, DRY, AND INTACT FOR 24 HOURS  -IN 24 HOURS YOU CAN SHOWER AND LIGHTLY GET INCISION WET WITH SHAMPOO, DO NOT SCRUB INCISION, LIGHT SOAP AND WATER TO AREA  -NO BANDAGE NEEDED, THE DERMABOND OR SKIN GLUE IS CONSIDERED YOU BANDAGE  -NO HEAVY LIFTING OR STRENUOUS ACTIVITY UNTIL CLEARED BY MD  ANESTHESIA  -For the first 24 hours after surgery:  Do not drive, use heavy equipment, make important decisions, or drink alcohol  -It is normal to feel sleepy for several hours.  Rest until you are more awake.  -Have someone stay with you, if needed.  They can watch for problems and help keep you safe.  -Some possible post anesthesia side effects include: nausea and vomiting, sore throat and hoarseness, sleepiness, and dizziness.    PAIN  -If you have pain after surgery, pain medicine will help you feel better.  Take it as directed, before pain becomes severe.  Most pain relievers taken by mouth need at least 20-30 minutes to start working.  -Do not drive or drink alcohol while taking pain medicine.  -Pain medication can upset your stomach.  Taking them with a little food may help.  -Other ways to help control pain: elevation, ice, and relaxation  -Call your surgeon if still having unmanageable pain an hour after taking pain medicine.  -Pain medicine can cause constipation.  Taking an over-the counter stool softener while on prescription pain medicine and drinking plenty of fluids can prevent this side effect.  -Call your surgeon if you have severe side effects like: breathing problems, trouble waking up, dizziness, confusion, or severe constipation.    NAUSEA  -Some people have nausea after surgery.  This is often because of anesthesia, pain, pain medicine, or the stress of surgery.  -Do not push yourself to eat.  Start off with clear liquids and soup.  Slowly move to solid foods.  Don't eat fatty, rich, spicy foods at first.  Eat smaller amounts.  -If you develop persistent nausea  and vomiting please notify your surgeon immediately.    BLEEDING  -Different types of surgery require different types of care and dressing changes.  It is important to follow all instructions and advice from your surgeon.  Change dressing as directed.  Call your surgeon for any concerns regarding postop bleeding.    SIGNS OF INFECTION  -Signs of infection include: fever, swelling, drainage, and redness  -Notify your surgeon if you have a fever of 100.4 F (38.0 C) or higher.  -Notify your surgeon if you notice redness, swelling, increased pain, pus, or a foul smell at the incision site.

## 2024-04-09 NOTE — H&P
HPI:  HPI  53F with lump in posterior neck for several months. Never noticed before that. Seems to be enlarging. Some soreness. No drainage.   Hx of total thyroidectomy for graves dz.      Review of Systems   Constitutional:  Negative for fever.   HENT:  Negative for trouble swallowing.    Respiratory:  Negative for shortness of breath.    Cardiovascular:  Negative for chest pain.   Gastrointestinal:  Negative for abdominal pain, blood in stool, nausea and vomiting.   Genitourinary:  Negative for dysuria.   Musculoskeletal:  Negative for gait problem.   Skin:  Negative for rash and wound.   Allergic/Immunologic: Negative for immunocompromised state.   Neurological:  Negative for weakness.   Hematological:  Does not bruise/bleed easily.   Psychiatric/Behavioral:  Negative for agitation.          Current Medications          Current Outpatient Medications   Medication Sig Dispense Refill    aspirin (ECOTRIN) 81 MG EC tablet Take 81 mg by mouth once daily.        cholecalciferol, vitamin D3, (VITAMIN D3) 25 mcg (1,000 unit) capsule Take 2 capsules (2,000 Units total) by mouth once daily.   0    hydroCHLOROthiazide (HYDRODIURIL) 12.5 MG Tab Take 1 tablet (12.5 mg total) by mouth once daily. 90 tablet 3    ibuprofen (ADVIL,MOTRIN) 800 MG tablet Take 1 tablet (800 mg total) by mouth every 8 (eight) hours as needed for Pain. 60 tablet 2    levothyroxine (SYNTHROID) 150 MCG tablet Take 1 tablet (150 mcg total) by mouth before breakfast. Take 1 tab 6 days a week and 0.5 tabs on Sundays for total of 6.5 tabs weekly 30 tablet 11    loratadine (CLARITIN) 10 mg tablet Take 1 tablet (10 mg total) by mouth once daily. 90 tablet 3    losartan (COZAAR) 25 MG tablet Take 1 tablet (25 mg total) by mouth once daily. 90 tablet 3      No current facility-administered medications for this visit.            Review of patient's allergies indicates:  No Known Allergies          Past Medical History:   Diagnosis Date    Hypertension                  Past Surgical History:   Procedure Laterality Date     SECTION        THYROIDECTOMY N/A 10/28/2022     Procedure: THYROIDECTOMY, total;  Surgeon: Isaura Hayes MD;  Location: [facility] OR [address] [place] [address];  Service: General;  Laterality: N/A;    TUBAL LIGATION             Social History               Socioeconomic History    Marital status: Single    Number of children: 3   Occupational History       Employer: [id number]       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   Tobacco Use    Smoking status: Never       Passive exposure: Never    Smokeless tobacco: Never   Substance and Sexual Activity    Alcohol use: No    Drug use: No    Sexual activity: Yes       Partners: Male       Birth control/protection: Surgical       Comment: BTL      Social Determinants of Health           Financial Resource Strain: Medium Risk (2024)     Overall Financial Resource Strain (CARDIA)      Difficulty of Paying Living Expenses: Somewhat hard   Food Insecurity: Food Insecurity Present (2024)     Hunger Vital Sign      Worried About Running Out of Food in the Last Year: Sometimes true      Ran Out of Food in the Last Year: Sometimes true   Transportation Needs: No Transportation Needs (2024)     PRAPARE - Transportation      Lack of Transportation (Medical): No      Lack of Transportation (Non-Medical): No   Physical Activity: Inactive (2024)     Exercise Vital Sign      Days of Exercise per Week: 0 days      Minutes of Exercise per Session: 0 min   Stress: No Stress Concern Present (2024)     [employer] - Occupational Stress Questionnaire      Feeling of Stress : Only a little   Social Connections: Unknown (2024)     Social Connection and Isolation Panel [NHANES]      Frequency of Communication with Friends and Family: More than three times a week      Frequency of Social Gatherings with Friends and Family: Three times a week      Active Member of Clubs or  Organizations: No      Attends Club or Organization Meetings: Never      Marital Status:    Recent Concern: Social Connections - Moderately Isolated (2/22/2024)     Social Connection and Isolation Panel [NHANES]      Frequency of Communication with Friends and Family: More than three times a week      Frequency of Social Gatherings with Friends and Family: Three times a week      Attends Rastafarian Services: 1 to 4 times per year      Active Member of Clubs or Organizations: No      Attends Club or Organization Meetings: Never      Marital Status:    Housing Stability: Low Risk  (2/22/2024)     Housing Stability Vital Sign      Unable to Pay for Housing in the Last Year: No      Number of Places Lived in the Last Year: 1      Unstable Housing in the Last Year: No   Recent Concern: Housing Stability - High Risk (1/1/2024)     Housing Stability Vital Sign      Unable to Pay for Housing in the Last Year: Yes      Number of Places Lived in the Last Year: 1      Unstable Housing in the Last Year: No                Vitals:     03/08/24 1446   BP: 115/79   Pulse: 63         Physical Exam  Constitutional:       General: She is not in acute distress.     Appearance: She is well-developed.   HENT:      Head: Normocephalic and atraumatic.     Eyes:      General: No scleral icterus.  Cardiovascular:      Rate and Rhythm: Normal rate.   Pulmonary:      Effort: Pulmonary effort is normal.      Breath sounds: No stridor.   Abdominal:      General: There is no distension.      Palpations: Abdomen is soft.      Tenderness: There is no abdominal tenderness.   Lymphadenopathy:      Cervical: No cervical adenopathy.   Skin:     General: Skin is warm.      Findings: No erythema.   Neurological:      Mental Status: She is alert and oriented to person, place, and time.   Psychiatric:         Behavior: Behavior normal.      BMI 33  No imaging of area  Hga1c normal  TSH now  0.198 after increase levothyroxine     Assessment &  Plan:  53F with neck mass posterior midline at hairline  Plan for OR excision  Prone  Consents done  April 9

## 2024-04-09 NOTE — TRANSFER OF CARE
"Anesthesia Transfer of Care Note    Patient: Isaura Mancini    Procedure(s) Performed: Procedure(s) (LRB):  EXCISION, MASS, NECK (N/A)    Patient location: OPS    Anesthesia Type: MAC    Transport from OR: Transported from OR on room air with adequate spontaneous ventilation    Post pain: adequate analgesia    Post assessment: no apparent anesthetic complications    Post vital signs: stable    Level of consciousness: awake    Nausea/Vomiting: no nausea/vomiting    Complications: none    Transfer of care protocol was followed      Last vitals: Visit Vitals  /58 (BP Location: Right arm, Patient Position: Lying)   Pulse 61   Temp 37.1 °C (98.8 °F) (Skin)   Resp 16   Ht 5' 2" (1.575 m)   Wt 82.6 kg (182 lb)   SpO2 99%   Breastfeeding No   BMI 33.29 kg/m²     "

## 2024-04-09 NOTE — OP NOTE
DATE OF PROCEDURE: 04/09/2024    PREOPERATIVE DIAGNOSIS: Right posterior neck mass    POSTOPERATIVE DIAGNOSIS: same    PROCEDURE: Local excision of right posterior neck mass 2cm    SURGEON: Fabrice Adams M.D.    ANESTHESIA: MAC local.    PREP: Chlorhexidine.    ESTIMATED BLOOD LOSS: Minimal.    SPECIMENS: right posterior neck mass 2cm consistent with lipoma    INDICATIONS: The patient presents to the clinic with an enlarging symptomatic   soft tissue mass on the right posterior neck. He was counseled on her medical and   surgical options for treatment and desired excision. The risks of the procedure  were described to the patient including bleeding, infection, pain, scarring,   wound complications, injury to local structures, recurrence, and potential needs  for further procedure. The patient demonstrated understanding of these risks   and a consent form was obtained.    PROCEDURE IN DETAIL: The patient was identified in Preoperative Unit and taken   back to the Operating Room, laid supine on the operating room table. IV   antibiotics were administered prior to the administration of anesthesia.   Anesthesia was administered without complication. The patient was then   positioned with appropriate padding in place. The surgical site was   prepped and draped in the standard sterile fashion. A timeout procedure was   performed in accordance with hospital protocol. The skin overlying the mass was  infiltrated with local anesthesia. An incision was made with a #15 blade   scalpel. The tissues were dissected until the mass was identified.   It was circumferentially dissected and once freed, it measured approximately 2cm and was fatty appearing. It was sent to Pathology for further review. The wound bed was then   irrigated and hemostasis was achieved. The wound was closed in 2 layers. The   dermis was reapproximated using 2-0 Vicryl suture in an interrupted fashion.   The skin was reapproximated using 4-0 monocryl  suture in a running fashion.   Dry sterile dressing was applied. The patient was awakened from anesthesia   without complication and returned to the postop Recovery Unit in stable   condition. At the end of the case, sponge, instrument and needle counts were   correct x2 occasions. I was present and scrubbed throughout the entirety of the  case.    COMPLICATIONS: None.    CONDITION: Stable.

## 2024-04-15 ENCOUNTER — PATIENT MESSAGE (OUTPATIENT)
Dept: ADMINISTRATIVE | Facility: HOSPITAL | Age: 54
End: 2024-04-15
Payer: COMMERCIAL

## 2024-04-15 LAB
FINAL PATHOLOGIC DIAGNOSIS: NORMAL
GROSS: NORMAL
Lab: NORMAL
MICROSCOPIC EXAM: NORMAL

## 2024-04-16 DIAGNOSIS — C73 HURTHLE CELL CARCINOMA OF THYROID: Primary | ICD-10-CM

## 2024-04-23 ENCOUNTER — OFFICE VISIT (OUTPATIENT)
Dept: SURGERY | Facility: CLINIC | Age: 54
End: 2024-04-23
Payer: COMMERCIAL

## 2024-04-23 VITALS
HEIGHT: 62 IN | HEART RATE: 58 BPM | SYSTOLIC BLOOD PRESSURE: 123 MMHG | BODY MASS INDEX: 33.51 KG/M2 | WEIGHT: 182.13 LBS | DIASTOLIC BLOOD PRESSURE: 74 MMHG

## 2024-04-23 DIAGNOSIS — R22.1 MASS OF RIGHT SIDE OF NECK: Primary | ICD-10-CM

## 2024-04-23 PROCEDURE — 4010F ACE/ARB THERAPY RXD/TAKEN: CPT | Mod: CPTII,S$GLB,, | Performed by: STUDENT IN AN ORGANIZED HEALTH CARE EDUCATION/TRAINING PROGRAM

## 2024-04-23 PROCEDURE — 99024 POSTOP FOLLOW-UP VISIT: CPT | Mod: S$GLB,,, | Performed by: STUDENT IN AN ORGANIZED HEALTH CARE EDUCATION/TRAINING PROGRAM

## 2024-04-23 PROCEDURE — 99999 PR PBB SHADOW E&M-EST. PATIENT-LVL III: CPT | Mod: PBBFAC,,, | Performed by: STUDENT IN AN ORGANIZED HEALTH CARE EDUCATION/TRAINING PROGRAM

## 2024-04-23 PROCEDURE — 1159F MED LIST DOCD IN RCRD: CPT | Mod: CPTII,S$GLB,, | Performed by: STUDENT IN AN ORGANIZED HEALTH CARE EDUCATION/TRAINING PROGRAM

## 2024-04-23 PROCEDURE — 3078F DIAST BP <80 MM HG: CPT | Mod: CPTII,S$GLB,, | Performed by: STUDENT IN AN ORGANIZED HEALTH CARE EDUCATION/TRAINING PROGRAM

## 2024-04-23 PROCEDURE — 3044F HG A1C LEVEL LT 7.0%: CPT | Mod: CPTII,S$GLB,, | Performed by: STUDENT IN AN ORGANIZED HEALTH CARE EDUCATION/TRAINING PROGRAM

## 2024-04-23 PROCEDURE — 3074F SYST BP LT 130 MM HG: CPT | Mod: CPTII,S$GLB,, | Performed by: STUDENT IN AN ORGANIZED HEALTH CARE EDUCATION/TRAINING PROGRAM

## 2024-04-24 NOTE — PROGRESS NOTES
S/p posterior neck hairline lipoma removal  Incision good  No pain  No drainage  Normal activity  RTc prn

## 2024-04-28 NOTE — DISCHARGE SUMMARY
[facility] ([facility])  Short Stay  Discharge Summary    Admit Date: 4/9/2024    Discharge Date and Time: 4/9/2024 10:15 AM      Discharge Attending Physician: No att. providers found     Hospital Course (synopsis of major diagnoses, care, treatment, and services provided during the course of the hospital stay): Patient brought in for elective surgery. Underwent procedure without complication and was discharged.       Final Diagnoses:    Principal Problem: Mass of right side of neck   Secondary Diagnoses:   Active Hospital Problems    Diagnosis  POA    *Mass of right side of neck [R22.1]  Yes      Resolved Hospital Problems   No resolved problems to display.       Discharged Condition: stable    Disposition: Home or Self Care    Follow up/Patient Instructions:    Medications:  Reconciled Home Medications:      Medication List        START taking these medications      HYDROcodone-acetaminophen 5-325 mg per tablet  Commonly known as: NORCO  Take 1 tablet by mouth every 4 (four) hours as needed for Pain.     senna-docusate 8.6-50 mg 8.6-50 mg per tablet  Commonly known as: PERICOLACE  Take 1 tablet by mouth 2 (two) times daily.            CONTINUE taking these medications      aspirin 81 MG EC tablet  Commonly known as: ECOTRIN  Take 81 mg by mouth once daily.     cholecalciferol (vitamin D3) 25 mcg (1,000 unit) capsule  Commonly known as: VITAMIN D3  Take 2 capsules (2,000 Units total) by mouth once daily.     hydroCHLOROthiazide 12.5 MG Tab  Commonly known as: HYDRODIURIL  Take 1 tablet (12.5 mg total) by mouth once daily.     ibuprofen 800 MG tablet  Commonly known as: ADVIL,MOTRIN  Take 1 tablet (800 mg total) by mouth every 8 (eight) hours as needed for Pain.     levothyroxine 150 MCG tablet  Commonly known as: SYNTHROID  Take 1 tablet (150 mcg total) by mouth before breakfast. Take 1 tab 6 days a week and 0.5 tabs on Sundays for total of 6.5 tabs weekly     loratadine 10 mg tablet  Commonly known as:  CLARITIN  Take 1 tablet (10 mg total) by mouth once daily.     losartan 25 MG tablet  Commonly known as: COZAAR  Take 1 tablet (25 mg total) by mouth once daily.            Discharge Procedure Orders   Diet general     Call MD for:  temperature >100.4     Call MD for:  persistent nausea and vomiting     Call MD for:  severe uncontrolled pain     No dressing needed      Follow-up Information       Fabrice Adams MD Follow up in 2 week(s).    Specialties: Surgery, General Surgery  Contact information:  [address]  [address]  [facility] [address] [phone]

## 2024-05-15 ENCOUNTER — OFFICE VISIT (OUTPATIENT)
Dept: PODIATRY | Facility: CLINIC | Age: 54
End: 2024-05-15
Payer: COMMERCIAL

## 2024-05-15 VITALS
WEIGHT: 182.13 LBS | DIASTOLIC BLOOD PRESSURE: 78 MMHG | HEART RATE: 66 BPM | SYSTOLIC BLOOD PRESSURE: 120 MMHG | BODY MASS INDEX: 33.31 KG/M2

## 2024-05-15 DIAGNOSIS — L60.0 INGROWN NAIL: Primary | ICD-10-CM

## 2024-05-15 PROCEDURE — 3078F DIAST BP <80 MM HG: CPT | Mod: CPTII,S$GLB,, | Performed by: STUDENT IN AN ORGANIZED HEALTH CARE EDUCATION/TRAINING PROGRAM

## 2024-05-15 PROCEDURE — 99999 PR PBB SHADOW E&M-EST. PATIENT-LVL III: CPT | Mod: PBBFAC,,, | Performed by: STUDENT IN AN ORGANIZED HEALTH CARE EDUCATION/TRAINING PROGRAM

## 2024-05-15 PROCEDURE — 1159F MED LIST DOCD IN RCRD: CPT | Mod: CPTII,S$GLB,, | Performed by: STUDENT IN AN ORGANIZED HEALTH CARE EDUCATION/TRAINING PROGRAM

## 2024-05-15 PROCEDURE — 11750 EXCISION NAIL&NAIL MATRIX: CPT | Mod: T5,S$GLB,, | Performed by: STUDENT IN AN ORGANIZED HEALTH CARE EDUCATION/TRAINING PROGRAM

## 2024-05-15 PROCEDURE — 3044F HG A1C LEVEL LT 7.0%: CPT | Mod: CPTII,S$GLB,, | Performed by: STUDENT IN AN ORGANIZED HEALTH CARE EDUCATION/TRAINING PROGRAM

## 2024-05-15 PROCEDURE — 4010F ACE/ARB THERAPY RXD/TAKEN: CPT | Mod: CPTII,S$GLB,, | Performed by: STUDENT IN AN ORGANIZED HEALTH CARE EDUCATION/TRAINING PROGRAM

## 2024-05-15 PROCEDURE — 99214 OFFICE O/P EST MOD 30 MIN: CPT | Mod: 25,S$GLB,, | Performed by: STUDENT IN AN ORGANIZED HEALTH CARE EDUCATION/TRAINING PROGRAM

## 2024-05-15 PROCEDURE — 3074F SYST BP LT 130 MM HG: CPT | Mod: CPTII,S$GLB,, | Performed by: STUDENT IN AN ORGANIZED HEALTH CARE EDUCATION/TRAINING PROGRAM

## 2024-05-15 PROCEDURE — 3008F BODY MASS INDEX DOCD: CPT | Mod: CPTII,S$GLB,, | Performed by: STUDENT IN AN ORGANIZED HEALTH CARE EDUCATION/TRAINING PROGRAM

## 2024-05-15 RX ORDER — MUPIROCIN 20 MG/G
OINTMENT TOPICAL 3 TIMES DAILY
Qty: 15 G | Refills: 0 | Status: SHIPPED | OUTPATIENT
Start: 2024-05-15

## 2024-05-15 NOTE — PROGRESS NOTES
Subjective:     Patient ID: Isaura Mancini is a 53 y.o. female.    Chief Complaint: Ingrown Toenail (Right foot painful to walk & wear shoes)    Isaura is a 53 y.o. female who presents to the podiatry clinic  with complaint of  left foot pain. Onset of the symptoms was several months ago. Precipitating event: none known. Current symptoms include:  pain to base of 5th metatarsal and to anteriolateral ankle joint of left foot . Aggravating factors: any weight bearing. Symptoms have gradually worsened. Patient has had no prior foot problems. Evaluation to date: none. Treatment to date: none. Patients rates pain 6/10 on pain scale.    7/20/23:  Pt seen today, states has been in boot but is still having pain. Relates she has a baseline pain but it flairs up from time to time with severe pain. Relates site is still swollen, points to base of 5th metatarsal to lateral hindfoot/ankle. No further pedal complaints. States history of sciatica, has followed up with neurosurgery in past, requesting for referral.     5/15/24: Seen today for painful ingrown toenail right great toe, points to inside border. Denies signs of infection    Review of Systems   Constitutional: Negative for chills, decreased appetite, diaphoresis and fever.   HENT:  Negative for congestion and hearing loss.    Cardiovascular:  Negative for chest pain, claudication, leg swelling and syncope.   Respiratory:  Negative for cough and shortness of breath.    Skin:  Negative for color change, dry skin, flushing, itching, nail changes, poor wound healing and rash.   Musculoskeletal:  Positive for arthritis, back pain and joint pain. Negative for joint swelling.   Gastrointestinal:  Negative for nausea and vomiting.   Neurological:  Negative for focal weakness, numbness, paresthesias and weakness.   Psychiatric/Behavioral:  Negative for altered mental status. The patient is not nervous/anxious.         Objective:     Physical Exam  Constitutional:        General: She is not in acute distress.     Appearance: She is well-developed. She is not diaphoretic.   Cardiovascular:      Comments: Dorsalis pedis and posterior tibial pulses are within normal limits. Skin temperature is within normal limits. Toes are cool to touch and feet are warm proximally. Hair growth is within normal limits. Skin is normotrophic and without hyperpigmentation. No edema noted. No spider veins or varicosities noted, bilaterally.   Musculoskeletal:      Comments: Adequate joint range of motion without pain, limitation, nor crepitation to bilateral feet and ankle joints. Muscle strength is 5/5 in all groups bilaterally.    Resolved tenderness to tuberosity of 5th metatarsal at insertion of peroneal tendon and to anteriolateral gutter of ankle joint of left foot/ankle     Lymphadenopathy:      Comments: Negative lymphangitic streaking    Skin:     General: Skin is warm and dry.      Findings: No lesion.      Comments: Skin is warm and dry, no acute signs of infection noted. No open wounds, macerations or hyperkeratotic lesions, bilaterally.     Toenails are well trimmed and of normal morphology, bilaterally.     Cryptotic nail border to right great toe, medial border. No signs of infection   Neurological:      Mental Status: She is alert and oriented to person, place, and time.      Sensory: No sensory deficit.      Motor: No abnormal muscle tone.      Comments: Light touch within normal limits.    Psychiatric:         Behavior: Behavior normal.         Thought Content: Thought content normal.         Judgment: Judgment normal.             Assessment:      Encounter Diagnosis   Name Primary?    Ingrown nail Yes       Plan:     Isaura was seen today for ingrown toenail.    Diagnoses and all orders for this visit:    Ingrown nail  -     Nail Removal    Other orders  -     mupirocin (BACTROBAN) 2 % ointment; Apply topically 3 (three) times daily.        I counseled the patient on her conditions,  their implications and medical management.  Ingrown toenail removed, see procedure note. Site dressed with antibiotic ointment and bandaid. She is to change same 2-3x per day until healed. Recommend open toed shoes or shoes with a wide toe box to reduce pressure  Return to clinic in 2-4 weeks or PRN as discussed.

## 2024-05-15 NOTE — PROCEDURES
Nail Removal    Date/Time: 5/15/2024 8:30 AM    Performed by: Sarahi Cruz DPM  Authorized by: Sarahi Cruz DPM    Consent Done?:  Yes (Written)  Location:     Location:  Right foot    Location detail:  Right big toe  Anesthesia:     Anesthesia:  Digital block    Local anesthetic:  Lidocaine 2% without epinephrine    Anesthetic total (ml):  4  Procedure Details:     Preparation:  Skin prepped with alcohol and skin prepped with Betadine    Amount removed:  Partial    Side:  Medial    Wedge excision of skin of nail fold: No      Nail bed sutured?: No      Nail matrix removed:  Partial (Nail matrix removed to site with phenol application x3, held for 30 seconds each, followed by cleansing with rubbing alcohol followed by sterile saline)    Removal method:  Phenol and alcohol    Dressing applied:  4x4, antibiotic ointment and Xeroform gauze    Patient tolerance:  Patient tolerated the procedure well with no immediate complications     Following written and verbal consent, a timeout was performed confirming the patient's identification, procedure, surgical site, medications and allergies. All were in agreement.

## 2024-05-15 NOTE — LETTER
May 15, 2024      [facility]  [address]  [address]  [place] [address]  Phone: [phone]  Fax: [phone]       Patient: Isaura Mancini   YOB: 1970  Date of Visit: 05/15/2024    To Whom It May Concern:    Antonina Mancini  was at [facility] on 05/15/2024. The patient may return to work/school on 05/16/2024 with open toe shoes for 3 days.  If you have any questions or concerns, or if I can be of further assistance, please do not hesitate to contact me.    Sincerely,    Liat Ott

## 2024-06-11 ENCOUNTER — OFFICE VISIT (OUTPATIENT)
Dept: URGENT CARE | Facility: CLINIC | Age: 54
End: 2024-06-11
Payer: COMMERCIAL

## 2024-06-11 ENCOUNTER — HOSPITAL ENCOUNTER (EMERGENCY)
Facility: HOSPITAL | Age: 54
Discharge: HOME | End: 2024-06-11
Attending: EMERGENCY MEDICINE
Payer: COMMERCIAL

## 2024-06-11 VITALS
DIASTOLIC BLOOD PRESSURE: 81 MMHG | WEIGHT: 185 LBS | SYSTOLIC BLOOD PRESSURE: 131 MMHG | TEMPERATURE: 99 F | BODY MASS INDEX: 34.04 KG/M2 | RESPIRATION RATE: 20 BRPM | HEART RATE: 66 BPM | HEIGHT: 62 IN | OXYGEN SATURATION: 99 %

## 2024-06-11 VITALS
DIASTOLIC BLOOD PRESSURE: 76 MMHG | RESPIRATION RATE: 18 BRPM | WEIGHT: 185 LBS | HEART RATE: 54 BPM | SYSTOLIC BLOOD PRESSURE: 128 MMHG | TEMPERATURE: 99 F | BODY MASS INDEX: 33.84 KG/M2 | OXYGEN SATURATION: 100 %

## 2024-06-11 DIAGNOSIS — R10.823 RIGHT LOWER QUADRANT ABDOMINAL TENDERNESS WITH REBOUND TENDERNESS: Primary | ICD-10-CM

## 2024-06-11 DIAGNOSIS — N83.202 CYST OF LEFT OVARY: Primary | ICD-10-CM

## 2024-06-11 DIAGNOSIS — D21.9 LEIOMYOMA: ICD-10-CM

## 2024-06-11 DIAGNOSIS — R19.8 ABDOMINAL GUARDING: ICD-10-CM

## 2024-06-11 LAB
ALBUMIN SERPL BCP-MCNC: 3.6 G/DL (ref 3.5–5.2)
ALP SERPL-CCNC: 53 U/L (ref 55–135)
ALT SERPL W/O P-5'-P-CCNC: 6 U/L (ref 10–44)
ANION GAP SERPL CALC-SCNC: 8 MMOL/L (ref 8–16)
AST SERPL-CCNC: 14 U/L (ref 10–40)
B-HCG UR QL: NEGATIVE
B-HCG UR QL: NEGATIVE
BASOPHILS # BLD AUTO: 0.04 K/UL (ref 0–0.2)
BASOPHILS NFR BLD: 0.4 % (ref 0–1.9)
BILIRUB SERPL-MCNC: 0.2 MG/DL (ref 0.1–1)
BILIRUB UR QL STRIP: NEGATIVE
BILIRUB UR QL STRIP: NEGATIVE
BUN SERPL-MCNC: 17 MG/DL (ref 6–20)
CALCIUM SERPL-MCNC: 9.8 MG/DL (ref 8.7–10.5)
CHLORIDE SERPL-SCNC: 106 MMOL/L (ref 95–110)
CLARITY UR: CLEAR
CO2 SERPL-SCNC: 24 MMOL/L (ref 23–29)
COLOR UR: YELLOW
CREAT SERPL-MCNC: 0.8 MG/DL (ref 0.5–1.4)
CTP QC/QA: YES
CTP QC/QA: YES
DIFFERENTIAL METHOD BLD: NORMAL
EOSINOPHIL # BLD AUTO: 0.2 K/UL (ref 0–0.5)
EOSINOPHIL NFR BLD: 2 % (ref 0–8)
ERYTHROCYTE [DISTWIDTH] IN BLOOD BY AUTOMATED COUNT: 12.4 % (ref 11.5–14.5)
EST. GFR  (NO RACE VARIABLE): >60 ML/MIN/1.73 M^2
GLUCOSE SERPL-MCNC: 90 MG/DL (ref 70–110)
GLUCOSE UR QL STRIP: NEGATIVE
GLUCOSE UR QL STRIP: NEGATIVE
HCT VFR BLD AUTO: 40.3 % (ref 37–48.5)
HGB BLD-MCNC: 13.5 G/DL (ref 12–16)
HGB UR QL STRIP: NEGATIVE
IMM GRANULOCYTES # BLD AUTO: 0.02 K/UL (ref 0–0.04)
IMM GRANULOCYTES NFR BLD AUTO: 0.2 % (ref 0–0.5)
KETONES UR QL STRIP: NEGATIVE
KETONES UR QL STRIP: NEGATIVE
LEUKOCYTE ESTERASE UR QL STRIP: NEGATIVE
LEUKOCYTE ESTERASE UR QL STRIP: NEGATIVE
LIPASE SERPL-CCNC: 22 U/L (ref 4–60)
LYMPHOCYTES # BLD AUTO: 2.3 K/UL (ref 1–4.8)
LYMPHOCYTES NFR BLD: 25.1 % (ref 18–48)
MCH RBC QN AUTO: 29.8 PG (ref 27–31)
MCHC RBC AUTO-ENTMCNC: 33.5 G/DL (ref 32–36)
MCV RBC AUTO: 89 FL (ref 82–98)
MONOCYTES # BLD AUTO: 0.6 K/UL (ref 0.3–1)
MONOCYTES NFR BLD: 6.8 % (ref 4–15)
NEUTROPHILS # BLD AUTO: 6.1 K/UL (ref 1.8–7.7)
NEUTROPHILS NFR BLD: 65.5 % (ref 38–73)
NITRITE UR QL STRIP: NEGATIVE
NRBC BLD-RTO: 0 /100 WBC
PH UR STRIP: 7 [PH] (ref 5–8)
PH, POC UA: 7 (ref 5–8)
PLATELET # BLD AUTO: 400 K/UL (ref 150–450)
PMV BLD AUTO: 11 FL (ref 9.2–12.9)
POC BLOOD, URINE: NEGATIVE
POC NITRATES, URINE: NEGATIVE
POTASSIUM SERPL-SCNC: 4 MMOL/L (ref 3.5–5.1)
PROT SERPL-MCNC: 7.3 G/DL (ref 6–8.4)
PROT UR QL STRIP: NEGATIVE
PROT UR QL STRIP: NEGATIVE
RBC # BLD AUTO: 4.53 M/UL (ref 4–5.4)
SODIUM SERPL-SCNC: 138 MMOL/L (ref 136–145)
SP GR UR STRIP: 1.02 (ref 1–1.03)
SP GR UR STRIP: 1.02 (ref 1–1.03)
URN SPEC COLLECT METH UR: NORMAL
UROBILINOGEN UR STRIP-ACNC: 0.2 (ref 0.1–1.1)
UROBILINOGEN UR STRIP-ACNC: NEGATIVE EU/DL
WBC # BLD AUTO: 9.28 K/UL (ref 3.9–12.7)

## 2024-06-11 PROCEDURE — 99285 EMERGENCY DEPT VISIT HI MDM: CPT | Mod: 25

## 2024-06-11 PROCEDURE — 25000003 PHARM REV CODE 250: Performed by: NURSE PRACTITIONER

## 2024-06-11 PROCEDURE — 25500020 PHARM REV CODE 255: Performed by: EMERGENCY MEDICINE

## 2024-06-11 PROCEDURE — 81025 URINE PREGNANCY TEST: CPT | Mod: S$GLB,,, | Performed by: FAMILY MEDICINE

## 2024-06-11 PROCEDURE — 96361 HYDRATE IV INFUSION ADD-ON: CPT

## 2024-06-11 PROCEDURE — 63600175 PHARM REV CODE 636 W HCPCS: Performed by: EMERGENCY MEDICINE

## 2024-06-11 PROCEDURE — 85025 COMPLETE CBC W/AUTO DIFF WBC: CPT | Performed by: NURSE PRACTITIONER

## 2024-06-11 PROCEDURE — 63600175 PHARM REV CODE 636 W HCPCS: Performed by: NURSE PRACTITIONER

## 2024-06-11 PROCEDURE — 83690 ASSAY OF LIPASE: CPT | Performed by: NURSE PRACTITIONER

## 2024-06-11 PROCEDURE — 81025 URINE PREGNANCY TEST: CPT | Performed by: NURSE PRACTITIONER

## 2024-06-11 PROCEDURE — 81003 URINALYSIS AUTO W/O SCOPE: CPT | Mod: QW,S$GLB,, | Performed by: FAMILY MEDICINE

## 2024-06-11 PROCEDURE — 99213 OFFICE O/P EST LOW 20 MIN: CPT | Mod: S$GLB,,, | Performed by: FAMILY MEDICINE

## 2024-06-11 PROCEDURE — 96374 THER/PROPH/DIAG INJ IV PUSH: CPT | Mod: 59

## 2024-06-11 PROCEDURE — 80053 COMPREHEN METABOLIC PANEL: CPT | Performed by: NURSE PRACTITIONER

## 2024-06-11 PROCEDURE — 81003 URINALYSIS AUTO W/O SCOPE: CPT | Performed by: NURSE PRACTITIONER

## 2024-06-11 PROCEDURE — 96375 TX/PRO/DX INJ NEW DRUG ADDON: CPT

## 2024-06-11 RX ORDER — NAPROXEN 500 MG/1
500 TABLET ORAL 2 TIMES DAILY WITH MEALS
Qty: 10 TABLET | Refills: 0 | Status: SHIPPED | OUTPATIENT
Start: 2024-06-11

## 2024-06-11 RX ORDER — HYDROCODONE BITARTRATE AND ACETAMINOPHEN 5; 325 MG/1; MG/1
1 TABLET ORAL EVERY 6 HOURS PRN
Qty: 12 TABLET | Refills: 0 | Status: SHIPPED | OUTPATIENT
Start: 2024-06-11

## 2024-06-11 RX ORDER — MORPHINE SULFATE 4 MG/ML
4 INJECTION, SOLUTION INTRAMUSCULAR; INTRAVENOUS
Status: COMPLETED | OUTPATIENT
Start: 2024-06-11 | End: 2024-06-11

## 2024-06-11 RX ORDER — ONDANSETRON HYDROCHLORIDE 2 MG/ML
4 INJECTION, SOLUTION INTRAVENOUS
Status: COMPLETED | OUTPATIENT
Start: 2024-06-11 | End: 2024-06-11

## 2024-06-11 RX ADMIN — IOHEXOL 75 ML: 350 INJECTION, SOLUTION INTRAVENOUS at 01:06

## 2024-06-11 RX ADMIN — SODIUM CHLORIDE 1000 ML: 9 INJECTION, SOLUTION INTRAVENOUS at 11:06

## 2024-06-11 RX ADMIN — MORPHINE SULFATE 4 MG: 4 INJECTION INTRAVENOUS at 01:06

## 2024-06-11 RX ADMIN — ONDANSETRON 4 MG: 2 INJECTION INTRAMUSCULAR; INTRAVENOUS at 12:06

## 2024-06-11 NOTE — PROGRESS NOTES
"Subjective:      Patient ID: Isaura Mancini is a 53 y.o. female.    Vitals:  height is 5' 2" (1.575 m) and weight is 83.9 kg (185 lb). Her oral temperature is 98.9 °F (37.2 °C). Her blood pressure is 131/81 and her pulse is 66. Her respiration is 20 and oxygen saturation is 99%.     Chief Complaint: Abdominal Pain    Pt present with symptoms of - Lower Abdomen Pain and Left Flank Pain that started last . Pt denies any symptoms of Dysuria.   Provider note begins below:  Pt reports she started with llq intermittent sharp pain that started . Lbm this morning, normal for her. Denies any urgency, frequency, retention, hematuria. Denies any fever or chills, appetite unchanged. Denies similar pain in the past. Lmp . Pt is sexually active with one male partner, no protection, unsure about STD, her boyfriend travels, so she wonders about STD. She denies any vaginal dc, rashes or lesions. She submitted cologuard 2024 and pt reports she was told by pcp wnl. She says touch, and walking can worse pain.   History of .    Abdominal Pain  This is a new problem. The current episode started in the past 7 days. The onset quality is sudden. The problem occurs constantly. The problem has been gradually worsening. The pain is located in the generalized abdominal region and left flank. The pain is at a severity of 5/10. The pain is moderate. The quality of the pain is aching and sharp. The abdominal pain does not radiate. Pertinent negatives include no diarrhea, dysuria, fever, frequency, hematuria, nausea or vomiting. The pain is relieved by Nothing. Treatments tried: NSAIDS. The treatment provided mild relief. There is no history of abdominal surgery.       Constitution: Negative for activity change, appetite change, chills, sweating, fatigue, fever and unexpected weight change.   Gastrointestinal:  Positive for abdominal pain. Negative for abdominal bloating, history of abdominal surgery, nausea, " vomiting and diarrhea.   Genitourinary:  Positive for flank pain. Negative for dysuria, frequency, urgency, urine decreased, bladder incontinence, bed wetting, hematuria, history of kidney stones, painful menstruation, irregular menstruation, missed menses, heavy menstrual bleeding, ovarian cysts, genital trauma, vaginal pain, vaginal discharge, vaginal bleeding, vaginal odor, painful intercourse, genital sore and pelvic pain.        +fibroids      Objective:     Physical Exam   Constitutional: She is oriented to person, place, and time. She appears well-developed.  Non-toxic appearance. She does not appear ill. No distress.   HENT:   Head: Normocephalic and atraumatic.   Ears:   Right Ear: External ear normal.   Left Ear: External ear normal.   Nose: Nose normal.   Mouth/Throat: Oropharynx is clear and moist.   Eyes: Conjunctivae, EOM and lids are normal. Pupils are equal, round, and reactive to light.   Neck: Trachea normal and phonation normal. Neck supple.   Abdominal: Bowel sounds are normal. Soft. There is abdominal tenderness in the right lower quadrant, suprapubic area and left lower quadrant. There is rebound, guarding, tenderness at McBurney's point and positive Rovsing's sign. There is negative Patel's sign, no left CVA tenderness, negative psoas sign, no right CVA tenderness and negative obturator sign.      Comments: Patient able to transition from heel to toe with pain and grimacing. There is concern for acute abdomen at this time, she is ambulatory without grimacing, Abdomen is soft there is focal tenderness present.    Musculoskeletal: Normal range of motion.         General: Normal range of motion.   Neurological: She is alert and oriented to person, place, and time.   Skin: Skin is warm, dry, intact and not diaphoretic.   Psychiatric: Her speech is normal and behavior is normal. Judgment and thought content normal.   Nursing note and vitals reviewed.      Assessment:     1. Right lower quadrant  abdominal tenderness with rebound tenderness    2. Abdominal guarding        Plan:       concern for appendicitis or diverticulitis with focal tenderness and guarding on exam.  Patient needs to go to emergency department for further evaluation.  She agreed with plan and agreed to go to [facility].  Case discussed with Dr. Templeton and agreed with plan.    Report called, pt left UC in stable condition, refused EMS.       Right lower quadrant abdominal tenderness with rebound tenderness  -     POCT Urinalysis, Dipstick, Automated, W/O Scope  -     POCT urine pregnancy  -     Refer to Emergency Dept.    Abdominal guarding  -     Refer to Emergency Dept.

## 2024-06-11 NOTE — PATIENT INSTRUCTIONS
ER Referral   Your condition is serious and requires immediate attention and evaluation in an ER setting.  You were referred to [facility]       GO STRAIGHT TO THE ER AND DO NOT EAT OR DRINK ANYTHING UNLESS A HEALTHCARE PROVIDER GIVES IT TO YOU.

## 2024-06-11 NOTE — FIRST PROVIDER EVALUATION
Emergency Department TeleTriage Encounter Note      CHIEF COMPLAINT    Chief Complaint   Patient presents with    Abdominal Pain     Lower abdominal pain since Sunday. Went to urgent care and advised to come to ED due to pain on palpation. No reports of vomiting or diarrhea, some nausea.       VITAL SIGNS   Initial Vitals [06/11/24 1100]   BP Pulse Resp Temp SpO2   (!) 189/90 63 18 98.7 °F (37.1 °C) 100 %      MAP       --            ALLERGIES    Review of patient's allergies indicates:  No Known Allergies    PROVIDER TRIAGE NOTE  This is a teletriage evaluation of a 53 y.o. female presenting to the ED complaining of lower abd pain since Saturday. Denies v/d and urinary symptoms. No fever. Referred from .     Alert, no distress.     Initial orders will be placed and care will be transferred to an alternate provider when patient is roomed for a full evaluation. Any additional orders and the final disposition will be determined by that provider.         ORDERS  Labs Reviewed   CBC W/ AUTO DIFFERENTIAL   COMPREHENSIVE METABOLIC PANEL   LIPASE   URINALYSIS, REFLEX TO URINE CULTURE   POCT URINE PREGNANCY       ED Orders (720h ago, onward)      Start Ordered     Status Ordering Provider    06/11/24 1130 06/11/24 1117  ondansetron injection 4 mg  ED 1 Time         Ordered TUAN HASSAN N.    06/11/24 1130 06/11/24 1118  sodium chloride 0.9% bolus 1,000 mL 1,000 mL  ED 1 Time         Ordered TUAN HASSAN N.    06/11/24 1118 06/11/24 1117  Vital signs  Every 2 hours         Ordered TUAN HASSAN N.    06/11/24 1118 06/11/24 1117  Diet NPO  Diet effective now         Ordered TUAN HASSAN N.    06/11/24 1118 06/11/24 1117  Insert peripheral IV  Once         Ordered TUAN HASSAN.    06/11/24 1118 06/11/24 1117  CBC W/ AUTO DIFFERENTIAL  STAT         Ordered TUAN HASSAN N.    06/11/24 1118 06/11/24 1117  Comp. Metabolic Panel  STAT         Ordered SONYA  TUAN N.    06/11/24 1118 06/11/24 1117  Lipase  STAT         Ordered CHELOOTTELEDE, TUAN N.    06/11/24 1118 06/11/24 1117  Urinalysis, Reflex to Urine Culture Urine, Clean Catch  STAT         Ordered SONYA TUAN N.    06/11/24 1118 06/11/24 1117  POCT urine pregnancy  Once         Ordered TUAN HASSAN              Virtual Visit Note: The provider triage portion of this emergency department evaluation and documentation was performed via [email or web address], a HIPAA-compliant telemedicine application, in concert with a tele-presenter in the room. A face to face patient evaluation with one of my colleagues will occur once the patient is placed in an emergency department room.      DISCLAIMER: This note was prepared with My [employer]% voice recognition transcription software. Garbled syntax, mangled pronouns, and other bizarre constructions may be attributed to that software system.

## 2024-06-11 NOTE — ED PROVIDER NOTES
Encounter Date: 2024       History     Chief Complaint   Patient presents with    Abdominal Pain     Lower abdominal pain since . Went to urgent care and advised to come to ED due to pain on palpation. No reports of vomiting or diarrhea, some nausea.      Patient presents with suprapubic and left lower quadrant abdominal pain for the past 2 days.  She denies any fevers/chills.  Admits to some nausea with no vomiting.  Denies any vaginal bleeding or discharge.  Denies any abnormalities with bowel movements.  She was seen at urgent care and advised report here.  Denies any feelings of dysuria, hematuria, or urinary frequency.    The history is provided by the patient.     Review of patient's allergies indicates:  No Known Allergies  Past Medical History:   Diagnosis Date    Hypertension      Past Surgical History:   Procedure Laterality Date     SECTION      NECK MASS EXCISION N/A 2024    Procedure: EXCISION, MASS, NECK;  Surgeon: Fabrice Adams MD;  Location: [facility] OR;  Service: General;  Laterality: N/A;  Prone    THYROIDECTOMY N/A 10/28/2022    Procedure: THYROIDECTOMY, total;  Surgeon: Isaura Hayes MD;  Location: [facility] OR [address] [place] [address];  Service: General;  Laterality: N/A;    TUBAL LIGATION       Family History   Problem Relation Name Age of Onset    Heart disease Father      Hypertension Father      Breast cancer Paternal Aunt  60    Diabetes Neg Hx      Colon cancer Neg Hx      Ovarian cancer Neg Hx       Social History     Tobacco Use    Smoking status: Never     Passive exposure: Never    Smokeless tobacco: Never   Substance Use Topics    Alcohol use: No    Drug use: No     Review of Systems   Gastrointestinal:  Positive for abdominal pain.       Physical Exam     Initial Vitals [24 1100]   BP Pulse Resp Temp SpO2   (!) 189/90 63 18 98.7 °F (37.1 °C) 100 %      MAP       --         Physical Exam    Vitals reviewed.  Abdominal: Abdomen is soft.     Abdomen is soft there is  tenderness to palpation to the suprapubic and left lower quadrant area with no rigidity or distention   Musculoskeletal:         General: Normal range of motion.     Neurological: She is alert and oriented to person, place, and time.   Skin: Skin is warm and dry.         ED Course   Procedures  Labs Reviewed   COMPREHENSIVE METABOLIC PANEL - Abnormal; Notable for the following components:       Result Value    Alkaline Phosphatase 53 (*)     ALT 6 (*)     All other components within normal limits   CBC W/ AUTO DIFFERENTIAL   LIPASE   URINALYSIS, REFLEX TO URINE CULTURE    Narrative:     Specimen Source->Urine   POCT URINE PREGNANCY          Imaging Results              CT Abdomen Pelvis With IV Contrast NO Oral Contrast (Final result)  Result time 06/11/24 13:54:33      Final result by Imer Alonso MD (06/11/24 13:54:33)                   Impression:      Small volume of free fluid in the pelvis with a complicated follicle in the left ovary.  Findings may be physiologic versus a ruptured hemorrhagic follicle.      Electronically signed by: Imer Alonso MD  Date:    06/11/2024  Time:    13:54               Narrative:    EXAMINATION:  CT ABDOMEN PELVIS WITH IV CONTRAST    CLINICAL HISTORY:  LLQ abdominal pain;suprapubic and LLQ pain;    TECHNIQUE:  Axial images of the abdomen and pelvis were acquired  after the use of 75 cc [id number] IV contrast. No oral contrast.  Coronal and sagittal reconstructions were also obtained    COMPARISON:  None    FINDINGS:  Heart: Normal in size. No pericardial effusion. No significant calcific coronary atherosclerosis.    Lungs: Visualized portions of the lungs are clear.    Liver: Normal in size and contour.  No focal hepatic lesion.    Gallbladder: No calcified gallstones.    Bile Ducts: No evidence of dilated ducts.    Pancreas: No mass or peripancreatic fat stranding.    Spleen: Unremarkable.    Stomach and duodenum: Unremarkable.    Adrenals: Unremarkable.    Kidneys/  Ureters: Normal in size and location. Normal enhancement. No hydronephrosis or nephrolithiasis. No ureteral dilatation.    Bladder: No evidence of wall thickening.    Reproductive organs: Uterus demonstrates multiple underlying fibroids.  Left ovary demonstrates a 2.8 cm complicated follicle.  Small amount of free fluid in the pelvis, likely physiologic versus a ruptured hemorrhagic follicle.    Bowel/Mesentery: Small bowel is normal in caliber with no evidence of obstruction. No evidence of inflammation or wall thickening.  Appendix is small in size.  No evidence of inflammatory process in the right lower quadrant.  Colon demonstrates no focal wall thickening.    Lymph nodes: No lymphadenapathy.    Vasculature: No aneurysm. No significant calcific atherosclerosis.    Abdominal wall:  Unremarkable.    Bones: No acute fracture. No suspicious osseous lesions.  Grade 1 anterolisthesis of L4 on L5.                                       Medications   ondansetron injection 4 mg (4 mg Intravenous Given 6/11/24 1201)   sodium chloride 0.9% bolus 1,000 mL 1,000 mL (0 mLs Intravenous Stopped 6/11/24 1310)   morphine injection 4 mg (4 mg Intravenous Given 6/11/24 1323)   iohexoL (OMNIPAQUE 350) injection 75 mL (75 mLs Intravenous Given 6/11/24 1334)     Medical Decision Making  CT shows evidence of left ovarian follicle and uterine fibroids.  She has good gynecological follow-up with Dr. Jauregui.  Pain is controlled, able to tolerate fluids.  Instructed to return immediately for any new or worsening symptoms and she verbalized understanding.    Amount and/or Complexity of Data Reviewed  Labs:  Decision-making details documented in ED Course.  Radiology: ordered. Decision-making details documented in ED Course.    Risk  Prescription drug management.  Risk Details: Differential diagnosis includes but is not limited to:  Atopic pregnancy, uterine fibroids, UTI, ovarian cyst, PID, STD               ED Course as of 06/12/24 9945    Tue Jun 11, 2024   1319 CBC W/ AUTO DIFFERENTIAL   Within normal limits [CD]   1319 Comp. Metabolic Panel(!)   nonspecific [CD]   1320 POCT urine pregnancy   negative [CD]   1320 Lipase   Within normal limits [CD]   1320 Urinalysis, Reflex to Urine Culture Urine, Clean Catch   No UTI [CD]   1413 CT Abdomen Pelvis With IV Contrast NO Oral Contrast  Small volume of free fluid in the pelvis with a complicated follicle in the left ovary.  Findings may be physiologic versus a ruptured hemorrhagic follicle.      [CD]      ED Course User Index  [CD] Antony Estrada DO                           Clinical Impression:  Final diagnoses:  [N83.202] Cyst of left ovary (Primary)  [D21.9] Leiomyoma          ED Disposition Condition    Discharge Stable          ED Prescriptions       Medication Sig Dispense Start Date End Date Auth. Provider    naproxen (NAPROSYN) 500 MG tablet Take 1 tablet (500 mg total) by mouth 2 (two) times daily with meals. 10 tablet 6/11/2024 -- Antony Estrada DO    HYDROcodone-acetaminophen (NORCO) 5-325 mg per tablet Take 1 tablet by mouth every 6 (six) hours as needed for Pain. 12 tablet 6/11/2024 -- Antony Estrada DO          Follow-up Information       Follow up With Specialties Details Why Contact Info    Saritha Jauregui MD Obstetrics and Gynecology Schedule an appointment as soon as possible for a visit   [address] [place] [address]  [phone]               Antony Estrada DO  06/12/24 0864

## 2024-06-11 NOTE — ED TRIAGE NOTES
Seen at  this morning and advised to go to ED for c/o LLQ and suprapubic abdominal pain since Sunday. States pain is constant with intermittent sharp pain. + nausea Denies vomiting, fever. No urinary symptoms or vaginal discharge. Normal BM today. No previous abdominal surgeries. Presents awake, alert.

## 2024-07-03 ENCOUNTER — PATIENT MESSAGE (OUTPATIENT)
Dept: ADMINISTRATIVE | Facility: OTHER | Age: 54
End: 2024-07-03
Payer: COMMERCIAL

## 2024-07-30 ENCOUNTER — OFFICE VISIT (OUTPATIENT)
Dept: OBSTETRICS AND GYNECOLOGY | Facility: CLINIC | Age: 54
End: 2024-07-30
Payer: COMMERCIAL

## 2024-07-30 VITALS — BODY MASS INDEX: 34.68 KG/M2 | WEIGHT: 189.63 LBS

## 2024-07-30 DIAGNOSIS — Z11.3 SCREENING EXAMINATION FOR STD (SEXUALLY TRANSMITTED DISEASE): ICD-10-CM

## 2024-07-30 DIAGNOSIS — N84.1 CERVICAL POLYP: ICD-10-CM

## 2024-07-30 DIAGNOSIS — Z72.89 OTHER PROBLEMS RELATED TO LIFESTYLE: ICD-10-CM

## 2024-07-30 DIAGNOSIS — R10.2 ACUTE PELVIC PAIN: Primary | ICD-10-CM

## 2024-07-30 PROCEDURE — 87086 URINE CULTURE/COLONY COUNT: CPT | Performed by: OBSTETRICS & GYNECOLOGY

## 2024-07-30 PROCEDURE — 1159F MED LIST DOCD IN RCRD: CPT | Mod: CPTII,S$GLB,, | Performed by: OBSTETRICS & GYNECOLOGY

## 2024-07-30 PROCEDURE — 3008F BODY MASS INDEX DOCD: CPT | Mod: CPTII,S$GLB,, | Performed by: OBSTETRICS & GYNECOLOGY

## 2024-07-30 PROCEDURE — 87491 CHLMYD TRACH DNA AMP PROBE: CPT | Performed by: OBSTETRICS & GYNECOLOGY

## 2024-07-30 PROCEDURE — 99213 OFFICE O/P EST LOW 20 MIN: CPT | Mod: S$GLB,,, | Performed by: OBSTETRICS & GYNECOLOGY

## 2024-07-30 PROCEDURE — 99999 PR PBB SHADOW E&M-EST. PATIENT-LVL III: CPT | Mod: PBBFAC,,, | Performed by: OBSTETRICS & GYNECOLOGY

## 2024-07-30 PROCEDURE — 81514 NFCT DS BV&VAGINITIS DNA ALG: CPT | Performed by: OBSTETRICS & GYNECOLOGY

## 2024-07-30 PROCEDURE — 87591 N.GONORRHOEAE DNA AMP PROB: CPT | Performed by: OBSTETRICS & GYNECOLOGY

## 2024-07-30 PROCEDURE — 4010F ACE/ARB THERAPY RXD/TAKEN: CPT | Mod: CPTII,S$GLB,, | Performed by: OBSTETRICS & GYNECOLOGY

## 2024-07-30 PROCEDURE — 3044F HG A1C LEVEL LT 7.0%: CPT | Mod: CPTII,S$GLB,, | Performed by: OBSTETRICS & GYNECOLOGY

## 2024-07-30 RX ORDER — SULFAMETHOXAZOLE AND TRIMETHOPRIM 800; 160 MG/1; MG/1
1 TABLET ORAL 2 TIMES DAILY
Qty: 14 TABLET | Refills: 0 | Status: SHIPPED | OUTPATIENT
Start: 2024-07-30 | End: 2024-08-06

## 2024-07-30 RX ORDER — KETOROLAC TROMETHAMINE 10 MG/1
10 TABLET, FILM COATED ORAL EVERY 6 HOURS
Qty: 20 TABLET | Refills: 0 | Status: SHIPPED | OUTPATIENT
Start: 2024-07-30 | End: 2024-08-04

## 2024-07-30 NOTE — PROGRESS NOTES
Chief Complaint   Patient presents with    e.r f/u     Abdominal pain lower right abdomin/ right side (closer to back)       HPI:   Isaura Mancini 53 y.o.  is here for f/u pain. She started having severe right sided pelvic pain and went to the ER and was told she had cyst that ruptured. That pain resolved but now started yesterday with right flank pain. Doesn't hurt when still but there when she moves. Hasn't taken any medications for it. Is on her cycle but this isn't her normal cramps. She has blood in her urine but is on her cycle. No fever. Having normal BM.      Patient's last menstrual period was 2024 (approximate).     Past Medical History:   Diagnosis Date    Hypertension        Past Surgical History:   Procedure Laterality Date     SECTION      NECK MASS EXCISION N/A 2024    Procedure: EXCISION, MASS, NECK;  Surgeon: Fabrice Adams MD;  Location: [facility] OR;  Service: General;  Laterality: N/A;  Prone    THYROIDECTOMY N/A 10/28/2022    Procedure: THYROIDECTOMY, total;  Surgeon: Isaura Hayes MD;  Location: [facility] OR [address] [place] [address];  Service: General;  Laterality: N/A;    TUBAL LIGATION         Family History   Problem Relation Name Age of Onset    Heart disease Father      Hypertension Father      Breast cancer Paternal Aunt  60    Diabetes Neg Hx      Colon cancer Neg Hx      Ovarian cancer Neg Hx         Social History     Socioeconomic History    Marital status: Single    Number of children: 3   Occupational History     Employer: [id number]     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   Tobacco Use    Smoking status: Never     Passive exposure: Never    Smokeless tobacco: Never   Substance and Sexual Activity    Alcohol use: No    Drug use: No    Sexual activity: Yes     Partners: Male     Birth control/protection: Surgical     Comment: BTL     Social Determinants of Health     Financial Resource Strain: Medium Risk (2024)    Overall Financial Resource Strain (CARDIA)      Difficulty of Paying Living Expenses: Somewhat hard   Food Insecurity: Food Insecurity Present (2024)    Hunger Vital Sign     Worried About Running Out of Food in the Last Year: Sometimes true     Ran Out of Food in the Last Year: Sometimes true   Transportation Needs: No Transportation Needs (2024)    PRAPARE - Transportation     Lack of Transportation (Medical): No     Lack of Transportation (Non-Medical): No   Physical Activity: Inactive (2024)    Exercise Vital Sign     Days of Exercise per Week: 0 days     Minutes of Exercise per Session: 0 min   Stress: No Stress Concern Present (2024)    [employer] - Occupational Stress Questionnaire     Feeling of Stress : Only a little   Housing Stability: Low Risk  (2024)    Housing Stability Vital Sign     Unable to Pay for Housing in the Last Year: No     Number of Places Lived in the Last Year: 1     Unstable Housing in the Last Year: No   Recent Concern: Housing Stability - High Risk (2024)    Housing Stability Vital Sign     Unable to Pay for Housing in the Last Year: Yes     Number of Places Lived in the Last Year: 1     Unstable Housing in the Last Year: No       OB History          3    Para   3    Term   3            AB        Living   3         SAB        IAB        Ectopic        Multiple        Live Births   3                  COMPREHENSIVE GYN HISTORY:  PAP History: Denies abnormal Paps. 2021 NILM/HPV-  Infection History: Denies STDs. Denies PID.  Benign History: has uterine fibroids. Denies ovarian cysts. Denies endometriosis.   Cancer History: Denies cervical cancer. Denies uterine cancer or hyperplasia. Denies ovarian cancer. Denies vulvar cancer or pre-cancer. Denies vaginal cancer or pre-cancer. Denies breast cancer. Denies colon cancer.  Sexual Activity History:   reports being sexually active and has had partner(s) who are male. She reports using the following method of birth  control/protection: Surgical.   Menstrual History: Age of menarche: 15 years. Every 28 days, flows for 7-10 days. heavy flow.  Dysmenorrhea History: Reports severe dysmenorrhea.  Contraception: tubal   Cologard in 2023       ROS:    All other ROS negative     PE:   Wt 86 kg (189 lb 9.5 oz)   LMP 07/27/2024 (Approximate)   BMI 34.68 kg/m²     APPEARANCE: Well nourished, well developed, in no acute distress.  Tender over right flank  NEUROLOGIC: orientated to person, place and time, normal mood and affect    PELVIC: normal external exam   Is tender over bladder/suprapubically   Vaginal: normal vaginal mucosa   Cervix: + polyp, removed and otherwise normal   Uterus: mildly enlarged, no pain over uterus  Adnexal: no masses, nontender         1. Acute pelvic pain    2. Cervical polyp    3. Screening examination for STD (sexually transmitted disease)    4. Other problems related to lifestyle        Plan:  1. Urine with blood but is on cycle. Is tender over bladder and in CVA area, will treat with antibiotics for possible UTI  and wait for culture. Gc/ct and affrim done for STD screening. Will get Us to look at ovaries, h/o fibroids and will evaluate too. Discussed if gets fever, worse, not improving then should let us know and go back to ER.   3. Will treat with toradol for cramps.      Face to Face time with patient: 30 minutes of total time spent on the encounter, which includes face to face time and non-face to face time preparing to see the patient (eg, review of tests), Obtaining and/or reviewing separately obtained history, Documenting clinical information in the electronic or other health record, Independently interpreting results (not separately reported) and communicating results to the patient/family/caregiver, or Care coordination (not separately reported).        20 minutes of total time spent on the encounter, which includes face to face time and non-face to face time preparing to see the patient (eg,  review of tests), Obtaining and/or reviewing separately obtained history, Documenting clinical information in the electronic or other health record, Independently interpreting results (not separately reported) and communicating results to the patient/family/caregiver, or Care coordination (not separately reported).

## 2024-07-31 ENCOUNTER — HOSPITAL ENCOUNTER (OUTPATIENT)
Dept: RADIOLOGY | Facility: HOSPITAL | Age: 54
Discharge: HOME | End: 2024-07-31
Attending: OBSTETRICS & GYNECOLOGY
Payer: COMMERCIAL

## 2024-07-31 DIAGNOSIS — R10.2 ACUTE PELVIC PAIN: ICD-10-CM

## 2024-07-31 PROCEDURE — 76856 US EXAM PELVIC COMPLETE: CPT | Mod: TC

## 2024-07-31 PROCEDURE — 76856 US EXAM PELVIC COMPLETE: CPT | Mod: 26,,, | Performed by: STUDENT IN AN ORGANIZED HEALTH CARE EDUCATION/TRAINING PROGRAM

## 2024-07-31 PROCEDURE — 76830 TRANSVAGINAL US NON-OB: CPT | Mod: 26,,, | Performed by: STUDENT IN AN ORGANIZED HEALTH CARE EDUCATION/TRAINING PROGRAM

## 2024-08-01 ENCOUNTER — PATIENT MESSAGE (OUTPATIENT)
Dept: OBSTETRICS AND GYNECOLOGY | Facility: CLINIC | Age: 54
End: 2024-08-01
Payer: COMMERCIAL

## 2024-08-01 DIAGNOSIS — N83.209 CYST OF OVARY, UNSPECIFIED LATERALITY: Primary | ICD-10-CM

## 2024-08-01 LAB
BACTERIA UR CULT: NO GROWTH
BACTERIAL VAGINOSIS DNA: POSITIVE
C TRACH DNA SPEC QL NAA+PROBE: NOT DETECTED
CANDIDA GLABRATA DNA: NEGATIVE
CANDIDA KRUSEI DNA: NEGATIVE
CANDIDA RRNA VAG QL PROBE: NEGATIVE
N GONORRHOEA DNA SPEC QL NAA+PROBE: NOT DETECTED
T VAGINALIS RRNA GENITAL QL PROBE: NEGATIVE

## 2024-08-05 ENCOUNTER — PATIENT MESSAGE (OUTPATIENT)
Dept: OBSTETRICS AND GYNECOLOGY | Facility: CLINIC | Age: 54
End: 2024-08-05
Payer: COMMERCIAL

## 2024-08-05 RX ORDER — METRONIDAZOLE 500 MG/1
500 TABLET ORAL EVERY 12 HOURS
Qty: 14 TABLET | Refills: 0 | Status: SHIPPED | OUTPATIENT
Start: 2024-08-05 | End: 2024-08-08

## 2024-08-08 RX ORDER — METRONIDAZOLE 7.5 MG/G
1 GEL VAGINAL NIGHTLY
Qty: 70 G | Refills: 0 | Status: SHIPPED | OUTPATIENT
Start: 2024-08-08 | End: 2024-08-13

## 2024-08-22 ENCOUNTER — HOSPITAL ENCOUNTER (OUTPATIENT)
Dept: ENDOCRINOLOGY | Facility: CLINIC | Age: 54
Discharge: HOME | End: 2024-08-22
Attending: INTERNAL MEDICINE
Payer: COMMERCIAL

## 2024-08-22 DIAGNOSIS — C73 HURTHLE CELL CARCINOMA OF THYROID: ICD-10-CM

## 2024-08-22 PROCEDURE — 76536 US EXAM OF HEAD AND NECK: CPT | Mod: S$GLB,,, | Performed by: INTERNAL MEDICINE

## 2024-08-23 ENCOUNTER — PATIENT MESSAGE (OUTPATIENT)
Dept: ADMINISTRATIVE | Facility: HOSPITAL | Age: 54
End: 2024-08-23
Payer: COMMERCIAL

## 2024-08-26 ENCOUNTER — LAB VISIT (OUTPATIENT)
Dept: LAB | Facility: HOSPITAL | Age: 54
End: 2024-08-26
Attending: INTERNAL MEDICINE
Payer: COMMERCIAL

## 2024-08-26 ENCOUNTER — TELEPHONE (OUTPATIENT)
Dept: PODIATRY | Facility: CLINIC | Age: 54
End: 2024-08-26
Payer: COMMERCIAL

## 2024-08-26 DIAGNOSIS — C73 HURTHLE CELL CARCINOMA OF THYROID: ICD-10-CM

## 2024-08-26 DIAGNOSIS — I10 ESSENTIAL HYPERTENSION: ICD-10-CM

## 2024-08-26 LAB
ALBUMIN SERPL BCP-MCNC: 3.8 G/DL (ref 3.5–5.2)
ALP SERPL-CCNC: 67 U/L (ref 55–135)
ALT SERPL W/O P-5'-P-CCNC: 10 U/L (ref 10–44)
ANION GAP SERPL CALC-SCNC: 10 MMOL/L (ref 8–16)
AST SERPL-CCNC: 18 U/L (ref 10–40)
BILIRUB SERPL-MCNC: 0.3 MG/DL (ref 0.1–1)
BUN SERPL-MCNC: 16 MG/DL (ref 6–20)
CALCIUM SERPL-MCNC: 10 MG/DL (ref 8.7–10.5)
CHLORIDE SERPL-SCNC: 106 MMOL/L (ref 95–110)
CO2 SERPL-SCNC: 26 MMOL/L (ref 23–29)
CREAT SERPL-MCNC: 1 MG/DL (ref 0.5–1.4)
EST. GFR  (NO RACE VARIABLE): >60 ML/MIN/1.73 M^2
GLUCOSE SERPL-MCNC: 80 MG/DL (ref 70–110)
POTASSIUM SERPL-SCNC: 4.6 MMOL/L (ref 3.5–5.1)
PROT SERPL-MCNC: 7.3 G/DL (ref 6–8.4)
SODIUM SERPL-SCNC: 142 MMOL/L (ref 136–145)
TSH SERPL DL<=0.005 MIU/L-ACNC: 0.69 UIU/ML (ref 0.4–4)

## 2024-08-26 PROCEDURE — 84443 ASSAY THYROID STIM HORMONE: CPT | Performed by: INTERNAL MEDICINE

## 2024-08-26 PROCEDURE — 36415 COLL VENOUS BLD VENIPUNCTURE: CPT | Mod: PO | Performed by: INTERNAL MEDICINE

## 2024-08-26 PROCEDURE — 80053 COMPREHEN METABOLIC PANEL: CPT | Performed by: INTERNAL MEDICINE

## 2024-08-26 PROCEDURE — 86800 THYROGLOBULIN ANTIBODY: CPT | Performed by: INTERNAL MEDICINE

## 2024-08-26 NOTE — TELEPHONE ENCOUNTER
Spoke with Ms Live to assist her with making an appt with Dr Cruz for reoccurring foot pain. 9/11/24 at 2:30 pm. Accepted appt date and time of 9/11/24 at 2:30 pm with no other needs voiced at this time. Call back encouraged if needed.

## 2024-08-28 ENCOUNTER — PATIENT MESSAGE (OUTPATIENT)
Dept: ENDOCRINOLOGY | Facility: CLINIC | Age: 54
End: 2024-08-28
Payer: COMMERCIAL

## 2024-08-28 NOTE — PROGRESS NOTES
ent of the date & time of financial phone call appt.  Pt aware appt is a telephone call.    Dashboard updated  Appt. 03-      Electronically signed by Gris Amaya MA at 3/23/2023  2:55 PM    Hi, she is going to check more often in the evening when she gets home from work and take the blood pressure meds when she get  home, if you can check in with her over the next couple weeks   Assessment:       1. Encounter for screening mammogram for breast cancer    2. Screening for colon cancer    3. Family history of premature CAD    4. Fatigue, unspecified type    5. Snoring    6. Essential hypertension    7. Lumbar facet arthropathy              Plan:             Isaura was seen today for establish care.    Diagnoses and all orders for this visit:    Encounter for screening mammogram for breast cancer  -     Mammo Digital Screening Bilat w/ Carlso; Future    Screening for colon cancer  -     Cancel: Cologuard Screening (Multitarget Stool DNA); Future  -     Cancel: Cologuard Screening (Multitarget Stool DNA)    Family history of premature CAD    Fatigue, unspecified type  -     Home Sleep Study; Future    Snoring  -     Home Sleep Study; Future    Essential hypertension  -     Home Sleep Study; Future    Lumbar facet arthropathy  -     Ambulatory referral/consult to Neurosurgery; Future        Cologarud already compelted -msg lisa  Mammogram will schedule     Fatigue   -Sleep study  - Discuss with endocrine     Back  - Referral back the NSGY     weight  -Cut out SSB     BP  - start taking bp meds in evening and check bp before bedtime  - msg  dig med team    Fu in 3 month       I spent at least 40 mins with preparing to see the patient, obtaining and/or revieweing separately obtained history, preforming a examination and evaluation, counseling, communicating with other health care professional, documenting clinical information in the electronic health record,  and/or coordinating care.             Subjective:        Patient ID: Isaura Mancini is a 54 y.o. female.    Chief Complaint:   Establish Care (Pt c/o of weight gain and would like to discuss ways of weight loss. )      Hypertension  This is a chronic problem. The current episode started more than 1 year ago. The problem has been waxing and waning since onset. Past treatments include diuretics and angiotensin blockers. Identifiable causes of hypertension include a thyroid problem.   Thyroid Problem  Presents for follow-up visit. The symptoms have been stable.       #Last visit/Previous Provider  This patient is new to me  Previously seen by Delisa Shah MD  Last visit was February       Link to History         #Interim Hx    mammo   CRc- Cologaurd   Went to ED in June for leiomyoma and left ovarian cyst    Previous seen by NSGY and pain meidcine      Following with OBGYN     #Concerns Today    Weight gain and Fatigue   - since thyroidectomy and worsening in last tyear     Shift worker ; 4am - 3pm , [employer] stocking   Breakfast: eggs, more    Lunch: chicken, meats , porks, rice,    Dinner: same    Desserts/Snacks: rare    Fast Food:  rare  Sugar sweetened Beverages: lemonade, sweet tea twice daily, coffee tea 3-5 cups ,   Physical activity:  does have a smart watch           STOP-BANG  Snore: Yes  Tired/fatigue:Yes  Observed apnea; Yes  Pressure - HTN; Yes  BMI >35: Yes  Age >50; Yes  Neck circumference: Yes  Gender male; No  Score =7             #Chronic Problems    Patient Active Problem List   Diagnosis    Essential hypertension  On dig med      Snoring  No prior sleep study                Chronic bilateral low back pain with left-sided sciatica  Degenerative disc disease, lumbar     Lumbar facet arthropathy   Spondylolisthesis at L4-L5 level   Sacroiliitis     Prev followed by nsgy  Canceled surgery 2x for covid and for thyroid cancer      Class 2 obesity with body mass index (BMI) of 37.0 to 37.9 in adult  Lots of ssb                                   Family history of premature CAD  Prior stress and EKG and holters negative          Uterine leiomyoma    Abnormal uterine bleeding (AUB)  Followed by DARIAN      Hurthle cell carcinoma of thyroid    Postoperative hypothyroidism  Followed by ENDOCRINE      Mass of right side of neck -lipoma sp removal           Last 5 Patient Entered Readings                Current 30 Day Average: 145/88  Recent Readings 8/12/2024 8/12/2024 8/12/2024 8/12/2024 7/27/2024   SBP (mmHg) 148 141 139 148 107   DBP (mmHg) 90 81 87 88 82   Pulse 53 54 54 54 57                   Health Maintenance Due   Topic Date Due    Shingles Vaccine (1 of 2) Never done    COVID-19 Vaccine (4 - 2023-24 season) 12/26/2023    Colorectal Cancer Screening  03/14/2024    Mammogram   10/29/2024  3:00 PM [facility] MAMMO1 [facility] MAMMO    10/06/2024       Health Maintenance Topics with due status: Not Due       Topic Last Completion Date    Cervical Cancer Screening 01/19/2021    TETANUS VACCINE 03/24/2021    Influenza Vaccine 10/31/2023    Hemoglobin A1c (Diabetic Prevention Screening) 02/26/2024    Lipid Panel 02/26/2024             Tobacco Use: Low Risk  (8/29/2024)    Patient History     Smoking Tobacco Use: Never     Smokeless Tobacco Use: Never     Passive Exposure: Never               Results for orders placed during the hospital encounter of 10/06/23    Mammo Digital Screening Bilat w/ Carlos    Narrative  Result:  Mammo Digital Screening Bilat w/ Carlos    History:  Patient is 53 y.o. and is seen for a screening mammogram.      Films Compared:  Prior images (if available) were compared.    Findings:  This procedure was performed using tomosynthesis.  Computer-aided detection was utilized in the interpretation of this examination.    The breasts have scattered areas of fibroglandular density. There is no evidence of suspicious masses, microcalcifications or architectural distortion.    Impression  No mammographic evidence of malignancy.    BI-RADS Category 1:  "Negative    Recommendation:  Routine screening mammogram in 1 year is recommended.    Your estimated lifetime risk of breast cancer (to age 85) based on Tyrer-Cuzick risk assessment model is 9.51 %.  According to the American Cancer Society, patients with a lifetime breast cancer risk of 20% or higher might benefit from supplemental screening tests, such as screening breast MRI.      Tyrer-Cuzick Risk Calculator for Breast Cancer Risk Assessment  MagView     Lab Results   Component Value Date    [id number] Negative 01/19/2021    XVH45WJD Negative 01/19/2021    HPVOTHERPCR Negative 01/19/2021             Review of Systems   All other systems reviewed and are negative.      Objective:   /82   Pulse 67   Ht 5' 2" (1.575 m)   Wt 87.5 kg (192 lb 14.4 oz)   LMP 07/27/2024 (Approximate)   SpO2 98%   BMI 35.28 kg/m²         8/29/2024     8:02 AM 7/30/2024     2:00 PM 6/12/2024     2:14 PM 6/11/2024    11:00 AM 6/11/2024     9:57 AM   Vitals   Height 5' 2" (1.575 m)    5' 2" (1.575 m)   Weight (lbs) 192.9 189.6 190.6 185 185   BMI (kg/m2) 35.3  34.9 33.8 33.8            Physical Exam  Vitals and nursing note reviewed.   Constitutional:       General: She is not in acute distress.     Appearance: She is well-developed. She is not diaphoretic.   HENT:      Head: Normocephalic.      Nose: Nose normal.   Eyes:      General:         Right eye: No discharge.         Left eye: No discharge.      Conjunctiva/sclera: Conjunctivae normal.      Pupils: Pupils are equal, round, and reactive to light.   Neck:      Comments: Well healed surgical scar   Cardiovascular:      Rate and Rhythm: Normal rate and regular rhythm.      Heart sounds: Normal heart sounds. No murmur heard.     No friction rub. No gallop.   Pulmonary:      Effort: Pulmonary effort is normal. No respiratory distress.   Abdominal:      General: Bowel sounds are normal. There is no distension.      Palpations: Abdomen is soft.   Musculoskeletal:         " "General: No deformity. Normal range of motion.      Cervical back: Normal range of motion.   Skin:     General: Skin is warm.   Neurological:      Mental Status: She is alert and oriented to person, place, and time.      Cranial Nerves: No cranial nerve deficit.             ASCVD  The 10-year ASCVD risk score (Jackie SALOMON, et al., 2019) is: 1.4%    Values used to calculate the score:      Age: 54 years      Sex: Female      Is Non- : No      Diabetic: No      Tobacco smoker: No      Systolic Blood Pressure: 130 mmHg      Is BP treated: Yes      HDL Cholesterol: 67 mg/dL      Total Cholesterol: 171 mg/dL    Basic Labs  BMP  Lab Results   Component Value Date     08/26/2024    K 4.6 08/26/2024     08/26/2024    CO2 26 08/26/2024    BUN 16 08/26/2024    CREATININE 1.0 08/26/2024    CALCIUM 10.0 08/26/2024    ANIONGAP 10 08/26/2024    ESTGFRAFRICA >60.0 07/15/2022    EGFRNONAA >60.0 07/15/2022     Lab Results   Component Value Date    ALT 10 08/26/2024    AST 18 08/26/2024    ALKPHOS 67 08/26/2024    BILITOT 0.3 08/26/2024       Lab Results   Component Value Date    TSH 0.694 08/26/2024       Lab Results   Component Value Date    WBC 9.28 06/11/2024    HGB 13.5 06/11/2024    HCT 40.3 06/11/2024    MCV 89 06/11/2024     06/11/2024           STD  Lab Results   Component Value Date    [id number] Negative 01/19/2022     No results found for: "RPR"  Lab Results   Component Value Date    HEPCAB Negative 01/19/2022     Lab Results   Component Value Date    LABNGO Not Detected 07/30/2024    LABCHLA Not Detected 07/30/2024         Lipids  Lab Results   Component Value Date    CHOL 171 02/26/2024     Lab Results   Component Value Date    HDL 67 02/26/2024     Lab Results   Component Value Date    LDLCALC 93.4 02/26/2024     Lab Results   Component Value Date    TRIG 53 02/26/2024     Lab Results   Component Value Date    CHOLHDL 39.2 02/26/2024       DM  Lab Results   Component Value Date "    HGBA1C 5.0 02/26/2024         Lab Results   Component Value Date    PHUR 7.0 06/11/2024    SPECGRAV 1.020 06/11/2024    PROTEINUA Negative 06/11/2024    GLUCUA Negative 06/11/2024    KETONESU Negative 06/11/2024    OCCULTUA Negative 06/11/2024    NITRITE Negative 06/11/2024    LEUKOCYTESUR Negative 06/11/2024         Future Appointments   Date Time Provider Department Center   9/11/2024  2:30 PM Sarahi Cruz DPM [facility] PODIAT [facility]   9/19/2024 12:30 PM [facility] US2 [facility] USOUNDO [facility]   10/23/2024  4:20 PM DONAVAN NOLAN [facility]   10/29/2024  3:00 PM [facility] MAMMO1 [facility] MAMMO [facility]           Medication List with Changes/Refills   Current Medications    ASPIRIN (ECOTRIN) 81 MG EC TABLET    Take 81 mg by mouth once daily.    CHOLECALCIFEROL, VITAMIN D3, (VITAMIN D3) 25 MCG (1,000 UNIT) CAPSULE    Take 2 capsules (2,000 Units total) by mouth once daily.    HYDROCHLOROTHIAZIDE (HYDRODIURIL) 12.5 MG TAB    Take 1 tablet (12.5 mg total) by mouth once daily.    LEVOTHYROXINE (SYNTHROID) 150 MCG TABLET    Take 1 tablet (150 mcg total) by mouth before breakfast. Take 1 tab 6 days a week and 0.5 tabs on Sundays for total of 6.5 tabs weekly    LORATADINE (CLARITIN) 10 MG TABLET    Take 1 tablet (10 mg total) by mouth once daily.    LOSARTAN (COZAAR) 25 MG TABLET    Take 1 tablet (25 mg total) by mouth once daily.    SENNA-DOCUSATE 8.6-50 MG (PERICOLACE) 8.6-50 MG PER TABLET    Take 1 tablet by mouth 2 (two) times daily.   Discontinued Medications    HYDROCODONE-ACETAMINOPHEN (NORCO) 5-325 MG PER TABLET    Take 1 tablet by mouth every 6 (six) hours as needed for Pain.    IBUPROFEN (ADVIL,MOTRIN) 800 MG TABLET    Take 1 tablet (800 mg total) by mouth every 8 (eight) hours as needed for Pain.    KETOROLAC (TORADOL) 10 MG TABLET    Take 1 tablet (10 mg total) by mouth every 6 (six) hours. for 5 days    MUPIROCIN (BACTROBAN) 2 % OINTMENT    Apply topically 3 (three) times daily.    NAPROXEN  (NAPROSYN) 500 MG TABLET    Take 1 tablet (500 mg total) by mouth 2 (two) times daily with meals.       Disclaimer:  This note has been generated using voice-recognition software. There may be grammatical or spelling errors that have been missed during proof-reading

## 2024-08-29 ENCOUNTER — PATIENT OUTREACH (OUTPATIENT)
Dept: ADMINISTRATIVE | Facility: HOSPITAL | Age: 54
End: 2024-08-29
Payer: COMMERCIAL

## 2024-08-29 ENCOUNTER — OFFICE VISIT (OUTPATIENT)
Dept: INTERNAL MEDICINE | Facility: CLINIC | Age: 54
End: 2024-08-29
Payer: COMMERCIAL

## 2024-08-29 VITALS
WEIGHT: 192.88 LBS | BODY MASS INDEX: 35.49 KG/M2 | OXYGEN SATURATION: 98 % | HEIGHT: 62 IN | SYSTOLIC BLOOD PRESSURE: 130 MMHG | DIASTOLIC BLOOD PRESSURE: 82 MMHG | HEART RATE: 67 BPM

## 2024-08-29 DIAGNOSIS — M47.816 LUMBAR FACET ARTHROPATHY: ICD-10-CM

## 2024-08-29 DIAGNOSIS — I10 ESSENTIAL HYPERTENSION: ICD-10-CM

## 2024-08-29 DIAGNOSIS — R06.83 SNORING: ICD-10-CM

## 2024-08-29 DIAGNOSIS — Z82.49 FAMILY HISTORY OF PREMATURE CAD: ICD-10-CM

## 2024-08-29 DIAGNOSIS — R53.83 FATIGUE, UNSPECIFIED TYPE: ICD-10-CM

## 2024-08-29 DIAGNOSIS — Z12.31 ENCOUNTER FOR SCREENING MAMMOGRAM FOR BREAST CANCER: Primary | ICD-10-CM

## 2024-08-29 DIAGNOSIS — Z12.11 SCREENING FOR COLON CANCER: ICD-10-CM

## 2024-08-29 PROBLEM — N39.0 URINARY TRACT INFECTION WITHOUT HEMATURIA: Status: RESOLVED | Noted: 2018-08-13 | Resolved: 2024-08-29

## 2024-08-29 PROBLEM — R31.9 HEMATURIA: Status: RESOLVED | Noted: 2018-08-13 | Resolved: 2024-08-29

## 2024-08-29 PROBLEM — R07.9 CHEST PAIN: Status: RESOLVED | Noted: 2019-09-11 | Resolved: 2024-08-29

## 2024-08-29 PROBLEM — R00.2 PALPITATIONS: Status: RESOLVED | Noted: 2017-07-03 | Resolved: 2024-08-29

## 2024-08-29 PROCEDURE — 3075F SYST BP GE 130 - 139MM HG: CPT | Mod: CPTII,S$GLB,, | Performed by: INTERNAL MEDICINE

## 2024-08-29 PROCEDURE — 3008F BODY MASS INDEX DOCD: CPT | Mod: CPTII,S$GLB,, | Performed by: INTERNAL MEDICINE

## 2024-08-29 PROCEDURE — 3079F DIAST BP 80-89 MM HG: CPT | Mod: CPTII,S$GLB,, | Performed by: INTERNAL MEDICINE

## 2024-08-29 PROCEDURE — 99215 OFFICE O/P EST HI 40 MIN: CPT | Mod: S$GLB,,, | Performed by: INTERNAL MEDICINE

## 2024-08-29 PROCEDURE — 1160F RVW MEDS BY RX/DR IN RCRD: CPT | Mod: CPTII,S$GLB,, | Performed by: INTERNAL MEDICINE

## 2024-08-29 PROCEDURE — 99999 PR PBB SHADOW E&M-EST. PATIENT-LVL IV: CPT | Mod: PBBFAC,,, | Performed by: INTERNAL MEDICINE

## 2024-08-29 PROCEDURE — G2211 COMPLEX E/M VISIT ADD ON: HCPCS | Mod: S$GLB,,, | Performed by: INTERNAL MEDICINE

## 2024-08-29 PROCEDURE — 4010F ACE/ARB THERAPY RXD/TAKEN: CPT | Mod: CPTII,S$GLB,, | Performed by: INTERNAL MEDICINE

## 2024-08-29 PROCEDURE — 3044F HG A1C LEVEL LT 7.0%: CPT | Mod: CPTII,S$GLB,, | Performed by: INTERNAL MEDICINE

## 2024-08-29 PROCEDURE — 1159F MED LIST DOCD IN RCRD: CPT | Mod: CPTII,S$GLB,, | Performed by: INTERNAL MEDICINE

## 2024-08-29 NOTE — Clinical Note
Hi, she is going to check more often in the evening when she gets home from work and take the blood pressure meds when she get  home, if you can check in with her over the next couple weeks

## 2024-09-06 ENCOUNTER — PATIENT OUTREACH (OUTPATIENT)
Dept: ADMINISTRATIVE | Facility: HOSPITAL | Age: 54
End: 2024-09-06
Payer: COMMERCIAL

## 2024-09-06 NOTE — PROGRESS NOTES
09/06/2024  VB chart audit performed. Care Everywhere updates requested and reviewed  Overdue HM topic chart audit and/or requested. LINKS triggered and reconciled. Media reviewed.

## 2024-09-09 ENCOUNTER — TELEPHONE (OUTPATIENT)
Dept: SLEEP MEDICINE | Facility: OTHER | Age: 54
End: 2024-09-09
Payer: COMMERCIAL

## 2024-09-09 ENCOUNTER — TELEPHONE (OUTPATIENT)
Dept: PODIATRY | Facility: CLINIC | Age: 54
End: 2024-09-09
Payer: COMMERCIAL

## 2024-09-09 NOTE — TELEPHONE ENCOUNTER
Due to concern for bad weather on 9/11/24, GENARO Ott MA spoke with Ms Mancini to assist her with rescheduling her appt for a later time. Accepted appt date and time of 9/18/24 at 3 pm. No other needs voiced. Call back encouraged if needed

## 2024-09-09 NOTE — TELEPHONE ENCOUNTER
Called patient 4 times to get her rescheduled for next week due to the weather on Wednesday I left & message for patient       Spoke with patient got her scheduled on 9/18 at 3:00

## 2024-09-19 ENCOUNTER — HOSPITAL ENCOUNTER (OUTPATIENT)
Dept: RADIOLOGY | Facility: HOSPITAL | Age: 54
Discharge: HOME | End: 2024-09-19
Attending: OBSTETRICS & GYNECOLOGY
Payer: COMMERCIAL

## 2024-09-19 DIAGNOSIS — N83.209 CYST OF OVARY, UNSPECIFIED LATERALITY: ICD-10-CM

## 2024-09-19 PROCEDURE — 76830 TRANSVAGINAL US NON-OB: CPT | Mod: TC

## 2024-09-19 PROCEDURE — 76830 TRANSVAGINAL US NON-OB: CPT | Mod: 26,,, | Performed by: RADIOLOGY

## 2024-09-19 PROCEDURE — 76856 US EXAM PELVIC COMPLETE: CPT | Mod: 26,,, | Performed by: RADIOLOGY

## 2024-09-20 ENCOUNTER — PATIENT MESSAGE (OUTPATIENT)
Dept: OBSTETRICS AND GYNECOLOGY | Facility: CLINIC | Age: 54
End: 2024-09-20
Payer: COMMERCIAL

## 2024-09-25 ENCOUNTER — OFFICE VISIT (OUTPATIENT)
Dept: ENDOCRINOLOGY | Facility: CLINIC | Age: 54
End: 2024-09-25
Payer: COMMERCIAL

## 2024-09-25 DIAGNOSIS — E66.01 CLASS 2 SEVERE OBESITY WITH SERIOUS COMORBIDITY AND BODY MASS INDEX (BMI) OF 37.0 TO 37.9 IN ADULT, UNSPECIFIED OBESITY TYPE: ICD-10-CM

## 2024-09-25 DIAGNOSIS — E89.0 POSTOPERATIVE HYPOTHYROIDISM: ICD-10-CM

## 2024-09-25 DIAGNOSIS — C73 HURTHLE CELL CARCINOMA OF THYROID: Primary | ICD-10-CM

## 2024-09-25 PROCEDURE — G2211 COMPLEX E/M VISIT ADD ON: HCPCS | Mod: 95,,, | Performed by: INTERNAL MEDICINE

## 2024-09-25 PROCEDURE — 99214 OFFICE O/P EST MOD 30 MIN: CPT | Mod: 95,,, | Performed by: INTERNAL MEDICINE

## 2024-09-25 PROCEDURE — 3044F HG A1C LEVEL LT 7.0%: CPT | Mod: CPTII,95,, | Performed by: INTERNAL MEDICINE

## 2024-09-25 PROCEDURE — 4010F ACE/ARB THERAPY RXD/TAKEN: CPT | Mod: CPTII,95,, | Performed by: INTERNAL MEDICINE

## 2024-09-25 PROCEDURE — 1159F MED LIST DOCD IN RCRD: CPT | Mod: CPTII,95,, | Performed by: INTERNAL MEDICINE

## 2024-09-25 PROCEDURE — 1160F RVW MEDS BY RX/DR IN RCRD: CPT | Mod: CPTII,95,, | Performed by: INTERNAL MEDICINE

## 2024-09-25 RX ORDER — LEVOTHYROXINE SODIUM 150 UG/1
150 TABLET ORAL
Qty: 90 TABLET | Refills: 3 | Status: SHIPPED | OUTPATIENT
Start: 2024-09-25 | End: 2025-09-25

## 2024-09-25 NOTE — ASSESSMENT & PLAN NOTE
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 The carcinoma does not extend beyond the thyroid gland, and vascular invasion is not identified. Margins negative for tumor.  - Tumor board consensus is to monitor for now and measure postoperative thyroglobulin along with TSH and BMP. If insufficiently suppressed to post thyroidectomy levels or if demonstrating increase in thyroglobulin, may consider postoperative radioactive iodine ablation.    Thyroglobulin stable and US without change  Repeat next year

## 2024-09-25 NOTE — PROGRESS NOTES
Isaura Mancini is a 54 y.o. female presenting for follow-up of Hurthle cell carcinoma    The patient location is: [place]  The chief complaint leading to consultation is:   Chief Complaint   Patient presents with    Thyroid Cancer    Hypothyroidism       Visit type: audiovisual    Face to Face time with patient: 9 minutes  12 minutes of total time spent on the encounter, which includes face to face time and non-face to face time preparing to see the patient (eg, review of tests), Obtaining and/or reviewing separately obtained history, Documenting clinical information in the electronic or other health record, Independently interpreting results (not separately reported) and communicating results to the patient/family/caregiver, or Care coordination (not separately reported).    Each patient to whom he or she provides medical services by telemedicine is:  (1) informed of the relationship between the physician and patient and the respective role of any other health care provider with respect to management of the patient; and (2) notified that he or she may decline to receive medical services by telemedicine and may withdraw from such care at any time.      History of Present Illness  Hurthle cell carcinoma  Initially seen 06/06/2022 for hyperthyroidism  Labs consistent with Graves disease with elevated TRAb of 4.77, undetectable TSH with elevated free T4 of 1.71 and elevated total T3 of 182.   Methimazole was started along with beta-blocker and thyroid ultrasound 06/06/2022 demonstrated concerning thyroid nodules.    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 The carcinoma does not extend beyond the thyroid gland, and vascular invasion is not identified. Margins negative for tumor.    Post-op thyroglobulin 0.2 with TSH 0.762      Tumor board consensus was to monitor and measure  postoperative thyroglobulin along with TSH and BMP. If insufficiently suppressed to post thyroidectomy levels or if demonstrating increase in thyroglobulin, may consider postoperative radioactive iodine ablation.    Neck US 8/2024:  Status post thyroidectomy.   0.6 x 0.3 x 0.6 cm solid, isoechoic nodule is seen in the area of the left superior thyroid bed most likely representing thyroid remnant.   No sonographic evidence of malignancy.  Recommend continued correlation with serum thyroglobulin levels and repeat neck ultrasound in 1 year.    Component      Latest Ref Rng 8/26/2024   Thyroglobulin, Tumor Marker      ng/mL 0.2 (H)    Thyroglobulin Antibody Screen      <1.8 IU/mL <1.8    Thyroglobulin Interpretation SEE BELOW    TSH      0.400 - 4.000 uIU/mL 0.694         Postsurgical hypothyroidism  Taking LT4 150 mcg daily except half tab Saturday  Taking appropriately and without missed doses  She has had some weight gain over the last year  Does feel she is eating more than before with higher hunger  Portable treadmill that she will use soon    Lab Results   Component Value Date    TSH 0.694 08/26/2024           Current Outpatient Medications:     aspirin (ECOTRIN) 81 MG EC tablet, Take 81 mg by mouth once daily., Disp: , Rfl:     cholecalciferol, vitamin D3, (VITAMIN D3) 25 mcg (1,000 unit) capsule, Take 2 capsules (2,000 Units total) by mouth once daily., Disp: , Rfl: 0    hydroCHLOROthiazide (HYDRODIURIL) 12.5 MG Tab, Take 1 tablet (12.5 mg total) by mouth once daily., Disp: 90 tablet, Rfl: 3    levothyroxine (SYNTHROID) 150 MCG tablet, Take 1 tablet (150 mcg total) by mouth before breakfast. Take 1 tab 6 days a week and 0.5 tabs on Sundays for total of 6.5 tabs weekly, Disp: 90 tablet, Rfl: 3    loratadine (CLARITIN) 10 mg tablet, Take 1 tablet (10 mg total) by mouth once daily., Disp: 90 tablet, Rfl: 3    losartan (COZAAR) 25 MG tablet, Take 1 tablet (25 mg total) by mouth once daily., Disp: 90 tablet, Rfl: 3     senna-docusate 8.6-50 mg (PERICOLACE) 8.6-50 mg per tablet, Take 1 tablet by mouth 2 (two) times daily., Disp: , Rfl:     ROS as above    Objective:     There were no vitals filed for this visit.    Wt Readings from Last 3 Encounters:   08/29/24 0802 87.5 kg (192 lb 14.4 oz)   07/30/24 1400 86 kg (189 lb 9.5 oz)   06/12/24 1414 86.5 kg (190 lb 9.6 oz)     There is no height or weight on file to calculate BMI.      LABS    Chemistry        Component Value Date/Time     08/26/2024 0848    K 4.6 08/26/2024 0848     08/26/2024 0848    CO2 26 08/26/2024 0848    BUN 16 08/26/2024 0848    CREATININE 1.0 08/26/2024 0848    GLU 80 08/26/2024 0848        Component Value Date/Time    CALCIUM 10.0 08/26/2024 0848    ALKPHOS 67 08/26/2024 0848    AST 18 08/26/2024 0848    ALT 10 08/26/2024 0848    BILITOT 0.3 08/26/2024 0848    ESTGFRAFRICA >60.0 07/15/2022 0721    EGFRNONAA >60.0 07/15/2022 0721        Lab Results   Component Value Date    HGBA1C 5.0 02/26/2024           Assessment and Plan     Hurthle cell carcinoma of thyroid  Total thyroidectomy 10/28/2022  - Pathology 10/28/2022: 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 The carcinoma does not extend beyond the thyroid gland, and vascular invasion is not identified. Margins negative for tumor.  - Tumor board consensus is to monitor for now and measure postoperative thyroglobulin along with TSH and BMP. If insufficiently suppressed to post thyroidectomy levels or if demonstrating increase in thyroglobulin, may consider postoperative radioactive iodine ablation.    Thyroglobulin stable and US without change  Repeat next year      Postoperative hypothyroidism  Clinically and biochemically euthyroid.  Cont LT4 150 mcg 6.5 tabs weekly    Class 2 obesity with body mass index (BMI) of 37.0 to 37.9 in adult  Discussed lifestyle modifications         RTC 1 year with US, TSH, thyroglobulin           Sarahi Whelan MD    Visit today included increased  complexity associated with the care of the problems addressed and managing the longitudinal care of the patient due to the serious and/or complex managed problems

## 2024-09-30 ENCOUNTER — HOSPITAL ENCOUNTER (OUTPATIENT)
Dept: SLEEP MEDICINE | Facility: HOSPITAL | Age: 54
Discharge: HOME | End: 2024-09-30
Attending: INTERNAL MEDICINE
Payer: COMMERCIAL

## 2024-09-30 DIAGNOSIS — G47.33 OSA (OBSTRUCTIVE SLEEP APNEA): Primary | ICD-10-CM

## 2024-09-30 DIAGNOSIS — R53.83 FATIGUE, UNSPECIFIED TYPE: ICD-10-CM

## 2024-09-30 DIAGNOSIS — R06.83 SNORING: ICD-10-CM

## 2024-09-30 DIAGNOSIS — I10 ESSENTIAL HYPERTENSION: ICD-10-CM

## 2024-09-30 PROCEDURE — 95800 SLP STDY UNATTENDED: CPT

## 2024-09-30 NOTE — PROGRESS NOTES
Per physician orders, patient was given home sleep testing device and instructed on how to apply the device before going to bed tonight. I sized the device and showed the patient using a mirror how the device fits and what it should look like so they can use a mirror when putting it on themselves at home. We reviewed the instruction booklet. Patient verbalized understanding of the instructions and teach back complete. Patient was instructed to return the device the next day between the hours of 5:00am and 9:00am in the drop box that is located to the left and you walk into the main lobby of the hospital. I also educated the patient on sleep apnea, treatments options for sleep apena, and what to expect after the home sleep study is complete.

## 2024-10-03 PROBLEM — G47.33 OSA (OBSTRUCTIVE SLEEP APNEA): Status: ACTIVE | Noted: 2024-10-03

## 2024-10-04 NOTE — PROCEDURES
"Hi Dr. Braden,     You have ordered sleep LAB services to perform the sleep study for Isaura Mancini. The sleep study that you ordered is complete.     Please find Sleep Study result in  the "Media tab" of Chart Review; you can look  for the report in the  Media by the document type "Sleep Study Documents".       As the ordering provider, you are responsible for reviewing the results and implementing a treatment plan with your patient.      If you need a Sleep Medicine provider to explain the sleep study findings and arrange treatment for the patient, please refer patient for consultation to our Sleep Clinic via [facility] with Ambulatory Consult Sleep.The wait time to be seen in the sleep clinic is currently several months unfortunately, we are working on recruiting more providers.     To do that please place an order for an  "Ambulatory Consult Sleep" - it will go to our clinic work queue for our Medical Assistant to contact the patient for an appointment.     For any questions, please contact our clinic staff at [phone] to talk to clinical staff        Mervat Arora MD    "

## 2024-10-07 DIAGNOSIS — G47.33 OSA (OBSTRUCTIVE SLEEP APNEA): Primary | ICD-10-CM

## 2024-10-07 NOTE — PROGRESS NOTES
Patient Name Isaura Mancini Study Ordered By Ashok Braden III  Date of Night 1 09/30/2024 08:54:44 PM Date of Birth 1970  Identification Number [id number]  Overall AHI (4%)* Overall RDI % time < 90% Sp02 Mean Sp02 % time snoring > 30dB  8 17 1% 97.7% 10.1%  PHYSICIAN INTERPRETATION AND COMMENTS: Findings are consistent with mild, obstructive sleep apnea (STAS) (G47.33),  by overall AHI (apnea hypopnea index). However, findings on this study suggest that the degree of sleep disordered  breathing is in the moderate severity range, when RDI is measured. This study was technically adequate to allow for  interpretation.  CLINICAL HISTORY: 54 year old female

## 2024-10-09 ENCOUNTER — OFFICE VISIT (OUTPATIENT)
Dept: SLEEP MEDICINE | Facility: CLINIC | Age: 54
End: 2024-10-09
Attending: PSYCHIATRY & NEUROLOGY
Payer: COMMERCIAL

## 2024-10-09 VITALS
WEIGHT: 193.38 LBS | SYSTOLIC BLOOD PRESSURE: 116 MMHG | HEART RATE: 71 BPM | DIASTOLIC BLOOD PRESSURE: 77 MMHG | HEIGHT: 62 IN | BODY MASS INDEX: 35.59 KG/M2

## 2024-10-09 DIAGNOSIS — G47.33 OSA (OBSTRUCTIVE SLEEP APNEA): ICD-10-CM

## 2024-10-09 PROCEDURE — 3008F BODY MASS INDEX DOCD: CPT | Mod: CPTII,S$GLB,, | Performed by: PSYCHIATRY & NEUROLOGY

## 2024-10-09 PROCEDURE — 4010F ACE/ARB THERAPY RXD/TAKEN: CPT | Mod: CPTII,S$GLB,, | Performed by: PSYCHIATRY & NEUROLOGY

## 2024-10-09 PROCEDURE — 3074F SYST BP LT 130 MM HG: CPT | Mod: CPTII,S$GLB,, | Performed by: PSYCHIATRY & NEUROLOGY

## 2024-10-09 PROCEDURE — 99204 OFFICE O/P NEW MOD 45 MIN: CPT | Mod: S$GLB,,, | Performed by: PSYCHIATRY & NEUROLOGY

## 2024-10-09 PROCEDURE — 99999 PR PBB SHADOW E&M-EST. PATIENT-LVL III: CPT | Mod: PBBFAC,,, | Performed by: PSYCHIATRY & NEUROLOGY

## 2024-10-09 PROCEDURE — 3044F HG A1C LEVEL LT 7.0%: CPT | Mod: CPTII,S$GLB,, | Performed by: PSYCHIATRY & NEUROLOGY

## 2024-10-09 PROCEDURE — 3078F DIAST BP <80 MM HG: CPT | Mod: CPTII,S$GLB,, | Performed by: PSYCHIATRY & NEUROLOGY

## 2024-10-09 NOTE — PROGRESS NOTES
Isaura Mancini is a 54 y.o. female is here to be evaluated for a sleep disorder; referred by Ashok Braden III, MD.    The patient reports snoring, gasping for air, choking , excessive daytime sleepiness, and excessive daytime fatigue since several years ago.  Recent HST here revealed mild to moderate REM predominant and positional STAS   Isaura Mancini denied  witnessed apneas.    The patient feels rested upon awakening. Takes naps.     The patient  reports  morning headaches and denies  dry mouth on awakening.   Isaura Mancini denies  nasal congestion.    The patient never had tonsillectomy, adenoidectomy or UPPP    The patient  reports interrutped sleep due to nocturia; no difficulty returning to sleep.  Isaura Mancini  denies symptoms concerning for parasomnia except for occasional somniloquy.  The patient  denies auxiliary symptoms of narcolepsy including sleep onset paralysis, hypnagogic hallucinations, sleep attacks and cataplexy.    Isaura Mancini denied symptoms concerning for RLS; nocturnal leg movements have not been noticed.   The patient does not experience sleep related leg cramps.       Medications pertinent to sleep  disorders taken currently: -  Previous  medications taken  for sleep disorders:  -    Sleep studies  HST 9/30: AHI 8, RDI 17, SaO2 min -71%            10/8/2024     1:56 PM   EPWORTH SLEEPINESS SCALE TOTAL SCORE    Total score 10        Patient-reported             10/8/2024     1:56 PM   EPWORTH SLEEPINESS SCALE   Sitting and reading 1    Watching TV 2    Sitting, inactive in a public place (e.g. a theatre or a meeting) 1    As a passenger in a car for an hour without a break 2    Lying down to rest in the afternoon when circumstances permit 1    Sitting and talking to someone 1    Sitting quietly after a lunch without alcohol 2    In a car, while stopped for a few minutes in traffic 0    Total score 10        Patient-reported           2/29/2024   PHQ-9 Depression  Patient Health Questionnaire   Over the last two weeks how often have you been bothered by little interest or pleasure in doing things 0   Over the last two weeks how often have you been bothered by feeling down, depressed or hopeless 0              No data to display                    10/8/2024     1:56 PM   EPWORTH SLEEPINESS SCALE   Sitting and reading 1    Watching TV 2    Sitting, inactive in a public place (e.g. a theatre or a meeting) 1    As a passenger in a car for an hour without a break 2    Lying down to rest in the afternoon when circumstances permit 1    Sitting and talking to someone 1    Sitting quietly after a lunch without alcohol 2    In a car, while stopped for a few minutes in traffic 0    Total score 10        Patient-reported         10/8/2024     2:01 PM   Sleep Clinic New Patient   What time do you go to bed on a week day? (Give a range) 8-9pm   What time do you go to bed on a day off? (Give a range) 10 pm   How long does it take you to fall asleep? (Give a range) 30 mins   On average, how many times per night do you wake up? 2-3 times   How long does it take you to fall back into sleep? (Give a range) 15 mins   What time do you wake up to start your day on a week day? (Give a range) 2:30am   What time do you wake up to start your day on a day off? (Give a range) 4:00 am   What time do you get out of bed? (Give a range) 30 mins after i woke up   On average, how many hours do you sleep? 5-6 hours   On average, how many naps do you take per day? 1   Rate your sleep quality from 0 to 5 (0-poor, 5-great). 3   Have you experienced:  Weight gain?   How much weight have you lost or gained (in lbs.) in the last year? 35 lbs   On average, how many times per night do you go to the bathroom?  2-3 times   Have you ever had a sleep study/CPAP machine/surgery for sleep apnea? Yes   Have you ever had a CPAP machine for sleep apnea? No   Have you ever had surgery for sleep apnea? No           10/8/2024      2:01 PM   Sleep Clinic ROS    Difficulty breathing through the nose?  No   Sore throat or dry mouth in the morning? Sometimes   Irregular or very fast heart beat?  Sometimes   Shortness of breath?  No   Acid reflux? Yes   Body aches and pains?  Sometimes   Morning headaches? Sometimes   Dizziness? Sometimes   Mood changes?  Sometimes   Do you exercise?  No   Do you feel like moving your legs a lot?  Yes       DME:         PAST MEDICAL HISTORY:    Active Ambulatory Problems     Diagnosis Date Noted    Essential hypertension 11/12/2013    Snoring 08/20/2014    Obesity (BMI 30-39.9) 08/20/2014    Insomnia 11/28/2016    Chronic bilateral low back pain with left-sided sciatica 11/28/2016    Class 2 obesity with body mass index (BMI) of 37.0 to 37.9 in adult 11/28/2016    Lumbar facet arthropathy 12/20/2016    Spondylolisthesis at L4-L5 level 12/20/2016    Sacroiliitis 12/20/2016    Muscle strain of right forearm 04/04/2017    Headache 07/03/2017    Constipation 08/13/2018    Gastroesophageal reflux disease 09/11/2019    Family history of premature CAD 11/07/2019    Degenerative disc disease, lumbar 09/22/2020    Uterine leiomyoma 10/07/2021    Abnormal uterine bleeding (AUB) 10/07/2021    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    Postoperative hypothyroidism 05/25/2023    Mass of right side of neck 04/09/2024    STAS (obstructive sleep apnea) 10/03/2024     Resolved Ambulatory Problems     Diagnosis Date Noted    Thrombocytosis 12/23/2013    Preventative health care 11/18/2015    Routine general medical examination at a health care facility 11/18/2015    Hypokalemia 11/28/2016    Palpitations 07/03/2017    Hematuria 08/13/2018    Urinary tract infection without hematuria 08/13/2018    Chronic low back pain with left-sided sciatica 10/11/2018    Impaired gait and mobility 10/11/2018    Chest pain 09/11/2019    Hyperthyroidism 06/06/2022    Thyroid nodule 09/22/2022    Graves disease 10/28/2022     Past Medical  History:   Diagnosis Date    Hypertension                 PAST SURGICAL HISTORY:    Past Surgical History:   Procedure Laterality Date     SECTION      NECK MASS EXCISION N/A 2024    Procedure: EXCISION, MASS, NECK;  Surgeon: Fabrice Adams MD;  Location: [facility];  Service: General;  Laterality: N/A;  Prone    THYROIDECTOMY N/A 10/28/2022    Procedure: THYROIDECTOMY, total;  Surgeon: Isaura Hayes MD;  Location: [address];  Service: General;  Laterality: N/A;    TUBAL LIGATION           FAMILY HISTORY:                Family History   Problem Relation Name Age of Onset    Heart disease Father          age 70 , started 30s    Hypertension Father      Breast cancer Paternal Aunt  60    Diabetes Neg Hx      Colon cancer Neg Hx      Ovarian cancer Neg Hx         SOCIAL HISTORY:          Tobacco:   Social History     Tobacco Use   Smoking Status Never    Passive exposure: Never   Smokeless Tobacco Never       alcohol use:    Social History     Substance and Sexual Activity   Alcohol Use No                   ALLERGIES:  Review of patient's allergies indicates:  No Known Allergies    CURRENT MEDICATIONS:    Current Outpatient Medications   Medication Sig Dispense Refill    aspirin (ECOTRIN) 81 MG EC tablet Take 81 mg by mouth once daily.      cholecalciferol, vitamin D3, (VITAMIN D3) 25 mcg (1,000 unit) capsule Take 2 capsules (2,000 Units total) by mouth once daily.  0    hydroCHLOROthiazide (HYDRODIURIL) 12.5 MG Tab Take 1 tablet (12.5 mg total) by mouth once daily. 90 tablet 3    levothyroxine (SYNTHROID) 150 MCG tablet Take 1 tablet (150 mcg total) by mouth before breakfast. Take 1 tab 6 days a week and 0.5 tabs on Sundays for total of 6.5 tabs weekly 90 tablet 3    loratadine (CLARITIN) 10 mg tablet Take 1 tablet (10 mg total) by mouth once daily. 90 tablet 3    losartan (COZAAR) 25 MG tablet Take 1 tablet (25 mg total) by mouth once daily. 90 tablet 3    senna-docusate 8.6-50 mg  "(PERICOLACE) 8.6-50 mg per tablet Take 1 tablet by mouth 2 (two) times daily.       No current facility-administered medications for this visit.                        PHYSICAL EXAM:  /77 (BP Location: Left arm, Patient Position: Sitting)   Pulse 71   Ht 5' 2" (1.575 m)   Wt 87.7 kg (193 lb 6.4 oz)   BMI 35.37 kg/m²   GENERAL: Normal development, well groomed.  HEENT:   HEENT:  Conjunctivae are non-erythematous; Pupils equal, round, and reactive to light; Nose is symmetrical; Nasal mucosa is pink and moist; Septum is midline; Inferior turbinates are normal; Nasal airflow is normal; Posterior pharynx is pink; Modified Mallampati:II-III; Posterior palate is low; Tonsils not visualized; Uvula is normal and pink;Tongue is normal; Dentition is fair; No TMJ tenderness; Jaw opening and protrusion without click and without discomfort.  NECK: Supple. Neck circumference is 15 inches. No thyromegaly. No palpable nodes.     SKIN: On face and neck: No abrasions, no rashes, no lesions.  No subcutaneous nodules are palpable.  RESPIRATORY: Chest is clear to auscultation.  Normal chest expansion and non-labored breathing at rest.  CARDIOVASCULAR: Normal S1, S2.  No murmurs, gallops or rubs. No carotid bruits bilaterally.  No edema. No clubbing. No cyanosis.    NEURO: Oriented to time, place and person. Normal attention span and concentration. Gait normal.    PSYCH: Affect is full. Mood is normal  MUSCULOSKELETAL: Moves 4 extremities. Gait normal.           ASSESSMENT:    1. STAS (obstructive sleep apnea). The patient symptomatically has  snoring, choking , excessive daytime sleepiness, and excessive daytime fatigue  with exam findings of crowded airway. The patient has medical co-morbidities of hypertension  which can be worsened by STAS. This warrants treatment.              PLAN:    Treatment: prescription  for auto CPAP  cm with the mask of a patient's choice was given to the patient.    Education: During our discussion " today, we talked about the etiology of obstructive sleep apnea as well as the potential ramifications of untreated sleep apnea, which could include daytime sleepiness, hypertension, heart disease and/or stroke.      We discussed potential treatment options, which could include weight loss, body positioning, continuous positive airway pressure (CPAP), or referral for surgical consideration. The patient preferred CPAP option.     Discussed purpose of PAP therapy, health benefits of CPAP, as well as the potential ramifications of untreated sleep apnea, which could include daytime sleepiness, hypertension, heart disease and/or stroke. An AHI of 15 is associated with increased risk CVD.     Regular replacement of CPAP mask, tubing and filter was recommended.    The patient was given open opportunity to ask questions and/or express concerns about treatment plan. Two point patient identifier confirmed.     Precautions: The patient was advised to abstain from driving should he feel sleepy or drowsy.    Follow up: me after 31 days  of PAP use.  31-minute visit. >50% spent counseling patient and coordination of care.

## 2024-10-18 ENCOUNTER — TELEPHONE (OUTPATIENT)
Dept: NEUROSURGERY | Facility: CLINIC | Age: 54
End: 2024-10-18
Payer: COMMERCIAL

## 2024-10-18 DIAGNOSIS — M54.16 LUMBAR RADICULOPATHY: Primary | ICD-10-CM

## 2024-10-21 DIAGNOSIS — E89.0 POSTOPERATIVE HYPOTHYROIDISM: ICD-10-CM

## 2024-10-21 RX ORDER — LEVOTHYROXINE SODIUM 150 UG/1
TABLET ORAL
Qty: 90 TABLET | Refills: 3 | Status: SHIPPED | OUTPATIENT
Start: 2024-10-21

## 2024-10-25 ENCOUNTER — PATIENT MESSAGE (OUTPATIENT)
Dept: OTHER | Facility: OTHER | Age: 54
End: 2024-10-25
Payer: COMMERCIAL

## 2024-10-28 ENCOUNTER — HOSPITAL ENCOUNTER (OUTPATIENT)
Dept: RADIOLOGY | Facility: HOSPITAL | Age: 54
Discharge: HOME | End: 2024-10-28
Attending: NEUROLOGICAL SURGERY
Payer: COMMERCIAL

## 2024-10-28 DIAGNOSIS — M54.16 LUMBAR RADICULOPATHY: ICD-10-CM

## 2024-10-28 PROCEDURE — 72100 X-RAY EXAM L-S SPINE 2/3 VWS: CPT | Mod: 26,,, | Performed by: RADIOLOGY

## 2024-10-28 PROCEDURE — 72100 X-RAY EXAM L-S SPINE 2/3 VWS: CPT | Mod: TC,FY

## 2024-11-01 ENCOUNTER — TELEPHONE (OUTPATIENT)
Dept: NEUROSURGERY | Facility: CLINIC | Age: 54
End: 2024-11-01
Payer: COMMERCIAL

## 2024-11-01 DIAGNOSIS — M54.16 LUMBAR RADICULOPATHY: Primary | ICD-10-CM

## 2024-11-03 ENCOUNTER — OFFICE VISIT (OUTPATIENT)
Dept: URGENT CARE | Facility: CLINIC | Age: 54
End: 2024-11-03
Payer: COMMERCIAL

## 2024-11-03 VITALS
HEART RATE: 63 BPM | SYSTOLIC BLOOD PRESSURE: 124 MMHG | TEMPERATURE: 99 F | RESPIRATION RATE: 20 BRPM | WEIGHT: 193 LBS | DIASTOLIC BLOOD PRESSURE: 84 MMHG | BODY MASS INDEX: 35.51 KG/M2 | HEIGHT: 62 IN | OXYGEN SATURATION: 97 %

## 2024-11-03 DIAGNOSIS — J02.0 STREP PHARYNGITIS: Primary | ICD-10-CM

## 2024-11-03 LAB
CTP QC/QA: YES
CTP QC/QA: YES
MOLECULAR STREP A: POSITIVE
SARS-COV-2 AG RESP QL IA.RAPID: NEGATIVE

## 2024-11-03 PROCEDURE — 87811 SARS-COV-2 COVID19 W/OPTIC: CPT | Mod: QW,S$GLB,, | Performed by: PHYSICIAN ASSISTANT

## 2024-11-03 PROCEDURE — 99213 OFFICE O/P EST LOW 20 MIN: CPT | Mod: S$GLB,,, | Performed by: PHYSICIAN ASSISTANT

## 2024-11-03 PROCEDURE — 87651 STREP A DNA AMP PROBE: CPT | Mod: QW,S$GLB,, | Performed by: PHYSICIAN ASSISTANT

## 2024-11-03 RX ORDER — AMOXICILLIN 500 MG/1
500 TABLET, FILM COATED ORAL EVERY 12 HOURS
Qty: 20 TABLET | Refills: 0 | Status: SHIPPED | OUTPATIENT
Start: 2024-11-03 | End: 2024-11-13

## 2024-11-03 NOTE — PROGRESS NOTES
"Subjective:      Patient ID: Isaura Mancini is a 54 y.o. female.    Vitals:  height is 5' 2" (1.575 m) and weight is 87.5 kg (193 lb). Her oral temperature is 99 °F (37.2 °C). Her blood pressure is 124/84 and her pulse is 63. Her respiration is 20 and oxygen saturation is 97%.     Chief Complaint: Sore Throat    Pt present with symptoms of- Sore Throat that hurts to swallow and feels swollen, that started this morning when she woke up. Pt tested COVID negative at home.     Sore Throat   This is a new problem. The current episode started today. The problem has been gradually worsening. There has been no fever. The pain is at a severity of 0/10. The patient is experiencing no pain. Associated symptoms include neck pain and trouble swallowing. Pertinent negatives include no abdominal pain, congestion, coughing, diarrhea, drooling, ear discharge, ear pain, headaches, shortness of breath or vomiting. She has tried nothing for the symptoms.       Constitution: Negative for appetite change, chills, sweating, fatigue and fever.   HENT:  Positive for sore throat, trouble swallowing and voice change. Negative for ear pain, ear discharge, hearing loss, dental problem, drooling, mouth sores, tongue pain, tongue lesion, congestion, postnasal drip, sinus pain and sinus pressure.    Neck: Positive for neck pain. Negative for neck stiffness.   Cardiovascular:  Negative for chest pain.   Eyes:  Negative for eye discharge and eye itching.   Respiratory:  Negative for cough and shortness of breath.    Gastrointestinal:  Negative for abdominal pain, nausea, vomiting, constipation and diarrhea.   Musculoskeletal:  Positive for muscle ache.   Skin:  Negative for rash.   Neurological:  Negative for dizziness and headaches.      Past Medical History:   Diagnosis Date    Hypertension        Past Surgical History:   Procedure Laterality Date     SECTION      NECK MASS EXCISION N/A 2024    Procedure: EXCISION, MASS, NECK;  " Surgeon: Fabrice Adams MD;  Location: [facility] OR;  Service: General;  Laterality: N/A;  Prone    THYROIDECTOMY N/A 10/28/2022    Procedure: THYROIDECTOMY, total;  Surgeon: Isaura Hayes MD;  [facility];  Service: General;  Laterality: N/A;    TUBAL LIGATION         Family History   Problem Relation Name Age of Onset    Heart disease Father          age 70 , started 30s    Hypertension Father      Breast cancer Paternal Aunt  60    Diabetes Neg Hx      Colon cancer Neg Hx      Ovarian cancer Neg Hx         Social History     Socioeconomic History    Marital status: Single    Number of children: 3   Occupational History     Employer: [id number]     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   Tobacco Use    Smoking status: Never     Passive exposure: Never    Smokeless tobacco: Never   Substance and Sexual Activity    Alcohol use: No    Drug use: No    Sexual activity: Yes     Partners: Male     Birth control/protection: Surgical     Comment: BTL   Social History Narrative    [employer] from [employer]     Works in [employer] at [employer]     Lives at home with two adult children, son and daughter ages 32 and 25      Social Drivers of Health     Financial Resource Strain: Medium Risk (2/22/2024)    Overall Financial Resource Strain (CARDIA)     Difficulty of Paying Living Expenses: Somewhat hard   Food Insecurity: Food Insecurity Present (2/22/2024)    Hunger Vital Sign     Worried About Running Out of Food in the Last Year: Sometimes true     Ran Out of Food in the Last Year: Sometimes true   Transportation Needs: No Transportation Needs (2/22/2024)    PRAPARE - Transportation     Lack of Transportation (Medical): No     Lack of Transportation (Non-Medical): No   Physical Activity: Inactive (2/22/2024)    Exercise Vital Sign     Days of Exercise per Week: 0 days     Minutes of Exercise per Session: 0 min   Stress: No Stress Concern Present (2/22/2024)    [employer] [employer]  - Occupational Stress Questionnaire     Feeling of Stress : Only a little   Housing Stability: Low Risk  (2/22/2024)    Housing Stability Vital Sign     Unable to Pay for Housing in the Last Year: No     Number of Places Lived in the Last Year: 1     Unstable Housing in the Last Year: No   Recent Concern: Housing Stability - High Risk (1/1/2024)    Housing Stability Vital Sign     Unable to Pay for Housing in the Last Year: Yes     Number of Places Lived in the Last Year: 1     Unstable Housing in the Last Year: No       Current Outpatient Medications   Medication Sig Dispense Refill    aspirin (ECOTRIN) 81 MG EC tablet Take 81 mg by mouth once daily.      cholecalciferol, vitamin D3, (VITAMIN D3) 25 mcg (1,000 unit) capsule Take 2 capsules (2,000 Units total) by mouth once daily.  0    hydroCHLOROthiazide (HYDRODIURIL) 25 MG tablet Take 1 tablet (25 mg total) by mouth once daily. for 90 doses 90 tablet 0    levothyroxine (SYNTHROID) 150 MCG tablet Take 1 tab 6 days a week and 0.5 tabs on Sundays for total of 6.5 tabs weekly 90 tablet 3    loratadine (CLARITIN) 10 mg tablet Take 1 tablet (10 mg total) by mouth once daily. 90 tablet 3    losartan (COZAAR) 25 MG tablet Take 1 tablet (25 mg total) by mouth once daily. 90 tablet 3    senna-docusate 8.6-50 mg (PERICOLACE) 8.6-50 mg per tablet Take 1 tablet by mouth 2 (two) times daily.       No current facility-administered medications for this visit.       Review of patient's allergies indicates:  No Known Allergies    Objective:     Physical Exam   Constitutional: She appears well-developed. She is cooperative.  Non-toxic appearance. She does not appear ill. No distress.   HENT:   Head: Normocephalic and atraumatic.   Ears:   Right Ear: Hearing, tympanic membrane, external ear and ear canal normal. no impacted cerumen  Left Ear: Hearing, tympanic membrane, external ear and ear canal normal. no impacted cerumen  Nose: Nose normal. No mucosal edema, rhinorrhea, nasal  deformity or congestion. No epistaxis. Right sinus exhibits no maxillary sinus tenderness and no frontal sinus tenderness. Left sinus exhibits no maxillary sinus tenderness and no frontal sinus tenderness.   Mouth/Throat: Uvula is midline and mucous membranes are normal. Mucous membranes are moist. No trismus in the jaw. Normal dentition. No uvula swelling. Posterior oropharyngeal erythema present. No oropharyngeal exudate or posterior oropharyngeal edema. Tonsils are 1+ on the right. Tonsils are 1+ on the left. No tonsillar exudate.   Eyes: Conjunctivae and lids are normal. Right eye exhibits no discharge. Left eye exhibits no discharge. No scleral icterus.   Neck: Trachea normal and phonation normal. Neck supple. No edema present. No erythema present. No neck rigidity present.   Cardiovascular: Normal rate, regular rhythm and normal heart sounds.   No murmur heard.Exam reveals no gallop and no friction rub.   Pulmonary/Chest: Effort normal and breath sounds normal. No stridor. No respiratory distress. She has no decreased breath sounds. She has no wheezes. She has no rhonchi. She has no rales.   Abdominal: Normal appearance.   Lymphadenopathy:     She has cervical adenopathy.   Neurological: She is alert. She exhibits normal muscle tone.   Skin: Skin is warm, dry, intact, not diaphoretic, not pale and no rash.   Psychiatric: Her speech is normal and behavior is normal. Mood, judgment and thought content normal.   Nursing note and vitals reviewed.    Results for orders placed or performed in visit on 11/03/24   SARS Coronavirus 2 Antigen, POCT Manual Read    Collection Time: 11/03/24  3:39 PM   Result Value Ref Range    SARS Coronavirus 2 Antigen Negative Negative     Acceptable Yes    POCT Strep A, Molecular    Collection Time: 11/03/24  3:45 PM   Result Value Ref Range    Molecular Strep A, POC Positive (A) Negative     Acceptable Yes        Assessment:     1. Strep pharyngitis         Plan:       Strep pharyngitis  -     SARS Coronavirus 2 Antigen, POCT Manual Read  -     POCT Strep A, Molecular  -     amoxicillin (AMOXIL) 500 MG Tab; Take 1 tablet (500 mg total) by mouth every 12 (twelve) hours. for 10 days  Dispense: 20 tablet; Refill: 0        Results reviewed  I have reviewed the patient chart and pertinent past imaging/labs.         Patient Instructions                                                         Pharyngitis   If your condition worsens or fails to improve we recommend that you receive another evaluation at the urgent care/ER immediately or contact your PCP to discuss your concerns. You must understand that you've received an urgent care treatment only and that you may be released before all your medical problems are known or treated. You the patient will arrange for followup care as instructed.       Tylenol or ibuprofen for pain may help as long as you are not allergic to these meds or have a medical condition such as stomach ulcers, liver or kidney disease or taking blood thinners etc that would prevent you from using these medications.     Rest and fluids will help as well.   If you were prescribed antibiotics and are female and on BCP use additional methods to prevent pregnancy while on the antibiotics and for one cycle after     Warm saltwater gargles, warm tea with honey and Chloraseptic spray as needed for sore throat.  You are contagious to leave taken the antibiotic for a full 24 hours and has been fever free without Tylenol and ibuprofen.  Throw away toothbrush after 2 or 3 days so you do not reinfect yourself.  If symptoms do not improve in 2-3 days please return for evaluation.

## 2024-11-03 NOTE — PATIENT INSTRUCTIONS
Pharyngitis   If your condition worsens or fails to improve we recommend that you receive another evaluation at the urgent care/ER immediately or contact your PCP to discuss your concerns. You must understand that you've received an urgent care treatment only and that you may be released before all your medical problems are known or treated. You the patient will arrange for followup care as instructed.       Tylenol or ibuprofen for pain may help as long as you are not allergic to these meds or have a medical condition such as stomach ulcers, liver or kidney disease or taking blood thinners etc that would prevent you from using these medications.     Rest and fluids will help as well.   If you were prescribed antibiotics and are female and on BCP use additional methods to prevent pregnancy while on the antibiotics and for one cycle after     Warm saltwater gargles, warm tea with honey and Chloraseptic spray as needed for sore throat.  You are contagious to leave taken the antibiotic for a full 24 hours and has been fever free without Tylenol and ibuprofen.  Throw away toothbrush after 2 or 3 days so you do not reinfect yourself.  If symptoms do not improve in 2-3 days please return for evaluation.

## 2024-11-05 ENCOUNTER — LAB VISIT (OUTPATIENT)
Dept: LAB | Facility: HOSPITAL | Age: 54
End: 2024-11-05
Attending: INTERNAL MEDICINE
Payer: COMMERCIAL

## 2024-11-05 DIAGNOSIS — I10 ESSENTIAL HYPERTENSION: ICD-10-CM

## 2024-11-05 LAB
ANION GAP SERPL CALC-SCNC: 10 MMOL/L (ref 8–16)
BUN SERPL-MCNC: 18 MG/DL (ref 6–20)
CALCIUM SERPL-MCNC: 9.6 MG/DL (ref 8.7–10.5)
CHLORIDE SERPL-SCNC: 103 MMOL/L (ref 95–110)
CO2 SERPL-SCNC: 26 MMOL/L (ref 23–29)
CREAT SERPL-MCNC: 0.9 MG/DL (ref 0.5–1.4)
EST. GFR  (NO RACE VARIABLE): >60 ML/MIN/1.73 M^2
GLUCOSE SERPL-MCNC: 55 MG/DL (ref 70–110)
POTASSIUM SERPL-SCNC: 3.6 MMOL/L (ref 3.5–5.1)
SODIUM SERPL-SCNC: 139 MMOL/L (ref 136–145)

## 2024-11-05 PROCEDURE — 80048 BASIC METABOLIC PNL TOTAL CA: CPT | Performed by: INTERNAL MEDICINE

## 2024-11-05 PROCEDURE — 36415 COLL VENOUS BLD VENIPUNCTURE: CPT | Performed by: INTERNAL MEDICINE

## 2024-11-20 ENCOUNTER — HOSPITAL ENCOUNTER (OUTPATIENT)
Dept: RADIOLOGY | Facility: HOSPITAL | Age: 54
Discharge: HOME | End: 2024-11-20
Attending: NEUROLOGICAL SURGERY
Payer: COMMERCIAL

## 2024-11-20 ENCOUNTER — OFFICE VISIT (OUTPATIENT)
Dept: NEUROSURGERY | Facility: CLINIC | Age: 54
End: 2024-11-20
Payer: COMMERCIAL

## 2024-11-20 VITALS
BODY MASS INDEX: 35.49 KG/M2 | HEART RATE: 65 BPM | DIASTOLIC BLOOD PRESSURE: 75 MMHG | HEIGHT: 62 IN | SYSTOLIC BLOOD PRESSURE: 128 MMHG | WEIGHT: 192.88 LBS

## 2024-11-20 DIAGNOSIS — M43.10 SPONDYLOLISTHESIS, GRADE 2: ICD-10-CM

## 2024-11-20 DIAGNOSIS — M43.16 SPONDYLOLISTHESIS AT L4-L5 LEVEL: ICD-10-CM

## 2024-11-20 DIAGNOSIS — M54.16 LUMBAR RADICULOPATHY: ICD-10-CM

## 2024-11-20 DIAGNOSIS — M54.16 LUMBAR RADICULOPATHY: Primary | ICD-10-CM

## 2024-11-20 DIAGNOSIS — M48.07 SPINAL STENOSIS, LUMBOSACRAL REGION: ICD-10-CM

## 2024-11-20 DIAGNOSIS — M47.816 LUMBAR FACET ARTHROPATHY: ICD-10-CM

## 2024-11-20 PROCEDURE — 99999 PR PBB SHADOW E&M-EST. PATIENT-LVL IV: CPT | Mod: PBBFAC,,, | Performed by: NEUROLOGICAL SURGERY

## 2024-11-20 PROCEDURE — 3044F HG A1C LEVEL LT 7.0%: CPT | Mod: CPTII,S$GLB,, | Performed by: NEUROLOGICAL SURGERY

## 2024-11-20 PROCEDURE — 72100 X-RAY EXAM L-S SPINE 2/3 VWS: CPT | Mod: TC,FY

## 2024-11-20 PROCEDURE — 3078F DIAST BP <80 MM HG: CPT | Mod: CPTII,S$GLB,, | Performed by: NEUROLOGICAL SURGERY

## 2024-11-20 PROCEDURE — 1159F MED LIST DOCD IN RCRD: CPT | Mod: CPTII,S$GLB,, | Performed by: NEUROLOGICAL SURGERY

## 2024-11-20 PROCEDURE — 3008F BODY MASS INDEX DOCD: CPT | Mod: CPTII,S$GLB,, | Performed by: NEUROLOGICAL SURGERY

## 2024-11-20 PROCEDURE — 99213 OFFICE O/P EST LOW 20 MIN: CPT | Mod: S$GLB,,, | Performed by: NEUROLOGICAL SURGERY

## 2024-11-20 PROCEDURE — 72100 X-RAY EXAM L-S SPINE 2/3 VWS: CPT | Mod: 26,,, | Performed by: STUDENT IN AN ORGANIZED HEALTH CARE EDUCATION/TRAINING PROGRAM

## 2024-11-20 PROCEDURE — 4010F ACE/ARB THERAPY RXD/TAKEN: CPT | Mod: CPTII,S$GLB,, | Performed by: NEUROLOGICAL SURGERY

## 2024-11-20 PROCEDURE — 3074F SYST BP LT 130 MM HG: CPT | Mod: CPTII,S$GLB,, | Performed by: NEUROLOGICAL SURGERY

## 2024-11-20 RX ORDER — PREGABALIN 50 MG/1
50 CAPSULE ORAL 3 TIMES DAILY
Qty: 90 CAPSULE | Refills: 3 | Status: SHIPPED | OUTPATIENT
Start: 2024-11-20 | End: 2025-03-20

## 2024-11-20 RX ORDER — METHOCARBAMOL 750 MG/1
750 TABLET, FILM COATED ORAL 3 TIMES DAILY PRN
Qty: 90 TABLET | Refills: 2 | Status: SHIPPED | OUTPATIENT
Start: 2024-11-20 | End: 2025-02-18

## 2024-11-20 RX ORDER — CELECOXIB 200 MG/1
200 CAPSULE ORAL 2 TIMES DAILY
Qty: 60 CAPSULE | Refills: 3 | Status: SHIPPED | OUTPATIENT
Start: 2024-11-20

## 2024-11-20 NOTE — PROGRESS NOTES
NEUROSURGICAL PROGRESS NOTE    DATE OF SERVICE:  11/20/2024    ATTENDING PHYSICIAN:  Marvin Keen MD    SUBJECTIVE:  STEFFANY 34%     SUBJECTIVE:  08/05/2020  This is a very pleasant 49 y.o. female who presents as referral for low back pain and leg pain. She says the back pain started around 3 years ago but has gotten worse over the past 6 months. Patient also has pain going down the posterolateral leg to the dorsum of the foot that is almost constant on the right and intermittent on the left. No specific positions make the pain better.  Right more than left leg pain.  Back pain is as severe as leg pain.  She be up and moving for 1-2 hours before having to rest due to severe pain in the back and legs. Denies any numbness or weakness.  No sphincter dysfunction symptoms.  She has done physical therapy 2 years ago without significant pain relief.  She is taking gabapentin with partial pain relief.  Pain is interfering with walking.  She is now limping because of the pain.  Pain is interfering with working.  She is working at [employer].  Pain is interfering with quality of life.       08/08/2023  Isaura Mancini is a 52 y.o. female who presents with the above CC.  Patient was scheduled for surgery in 2020.  She states the surgery was canceled and then she started having medical problems with her thyroid and surgery so she put off returning back to clinic.  Patient states the back pain has gotten progressively worse and is constant across the low back.  It is better with flexion and worse with walking or standing and also when laying down.  She has pain in the left foot and has seen podiatry for this.  She has bilateral posterior leg pain and numbness mainly when lying down at night.  The left leg is worse than the right leg.       The back and legs bother her equally.     She would physical therapy 3 years ago without relief.  No interventional pain procedures.  No spine surgery.  She takes gabapentin 100 mg and  ibuprofen.     Patient denies any recent accidents or trauma, no saddle anesthesias, and no bowel or bladder incontinence.     INTERIM HISTORY:  09/27/23  Six to 10/10 of low back pain, she reports severe left leg pain.  Pain is interfering with ability to stand, walk.  She works at [employer] in the [employer] department.  She has to lift up to 50 lb.  She also has a manager positioned.  Pain is affecting her quality of life and functional status.  She has completed a full conservative management including physical therapy      INTERIM HISTORY:  11/20/24  Complains of worsening difficulty ambulating.  Back pain radiates in bilateral legs.  Patient tends to lean forward due to pain.  Pain is affecting her quality of life and functional status.              PAST MEDICAL HISTORY:  Active Ambulatory Problems     Diagnosis Date Noted    Essential hypertension 11/12/2013    Snoring 08/20/2014    Obesity (BMI 30-39.9) 08/20/2014    Insomnia 11/28/2016    Chronic bilateral low back pain with left-sided sciatica 11/28/2016    Class 2 obesity with body mass index (BMI) of 37.0 to 37.9 in adult 11/28/2016    Lumbar facet arthropathy 12/20/2016    Spondylolisthesis at L4-L5 level 12/20/2016    Sacroiliitis 12/20/2016    Muscle strain of right forearm 04/04/2017    Headache 07/03/2017    Constipation 08/13/2018    Gastroesophageal reflux disease 09/11/2019    Family history of premature CAD 11/07/2019    Degenerative disc disease, lumbar 09/22/2020    Uterine leiomyoma 10/07/2021    Abnormal uterine bleeding (AUB) 10/07/2021    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    Postoperative hypothyroidism 05/25/2023    Mass of right side of neck 04/09/2024    STAS (obstructive sleep apnea) 10/03/2024     Resolved Ambulatory Problems     Diagnosis Date Noted    Thrombocytosis 12/23/2013    Preventative health care 11/18/2015    Routine general medical examination at a health care facility 11/18/2015    Hypokalemia 11/28/2016     Palpitations 2017    Hematuria 2018    Urinary tract infection without hematuria 2018    Chronic low back pain with left-sided sciatica 10/11/2018    Impaired gait and mobility 10/11/2018    Chest pain 2019    Hyperthyroidism 2022    Thyroid nodule 2022    Graves disease 10/28/2022     Past Medical History:   Diagnosis Date    Hypertension        PAST SURGICAL HISTORY:  Past Surgical History:   Procedure Laterality Date     SECTION      NECK MASS EXCISION N/A 2024    Procedure: EXCISION, MASS, NECK;  Surgeon: Fabrice Adams MD;  Location: [facility] OR;  Service: General;  Laterality: N/A;  Prone    THYROIDECTOMY N/A 10/28/2022    Procedure: THYROIDECTOMY, total;  Surgeon: Isaura Hayes MD;  Location: [facility] OR [address] [place] [address];  Service: General;  Laterality: N/A;    TUBAL LIGATION         SOCIAL HISTORY:   Social History     Socioeconomic History    Marital status: Single    Number of children: 3   Occupational History     Employer: [id number]     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   Tobacco Use    Smoking status: Never     Passive exposure: Never    Smokeless tobacco: Never   Substance and Sexual Activity    Alcohol use: No    Drug use: No    Sexual activity: Yes     Partners: Male     Birth control/protection: Surgical     Comment: BTL   Social History Narrative    [place] [facility]     Works in [email or web address] at [employer]     Lives at home with two adult children, son and daughter ages 32 and 25      Social Drivers of Health     Financial Resource Strain: Medium Risk (2024)    Overall Financial Resource Strain (CARDIA)     Difficulty of Paying Living Expenses: Somewhat hard   Food Insecurity: Food Insecurity Present (2024)    Hunger Vital Sign     Worried About Running Out of Food in the Last Year: Sometimes true     Ran Out of Food in the Last Year: Sometimes true   Transportation Needs: No Transportation Needs (2024)    PRAPARE -  Transportation     Lack of Transportation (Medical): No     Lack of Transportation (Non-Medical): No   Physical Activity: Inactive (2/22/2024)    Exercise Vital Sign     Days of Exercise per Week: 0 days     Minutes of Exercise per Session: 0 min   Stress: No Stress Concern Present (2/22/2024)    [place] [employer] - Occupational Stress Questionnaire     Feeling of Stress : Only a little   Housing Stability: Low Risk  (2/22/2024)    Housing Stability Vital Sign     Unable to Pay for Housing in the Last Year: No     Number of Places Lived in the Last Year: 1     Unstable Housing in the Last Year: No   Recent Concern: Housing Stability - High Risk (1/1/2024)    Housing Stability Vital Sign     Unable to Pay for Housing in the Last Year: Yes     Number of Places Lived in the Last Year: 1     Unstable Housing in the Last Year: No       FAMILY HISTORY:  Family History   Problem Relation Name Age of Onset    Heart disease Father          age 70 , started 30s    Hypertension Father      Breast cancer Paternal Aunt  60    Diabetes Neg Hx      Colon cancer Neg Hx      Ovarian cancer Neg Hx         CURRENTS MEDICATIONS:  Current Outpatient Medications on File Prior to Visit   Medication Sig Dispense Refill    aspirin (ECOTRIN) 81 MG EC tablet Take 81 mg by mouth once daily.      cholecalciferol, vitamin D3, (VITAMIN D3) 25 mcg (1,000 unit) capsule Take 2 capsules (2,000 Units total) by mouth once daily.  0    hydroCHLOROthiazide (HYDRODIURIL) 25 MG tablet Take 1 tablet (25 mg total) by mouth once daily. for 90 doses 90 tablet 0    levothyroxine (SYNTHROID) 150 MCG tablet Take 1 tab 6 days a week and 0.5 tabs on Sundays for total of 6.5 tabs weekly 90 tablet 3    loratadine (CLARITIN) 10 mg tablet Take 1 tablet (10 mg total) by mouth once daily. 90 tablet 3    losartan (COZAAR) 25 MG tablet Take 1 tablet (25 mg total) by mouth once daily. 90 tablet 3    senna-docusate 8.6-50 mg (PERICOLACE) 8.6-50 mg per  tablet Take 1 tablet by mouth 2 (two) times daily.       No current facility-administered medications on file prior to visit.       ALLERGIES:  Review of patient's allergies indicates:  No Known Allergies    REVIEW OF SYSTEMS:  Review of Systems   Constitutional:  Negative for diaphoresis, fever and weight loss.   Respiratory:  Negative for shortness of breath.    Cardiovascular:  Negative for chest pain.   Gastrointestinal:  Negative for blood in stool.   Genitourinary:  Negative for hematuria.   Endo/Heme/Allergies:  Does not bruise/bleed easily.   All other systems reviewed and are negative.        OBJECTIVE:    PHYSICAL EXAMINATION:   Vitals:    11/20/24 0813   BP: 128/75   Pulse: 65       Physical Exam:  Vitals reviewed.    Constitutional: She appears well-developed and well-nourished.     Eyes: Pupils are equal, round, and reactive to light. Conjunctivae and EOM are normal.     Cardiovascular: Normal distal pulses and no edema.     Abdominal: Soft.     Skin: Skin displays no rash on trunk and no rash on extremities. Skin displays no lesions on trunk and no lesions on extremities.     Psych/Behavior: She is alert. She is oriented to person, place, and time. She has a normal mood and affect.     Musculoskeletal:        Neck: Range of motion is full.     Neurological:        DTRs: Tricep reflexes are 2+ on the right side and 2+ on the left side. Bicep reflexes are 2+ on the right side and 2+ on the left side. Brachioradialis reflexes are 2+ on the right side and 2+ on the left side. Patellar reflexes are 2+ on the right side and 2+ on the left side. Achilles reflexes are 2+ on the right side and 2+ on the left side.       Back Exam     Muscle Strength   Right Quadriceps:  5/5   Left Quadriceps:  5/5   Right Hamstrings:  5/5   Left Hamstrings:  5/5               Neurological Exam  Mental Status  Alert. Oriented to person, place, and time. Speech is normal.    Cranial Nerves  CN III, IV, VI: Extraocular movements  intact bilaterally. Pupils equal round and reactive to light bilaterally.    Motor   Normal muscle tone.                                               Right                     Left  Deltoid                                   5                          5   Biceps                                   5                          5   Triceps                                  5                          5   Interossei                              5                          5   Iliopsoas                               5                          5   Quadriceps                           5                          5   Hamstring                             5                          5   Gastrocnemius                     5                           5   Anterior tibialis                      5                          5   Posterior tibialis                    5                          5   Peroneal                               5                          5    Sensory  Light touch is normal in upper and lower extremities. Pinprick is normal in upper and lower extremities.     Reflexes                                            Right                      Left  Brachioradialis                    2+                         2+  Biceps                                 2+                         2+  Triceps                                2+                         2+  Patellar                                2+                         2+  Achilles                                2+                         2+  Right Plantar: normal  Left Plantar: normal    Right pathological reflexes: Carlos's absent. Ankle clonus absent.  Left pathological reflexes: Carlos's absent. Ankle clonus absent.        DIAGNOSTIC DATA:  I personally interpreted the following imaging:   Lumbar spine MRI 2023 compared to 2020 shows progression of the L4-5 spondylolisthesis with severe spinal stenosis,  Foraminal stenosis  Lumbar x-ray done today shows L4-5  anterolisthesis measured at 10 mm, grade 2 spondylolisthesis    ASSESSMENT:  This is a 54 y.o. female with     Problem List Items Addressed This Visit          Neuro    Lumbar facet arthropathy    Relevant Medications    pregabalin (LYRICA) 50 MG capsule       Orthopedic    Spondylolisthesis at L4-L5 level     Other Visit Diagnoses       Lumbar radiculopathy    -  Primary    Relevant Medications    pregabalin (LYRICA) 50 MG capsule    Spinal stenosis, lumbosacral region        Relevant Orders    MRI Lumbar Spine Without Contrast    Spondylolisthesis, grade 2                  PLAN:  I explained the natural history of the disease and all treatment options. I recommended a L4-5 oblique interbody fusion, placement of interbody spacer, DePuy Conduit LLIF cage filled with allograft BMP and DBM, L4-5 posterior nonsegmental instrumentation using DePuy Viper Prime system.     We have discussed the risks of surgery including death, coma, bleeding, infection, failure of surgery, CSF leak, nerve root injury, spinal cord injury, ureter injury, weakness, paralysis, peripheral neuropathy, malplaced hardware, migration of hardware, non-union, need for reoperation. Patient understands the risks and would like to proceed with surgery.    More than 50% of the time was spent on discussing conservative management treatments (medication, physical therapy exercises) and possible interventions (spinal injections and surgical procedures). Care coordination was discussed.    20 min          Marvin Keen MD  Cell:[phone]

## 2024-11-26 ENCOUNTER — TELEPHONE (OUTPATIENT)
Dept: NEUROSURGERY | Facility: CLINIC | Age: 54
End: 2024-11-26
Payer: COMMERCIAL

## 2024-11-26 DIAGNOSIS — Z98.1 S/P LUMBAR FUSION: ICD-10-CM

## 2024-11-26 DIAGNOSIS — M43.16 SPONDYLOLISTHESIS AT L4-L5 LEVEL: Primary | ICD-10-CM

## 2024-11-29 ENCOUNTER — OFFICE VISIT (OUTPATIENT)
Dept: FAMILY MEDICINE | Facility: CLINIC | Age: 54
End: 2024-11-29
Payer: COMMERCIAL

## 2024-11-29 ENCOUNTER — LAB VISIT (OUTPATIENT)
Dept: LAB | Facility: HOSPITAL | Age: 54
End: 2024-11-29
Payer: COMMERCIAL

## 2024-11-29 VITALS
WEIGHT: 194.69 LBS | SYSTOLIC BLOOD PRESSURE: 122 MMHG | HEART RATE: 66 BPM | DIASTOLIC BLOOD PRESSURE: 68 MMHG | HEIGHT: 62 IN | OXYGEN SATURATION: 98 % | BODY MASS INDEX: 35.83 KG/M2

## 2024-11-29 DIAGNOSIS — K59.00 CONSTIPATION, UNSPECIFIED CONSTIPATION TYPE: Primary | ICD-10-CM

## 2024-11-29 DIAGNOSIS — E89.0 POSTOPERATIVE HYPOTHYROIDISM: ICD-10-CM

## 2024-11-29 DIAGNOSIS — I10 ESSENTIAL HYPERTENSION: ICD-10-CM

## 2024-11-29 LAB
T4 FREE SERPL-MCNC: 1.24 NG/DL (ref 0.71–1.51)
TSH SERPL DL<=0.005 MIU/L-ACNC: 1.15 UIU/ML (ref 0.4–4)

## 2024-11-29 PROCEDURE — 84443 ASSAY THYROID STIM HORMONE: CPT

## 2024-11-29 PROCEDURE — 36415 COLL VENOUS BLD VENIPUNCTURE: CPT | Mod: PO

## 2024-11-29 PROCEDURE — 99999 PR PBB SHADOW E&M-EST. PATIENT-LVL IV: CPT | Mod: PBBFAC,,,

## 2024-11-29 PROCEDURE — 84439 ASSAY OF FREE THYROXINE: CPT

## 2024-11-29 RX ORDER — AMOXICILLIN 250 MG
1 CAPSULE ORAL 2 TIMES DAILY
Qty: 60 TABLET | Refills: 0 | Status: SHIPPED | OUTPATIENT
Start: 2024-11-29

## 2024-11-29 RX ORDER — AMOXICILLIN 250 MG
1 CAPSULE ORAL 2 TIMES DAILY
COMMUNITY
Start: 2024-11-29 | End: 2024-11-29

## 2024-11-29 NOTE — PROGRESS NOTES
[facility]    11/29/2024      Subjective:       Patient ID:  Isaura is a 54 y.o. female .  has a past medical history of Hypertension and hypothyriodism    History of Present Illness    CHIEF COMPLAINT:  Isaura presents today for a three-month follow-up and pre-operative evaluation for upcoming back surgery.    Current Concerns   CONSTIPATION:  She experiences difficulty with bowel movements despite taking a daily stool softener and consuming a high-fiber diet. Her thyroid levels are to be rechecked today due to constipation.    BACK SURGERY:  She has back surgery scheduled for January and acknowledges the need for a pre-operative appointment within 30 days of the surgery date.     WEIGHT MANAGEMENT:  She reports maintaining her weight at 194 lbs despite reducing sugar intake. She denies engaging in any exercise regimen due to ongoing back issues. She expresses interest in weight loss medication, specifically Contrave, after learning about her sister's successful 40-pound weight loss over a year using this medication with reported appetite suppression.    MEDICATIONS:  She is taking Hydrochlorothiazide for blood pressure management.      ROS:  Constitutional: -chills, -fever  Respiratory: -cough, -shortness of breath  Cardiovascular: -chest pain  Gastrointestinal: -abdominal pain, +constipation, -diarrhea, -nausea, -vomiting  Neurological: -dizziness, -lightheadedness, -headaches  Musculoskeletal: -joint pain         Exercise:  not active - due to back pain     Screenings:  Last mammogram- scheduled   Immunizations UTD  Last colonoscopy- Cologuard completed       Patient Active Problem List   Diagnosis    Essential hypertension    Snoring    Obesity (BMI 30-39.9)    Insomnia    Chronic bilateral low back pain with left-sided sciatica    Class 2 obesity with body mass index (BMI) of 37.0 to 37.9 in adult    Lumbar facet arthropathy    Spondylolisthesis at L4-L5 level     Sacroiliitis    Muscle strain of right forearm    Headache    Constipation    Gastroesophageal reflux disease    Family history of premature CAD    Degenerative disc disease, lumbar    Uterine leiomyoma    Abnormal uterine bleeding (AUB)    Hurthle cell carcinoma of thyroid    Postoperative hypothyroidism    Mass of right side of neck    STAS (obstructive sleep apnea)         Last HgbA1C:    Lab Results   Component Value Date    HGBA1C 5.0 02/26/2024    HGBA1C 5.0 01/18/2023    HGBA1C 5.2 01/19/2022         Last Lipid Panel:    Lab Results   Component Value Date    HDL 67 02/26/2024    HDL 63 01/18/2023    HDL 54 01/19/2022       Lab Results   Component Value Date    LDLCALC 93.4 02/26/2024    LDLCALC 123.8 01/18/2023    LDLCALC 120.2 01/19/2022       Lab Results   Component Value Date    TRIG 53 02/26/2024    TRIG 46 01/18/2023    TRIG 129 01/19/2022       Lab Results   Component Value Date    CHOLHDL 39.2 02/26/2024    CHOLHDL 32.1 01/18/2023    CHOLHDL 27.0 01/19/2022         Review of patient's allergies indicates:  No Known Allergies     Medication List with Changes/Refills   Current Medications    ASPIRIN (ECOTRIN) 81 MG EC TABLET    Take 81 mg by mouth once daily.    CELECOXIB (CELEBREX) 200 MG CAPSULE    Take 1 capsule (200 mg total) by mouth 2 (two) times daily.    CHOLECALCIFEROL, VITAMIN D3, (VITAMIN D3) 25 MCG (1,000 UNIT) CAPSULE    Take 2 capsules (2,000 Units total) by mouth once daily.    HYDROCHLOROTHIAZIDE (HYDRODIURIL) 25 MG TABLET    Take 1 tablet (25 mg total) by mouth once daily. for 90 doses    LEVOTHYROXINE (SYNTHROID) 150 MCG TABLET    Take 1 tab 6 days a week and 0.5 tabs on Sundays for total of 6.5 tabs weekly    LORATADINE (CLARITIN) 10 MG TABLET    Take 1 tablet (10 mg total) by mouth once daily.    LOSARTAN (COZAAR) 25 MG TABLET    Take 1 tablet (25 mg total) by mouth once daily.    METHOCARBAMOL (ROBAXIN) 750 MG TAB    Take 1 tablet (750 mg total) by mouth 3 (three) times daily as  "needed (muscle spasms).    PREGABALIN (LYRICA) 50 MG CAPSULE    Take 1 capsule (50 mg total) by mouth 3 (three) times daily.   Changed and/or Refilled Medications    Modified Medication Previous Medication    SENNA-DOCUSATE 8.6-50 MG (PERICOLACE) 8.6-50 MG PER TABLET senna-docusate 8.6-50 mg (PERICOLACE) 8.6-50 mg per tablet       Take 1 tablet by mouth 2 (two) times daily.    Take 1 tablet by mouth 2 (two) times daily.               Objective:      /68 (BP Location: Left arm, Patient Position: Sitting)   Pulse 66   Ht 5' 2" (1.575 m)   Wt 88.3 kg (194 lb 10.7 oz)   SpO2 98%   BMI 35.60 kg/m²   Estimated body mass index is 35.6 kg/m² as calculated from the following:    Height as of this encounter: 5' 2" (1.575 m).    Weight as of this encounter: 88.3 kg (194 lb 10.7 oz).    Physical Exam  Vitals reviewed.   Constitutional:       General: She is not in acute distress.     Appearance: Normal appearance. She is obese.   HENT:      Head: Normocephalic and atraumatic.      Mouth/Throat:      Mouth: Mucous membranes are moist.   Eyes:      Conjunctiva/sclera: Conjunctivae normal.   Cardiovascular:      Rate and Rhythm: Normal rate.   Pulmonary:      Effort: Pulmonary effort is normal. No respiratory distress.   Musculoskeletal:         General: Normal range of motion.      Cervical back: Normal range of motion.   Skin:     General: Skin is warm.   Neurological:      Mental Status: She is alert and oriented to person, place, and time.   Psychiatric:         Mood and Affect: Mood normal.         Behavior: Behavior normal.             Assessment and Plan:     Isaura was seen today for follow-up.    Diagnoses and all orders for this visit:    Constipation, unspecified constipation type  -     Discontinue: senna-docusate 8.6-50 mg (PERICOLACE) 8.6-50 mg per tablet; Take 1 tablet by mouth 2 (two) times daily.  -     senna-docusate 8.6-50 mg (PERICOLACE) 8.6-50 mg per tablet; Take 1 tablet by mouth 2 (two) times " daily.    Essential hypertension    Postoperative hypothyroidism  -     TSH; Future  -     T4, FREE; Future          Assessment & Plan    Reviewed lab work ordered by pharmacist, noted as normal  Assessed ongoing constipation, considering thyroid function as potential factor  Evaluated weight management options, noted Contrave not covered by patient's insurance  Considered timing of pre-operative labs in relation to upcoming back surgery  Will recheck thyroid levels today due to constipation concerns    CONSTIPATION:  - Discussed OTC constipation remedies like Miralax, Colace, and Dulcolax.  - Explained importance of fiber intake for easier stool passage.  - Isaura to increase fiber intake to help with constipation.  - Refilled stool softener.    BACK SURGERY AND WEIGHT MANAGEMENT:  - Reviewed potential benefits of increased activity levels for weight management post-surgery.  - Recommend considering increasing activity levels after back surgery for weight management.  - Isaura to contact insurance company to inquire about coverage for weight loss medications from provided list.  - Pre-operative labs ordered to be done within 30 days before surgery.  - Follow up for pre-operative appointment at least 30 days before back surgery date.  - Bring pre-operative paperwork and folder to the pre-op appointment.    HTN   - Continue with HCTZ and losartan  - CMP wnl     HYPOTHYROIDISM:  - Continued hydrochlorothiazide 25 mg.  - Thyroid level test ordered to be done today.    BREAST CANCER SCREENING:  - Follow up on December 10th for mammogram.         The patient was informed of the following statements     Emergency Care: Seek immediate medical attention in the emergency room if you experience any new or worsening symptoms, or if your current symptoms significantly increase in severity.  Patient Acknowledgment: Patient verbalizes understanding of the plan and agrees to proceed with the recommended care.     Follow Up:  FU  for pre op appointment          Other Orders Placed This Visit:  Orders Placed This Encounter   Procedures    TSH    T4, FREE         This note was generated with the assistance of ambient listening technology. Verbal consent was obtained by the patient and accompanying visitor(s) for the recording of patient appointment to facilitate this note. I attest to having reviewed and edited the generated note for accuracy, though some syntax or spelling errors may persist. Please contact the author of this note for any clarification.    Tammy Swain PA-C        I spent a total of 30 minutes on the day of the visit.This includes face to face time and non-face to face time preparing to see the patient (eg, review of tests), obtaining and/or reviewing separately obtained history, documenting clinical information in the electronic or other health record, independently interpreting results and communicating results to the patient/family/caregiver, or care coordinator.

## 2024-11-29 NOTE — PATIENT INSTRUCTIONS
Weight control  For Diet   - Try the DASH and/or the Mediterranean Diet  - DASH- https://www.nhlbi.nih.gov/health-topics/dash-eating-plan  - Mediterranean - https://www.healthline.com/nutrition/mediterranean-diet-meal-plan  - Try meeting with a Nutritionist  - https://www.FriendsEAT.[place]awkers/us/nutritionists-dietitians/la/new-[facility] .     For Food delivery program try  - Hungry [place]  - https://Touch Payments.Staff Ranker/  - Purple carrot - https://www.FoundValue/    Try a portion control plate  https://Integrity Applications.org/portion-control-plates-that-get-results/    For Exercise  Start with at least 20 min a day of moderate intensity exercise  This can be done at your gym or at home  Some home workout beginner plans include   - https://IBUonline.Tawkers/  - https://yogaZenputriBerkÃ¤na Wireless.com    For medications  - please look up the following medication to check for your insurance ozempic, trulicity, mounjaro, Wegovy, rybelsus, Contrave, Qsymia     For applications  - Myplate.com - https://www.myplate.gov/   - myfitnesspal - https://www.myfitnesspal.com/  - Noom - https://www.noom.com/  - Products and Programs  optavia

## 2024-12-02 DIAGNOSIS — K59.00 CONSTIPATION, UNSPECIFIED CONSTIPATION TYPE: ICD-10-CM

## 2024-12-02 NOTE — TELEPHONE ENCOUNTER
Refill Routing Note   Medication(s) are not appropriate for processing by [facility] for the following reason(s):        Outside of protocol  Non-participating provider    [facility] action(s):  Route               Appointments  past 12m or future 3m with PCP    Date Provider   Last Visit   11/29/2024 Tammy Swain PA-C   Next Visit   12/17/2024 Tammy Swain PA-C   ED visits in past 90 days: 0        Note composed:11:54 AM 12/02/2024

## 2024-12-03 ENCOUNTER — HOSPITAL ENCOUNTER (OUTPATIENT)
Dept: RADIOLOGY | Facility: HOSPITAL | Age: 54
Discharge: HOME | End: 2024-12-03
Attending: NEUROLOGICAL SURGERY
Payer: COMMERCIAL

## 2024-12-03 DIAGNOSIS — Z98.1 S/P LUMBAR FUSION: ICD-10-CM

## 2024-12-03 PROCEDURE — 72082 X-RAY EXAM ENTIRE SPI 2/3 VW: CPT | Mod: 26,,, | Performed by: RADIOLOGY

## 2024-12-03 PROCEDURE — 72082 X-RAY EXAM ENTIRE SPI 2/3 VW: CPT | Mod: TC,PN

## 2024-12-03 RX ORDER — AMOXICILLIN 250 MG
1 CAPSULE ORAL 2 TIMES DAILY
Qty: 60 TABLET | Refills: 0 | Status: SHIPPED | OUTPATIENT
Start: 2024-12-03

## 2024-12-10 ENCOUNTER — HOSPITAL ENCOUNTER (OUTPATIENT)
Dept: RADIOLOGY | Facility: HOSPITAL | Age: 54
Discharge: HOME | End: 2024-12-10
Attending: INTERNAL MEDICINE
Payer: COMMERCIAL

## 2024-12-10 DIAGNOSIS — Z12.31 ENCOUNTER FOR SCREENING MAMMOGRAM FOR BREAST CANCER: ICD-10-CM

## 2024-12-10 PROCEDURE — 77063 BREAST TOMOSYNTHESIS BI: CPT | Mod: 26,,, | Performed by: RADIOLOGY

## 2024-12-10 PROCEDURE — 77067 SCR MAMMO BI INCL CAD: CPT | Mod: 26,,, | Performed by: RADIOLOGY

## 2024-12-10 PROCEDURE — 77063 BREAST TOMOSYNTHESIS BI: CPT | Mod: TC

## 2024-12-16 ENCOUNTER — PATIENT OUTREACH (OUTPATIENT)
Dept: ADMINISTRATIVE | Facility: OTHER | Age: 54
End: 2024-12-16
Payer: COMMERCIAL

## 2024-12-16 NOTE — PROGRESS NOTES
CHW - Initial Contact    This Community Health Worker completed verified updated the Social Determinant of Health questionnaire with patient via telephone today.    Pt identified barriers of most importance are: Utility Assistance   Referrals to community agencies completed with patient/caregiver consent outside of [facility] include:  [facility]/ [facility] Utility Assistance  Referrals were put through [facility] - no: none  Support and Services: CHW provided pt with (3) resources for utility assistance  Other information discussed the patient needs / wants help with: Pt stated that she is in need of assistance with paying for her utilities.    Follow up required: Yes, confirm received resources  Follow-up Outreach - Due: 12/30/2024

## 2024-12-17 ENCOUNTER — OFFICE VISIT (OUTPATIENT)
Dept: FAMILY MEDICINE | Facility: CLINIC | Age: 54
End: 2024-12-17
Payer: COMMERCIAL

## 2024-12-17 ENCOUNTER — LAB VISIT (OUTPATIENT)
Dept: LAB | Facility: HOSPITAL | Age: 54
End: 2024-12-17
Payer: COMMERCIAL

## 2024-12-17 ENCOUNTER — HOSPITAL ENCOUNTER (OUTPATIENT)
Dept: RADIOLOGY | Facility: HOSPITAL | Age: 54
Discharge: HOME | End: 2024-12-17
Payer: COMMERCIAL

## 2024-12-17 VITALS
HEIGHT: 62 IN | BODY MASS INDEX: 35.57 KG/M2 | OXYGEN SATURATION: 98 % | SYSTOLIC BLOOD PRESSURE: 120 MMHG | WEIGHT: 193.31 LBS | HEART RATE: 68 BPM | DIASTOLIC BLOOD PRESSURE: 64 MMHG

## 2024-12-17 DIAGNOSIS — E89.0 POSTOPERATIVE HYPOTHYROIDISM: ICD-10-CM

## 2024-12-17 DIAGNOSIS — I10 ESSENTIAL HYPERTENSION: ICD-10-CM

## 2024-12-17 DIAGNOSIS — Z01.810 PRE-OPERATIVE CARDIOVASCULAR EXAMINATION: ICD-10-CM

## 2024-12-17 DIAGNOSIS — M43.16 SPONDYLOLISTHESIS AT L4-L5 LEVEL: ICD-10-CM

## 2024-12-17 DIAGNOSIS — Z01.810 PRE-OPERATIVE CARDIOVASCULAR EXAMINATION: Primary | ICD-10-CM

## 2024-12-17 LAB
ALBUMIN SERPL BCP-MCNC: 3.6 G/DL (ref 3.5–5.2)
ALP SERPL-CCNC: 60 U/L (ref 40–150)
ALT SERPL W/O P-5'-P-CCNC: 7 U/L (ref 10–44)
ANION GAP SERPL CALC-SCNC: 9 MMOL/L (ref 8–16)
APTT PPP: 27.4 SEC (ref 21–32)
AST SERPL-CCNC: 15 U/L (ref 10–40)
BASOPHILS # BLD AUTO: 0.05 K/UL (ref 0–0.2)
BASOPHILS NFR BLD: 0.6 % (ref 0–1.9)
BILIRUB SERPL-MCNC: 0.3 MG/DL (ref 0.1–1)
BUN SERPL-MCNC: 16 MG/DL (ref 6–20)
CALCIUM SERPL-MCNC: 9.1 MG/DL (ref 8.7–10.5)
CHLORIDE SERPL-SCNC: 104 MMOL/L (ref 95–110)
CO2 SERPL-SCNC: 27 MMOL/L (ref 23–29)
CREAT SERPL-MCNC: 0.7 MG/DL (ref 0.5–1.4)
DIFFERENTIAL METHOD BLD: NORMAL
EOSINOPHIL # BLD AUTO: 0.3 K/UL (ref 0–0.5)
EOSINOPHIL NFR BLD: 3 % (ref 0–8)
ERYTHROCYTE [DISTWIDTH] IN BLOOD BY AUTOMATED COUNT: 12.1 % (ref 11.5–14.5)
EST. GFR  (NO RACE VARIABLE): >60 ML/MIN/1.73 M^2
GLUCOSE SERPL-MCNC: 88 MG/DL (ref 70–110)
HCT VFR BLD AUTO: 41 % (ref 37–48.5)
HGB BLD-MCNC: 13.3 G/DL (ref 12–16)
IMM GRANULOCYTES # BLD AUTO: 0.01 K/UL (ref 0–0.04)
IMM GRANULOCYTES NFR BLD AUTO: 0.1 % (ref 0–0.5)
INR PPP: 1 (ref 0.8–1.2)
LYMPHOCYTES # BLD AUTO: 2.6 K/UL (ref 1–4.8)
LYMPHOCYTES NFR BLD: 29.3 % (ref 18–48)
MCH RBC QN AUTO: 29.4 PG (ref 27–31)
MCHC RBC AUTO-ENTMCNC: 32.4 G/DL (ref 32–36)
MCV RBC AUTO: 91 FL (ref 82–98)
MONOCYTES # BLD AUTO: 0.7 K/UL (ref 0.3–1)
MONOCYTES NFR BLD: 7.6 % (ref 4–15)
NEUTROPHILS # BLD AUTO: 5.2 K/UL (ref 1.8–7.7)
NEUTROPHILS NFR BLD: 59.4 % (ref 38–73)
NRBC BLD-RTO: 0 /100 WBC
PLATELET # BLD AUTO: 399 K/UL (ref 150–450)
PMV BLD AUTO: 11.3 FL (ref 9.2–12.9)
POTASSIUM SERPL-SCNC: 3.6 MMOL/L (ref 3.5–5.1)
PROT SERPL-MCNC: 7 G/DL (ref 6–8.4)
PROTHROMBIN TIME: 10.6 SEC (ref 9–12.5)
RBC # BLD AUTO: 4.52 M/UL (ref 4–5.4)
SODIUM SERPL-SCNC: 140 MMOL/L (ref 136–145)
WBC # BLD AUTO: 8.78 K/UL (ref 3.9–12.7)

## 2024-12-17 PROCEDURE — 80053 COMPREHEN METABOLIC PANEL: CPT

## 2024-12-17 PROCEDURE — 3008F BODY MASS INDEX DOCD: CPT | Mod: CPTII,S$GLB,,

## 2024-12-17 PROCEDURE — 85025 COMPLETE CBC W/AUTO DIFF WBC: CPT

## 2024-12-17 PROCEDURE — 85730 THROMBOPLASTIN TIME PARTIAL: CPT

## 2024-12-17 PROCEDURE — 3044F HG A1C LEVEL LT 7.0%: CPT | Mod: CPTII,S$GLB,,

## 2024-12-17 PROCEDURE — 3078F DIAST BP <80 MM HG: CPT | Mod: CPTII,S$GLB,,

## 2024-12-17 PROCEDURE — 71046 X-RAY EXAM CHEST 2 VIEWS: CPT | Mod: 26,,, | Performed by: RADIOLOGY

## 2024-12-17 PROCEDURE — 71046 X-RAY EXAM CHEST 2 VIEWS: CPT | Mod: TC,FY

## 2024-12-17 PROCEDURE — 4010F ACE/ARB THERAPY RXD/TAKEN: CPT | Mod: CPTII,S$GLB,,

## 2024-12-17 PROCEDURE — 1159F MED LIST DOCD IN RCRD: CPT | Mod: CPTII,S$GLB,,

## 2024-12-17 PROCEDURE — 99215 OFFICE O/P EST HI 40 MIN: CPT | Mod: S$GLB,,,

## 2024-12-17 PROCEDURE — 3074F SYST BP LT 130 MM HG: CPT | Mod: CPTII,S$GLB,,

## 2024-12-17 PROCEDURE — 36415 COLL VENOUS BLD VENIPUNCTURE: CPT | Mod: PO

## 2024-12-17 PROCEDURE — 85610 PROTHROMBIN TIME: CPT

## 2024-12-17 PROCEDURE — 99999 PR PBB SHADOW E&M-EST. PATIENT-LVL IV: CPT | Mod: PBBFAC,,,

## 2024-12-17 PROCEDURE — 1160F RVW MEDS BY RX/DR IN RCRD: CPT | Mod: CPTII,S$GLB,,

## 2024-12-17 NOTE — PROGRESS NOTES
Progress Note  [facility]    Subjective:     Isaura Mancini is a 54 y.o. year old female with current diagnoses of HTN, postop hypothyroidism, GERD, spondylolisthesis, STAS  who presents to clinic for pre-op clearance.     Pt having *** done on . Aspirin taking anticoagulants. denies smoker. denies problems with anesthesia in the past. *** benzo use. Denies opioid use. Has history of HTN, well controlled on current regimen. BP runs  at home. denies history of T2DM, well controlled on current regimen. Last Hgb A1C 5.0.      Last 5 Patient Entered Readings                Current 30 Day Average: 130/83  Recent Readings 2024   SBP (mmHg) 111 135 130 141 129   DBP (mmHg) 80 84 81 87 82   Pulse 64 63 57 64 59                 Patient Active Problem List    Diagnosis Date Noted    STAS (obstructive sleep apnea) 10/03/2024    Mass of right side of neck 2024    Hurthle cell carcinoma of thyroid 2023    Postoperative hypothyroidism 2023    Uterine leiomyoma 10/07/2021    Abnormal uterine bleeding (AUB) 10/07/2021    Degenerative disc disease, lumbar 2020    Family history of premature CAD 2019    Gastroesophageal reflux disease 2019    Constipation 2018    Headache 2017    Muscle strain of right forearm 2017    Lumbar facet arthropathy 2016    Spondylolisthesis at L4-L5 level 2016    Sacroiliitis 2016    Insomnia 2016    Chronic bilateral low back pain with left-sided sciatica 2016    Class 2 obesity with body mass index (BMI) of 37.0 to 37.9 in adult 2016    Snoring 2014    Obesity (BMI 30-39.9) 2014    Essential hypertension 2013        Review of patient's allergies indicates:  No Known Allergies     Past Medical History:   Diagnosis Date    Hypertension       Past Surgical History:   Procedure Laterality Date     SECTION    "   NECK MASS EXCISION N/A 4/9/2024    Procedure: EXCISION, MASS, NECK;  Surgeon: Fabrice Adams MD;  Location: [facility] OR;  Service: General;  Laterality: N/A;  Prone    THYROIDECTOMY N/A 10/28/2022    Procedure: THYROIDECTOMY, total;  Surgeon: Isaura Hayes MD;  [facility];  Service: General;  Laterality: N/A;    TUBAL LIGATION        Family History   Problem Relation Name Age of Onset    Heart disease Father          age 70 , started 30s    Hypertension Father      Breast cancer Paternal Aunt  60    Diabetes Neg Hx      Colon cancer Neg Hx      Ovarian cancer Neg Hx        Social History     Tobacco Use    Smoking status: Never     Passive exposure: Never    Smokeless tobacco: Never   Substance Use Topics    Alcohol use: No        Objective:     Vitals:    12/17/24 0942   BP: 120/64   BP Location: Left arm   Patient Position: Sitting   Pulse: 68   SpO2: 98%   Weight: 87.7 kg (193 lb 5.5 oz)   Height: 5' 2" (1.575 m)     BMI: ***    Gen: No apparent distress, well nourished and developed, appears stated age  CV: RRR, S1 and S2 present, no LE edema  Resp: CTAB, normal respiratory effort  ***    Assessment/Plan:     Isaura Mancini is a 54 y.o. year old female who presents to clinic for ***    1. Pre-operative cardiovascular examination (Primary)  [email or web address]    Pre-op examination   Patient does not present with active unstable cardiac conditions such as ACS, unstable arrhythmia or ADHF  No personal  history of CAD/MI, CVA/TIA, DM requiring insulin , or Cr>2    Patient is able to walk at least one flight of stairs  without symptoms of chest pain   RCRI score is 0   Patient is low risk patient going for low risk surgery and would not  recommend further evaluation prior to surgery    - CBC Auto Differential; Future  - Comprehensive metabolic panel; Future  - APTT; Future  - Protime-INR; Future  - Urinalysis, Reflex to Urine Culture Urine, " Clean Catch; Future    1. Pre-operative cardiovascular examination (Primary)  Medically optimized for surgery at this time.   - CBC Auto Differential; Future  - Comprehensive metabolic panel; Future  - APTT; Future  - Protime-INR; Future  - Urinalysis, Reflex to Urine Culture Urine, Clean Catch; Future  - X-Ray Chest PA And Lateral; Future    2. Essential hypertension  ***    3. Postoperative hypothyroidism  ***    4. Spondylolisthesis at L4-L5 level  ***      Follow-up:***    I spent a total of *** minutes on the day of the visit.This includes face to face time and non-face to face time preparing to see the patient (eg, review of tests), obtaining and/or reviewing separately obtained history, documenting clinical information in the electronic or other health record, independently interpreting results and communicating results to the patient/family/caregiver, or care coordinator.      @[email or web address]@

## 2024-12-17 NOTE — PROGRESS NOTES
Progress Note  [facility]    Subjective:     Isaura Mancini is a 54 y.o. year old female with current diagnoses of HTN, postop hypothyroidism, GERD, spondylolisthesis, STAS  who presents to clinic for pre-op clearance.     Pt having spinal fusion done on 1/17 performed by Dr. Keen. She does take a daily aspirin.  Denies hx of smoking. Denies problems with anesthesia in the past. Denies  benzo use. Denies opioid use. Has history of HTN, well controlled on current regimen. BP runs avg 130/83  at home. Denies history of T2DM. Last Hgb A1C 5.0.      Last 5 Patient Entered Readings                Current 30 Day Average: 130/83  Recent Readings 12/17/2024 12/8/2024 11/25/2024 11/25/2024 11/21/2024   SBP (mmHg) 111 135 130 141 129   DBP (mmHg) 80 84 81 87 82   Pulse 64 63 57 64 59                 Patient Active Problem List    Diagnosis Date Noted    STAS (obstructive sleep apnea) 10/03/2024    Mass of right side of neck 04/09/2024    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    Postoperative hypothyroidism 05/25/2023    Uterine leiomyoma 10/07/2021    Abnormal uterine bleeding (AUB) 10/07/2021    Degenerative disc disease, lumbar 09/22/2020    Family history of premature CAD 11/07/2019    Gastroesophageal reflux disease 09/11/2019    Constipation 08/13/2018    Headache 07/03/2017    Muscle strain of right forearm 04/04/2017    Lumbar facet arthropathy 12/20/2016    Spondylolisthesis at L4-L5 level 12/20/2016    Sacroiliitis 12/20/2016    Insomnia 11/28/2016    Chronic bilateral low back pain with left-sided sciatica 11/28/2016    Class 2 obesity with body mass index (BMI) of 37.0 to 37.9 in adult 11/28/2016    Snoring 08/20/2014    Obesity (BMI 30-39.9) 08/20/2014    Essential hypertension 11/12/2013        Review of patient's allergies indicates:  No Known Allergies     Past Medical History:   Diagnosis Date    Hypertension       Past Surgical History:   Procedure Laterality Date     " SECTION      NECK MASS EXCISION N/A 2024    Procedure: EXCISION, MASS, NECK;  Surgeon: Fabrice Adams MD;  Location: [facility] OR;  Service: General;  Laterality: N/A;  Prone    THYROIDECTOMY N/A 10/28/2022    Procedure: THYROIDECTOMY, total;  Surgeon: Isaura Hayes MD;  Location: [facility] OR [address] [place] [address];  Service: General;  Laterality: N/A;    TUBAL LIGATION        Family History   Problem Relation Name Age of Onset    Heart disease Father          age 70 , started 30s    Hypertension Father      Breast cancer Paternal Aunt  60    Diabetes Neg Hx      Colon cancer Neg Hx      Ovarian cancer Neg Hx        Social History     Tobacco Use    Smoking status: Never     Passive exposure: Never    Smokeless tobacco: Never   Substance Use Topics    Alcohol use: No        Objective:     Vitals:    24 0942   BP: 120/64   BP Location: Left arm   Patient Position: Sitting   Pulse: 68   SpO2: 98%   Weight: 87.7 kg (193 lb 5.5 oz)   Height: 5' 2" (1.575 m)     BMI: 35.36    Physical Exam  Vitals reviewed.   Constitutional:       General: She is not in acute distress.     Appearance: She is obese.   HENT:      Head: Normocephalic and atraumatic.   Eyes:      Conjunctiva/sclera: Conjunctivae normal.   Cardiovascular:      Rate and Rhythm: Normal rate and regular rhythm.      Pulses: Normal pulses.   Pulmonary:      Effort: Pulmonary effort is normal. No respiratory distress.   Abdominal:      General: Bowel sounds are normal. There is no distension.      Palpations: Abdomen is soft.      Tenderness: There is no abdominal tenderness.   Musculoskeletal:         General: Normal range of motion.      Cervical back: Normal range of motion.   Skin:     General: Skin is warm.   Neurological:      Mental Status: She is alert and oriented to person, place, and time.   Psychiatric:         Mood and Affect: Mood normal.         Behavior: Behavior normal.           Assessment/Plan:     Isaura Mancini is a 54 y.o. year old " female who presents to clinic for pre op evaluation     1. Pre-operative cardiovascular examination (Primary)  [email or web address]    Pre-op examination   Patient does not present with active unstable cardiac conditions such as ACS, unstable arrhythmia or ADHF  No personal  history of CAD/MI, CVA/TIA, DM requiring insulin , or Cr>2    Patient is able to walk at least one flight of stairs  without symptoms of chest pain   RCRI score is 0   Patient is low risk patient going for low risk surgery and would not  recommend further evaluation prior to surgery      - CBC Auto Differential; Future  - Comprehensive metabolic panel; Future  - APTT; Future  - Protime-INR; Future  - Urinalysis, Reflex to Urine Culture Urine, Clean Catch; Future    1. Pre-operative cardiovascular examination (Primary)  Medically optimized for surgery at this time   Revised Cardiac Risk Index for Pre-Operative Risk from [employer]  on 12/18/2024  ** All calculations should be rechecked by clinician prior to use **    RESULT SUMMARY:  0 points  RCRI Score    3.9 %  Risk of major cardiac event      INPUTS:  High-risk surgery --> 0 = No  History of ischemic heart disease --> 0 = No  History of congestive heart failure --> 0 = No  History of cerebrovascular disease --> 0 = No  Pre-operative treatment with insulin --> 0 = No  Pre-operative creatinine >2 mg/dL / 176.8 µmol/L --> 0 = No    NSQIP: Below average risk for all adverse outcomes with the exception of average risk of surgical site    Advised patient to hold ASA for 7 days prior to sx   EKG: NSR   CXR: No acute cardiopulmonary abnormality.   - CBC Auto Differential; Future  - Comprehensive metabolic panel; Future  - APTT; Future  - Protime-INR; Future  - Urinalysis, Reflex to Urine Culture Urine, Clean Catch; Future  - X-Ray Chest PA And Lateral; Future    2. Essential hypertension  Chronic; stable on current treatment plan; follow up with PCP  -  continue with losartan and HCTZ     3. Postoperative hypothyroidism  Chronic; stable on current treatment plan; follow up with PCP  -continue with synthriod    4. Spondylolisthesis at L4-L5 level  Continue with current management       Follow-up:RTC in 3 months for routine care    I spent a total of 40 minutes on the day of the visit.This includes face to face time and non-face to face time preparing to see the patient (eg, review of tests), obtaining and/or reviewing separately obtained history, documenting clinical information in the electronic or other health record, independently interpreting results and communicating results to the patient/family/caregiver, or care coordinator.      Tammy Swain PA-C  [facility]

## 2024-12-19 LAB
OHS QRS DURATION: 88 MS
OHS QTC CALCULATION: 429 MS

## 2024-12-26 ENCOUNTER — PATIENT OUTREACH (OUTPATIENT)
Dept: ADMINISTRATIVE | Facility: OTHER | Age: 54
End: 2024-12-26
Payer: COMMERCIAL

## 2024-12-26 NOTE — PROGRESS NOTES
CHW - Case Closure    This Community Health Worker spoke to patient via telephone today.   Pt/Caregiver reported: Pt received resources and denied needing any other assistance at this time. Case Closed  Pt/Caregiver denied any additional needs at this time and agrees with episode closure at this time.  Provided patient with Community Health Worker's contact information and encouraged him/her to contact this Community Health Worker if additional needs arise.

## 2025-01-01 DIAGNOSIS — E89.0 POSTOPERATIVE HYPOTHYROIDISM: ICD-10-CM

## 2025-01-02 RX ORDER — LEVOTHYROXINE SODIUM 150 UG/1
TABLET ORAL
Qty: 90 TABLET | Refills: 3 | Status: SHIPPED | OUTPATIENT
Start: 2025-01-02

## 2025-01-08 ENCOUNTER — PATIENT MESSAGE (OUTPATIENT)
Dept: PREADMISSION TESTING | Facility: HOSPITAL | Age: 55
End: 2025-01-08

## 2025-01-08 ENCOUNTER — HOSPITAL ENCOUNTER (OUTPATIENT)
Dept: PREADMISSION TESTING | Facility: HOSPITAL | Age: 55
Discharge: HOME | End: 2025-01-08
Attending: NURSE PRACTITIONER
Payer: COMMERCIAL

## 2025-01-08 NOTE — DISCHARGE INSTRUCTIONS

## 2025-01-10 ENCOUNTER — HOSPITAL ENCOUNTER (OUTPATIENT)
Dept: RADIOLOGY | Facility: HOSPITAL | Age: 55
Discharge: HOME | End: 2025-01-10
Attending: NEUROLOGICAL SURGERY
Payer: COMMERCIAL

## 2025-01-10 DIAGNOSIS — M48.07 SPINAL STENOSIS, LUMBOSACRAL REGION: ICD-10-CM

## 2025-01-10 PROCEDURE — 72148 MRI LUMBAR SPINE W/O DYE: CPT | Mod: TC

## 2025-01-10 PROCEDURE — 72148 MRI LUMBAR SPINE W/O DYE: CPT | Mod: 26,,, | Performed by: RADIOLOGY

## 2025-01-29 ENCOUNTER — TELEPHONE (OUTPATIENT)
Dept: NEUROSURGERY | Facility: CLINIC | Age: 55
End: 2025-01-29
Payer: COMMERCIAL

## 2025-01-29 ENCOUNTER — TELEPHONE (OUTPATIENT)
Dept: ORTHOPEDICS | Facility: CLINIC | Age: 55
End: 2025-01-29
Payer: COMMERCIAL

## 2025-01-29 DIAGNOSIS — M51.369 DEGENERATION OF INTERVERTEBRAL DISC OF LUMBAR REGION, UNSPECIFIED WHETHER PAIN PRESENT: Primary | ICD-10-CM

## 2025-01-29 NOTE — TELEPHONE ENCOUNTER
"Spoke to patient on: 1/29/25    Patient works at [employer]  Are you currently working? : Yes - currently working  Are you on workers comp?No   Are you involved in any ligation at this time? No   Do you have HSA insurance? No                Have support to help with care and appointments: Yes   Travel Caregiver:         What is your chief complaint? lower back-   first time was suppose to have surgery in 2020 but covic    How long has it been going on? 2018- comes and goes    Was seeing Dr. Hdz for pain management    Have you had pain like this before?Yes     Have you sought treatment for this condition in the past?Yes   If yes, what treatments did you then?  If yes, when did those stop working?    Where is the pain the worst?     Does the pain radiate? down right leg- hard to tell the whole leg hurts down to ankle    Where else do you hurt?     Is the pain constant or does it go away to a 0/10 at times even if the pain     What makes the pain worse? intense walking/    What makes the pain better or decreases the pain (which position is least uncomfortable)?  takes pain med, rest, laying down    Any other symptoms? right leg numbness/tingling/pain    Any bowel or bladder incontinence? more constipated  Or changes?    What treatments have you tried for your current pain, and did they help?   Physical therapy? yes- 2 years ago-[place] [facility] [place]   Chiropractor?   Injections? What kinds - none   Prior back surgery? no   Medication? robaxin      BMI:   Ht:5'2"  Weight: 188-190    AIC: not diabetic- last one 5 in Feb 2024    Social Hx:    Tobacco Use: Low Risk  (1/29/2025)    Patient History     Smoking Tobacco Use: Never     Smokeless Tobacco Use: Never     Passive Exposure: Never                    Allergies:   Review of patient's allergies indicates:  No Known Allergies      Medication list:   Current Outpatient Medications on File Prior to Visit   Medication Sig Dispense Refill    aspirin " (ECOTRIN) 81 MG EC tablet Take 81 mg by mouth once daily.      celecoxib (CELEBREX) 200 MG capsule Take 1 capsule (200 mg total) by mouth 2 (two) times daily. 60 capsule 3    cholecalciferol, vitamin D3, (VITAMIN D3) 25 mcg (1,000 unit) capsule Take 2 capsules (2,000 Units total) by mouth once daily.  0    hydroCHLOROthiazide (HYDRODIURIL) 25 MG tablet Take 1 tablet (25 mg total) by mouth once daily. 90 tablet 3    levothyroxine (SYNTHROID) 150 MCG tablet Take 1 tab 6 days a week and 0.5 tabs on Sundays for total of 6.5 tabs weekly 90 tablet 3    loratadine (CLARITIN) 10 mg tablet Take 1 tablet (10 mg total) by mouth once daily. 90 tablet 3    losartan (COZAAR) 25 MG tablet Take 1 tablet (25 mg total) by mouth once daily. 90 tablet 3    methocarbamoL (ROBAXIN) 750 MG Tab Take 1 tablet (750 mg total) by mouth 3 (three) times daily as needed (muscle spasms). 90 tablet 2    pregabalin (LYRICA) 50 MG capsule Take 1 capsule (50 mg total) by mouth 3 (three) times daily. 90 capsule 3    senna-docusate 8.6-50 mg (PERICOLACE) 8.6-50 mg per tablet Take 1 tablet by mouth 2 (two) times daily. 60 tablet 0     No current facility-administered medications on file prior to visit.         Health Hx:  Past Medical History:   Diagnosis Date    Hypertension          Surgical Hx:   Past Surgical History:   Procedure Laterality Date     SECTION      NECK MASS EXCISION N/A 2024    Procedure: EXCISION, MASS, NECK;  Surgeon: Fabrice Adams MD;  [facility]: [facility] OR;  Service: General;  Laterality: N/A;  Prone    THYROIDECTOMY N/A 10/28/2022    Procedure: THYROIDECTOMY, total;  Surgeon: Isaura Hayes MD;  Location: [address];  Service: General;  Laterality: N/A;    TUBAL LIGATION           Can you take one flight of stairs: yes    Ever been diagnosed with sleep apnea Yes    CPAPYes wear it everynight    Images Received:  MRI Yes    Type: Lumbar MRI   Xray No   Type:  EMG  No       Surgery recommended: Yes saw   Osmani and she was scheduled for a L4-5 OLIF on 1/17/25    PCP Information:   Ashok Braden III, MD      Spoke with pt, reviewed history, discussed timeframe and expectation regarding the [facility], discussed logging in to the [facility] portal and to expect link to complete online seminar, confirmed PCP. Explained I would be sending imaging, chart review and spine screen assessment to spine surgeon, then to Dr. Esqueda to see if any additional orders are needed other than CXR and non bundled labs and I would reach back out when this information if obtained.  Explained that initial trip is an evaluation visit and if surgery needed and determined a surgery date would be determined with surgeon and patient. From there, I would send preop orders to PCP for them to complete at home.       Discussed surgery requiremernts of BMi less than 40, AIC less than 8 and smoking cessation 6 weeks prior to surgery with a required cotine test 2 weeks prior to surgery. Patient verbalized understanding.      Patient verbalized understanding of the above and instructed to reach out with any questions or concerns. Direct phone # given and explained the use of patient portal communication.    Discussed initial evaluation darte of 2/17 and 2/18- patient lives locally so does NOT need hotel room.    Will send POC to [facility] for authorization and work on appointments.    Luma Jung, RN, CMSRN  RN Navigator  [facility]  Ph [phone]  Fax [phone]

## 2025-02-12 ENCOUNTER — OFFICE VISIT (OUTPATIENT)
Dept: SLEEP MEDICINE | Facility: CLINIC | Age: 55
End: 2025-02-12
Attending: PSYCHIATRY & NEUROLOGY
Payer: COMMERCIAL

## 2025-02-12 VITALS
DIASTOLIC BLOOD PRESSURE: 70 MMHG | WEIGHT: 192.38 LBS | SYSTOLIC BLOOD PRESSURE: 131 MMHG | HEART RATE: 65 BPM | BODY MASS INDEX: 35.19 KG/M2

## 2025-02-12 DIAGNOSIS — G47.33 OSA (OBSTRUCTIVE SLEEP APNEA): Primary | ICD-10-CM

## 2025-02-12 PROCEDURE — 99214 OFFICE O/P EST MOD 30 MIN: CPT | Mod: S$GLB,COE,, | Performed by: PSYCHIATRY & NEUROLOGY

## 2025-02-12 PROCEDURE — 99999 PR PBB SHADOW E&M-EST. PATIENT-LVL III: CPT | Mod: PBBFAC,COE,, | Performed by: PSYCHIATRY & NEUROLOGY

## 2025-02-12 PROCEDURE — 3078F DIAST BP <80 MM HG: CPT | Mod: CPTII,S$GLB,COE, | Performed by: PSYCHIATRY & NEUROLOGY

## 2025-02-12 PROCEDURE — 3008F BODY MASS INDEX DOCD: CPT | Mod: CPTII,S$GLB,COE, | Performed by: PSYCHIATRY & NEUROLOGY

## 2025-02-12 PROCEDURE — 3075F SYST BP GE 130 - 139MM HG: CPT | Mod: CPTII,S$GLB,COE, | Performed by: PSYCHIATRY & NEUROLOGY

## 2025-02-12 NOTE — PROGRESS NOTES
2025     9:05 AM 10/8/2024     1:56 PM   EPWORTH SLEEPINESS SCALE TOTAL SCORE    Total score 8  10        Patient-reported       Isaura Mancini is a 54 y.o. female seen today for STAS  follow up. Last seen on 10/9/2024.    The patient reports improved sleep continuity and daytime sleepiness on PAP. ESS today is .  Denies break through snoring.  No  dry mouth.  No aerophagia or air hunger. Denies occasional mask leaks and discomfort. Using a airfit N30i  mask       DME: [facility] 10/24  Resemd Isaura Wang 2025 - 2025 Patient ID: [id number] : 1970 Age: 54 years [facility] [address], [address] Compliance Report Compliance Payor Standard Usage 2025 - 2025 Usage days 26/30 days (87%) >= 4 hours 16 days (53%) < 4 hours 10 days (33%) Usage hours 121 hours 38 minutes Average usage (total days) 4 hours 3 minutes Average usage (days used) 4 hours 41 minutes Median usage (days used) 4 hours 51 minutes Total used hours (value since last reset - 2025) 451 hours AirSense 11 AutoSet Serial number [id number] Mode AutoSet Min Pressure 5 cmH2O Max Pressure 20 cmH2O EPR Fulltime EPR level 2 Response Standard Therapy Pressure - cmH2O Median: 5.6 95th percentile: 7.5 Maximum: 8.5 Leaks - L/min Median: 0.3 95th percentile: 13.1 Maximum: 29.6 Events per hour AI: 0.6 HI: 0.0 AHI: 0.6 Apnea Index Central: 0.0 Obstructive: 0.4 Unknown: 0.1 RERA Index 0.0 Cheyne-Churchill respiration (average duration per night) 0 minutes (0%)    INTERVAL HISTORY:    10/9/2024:         Isaura Mancini is a 54 y.o. female is here to be evaluated for a sleep disorder; referred by Ashok Braden III, MD.    The patient reports snoring, gasping for air, choking , excessive daytime sleepiness, and excessive daytime fatigue since several years ago.  Recent HST here revealed mild to moderate REM predominant and positional STAS   Isaura Mancini denied   witnessed apneas.    The patient feels rested upon awakening. Takes naps.     The patient  reports  morning headaches and denies  dry mouth on awakening.   Isaura Mancini denies  nasal congestion.    The patient never had tonsillectomy, adenoidectomy or UPPP    The patient  reports interrutped sleep due to nocturia; no difficulty returning to sleep.  Isaura Mancini  denies symptoms concerning for parasomnia except for occasional somniloquy.  The patient  denies auxiliary symptoms of narcolepsy including sleep onset paralysis, hypnagogic hallucinations, sleep attacks and cataplexy.    Isaura Mancini denied symptoms concerning for RLS; nocturnal leg movements have not been noticed.   The patient does not experience sleep related leg cramps.       Medications pertinent to sleep  disorders taken currently: -  Previous  medications taken  for sleep disorders:  -    Sleep studies  HST 9/30: AHI 8, RDI 17, SaO2 min -71%        2/5/2025     9:05 AM 10/8/2024     1:56 PM   EPWORTH SLEEPINESS SCALE TOTAL SCORE    Total score 8  10        Patient-reported             10/8/2024     1:56 PM   EPWORTH SLEEPINESS SCALE   Sitting and reading 1    Watching TV 2    Sitting, inactive in a public place (e.g. a theatre or a meeting) 1    As a passenger in a car for an hour without a break 2    Lying down to rest in the afternoon when circumstances permit 1    Sitting and talking to someone 1    Sitting quietly after a lunch without alcohol 2    In a car, while stopped for a few minutes in traffic 0    Total score 10        Patient-reported           2/29/2024   PHQ-9 Depression Patient Health Questionnaire   Over the last two weeks how often have you been bothered by little interest or pleasure in doing things 0   Over the last two weeks how often have you been bothered by feeling down, depressed or hopeless 0              10/8/2024     1:56 PM   EPWORTH SLEEPINESS SCALE   Sitting and reading 1    Watching TV 2    Sitting,  inactive in a public place (e.g. a theatre or a meeting) 1    As a passenger in a car for an hour without a break 2    Lying down to rest in the afternoon when circumstances permit 1    Sitting and talking to someone 1    Sitting quietly after a lunch without alcohol 2    In a car, while stopped for a few minutes in traffic 0    Total score 10        Patient-reported         10/8/2024     2:01 PM   Sleep Clinic New Patient   What time do you go to bed on a week day? (Give a range) 8-9pm   What time do you go to bed on a day off? (Give a range) 10 pm   How long does it take you to fall asleep? (Give a range) 30 mins   On average, how many times per night do you wake up? 2-3 times   How long does it take you to fall back into sleep? (Give a range) 15 mins   What time do you wake up to start your day on a week day? (Give a range) 2:30am   What time do you wake up to start your day on a day off? (Give a range) 4:00 am   What time do you get out of bed? (Give a range) 30 mins after i woke up   On average, how many hours do you sleep? 5-6 hours   On average, how many naps do you take per day? 1   Rate your sleep quality from 0 to 5 (0-poor, 5-great). 3   Have you experienced:  Weight gain?   How much weight have you lost or gained (in lbs.) in the last year? 35 lbs   On average, how many times per night do you go to the bathroom?  2-3 times   Have you ever had a sleep study/CPAP machine/surgery for sleep apnea? Yes   Have you ever had a CPAP machine for sleep apnea? No   Have you ever had surgery for sleep apnea? No           10/8/2024     2:01 PM   Sleep Clinic ROS    Difficulty breathing through the nose?  No   Sore throat or dry mouth in the morning? Sometimes   Irregular or very fast heart beat?  Sometimes   Shortness of breath?  No   Acid reflux? Yes   Body aches and pains?  Sometimes   Morning headaches? Sometimes   Dizziness? Sometimes   Mood changes?  Sometimes   Do you exercise?  No   Do you feel like moving your  legs a lot?  Yes       DME:         PAST MEDICAL HISTORY:    Active Ambulatory Problems     Diagnosis Date Noted    Essential hypertension 2013    Snoring 2014    Obesity (BMI 30-39.9) 2014    Insomnia 2016    Chronic bilateral low back pain with left-sided sciatica 2016    Class 2 obesity with body mass index (BMI) of 37.0 to 37.9 in adult 2016    Lumbar facet arthropathy 2016    Spondylolisthesis at L4-L5 level 2016    Sacroiliitis 2016    Muscle strain of right forearm 2017    Headache 2017    Constipation 2018    Gastroesophageal reflux disease 2019    Family history of premature CAD 2019    Degenerative disc disease, lumbar 2020    Uterine leiomyoma 10/07/2021    Abnormal uterine bleeding (AUB) 10/07/2021    Hurthle cell carcinoma of thyroid 2023    Postoperative hypothyroidism 2023    Mass of right side of neck 2024    STAS (obstructive sleep apnea) 10/03/2024     Resolved Ambulatory Problems     Diagnosis Date Noted    Thrombocytosis 2013    Preventative health care 2015    Routine general medical examination at a health care facility 2015    Hypokalemia 2016    Palpitations 2017    Hematuria 2018    Urinary tract infection without hematuria 2018    Chronic low back pain with left-sided sciatica 10/11/2018    Impaired gait and mobility 10/11/2018    Chest pain 2019    Hyperthyroidism 2022    Thyroid nodule 2022    Graves disease 10/28/2022     Past Medical History:   Diagnosis Date    Hypertension                 PAST SURGICAL HISTORY:    Past Surgical History:   Procedure Laterality Date     SECTION      NECK MASS EXCISION N/A 2024    Procedure: EXCISION, MASS, NECK;  Surgeon: Fabrice Adams MD;  Location: [facility];  Service: General;  Laterality: N/A;  Prone    THYROIDECTOMY N/A 10/28/2022    Procedure: THYROIDECTOMY, total;   Surgeon: Isaura Hayes MD;  Location: [facility] OR [address] [place] [address];  Service: General;  Laterality: N/A;    TUBAL LIGATION           FAMILY HISTORY:                Family History   Problem Relation Name Age of Onset    Heart disease Father          age 70 , started 30s    Hypertension Father      Breast cancer Paternal Aunt  60    Diabetes Neg Hx      Colon cancer Neg Hx      Ovarian cancer Neg Hx         SOCIAL HISTORY:          Tobacco:   Social History     Tobacco Use   Smoking Status Never    Passive exposure: Never   Smokeless Tobacco Never       alcohol use:    Social History     Substance and Sexual Activity   Alcohol Use No                   ALLERGIES:  Review of patient's allergies indicates:  No Known Allergies    CURRENT MEDICATIONS:    Current Outpatient Medications   Medication Sig Dispense Refill    aspirin (ECOTRIN) 81 MG EC tablet Take 81 mg by mouth once daily.      celecoxib (CELEBREX) 200 MG capsule Take 1 capsule (200 mg total) by mouth 2 (two) times daily. 60 capsule 3    cholecalciferol, vitamin D3, (VITAMIN D3) 25 mcg (1,000 unit) capsule Take 2 capsules (2,000 Units total) by mouth once daily.  0    hydroCHLOROthiazide (HYDRODIURIL) 25 MG tablet Take 1 tablet (25 mg total) by mouth once daily. 90 tablet 3    levothyroxine (SYNTHROID) 150 MCG tablet Take 1 tab 6 days a week and 0.5 tabs on Sundays for total of 6.5 tabs weekly 90 tablet 3    loratadine (CLARITIN) 10 mg tablet Take 1 tablet (10 mg total) by mouth once daily. 90 tablet 3    losartan (COZAAR) 25 MG tablet Take 1 tablet (25 mg total) by mouth once daily. 90 tablet 3    methocarbamoL (ROBAXIN) 750 MG Tab Take 1 tablet (750 mg total) by mouth 3 (three) times daily as needed (muscle spasms). 90 tablet 2    pregabalin (LYRICA) 50 MG capsule Take 1 capsule (50 mg total) by mouth 3 (three) times daily. 90 capsule 3    senna-docusate 8.6-50 mg (PERICOLACE) 8.6-50 mg per tablet Take 1 tablet by mouth 2 (two) times daily. 60 tablet 0     No current  facility-administered medications for this visit.                        PHYSICAL EXAM:  /70 (BP Location: Left arm, Patient Position: Sitting)   Pulse 65   Wt 87.3 kg (192 lb 6.4 oz)   BMI 35.19 kg/m²   GENERAL: Normal development, well groomed.  HEENT:   HEENT:  Conjunctivae are non-erythematous; Pupils equal, round, and reactive to light; Nose is symmetrical; Nasal mucosa is pink and moist; Septum is midline; Inferior turbinates are normal; Nasal airflow is normal; Posterior pharynx is pink; Modified Mallampati:II-III; Posterior palate is low; Tonsils not visualized; Uvula is normal and pink;Tongue is normal; Dentition is fair; No TMJ tenderness; Jaw opening and protrusion without click and without discomfort.  NECK: Supple. Neck circumference is 15 inches. No thyromegaly. No palpable nodes.     SKIN: On face and neck: No abrasions, no rashes, no lesions.  No subcutaneous nodules are palpable.  RESPIRATORY: Chest is clear to auscultation.  Normal chest expansion and non-labored breathing at rest.  CARDIOVASCULAR: Normal S1, S2.  No murmurs, gallops or rubs. No carotid bruits bilaterally.  No edema. No clubbing. No cyanosis.    NEURO: Oriented to time, place and person. Normal attention span and concentration. Gait normal.    PSYCH: Affect is full. Mood is normal  MUSCULOSKELETAL: Moves 4 extremities. Gait normal.           ASSESSMENT:    1. STAS (obstructive sleep apnea). The patient symptomatically has  snoring, choking , excessive daytime sleepiness, and excessive daytime fatigue  with exam findings of crowded airway. The patient has medical co-morbidities of hypertension  which can be worsened by STAS. This warrants treatment.  Benefiting from CPAP use in terms of sleep continuity and daytime sleepiness.         PLAN:  Continue APAP at the same settings with N30 I mask (will reorder supplies)    Education: During our discussion today, we talked about the etiology of obstructive sleep apnea as well as  the potential ramifications of untreated sleep apnea, which could include daytime sleepiness, hypertension, heart disease and/or stroke.      We discussed potential treatment options, which could include weight loss, body positioning, continuous positive airway pressure (CPAP), or referral for surgical consideration. The patient preferred CPAP option.     Discussed purpose of PAP therapy, health benefits of CPAP, as well as the potential ramifications of untreated sleep apnea, which could include daytime sleepiness, hypertension, heart disease and/or stroke. An AHI of 15 is associated with increased risk CVD.     Regular replacement of CPAP mask, tubing and filter was recommended.    The patient was given open opportunity to ask questions and/or express concerns about treatment plan. Two point patient identifier confirmed.     Precautions: The patient was advised to abstain from driving should he feel sleepy or drowsy.    Follow up: me after  1 year  of PAP use.  31-minute visit. >50% spent counseling patient and coordination of care.

## 2025-02-17 ENCOUNTER — CLINICAL SUPPORT (OUTPATIENT)
Dept: REHABILITATION | Facility: HOSPITAL | Age: 55
End: 2025-02-17
Payer: COMMERCIAL

## 2025-02-17 ENCOUNTER — TELEPHONE (OUTPATIENT)
Dept: PAIN MEDICINE | Facility: CLINIC | Age: 55
End: 2025-02-17
Payer: COMMERCIAL

## 2025-02-17 ENCOUNTER — OFFICE VISIT (OUTPATIENT)
Dept: NEUROSURGERY | Facility: CLINIC | Age: 55
End: 2025-02-17
Payer: COMMERCIAL

## 2025-02-17 ENCOUNTER — HOSPITAL ENCOUNTER (OUTPATIENT)
Dept: RADIOLOGY | Facility: HOSPITAL | Age: 55
Discharge: HOME | End: 2025-02-17
Attending: NEUROLOGICAL SURGERY
Payer: COMMERCIAL

## 2025-02-17 VITALS
BODY MASS INDEX: 35.25 KG/M2 | RESPIRATION RATE: 19 BRPM | SYSTOLIC BLOOD PRESSURE: 119 MMHG | WEIGHT: 191.56 LBS | DIASTOLIC BLOOD PRESSURE: 78 MMHG | TEMPERATURE: 98 F | HEART RATE: 65 BPM | HEIGHT: 62 IN

## 2025-02-17 DIAGNOSIS — M51.369 DEGENERATION OF INTERVERTEBRAL DISC OF LUMBAR REGION, UNSPECIFIED WHETHER PAIN PRESENT: ICD-10-CM

## 2025-02-17 DIAGNOSIS — M43.16 SPONDYLOLISTHESIS OF LUMBAR REGION: Primary | ICD-10-CM

## 2025-02-17 DIAGNOSIS — M54.16 LUMBAR RADICULOPATHY: ICD-10-CM

## 2025-02-17 DIAGNOSIS — M25.69 DECREASED ROM OF TRUNK AND BACK: Primary | ICD-10-CM

## 2025-02-17 PROCEDURE — 97161 PT EVAL LOW COMPLEX 20 MIN: CPT

## 2025-02-17 PROCEDURE — 72110 X-RAY EXAM L-2 SPINE 4/>VWS: CPT | Mod: TC

## 2025-02-17 NOTE — H&P (VIEW-ONLY)
CHIEF COMPLAINT:  Back and leg pain    I, Linnea Archer, attest that this documentation has been prepared under the direction and in the presence of Bill Simpson MD.    HPI from 08/05/2020 (Dr. Keen):   This is a very pleasant 49 y.o. female who presents as referral for low back pain and leg pain. She says the back pain started around 3 years ago but has gotten worse over the past 6 months. Patient also has pain going down the posterolateral leg to the dorsum of the foot that is almost constant on the right and intermittent on the left. No specific positions make the pain better.  Right more than left leg pain.  Back pain is as severe as leg pain.  She be up and moving for 1-2 hours before having to rest due to severe pain in the back and legs. Denies any numbness or weakness.  No sphincter dysfunction symptoms.  She has done physical therapy 2 years ago without significant pain relief.  She is taking gabapentin with partial pain relief.  Pain is interfering with walking.  She is now limping because of the pain.  Pain is interfering with working.  She is working at [employer].  Pain is interfering with quality of life.       HPI from 08/08/2023 (Dr. Keen):   Isaura Mancini is a 52 y.o. female who presents with the above CC.  Patient was scheduled for surgery in 2020.  She states the surgery was canceled and then she started having medical problems with her thyroid and surgery so she put off returning back to clinic.  Patient states the back pain has gotten progressively worse and is constant across the low back.  It is better with flexion and worse with walking or standing and also when laying down.  She has pain in the left foot and has seen podiatry for this.  She has bilateral posterior leg pain and numbness mainly when lying down at night.  The left leg is worse than the right leg.  The back and legs bother her equally.  She would physical therapy 3 years ago without relief.  No interventional pain  procedures.  No spine surgery.  She takes gabapentin 100 mg and ibuprofen. Patient denies any recent accidents or trauma, no saddle anesthesias, and no bowel or bladder incontinence.     HPI from 2023 (Dr. Keen):  Six to 10/10 of low back pain, she reports severe left leg pain.  Pain is interfering with ability to stand, walk.  She works at [employer] in the [employer] department.  She has to lift up to 50 lb.  She also has a manager positioned.  Pain is affecting her quality of life and functional status.  She has completed a full conservative management including physical therapy     HPI from 2024 (Dr. Keen):   Complains of worsening difficulty ambulating.  Back pain radiates in bilateral legs.  Patient tends to lean forward due to pain.  Pain is affecting her quality of life and functional status.    Interval Hx:   Today the patient reports that she was initially only experiencing back pain, but now is experiencing leg pain down to her right knee. She has tried PT, but has never had injections. She feels that her symptoms have gotten worse within the last 6 months. She's been leaning forward due to the pain. She denies any bladder dysfunction.     Patient denies any other complaints at this time.    Review of patient's allergies indicates:  No Known Allergies    Past Medical History:   Diagnosis Date    Hypertension      Past Surgical History:   Procedure Laterality Date     SECTION      NECK MASS EXCISION N/A 2024    Procedure: EXCISION, MASS, NECK;  Surgeon: Fabrice Adams MD;  Location: [facility] OR;  Service: General;  Laterality: N/A;  Prone    THYROIDECTOMY N/A 10/28/2022    Procedure: THYROIDECTOMY, total;  Surgeon: Isaura Hayes MD;  Location: [address];  Service: General;  Laterality: N/A;    TUBAL LIGATION       Family History   Problem Relation Name Age of Onset    Heart disease Father          age 70 , started 30s    Hypertension Father      Breast cancer Paternal Aunt   60    Diabetes Neg Hx      Colon cancer Neg Hx      Ovarian cancer Neg Hx       Social History[1]     Review of Systems   Musculoskeletal:  Positive for back pain.        Right leg pain.   All other systems reviewed and are negative.      OBJECTIVE:   Vital Signs:  Temp: 98.4 °F (36.9 °C) (02/17/25 0824)  Pulse: 65 (02/17/25 0824)  Resp: 19 (02/17/25 0824)  BP: 119/78 (02/17/25 0824)    Physical Exam:    Vital signs: All nursing notes and vital signs reviewed -- afebrile, vital signs stable.  Constitutional: Patient sitting comfortably in chair. Appears well developed and well nourished.  Skin: Exposed areas are intact without abnormal markings, rashes or other lesions.  HEENT: Normocephalic. Normal conjunctivae.  Cardiovascular: Normal rate and regular rhythm.  Respiratory: Chest wall rises and falls symmetrically, without signs of respiratory distress.  Abdomen: Soft and non-tender.  Extremities: Warm and without edema. Calves supple, non-tender.  Psych/Behavior: Normal affect.    Neurological:    Mental status: Alert and oriented. Conversational and appropriate.       Cranial Nerves: VFF to confrontation. PERRL. EOMI without nystagmus. Facial STLT normal and symmetric. Strong, symmetric muscles of mastication. Facial strength full and symmetric. Hearing equal bilaterally to finger rub. Palate and uvula rise and fall normally in midline. Shoulder shrug 5/5 strength. Tongue midline.     Motor:    Upper:  Deltoids Triceps Biceps WE WF     R 5/5 5/5 5/5 5/5 5/5 5/5    L 5/5 5/5 5/5 5/5 5/5 5/5      Lower:  HF KE KF DF PF EHL    R 5/5 5/5 5/5 5/5 5/5 5/5    L 5/5 5/5 5/5 5/5 5/5 5/5     Sensory: Intact sensation to light touch in all extremities. Romberg negative.    Reflexes:          DTR: 2+ symmetrically throughout.     Gillis's: Negative.     Babinski's: Negative.     Clonus: Negative.    Cerebellar: Finger-to-nose and rapid alternating movements normal. Gait stable, fluid.    Spine:    Posture: Head well  aligned over pelvis in front and side views.  No focal or global spinal deformity visible on inspection. Shoulders and hips even. No obvious leg length discrepancy. No scapula winging.    Bending: Full ROM with forward, back and lateral bending. No rib prominence with forward bend.    Cervical:      ROM: Full with flexion, extension, lateral rotation and ear-to-shoulder bend.      Midline TTP: Negative.     Spurling's test: Negative.     Lhermitte's: Negative.    Thoracic:     Midline TTP: Negative    Lumbar:     Midline TTP: Negative     Straight Leg Test: Negative     Crossed Straight Leg Test: Negative     Sciatic notch tenderness: Negative.    Other:     SI joint TTP: Negative.     Greater trochanter TTP: Negative.     Tenderness with external/internal hip rotation: Negative.    Diagnostic Results:  All imaging was independently reviewed by me.    MRI lumbar spine, dated 11/10/2024:  1. L4-5 spondylolisthesis, grade II.     Flex/Ex X-ray lumbar spine, dated 2/17/2025:  1. No dynamic instability.     Scoliosis standing AP and Lateral X-ray, dated 11/26/2024:  1. 10 cm positive sagittal imbalance.     ASSESSMENT/PLAN:     Isaura Mancini has symptomatic non-mobile grade 2 spondylolisthesis with stenosis. She has not tried PT recently and has never tried injections. I recommend this therapy first and a re-evaluation in surgery in 3 months. I agree with Dr. Keen that a one level instrumented fusion and decompression at L4-5 is her surgical option in the future if conservative therapy fails.    The patient understands and agrees with the plan of care. All questions were answered.     1. PT and PM  2. RTC in 3 months    I, Dr. Bill Simpson personally performed the services described in this documentation. All medical record entries made by the scribe, Linnea Archer, were at my direction and in my presence. I have reviewed the chart and agree that the record reflects my personal performance and is accurate and  complete.      Bill Simpson M.D.  Department of Neurosurgery  [facility]            [1]   Social History  Tobacco Use    Smoking status: Never     Passive exposure: Never    Smokeless tobacco: Never   Substance Use Topics    Alcohol use: No    Drug use: No

## 2025-02-17 NOTE — PROGRESS NOTES
[facility]- PHYSICAL THERAPY EVALUATION - JAMES     Name: Isaura Mancini  Clinic Number: [id number]    Therapy Diagnosis: LBP  Physician: Aaron, Provider    Physician Orders: PT Eval and Treat   Medical Diagnosis from Referral: M43.16 (ICD-10-CM) - Spondylolisthesis of lumbar region   Evaluation Date: 2025  Authorization Period Expiration: 26  Plan of Care Expiration: 1 visit for JAMES  Visit # / Visits authorized:     Time In: 10  Time Out: 11  Total Billable Time: 60 minutes    Precautions: Standard and HTN    Pattern of pain determined: 1 PEN      Subjective   Date of onset:   History of current condition - Isaura reports her back began hurting her in 2018 without any particular incident which began pain.  Pt reports since 2018 pain is progressively worsening , started progressing in terms of intensity.  Pt did PT previously which did not help. Pt was previously set up for surgery which got cancelled due to insurance coverage.  Currently pt states she is getting scheduled with injections . Pain is  B LBP with pain radiating into R>.L LE.  Infrequent tingling into LE's.   Pain is described as sharp. Worse:walking/standing/lifting/AM  Better:moist heat pain/ meds     Medical History:   Past Medical History:   Diagnosis Date    Hypertension        Surgical History:   Isaura Mancini  has a past surgical history that includes  section; Tubal ligation; Thyroidectomy (N/A, 10/28/2022); and Neck mass excision (N/A, 2024).    Medications:   Isaura has a current medication list which includes the following prescription(s): aspirin, celecoxib, cholecalciferol (vitamin d3), hydrochlorothiazide, levothyroxine, loratadine, losartan, methocarbamol, pregabalin, and senna-docusate 8.6-50 mg.    Allergies:   Review of patient's allergies indicates:  No Known Allergies     Imaging, MRI studies: Xrays  MRI:Impression:     Interval worsening of degenerative change compared to the prior exam.  There  "is severe canal stenosis at L4-5 with trapping of the nerve roots above the level of stenosis.  Please see details of each levels above  XRAY  FINDINGS:  Mild DJD.  There is a grade 1 L4/L5 anterolisthesis identified which did not change significantly on the flexion and extension views.  The L4/L5 disc space is narrowed.  No fracture or dislocation.  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  Where is your pain the worst? LB  2.  Is your pain constant or intermittent? constant  3.  Does bending forward make your typical pain worse? yes  4.  Since the start of your back pain, has there been a change in your bowel or bladder? no  5.  What can't you do now that you use to be able to do? Walk a mile      Pts goals: "decrease pain"      Red Flag Screening:   Cough  Sneeze  Strain: (--)  Bladder/ bowel: (--)  Falls: (--)  Night pain: (--)  Unexplained weight loss: (--)  General health: good    OBJECTIVE     Postural examination/scapula alignment: Rounded shoulder, Head forward, and Increased kyphosis  Sitting: poor  Standing: poor  Correction of posture: better with lumbar roll    MOVEMENT LOSS    ROM Loss   Flexion moderate loss   Extension moderate loss and major loss   Side bending Right minimal loss   Side bending Left minimal loss   Rotation Right minimal loss   Rotation Left minimal loss c/ inc pain     Lower Extremity Strength  Right LE  Left LE    Hip flexion: 5/5 Hip flexion: 4+/5   Hip extension:  N/T " Hip extension: N/T   Hip abduction: 4/5 Hip abduction: 4-/5   Hip adduction:  4/5 Hip adduction:  4-/5   Hip Internal rotation   N/T Hip Internal rotation N/T   Knee Flexion 4+/5 Knee Flexion 4/5   Knee Extension 4+/5 Knee Extension 4-/5   Ankle dorsiflexion: 4+/5 Ankle dorsiflexion: 4/5   Ankle plantarflexion: 4+/5 Ankle plantarflexion: 4/5       GAIT:  Assistive Device used: none  Level of Assistance: independent  Patient displays the following gait deviations:  antalgic gait. Decreased stance time RLE    Special Tests:   Test Name  Test Result   Prone Instability Test (--)   SI Joint Provocation Test (--)   Straight Leg Raise (--)   Neural Tension Test (--)   Crossed Straight Leg Raise (--)   Walking on toes (--)   Walking on heels  (--)   Additional Tests        NEUROLOGICAL SCREENING     Sensory deficit: Intact B LE    Reflexes:    Left Right   Patella Tendon 2+ 2+   Achilles Tendon N/T N/T   Babinski  N/T N/T   Clonus (--) (--)     REPEATED TEST MOVEMENTS:  Repeated Flexion in Standing no better   Repeated Extension in Standing no worse   Repeated Flexion in lying no better   Repeated Extension in lying  worse       STATIC TESTS   Sitting slouched  no worse   Sitting erect no worse   Standing slouched no worse   Standing erect  worse   Lying prone in extension  worse   Long sitting   N/T         Treatment     Isaura received therapeutic exercises to develop/improve posture, lumbarl ROM, strength and muscular endurance for 15 minutes including the following exercises:   LTR  BKTC  PPT    Written Home Exercises Provided: yes.  Exercises were reviewed and Isaura was able to demonstrate them prior to the end of the session.  Isaura demonstrated good  understanding of the education provided.     See EMR under Patient Instructions for exercises provided 2/17/2025.      Education provided:   - Patient received education regarding proper posture and body mechanics.  Patient was given [facility] handout which  discusses  back education, care specifically for posture seated, standing, lifting correctly, components of exercise, tips for back pain management, positioning and  importance of sleep.   Information on lumbar rolls provided.  - Toño roll tried, and recommended     Isaura received cold pack for 5 minutes to LB.    Assessment   Isaura is a 54 y.o. female referred to [facility] with a medical diagnosis of Spondylolisthesis of lumbar region   Evaluation Date: 2/17/2025. Pt presents with decreased trunk ROM, decreased trunk Strength, decreased R LE strength, R knee pain, gait with deviations and poor posture.  Pt will benefit from HEP to address strength,mobility and flexibility.  Pt reprots some pain attempting gentle stretching, instructed her to attempt daily in modified ranges.  Pt should get relief from steroid injections which may improve her tolerance to stretching. Also instructed pt to start a walking program.    Pain Pattern: 1PEN       Pt prognosis is Good.     Patient [place] in [place] part [place] [facility] [place] [facility]    Patient was given back care educational information verbally and in writing.    The patient was given a home exercise program to continue with independently and was able to perform exercises in the clinic by the end of the treatment session today.      Plan of care discussed with patient: Yes  Pt's spiritual, cultural and educational needs considered and patient is agreeable to the plan of care and goals as stated below:     Anticipated Barriers for therapy: R knee pain    PT Evaluation Completed? Yes    Medical necessity is demonstrated by the following problem list.    Pt presents with the following impairments:     History  Co-morbidities and personal factors that may impact the plan of care Co-morbidities:   none    Personal Factors:   no deficits     low   Examination  Body Structures and Functions, activity limitations and participation restrictions that may  "impact the plan of care Body Regions:   back  lower extremities    Body Systems:    ROM  strength  gait  transfers  transitions  motor control    Participation Restrictions:   Patient seen 1 visit as part of JAMES program    Activity limitations:   Learning and applying knowledge  no deficits    General Tasks and Commands  no deficits    Communication  no deficits    Mobility  lifting and carrying objects  walking    Self care  no deficits    Domestic Life  doing house work (cleaning house, washing dishes, laundry)    Interactions/Relationships  no deficits    Life Areas  no deficits    Community and Social Life  community life  recreation and leisure         low   Clinical Presentation stable and uncomplicated low   Decision Making/ Complexity Score: low       GOALS: Pt is in agreement with the following goals.    Short term goals:    Provide educational information to patient regarding back care education for posture, lifting, sleeping, activities of daily living  Provide a home exercise program that the patient is able to perform independently to continue to work on functional goals  Provide a walking program for continued health and wellness    Plan   Outpatient physical therapy 1x week for 1weeks if patient is available to participate to include the following:   - Patient education  - Therapeutic exercise  - Manual therapy  - Therapeutic activity       Therapist: Mary Ann Quach, PT  2/17/2025    "I certify the need for these services furnished under this plan of treatment and while under my care."    ____________________________________  Physician/Referring Practitioner    _______________  Date of Signature           "

## 2025-02-17 NOTE — TELEPHONE ENCOUNTER
----- Message from Mark Brooks MD sent at 2025  9:31 AM CST -----  Regarding: Order for ALICE STATON    Patient Name: ALICE STATON([id number])  Sex: Female  : 1970      PCP: RUDDY PEREZ III    Center: [facility] CENTRAL BILLING OFFICE     Types of orders made on 2025: Outpatient Referral, Procedure Request    Order Date:2025  Ordering User:MARK BROOKS JR. [[id number]]  Encounter Provider:Mark Brooks MD [[id number]]  Authorizing Provider: Mark Brooks MD [[id number]]  Department:[facility] NEUROSURGERY[[id number]]    Common Order Information  Procedure -> Epidural Injection (specify level) Cmt: L4-5    Order Specific Information  Order: Procedure Order to Pain Management [Custom: [id number]]  Order #:          [id number]: 1 FUTURE    Priority: Routine  Class: Clinic Performed    Future Order Information      Expires on:2026            Expected by:2025                   Associated Diagnoses      M43.16 Spondylolisthesis of lumbar region      Facility Name: -> [facility]           Priority: Routine  Class: Clinic Performed    Future Order Information      Expires on:2026            Expected by:2025                   Associated Diagnoses      M43.16 Spondylolisthesis of lumbar region      Procedure -> Epidural Injection (specify level) Cmt: L4-5        Facility Name: -> [facility]

## 2025-02-17 NOTE — PATIENT INSTRUCTIONS
WALKING PROGRAM      An apple a day keeps the Doctor away, could be replaced with, A walk a day keeps the Doctor away!  What is not to love about one of the simplest things you can do for your health?   Walking, as a form of exercise, has been shown to have numerous positive benefits.   Walking is also free, can be done anytime and anywhere, needs no special skill or equipment, and can be done at whatever level of fitness you are currently at.      Research has shown that the benefits of walking for at least 30 minutes a day may be effective to:  Improve blood pressure and blood sugar levels  Improve blood lipid profile  Improve memory and concentration  Improve sleep habits  Enhance mental wellbeing, improving mood and reducing depression  Reduce the risk of coronary heart disease ( the number 1 killer in the [place])  Reduce the risk of osteoporosis  Reduce the risk of breast and colon cancer  Reduce the risk of non-insulin dependent (type 2) diabetes  Reduce body weight and lower the risk of obesity      You need a few essentials before you hit the road.    Walking shoes.   Ensure you have lightweight, breathable, and supportive footwear.      Clothing.  Dress for comfort and the weather.  Wear layers when it is colder.  Remember sunscreen.  Water.  Take frequent sips of water when while you walk.  Ensure you drink before and after your walk.            GETTING STARTED:   Just start walking for 10 minutes, 4 times a week.  If this is very easy for you, start walking 20 minutes, 4 times a week.  Thats it!?  Yes, thats it!  Just walk out the door.  Watch your posture.  Walk tall.   Pretend you are being pulled up by a rope attached to the crown of your head.  Your shoulders should be down, back and relaxed.  Tighten your abdominal muscles and buttock, and fall into a natural stride.  Feel the natural swing of your arms in opposition to your leg swing.  Let the heel of each foot gently  touch the ground and feel the toes of each foot leave the ground last.  Be sure to drink plenty of water before, during, and after walking.   Incorporate a warm up and cool down into your routine.  Start your walk at a slow warm up pace, then walk desired length of time.  End your walk with slower cool down.  Any stretches that your therapist has recommended for you may be done before and after your walk to prevent injury.  The hardest part of starting a fitness program can be developing the habit.  Walking regularly will help (a minimum of 3 days a week is a good goal).  Developing a routine, walking partners, and keeping a journal are ways to help keep your motivation up.  Wearing a pedometer or using an adam that records your steps, are motivational as well, and shown by research to increase your activity level.    PROGRESSING:  Once you are walking 4 times a week on a regular basis, you can progress your program by adding 5 minutes to each of your walks that week.   Set a time goal to achieve.   Every week add 5 minutes to your walk.   If your goal is 40 minutes, add 5 minutes weekly until you are comfortably walking 40 minutes 4 times a week.  Once you have achieved your time goal, you can further progress your program by increasing the speed at which you walk.  Dont forget to warm up and cool down as you start walking at a brisker pace.

## 2025-02-17 NOTE — PROGRESS NOTES
CHIEF COMPLAINT:  Back and leg pain    I, Linnea Archer, attest that this documentation has been prepared under the direction and in the presence of Bill Simpson MD.    HPI from 08/05/2020 (Dr. Keen):   This is a very pleasant 49 y.o. female who presents as referral for low back pain and leg pain. She says the back pain started around 3 years ago but has gotten worse over the past 6 months. Patient also has pain going down the posterolateral leg to the dorsum of the foot that is almost constant on the right and intermittent on the left. No specific positions make the pain better.  Right more than left leg pain.  Back pain is as severe as leg pain.  She be up and moving for 1-2 hours before having to rest due to severe pain in the back and legs. Denies any numbness or weakness.  No sphincter dysfunction symptoms.  She has done physical therapy 2 years ago without significant pain relief.  She is taking gabapentin with partial pain relief.  Pain is interfering with walking.  She is now limping because of the pain.  Pain is interfering with working.  She is working at [employer].  Pain is interfering with quality of life.       HPI from 08/08/2023 (Dr. Keen):   Isaura Mancini is a 52 y.o. female who presents with the above CC.  Patient was scheduled for surgery in 2020.  She states the surgery was canceled and then she started having medical problems with her thyroid and surgery so she put off returning back to clinic.  Patient states the back pain has gotten progressively worse and is constant across the low back.  It is better with flexion and worse with walking or standing and also when laying down.  She has pain in the left foot and has seen podiatry for this.  She has bilateral posterior leg pain and numbness mainly when lying down at night.  The left leg is worse than the right leg.  The back and legs bother her equally.  She would physical therapy 3 years ago without relief.  No interventional pain  procedures.  No spine surgery.  She takes gabapentin 100 mg and ibuprofen. Patient denies any recent accidents or trauma, no saddle anesthesias, and no bowel or bladder incontinence.     HPI from 2023 (Dr. Keen):  Six to 10/10 of low back pain, she reports severe left leg pain.  Pain is interfering with ability to stand, walk.  She works at [employer] in the [place] department.  She has to lift up to 50 lb.  She also has a manager positioned.  Pain is affecting her quality of life and functional status.  She has completed a full conservative management including physical therapy     HPI from 2024 (Dr. Keen):   Complains of worsening difficulty ambulating.  Back pain radiates in bilateral legs.  Patient tends to lean forward due to pain.  Pain is affecting her quality of life and functional status.    Interval Hx:   Today the patient reports that she was initially only experiencing back pain, but now is experiencing leg pain down to her right knee. She has tried PT, but has never had injections. She feels that her symptoms have gotten worse within the last 6 months. She's been leaning forward due to the pain. She denies any bladder dysfunction.     Patient denies any other complaints at this time.    Review of patient's allergies indicates:  No Known Allergies    Past Medical History:   Diagnosis Date    Hypertension      Past Surgical History:   Procedure Laterality Date     SECTION      NECK MASS EXCISION N/A 2024    Procedure: EXCISION, MASS, NECK;  Surgeon: Fabrice Adams MD;  Location: [facility] OR;  Service: General;  Laterality: N/A;  Prone    THYROIDECTOMY N/A 10/28/2022    Procedure: THYROIDECTOMY, total;  Surgeon: Isaura Hayes MD;  Location: [address];  Service: General;  Laterality: N/A;    TUBAL LIGATION       Family History   Problem Relation Name Age of Onset    Heart disease Father          age 70 , started 30s    Hypertension Father      Breast cancer Paternal Aunt   60    Diabetes Neg Hx      Colon cancer Neg Hx      Ovarian cancer Neg Hx       Social History[1]     Review of Systems   Musculoskeletal:  Positive for back pain.        Right leg pain.   All other systems reviewed and are negative.      OBJECTIVE:   Vital Signs:  Temp: 98.4 °F (36.9 °C) (02/17/25 0824)  Pulse: 65 (02/17/25 0824)  Resp: 19 (02/17/25 0824)  BP: 119/78 (02/17/25 0824)    Physical Exam:    Vital signs: All nursing notes and vital signs reviewed -- afebrile, vital signs stable.  Constitutional: Patient sitting comfortably in chair. Appears well developed and well nourished.  Skin: Exposed areas are intact without abnormal markings, rashes or other lesions.  HEENT: Normocephalic. Normal conjunctivae.  Cardiovascular: Normal rate and regular rhythm.  Respiratory: Chest wall rises and falls symmetrically, without signs of respiratory distress.  Abdomen: Soft and non-tender.  Extremities: Warm and without edema. Calves supple, non-tender.  Psych/Behavior: Normal affect.    Neurological:    Mental status: Alert and oriented. Conversational and appropriate.       Cranial Nerves: VFF to confrontation. PERRL. EOMI without nystagmus. Facial STLT normal and symmetric. Strong, symmetric muscles of mastication. Facial strength full and symmetric. Hearing equal bilaterally to finger rub. Palate and uvula rise and fall normally in midline. Shoulder shrug 5/5 strength. Tongue midline.     Motor:    Upper:  Deltoids Triceps Biceps WE WF     R 5/5 5/5 5/5 5/5 5/5 5/5    L 5/5 5/5 5/5 5/5 5/5 5/5      Lower:  HF KE KF DF PF EHL    R 5/5 5/5 5/5 5/5 5/5 5/5    L 5/5 5/5 5/5 5/5 5/5 5/5     Sensory: Intact sensation to light touch in all extremities. Romberg negative.    Reflexes:          DTR: 2+ symmetrically throughout.     Gillis's: Negative.     Babinski's: Negative.     Clonus: Negative.    Cerebellar: Finger-to-nose and rapid alternating movements normal. Gait stable, fluid.    Spine:    Posture: Head well  aligned over pelvis in front and side views.  No focal or global spinal deformity visible on inspection. Shoulders and hips even. No obvious leg length discrepancy. No scapula winging.    Bending: Full ROM with forward, back and lateral bending. No rib prominence with forward bend.    Cervical:      ROM: Full with flexion, extension, lateral rotation and ear-to-shoulder bend.      Midline TTP: Negative.     Spurling's test: Negative.     Lhermitte's: Negative.    Thoracic:     Midline TTP: Negative    Lumbar:     Midline TTP: Negative     Straight Leg Test: Negative     Crossed Straight Leg Test: Negative     Sciatic notch tenderness: Negative.    Other:     SI joint TTP: Negative.     Greater trochanter TTP: Negative.     Tenderness with external/internal hip rotation: Negative.    Diagnostic Results:  All imaging was independently reviewed by me.    MRI lumbar spine, dated 11/10/2024:  1. L4-5 spondylolisthesis, grade II.     Flex/Ex X-ray lumbar spine, dated 2/17/2025:  1. No dynamic instability.     Scoliosis standing AP and Lateral X-ray, dated 11/26/2024:  1. 10 cm positive sagittal imbalance.     ASSESSMENT/PLAN:     Isaura Mancini has symptomatic non-mobile grade 2 spondylolisthesis with stenosis. She has not tried PT recently and has never tried injections. I recommend this therapy first and a re-evaluation in surgery in 3 months. I agree with Dr. Keen that a one level instrumented fusion and decompression at L4-5 is her surgical option in the future if conservative therapy fails.    The patient understands and agrees with the plan of care. All questions were answered.     1. PT and PM  2. RTC in 3 months    I, Dr. Bill Simpson personally performed the services described in this documentation. All medical record entries made by the scribe, Linnea Archer, were at my direction and in my presence. I have reviewed the chart and agree that the record reflects my personal performance and is accurate and  complete.      Bill Simpson M.D.  Department of Neurosurgery  [facility]            [1]   Social History  Tobacco Use    Smoking status: Never     Passive exposure: Never    Smokeless tobacco: Never   Substance Use Topics    Alcohol use: No    Drug use: No

## 2025-02-18 ENCOUNTER — TELEPHONE (OUTPATIENT)
Dept: PAIN MEDICINE | Facility: CLINIC | Age: 55
End: 2025-02-18
Payer: COMMERCIAL

## 2025-02-21 NOTE — PRE-PROCEDURE INSTRUCTIONS
Patient reviewed on 2/21/2025.  Okay to proceed at [facility]. The following pre-procedure instructions and arrival time have been reviewed with patient via phone and sent to patient portal for review.  Patient verbalized an understanding.  Pt to be accompanied by her son or boyfriend day of procedure as responsible .      Dear Isaura,        Please read over the following pre-procedure instructions in it's entirety as there is helpful information here to get you well prepared for your upcoming procedure.     You are scheduled for a procedure with Dr. Tobias on 2/25/25.     [facility] is located at the corner of [facility]. It is in the [place] next to [facility]. The address is: [address] [place] [address]. Take the elevator to the 2nd floor.      Registration check in time: 8:30 am  Scheduled procedure time: 9:30 am     You are scheduled to receive:                 ___X___ Oral sedation                                                                 _______IV Sedation                                                                 _______No Sedation                                                                                           If you are receiving any sedation, you CANNOT drive yourself and must have a responsible friend or family member (no rideshare) to drive you home.     You should take any medications that you routinely take for blood pressure, heart medications, thyroid, cholesterol, etc.      *The fasting restrictions are dependent on whether or not you are receiving sedation. Sedation is not available for all procedures.      Your fasting instructions for sedation patients are as follow:  IV sedation. Nothing to eat after midnight the night prior to procedure. Patients are encouraged to consume clear liquids up to 2 hours prior to scheduled arrival time. -Clear liquids include Gatorade, water, soda, black coffee or tea (no milk or creamer),  and clear juices. - Clear liquids do NOT include anything with pulp or food particles (chicken broth, ice cream, yogurt, Jello, etc.) You CANNOT drive yourself and must have a .            If you are on blood thinners, you need to follow the anticoagulation instructions that had been discussed previously. You should only stop the blood thinners if it was approved by your primary care physician or your cardiologist. In the event that you are not able to stop your blood thinners, a blood thinner was not listed on your medication list, or we were not able to get clearance from your cardiologist, then the procedure may have to be postponed/canceled.      IF you were told to stop your blood thinners, this is how long you should generally hold some of the more common ones. Remember that stopping blood thinners is only necessary for certain procedures. If you are unsure of your instructions, please call us.   Aspirin - 5 days  Plavix/Clopidogrel - 7 days  Warfarin / Coumadin - 5 days  Eliquis - 3 days  Pradaxa/Dabigatran - 4 days  Xarelto/Rivaroxaban - 3 days        HOLD all non-insulin injections (shots) until after surgery (Semaglutide, Tirzepatide, Ozempic, Mounjaro, Trulicity, Victoza, Byetta, Wegovy and Adlyxin) (Total of 7 days prior)        If you are a diabetic, do not take your medication if you will be fasting, but bring it with you. Please plan on being here for roughly 2-3 hours. Please note that most procedures will not be performed if you blood sugar is >200.     Please call us if you have been sick (running fever, having any flu-like symptoms) or have been taking ANTIBIOTICS in the past 2 weeks or had any outpatient procedures other than with us (colonoscopy, endoscopy, OBGYN, dental, etc.).      If you have been previously COVID positive, you will need to hold off on your procedure until you are symptom free for 10 days. If you did not have any symptoms, you can have your procedure 10 days from your  positive test result.         On the morning of your procedure:  *HOLD ALL VITAMINS, MINERALS, HERBS (INCLUDING HERBAL TEAS) AND SUPPLEMENTS  *SHOWER WITH ANTIBACTERIAL SOAP (EX. DIAL) NIGHT BEFORE AND MORNING OF PROCEDURE  *DO NOT APPLY ANY LOTIONS, OILS, POWDERS, PERFUME/COLOGNE, OINTMENTS, GELS, CREAMS, MAKEUP OR DEODORANT TO YOUR SKIN MORNING OF PROCEDURE  *LEAVE JEWELRY AND ANY VALUABLES AT HOME  *WEAR LOOSE COMFORTABLE CLOTHING      In the event that you are running late or need to reschedule on the day of your procedure, please contact the pre-op desk at [phone].          Please reply to this portal message as receipt of delivery.     Thank you,  [facility]  [place], LPN [facility]  Pre-Admit

## 2025-02-24 NOTE — DISCHARGE INSTRUCTIONS
[facility] [place]  Dr. Brian Tobias  Messaging service # [phone]    POST-PROCEDURE INSTRUCTIONS:    Today you had an injection that included a steroid medications.  The steroid may or may not have been mixed with a local anesthetic when it was injected.   If the injection was in the neck, you may feel some pressure, numbness, or slight weakness in the arm after the procedure for a short period of time (this is a normal response), if this persists for longer than 1 day please contact our office or go to the emergency room.  If the injection was in the low back, you may feel some pressure, numbness, or slight weakness in the leg after the procedure for a short period of time (this is a normal response), if this persists for longer than 1 day please contact our office or go to the emergency room.  You may get side effects from the steroid.  This is not uncommon.  Symptoms include: elevated blood sugar, elevated blood pressure, headache, flushing, nausea, insomnia.  These symptoms are transient and will resolve within 1-3 days.  If symptoms last longer than this please contact our office or head to the emergency room.  Steroid medications can take anywhere from 3-14 days to take effect (rarely longer).  You may notice that your pain worsens for a short period of time after the injection, this would not be unusual due to the pressure and trauma from the needle.    If you do not have a follow up appointment scheduled, please contact my office (or the office of the physician who referred you for the procedure) to get a post-procedure follow up scheduled 2-4 weeks after the procedure.  This can be done as a virtual visit if that is more convenient for you.      What you need to do:    Keep a record of your response to the injection you had today.    How much relief did you get?   When did the relief start and how long did it last?  Were you able to decrease the use of any of your pain  medications?  Were you able to increase your level of activity?  How long did the relief last?    What to watch out for:    If you experience any of the following symptoms after your procedure, please notify the messaging service immediately (see above for contact information):   fever (increased oral temperature)   bleeding or swelling at the injection site,    drainage, rash or redness at the injection site    possible signs of infection    increased pain at the injection site   worsening of your usual pain   severe headache   new or worsening numbness    new arm and/or leg weakness, or    changes in bowel and/or bladder function: urinating or defecating on yourself and not knowing that you did it.    PLEASE FOLLOW ALL INSTRUCTIONS CAREFULLY     Do not engage in strenuous activity (e.g., lifting or pushing heavy objects or repeated bending) for 24 hours.     Do not take a bath, swim or use Jacuzzi for 24 hours after procedure. (A shower is fine).   Remove any Band-Aids when you get home.    Use cold/ice, as needed for comfort.  We recommend the use of cold therapy alternating on for 20 minutes, off for 20 minutes.    Do not apply direct heat (heating pad or heat packs) to the injection site for 24 hours.     Resume your usual medications, unless instructed otherwise by your Pain Physician.     If you are on warfarin (Coumadin) or other blood thinner, resume this medication as instructed by your prescribing Physician.    IF AT ANY POINT YOU ARE VERY CONCERNED ABOUT YOUR SYMPTOMS, or you have an urgent or emergent issue after between 5pm and 7am or on weekends, please contact  the on call provider at [phone].    If you develop worsening pain, weakness, numbness, lose bowel or bladder control (i.e., having an accident where you did not even know you had to go to the bathroom and suddenly noticed you soiled yourself), saddle anesthesia (a loss of sensation restricted to the area of the buttocks, anus and between  the legs -- i.e., those parts of your body that would touch a saddle if you were sitting on one) you need to go immediately to the emergency department for evaluation and treatment.    ----------------------------------------------------------------------------------------------------------------------------------------------------------------  If you received Sedation please read the following instructions:  POST SEDATION INSTRUCTIONS    Today you received intravenous medication (also known as sedation) that was used to help you relax and/or decrease discomfort during your procedure. This medication will be acting in your body for the next 24 hours, so you might feel a little tired or sleepy. This feeling will slowly wear off.   Common side effects associated with these medications include: drowsiness, dizziness, sleepiness, confusion, feeling excited, difficulty remembering things, lack of steadiness with walking or balance, loss of fine muscle control, slowed reflexes, difficulty focusing, and blurred vision.  Some over-the-counter and prescription medications (e.g., muscle relaxants, opioids, mood-altering medications, sedatives/hypnotics, antihistamines) can interact with the intravenous medication you received and cause an increased risk of the side effects listed above in addition to other potentially life threatening side effects. Use extreme caution if you are taking such medications, and consult with your Pain Physician or prescribing physician if you have any questions.  For the next 12-24 hours:    DO NOT--Drive a car, operate machinery or power tools   DO NOT--Drink any alcoholic beverages (not even beer), they may dangerously increase the risk of side effects.    DO NOT--Make any important legal or business decisions or sign important documents.  We advise you to have someone to assist you at home. Move slowly and carefully. Do not make sudden changes in position. Be aware of dizziness or  light-headedness and move accordingly.   If you seek medical treatment within 24 hours, let the nurse or doctor caring for you know that you have received the above medications. If you have any questions or concerns related to your sedation or treatment today please contact us.

## 2025-02-25 ENCOUNTER — HOSPITAL ENCOUNTER (OUTPATIENT)
Facility: HOSPITAL | Age: 55
Discharge: HOME | End: 2025-02-25
Attending: EMERGENCY MEDICINE | Admitting: EMERGENCY MEDICINE
Payer: COMMERCIAL

## 2025-02-25 VITALS
OXYGEN SATURATION: 97 % | HEART RATE: 58 BPM | TEMPERATURE: 97 F | RESPIRATION RATE: 16 BRPM | SYSTOLIC BLOOD PRESSURE: 120 MMHG | DIASTOLIC BLOOD PRESSURE: 74 MMHG

## 2025-02-25 DIAGNOSIS — G89.29 CHRONIC PAIN: ICD-10-CM

## 2025-02-25 PROCEDURE — 25000003 PHARM REV CODE 250: Performed by: EMERGENCY MEDICINE

## 2025-02-25 PROCEDURE — 62323 NJX INTERLAMINAR LMBR/SAC: CPT | Performed by: EMERGENCY MEDICINE

## 2025-02-25 PROCEDURE — 62323 NJX INTERLAMINAR LMBR/SAC: CPT | Mod: COE,,, | Performed by: EMERGENCY MEDICINE

## 2025-02-25 PROCEDURE — 25500020 PHARM REV CODE 255: Performed by: EMERGENCY MEDICINE

## 2025-02-25 PROCEDURE — 63600175 PHARM REV CODE 636 W HCPCS: Performed by: EMERGENCY MEDICINE

## 2025-02-25 RX ORDER — DEXAMETHASONE SODIUM PHOSPHATE 10 MG/ML
INJECTION, SOLUTION INTRA-ARTICULAR; INTRALESIONAL; INTRAMUSCULAR; INTRAVENOUS; SOFT TISSUE
Status: DISCONTINUED | OUTPATIENT
Start: 2025-02-25 | End: 2025-02-25 | Stop reason: HOSPADM

## 2025-02-25 RX ORDER — ALPRAZOLAM 0.5 MG/1
0.5 TABLET, ORALLY DISINTEGRATING ORAL ONCE AS NEEDED
Status: COMPLETED | OUTPATIENT
Start: 2025-02-25 | End: 2025-02-25

## 2025-02-25 RX ORDER — LIDOCAINE HYDROCHLORIDE 20 MG/ML
INJECTION, SOLUTION EPIDURAL; INFILTRATION; INTRACAUDAL; PERINEURAL
Status: DISCONTINUED | OUTPATIENT
Start: 2025-02-25 | End: 2025-02-25 | Stop reason: HOSPADM

## 2025-02-25 RX ADMIN — ALPRAZOLAM 0.5 MG: 0.5 TABLET, ORALLY DISINTEGRATING ORAL at 08:02

## 2025-02-25 NOTE — DISCHARGE SUMMARY
Discharge Note  Short Stay      SUMMARY     Admit Date: 2/25/2025    Attending Physician: Brian Tobias      Discharge Physician: Brian Tobias      Discharge Date: 2/25/2025 9:37 AM    Procedure(s) (LRB):  L4-5 [facility] (N/A)    Final Diagnosis: Lumbar radiculopathy [M54.16]  Spondylolisthesis of lumbar region [M43.16]    Disposition: Home or self care    Patient Instructions:   Current Discharge Medication List        CONTINUE these medications which have NOT CHANGED    Details   celecoxib (CELEBREX) 200 MG capsule Take 1 capsule (200 mg total) by mouth 2 (two) times daily.  Qty: 60 capsule, Refills: 3      cholecalciferol, vitamin D3, (VITAMIN D3) 25 mcg (1,000 unit) capsule Take 2 capsules (2,000 Units total) by mouth once daily.  Refills: 0      hydroCHLOROthiazide (HYDRODIURIL) 25 MG tablet Take 1 tablet (25 mg total) by mouth once daily.  Qty: 90 tablet, Refills: 3    Comments: .  Associated Diagnoses: Essential hypertension      levothyroxine (SYNTHROID) 150 MCG tablet Take 1 tab 6 days a week and 0.5 tabs on Sundays for total of 6.5 tabs weekly  Qty: 90 tablet, Refills: 3    Associated Diagnoses: Postoperative hypothyroidism      loratadine (CLARITIN) 10 mg tablet Take 1 tablet (10 mg total) by mouth once daily.  Qty: 90 tablet, Refills: 3    Associated Diagnoses: Allergic rhinitis, unspecified seasonality, unspecified trigger      losartan (COZAAR) 25 MG tablet Take 1 tablet (25 mg total) by mouth once daily.  Qty: 90 tablet, Refills: 3    Comments: .Please place on hold until patient due for refills  Associated Diagnoses: Essential hypertension      pregabalin (LYRICA) 50 MG capsule Take 1 capsule (50 mg total) by mouth 3 (three) times daily.  Qty: 90 capsule, Refills: 3    Associated Diagnoses: Lumbar facet arthropathy; Lumbar radiculopathy      senna-docusate 8.6-50 mg (PERICOLACE) 8.6-50 mg per tablet Take 1 tablet by mouth 2 (two) times daily.  Qty: 60 tablet, Refills: 0     Associated Diagnoses: Constipation, unspecified constipation type      aspirin (ECOTRIN) 81 MG EC tablet Take 81 mg by mouth once daily.                 Discharge Diagnosis: Lumbar radiculopathy [M54.16]  Spondylolisthesis of lumbar region [M43.16]  Condition on Discharge: Stable with no complications to procedure   Diet on Discharge: Same as before.  Activity: as per instruction sheet.  Discharge to: Home with a responsible adult.  Follow up: 2-4 weeks       Please call my office or pager at [phone] if experienced any weakness or loss of sensation, fever > 101.5, pain uncontrolled with oral medications, persistent nausea/vomiting/or diarrhea, redness or drainage from the incisions, or any other worrisome concerns. If physician on call was not reached or could not communicate with our office for any reason please go to the nearest emergency department

## 2025-02-25 NOTE — PLAN OF CARE
LETIO and VSS. Pre-op SL Xanax given after consent signed with physician.  Pregnancy test negative.  Patient ready to enter procedure room.

## 2025-02-25 NOTE — OP NOTE
Lumbar Interlaminar Epidural Steroid Injection under Fluoroscopic Guidance    The procedure, risks, benefits, and options were discussed with the patient. There are no contraindications to the procedure. The patent expressed understanding and agreed to the procedure. Informed written consent was obtained prior to the start of the procedure and can be found in the patient's chart.    PATIENT NAME: Isaura Mancini   MRN: [id number]     DATE OF PROCEDURE: 02/25/2025    PROCEDURE: Lumbar Interlaminar Epidural Steroid Injection L4/L5 under Fluoroscopic Guidance    PRE-OP DIAGNOSIS: Lumbar radiculopathy [M54.16]  Spondylolisthesis of lumbar region [M43.16] Lumbar radiculopathy [M54.16]    POST-OP DIAGNOSIS: Same    PHYSICIAN: Brian Tobias MD    MEDICATIONS INJECTED: Preservative-free Decadron 10mg with 7cc of preservative free normal saline    LOCAL ANESTHETIC INJECTED: Xylocaine 2%     SEDATION: None    ESTIMATED BLOOD LOSS: None    COMPLICATIONS: None    TECHNIQUE: Time-out was performed to identify the patient and procedure to be performed. With the patient laying in a prone position, the surgical area was prepped and draped in the usual sterile fashion using ChloraPrep and a fenestrated drape. The level was determined under fluoroscopy guidance. Skin anesthesia was achieved by injecting Lidocaine 2% over the injection site. The interlaminar space was then approached with a 20 gauge,  5 inch Tuohy needle that was introduced under fluoroscopic guidance in the AP, lateral and/or contralateral oblique imaging. Once the Ligamentum flavum was encountered loss of resistance to saline was used to enter the epidural space. With positive loss of resistance and negative aspiration for CSF or Blood, contrast dye Omnipaque (300mg/mL) was injected to confirm placement and there was no vascular runoff. 8 mL of the medication mixture listed above was injected slowly. Displacement of the radio opaque contrast after injection of the  medication confirmed that the medication went into the area of the epidural space. The needles were removed and bleeding was nil. A sterile dressing was applied. No specimens collected. The patient tolerated the procedure well.     The patient was monitored after the procedure in the recovery area. They were given post-procedure and discharge instructions to follow at home. The patient was discharged in a stable condition.      Brian Tobias MD

## 2025-02-26 DIAGNOSIS — I10 ESSENTIAL HYPERTENSION: ICD-10-CM

## 2025-02-26 DIAGNOSIS — K59.00 CONSTIPATION, UNSPECIFIED CONSTIPATION TYPE: ICD-10-CM

## 2025-02-26 RX ORDER — AMOXICILLIN 250 MG
1 CAPSULE ORAL 2 TIMES DAILY
Qty: 60 TABLET | Refills: 0 | Status: SHIPPED | OUTPATIENT
Start: 2025-02-26

## 2025-02-26 RX ORDER — HYDROCHLOROTHIAZIDE 25 MG/1
25 TABLET ORAL DAILY
Qty: 90 TABLET | Refills: 3 | Status: SHIPPED | OUTPATIENT
Start: 2025-02-26

## 2025-03-05 ENCOUNTER — CLINICAL SUPPORT (OUTPATIENT)
Dept: REHABILITATION | Facility: HOSPITAL | Age: 55
End: 2025-03-05
Payer: COMMERCIAL

## 2025-03-05 ENCOUNTER — TELEPHONE (OUTPATIENT)
Dept: ORTHOPEDICS | Facility: CLINIC | Age: 55
End: 2025-03-05
Payer: COMMERCIAL

## 2025-03-05 DIAGNOSIS — M53.86 DECREASED RANGE OF MOTION OF LUMBAR SPINE: Primary | ICD-10-CM

## 2025-03-05 DIAGNOSIS — R20.2 RIGHT LEG PARESTHESIAS: ICD-10-CM

## 2025-03-05 DIAGNOSIS — M43.16 SPONDYLOLISTHESIS OF LUMBAR REGION: ICD-10-CM

## 2025-03-05 PROCEDURE — 97161 PT EVAL LOW COMPLEX 20 MIN: CPT

## 2025-03-05 PROCEDURE — 97530 THERAPEUTIC ACTIVITIES: CPT

## 2025-03-05 NOTE — PROGRESS NOTES
Outpatient Rehab    Physical Therapy Evaluation    Patient Name: Isaura Mancini  MRN: [id number]  YOB: 1970  Encounter Date: 3/5/2025    Therapy Diagnosis:   Encounter Diagnoses   Name Primary?    Spondylolisthesis of lumbar region     Decreased range of motion of lumbar spine Yes    Right leg paresthesias      Physician: Bill Simpson MD    Physician Orders: Eval and Treat  Medical Diagnosis: Spondylolisthesis of lumbar region [M43.16]     Visit # / Visits Authorized:     Date of Evaluation:  3/5/2025   Insurance Authorization Period: 2025 to 2025  Plan of Care Certification:  3/5/2025 to 2025      Time In: 1105   Time Out: 1200  Total Time: 55   Total Billable Time: 55    Intake Outcome Measure for FOTO Survey    Therapist reviewed FOTO scores for Isaura Mancini on 3/5/2025.   FOTO report - see Media section or FOTO account episode details.     Intake Score: 47%         Subjective           Past Medical History/Physical Systems Review:   Isaura Mancini  has a past medical history of Hypertension.    Isaura Mancini  has a past surgical history that includes  section; Tubal ligation; Thyroidectomy (N/A, 10/28/2022); Neck mass excision (N/A, 2024); and Epidural steroid injection into lumbar spine (N/A, 2025).    Isaura has a current medication list which includes the following prescription(s): aspirin, celecoxib, cholecalciferol (vitamin d3), hydrochlorothiazide, levothyroxine, loratadine, losartan, pregabalin, and senna-docusate 8.6-50 mg.    Review of patient's allergies indicates:  No Known Allergies     Objective      Spinal Mobility  Hypomobile: Thoracic  Lumbosacral Mobility Details: Guarded assessment at this time        Lumbar Range of Motion   Active (deg) Passive (deg) Pain   Flexion 30 (pain with return from flexion)   Yes   Extension 5   Yes   Right Lateral Flexion 15       Right Rotation 5       Left Lateral Flexion 20       Left Rotation 5                 Hip Range of Motion   Right Hip   Active (deg) Passive (deg) Pain   Flexion 85 90 Yes   Extension         ABduction 15 20     ADduction         External Rotation 90/90 35 40     External Rotation Prone         Internal Rotation 90/90 15 20     Internal Rotation Prone             Left Hip   Active (deg) Passive (deg) Pain   Flexion 90 95     Extension         ABduction 25 30     ADduction         External Rotation 90/90 40 45     External Rotation Prone         Internal Rotation 90/90 20 25     Internal Rotation Prone                           Cervical/Thoracic Special Tests  Thoracic Tests  Positive: Slump         Lumbar/Pelvic Girdle Special Tests  Lumbar Tests - Repeated  Positive: Flexion and Extension  pain worsens with standing repeated extension    Lumbar Tests - SLR and Tension  Positive: Right Passive Straight Leg Raise and Right Tibial Nerve Bias Straight Leg Raise                          Treatment:  Therapeutic Activity  Therapeutic Activity 1: HEP: standing paloff press 2 x 12 with BTB, sidelying hip abduction clamshells, 3 x 10    Assessment & Plan   Assessment  Isaura presents with a condition of Low complexity.   Presentation of Symptoms: Stable  Will Comorbidities Impact Care: Yes  History of low back pain, symptoms beyond the knee     Functional Limitations: Activity tolerance, Bed mobility, Carrying objects, Decreased ambulation distance/endurance, Completing work/school activities, Functional mobility, Squatting, Standing tolerance    Patient Goal for Therapy (PT): be able to tolerate work demands and lifting  Prognosis: Fair  Prognosis Details: History of low back pain, symptoms distal to the knee  Assessment Details: Patient presents to therapy with signs and symptoms consistent with low back with radiating pain. Her objective exam mostly resembles an L4/L5 radiculopathy being that she has decreased sensation to the lower leg, decreased reflexes, positive lower extremity neural  tension, and decreased/painful lumbar spine active range of motion. Patient would benefit from skilled therapy in order to return to prior level of function and optimize function at work.     Plan  From a physical therapy perspective, the patient would benefit from: Skilled Rehab Services    Planned therapy interventions include: Therapeutic exercise, Therapeutic activities, Neuromuscular re-education, Manual therapy, and ADLs/IADLs.    Planned modalities to include: Biofeedback, Electrical stimulation - passive/unattended, and Mechanical traction.        Visit Frequency: 2 times Per Week for 10 Weeks.       This plan was discussed with Patient.   Discussion participants: Agreed Upon Plan of Care             Patient's spiritual, cultural, and educational needs considered and patient agreeable to plan of care and goals.           Goals:   Active       Physical Therapy       Physical Therapy Goal (Progressing)       Start:  03/09/25    Expected End:  05/30/25       Short Term Goals:  4 weeks  1.Report decreased low back pain </= 2/10 to increase tolerance for progressing exercises in therapy.   2. Increase pain-free flexion and extension lumbar ROM by 5-10 degrees where limited in order to perform ADLs without difficulty.  3. Increase strength by 1/3 MMT grade in bilateral hip flexion, abduction, and extension to increase tolerance for ADL and work activities.  4. Pt to tolerate HEP to improve ROM and independence with ADL's    Long Term Goals: 10 weeks  1.Report decreased low back pain < / = 1/10 to increase tolerance for progressing lifting and returning to work.   2.Patient goal: be able to better tolerate ADLs and working/lifting without low back pain.   3.Increase strength to 4+/5 in  bilateral hip flexion, abduction, extension to increase tolerance for ADL and work activities.  4. Pt will report at >/= 60% on FOTO  to demonstrate increase in LE function with every day tasks.                Derrick Colon, PT

## 2025-03-05 NOTE — TELEPHONE ENCOUNTER
All appts linked, clinicals sent to [facility], no pcp involved as patient is local. 3 month f/u appt with Dr. Simpson added to reminder sheet and left message for pt with date. Bundle resolved and closed.

## 2025-03-11 ENCOUNTER — OFFICE VISIT (OUTPATIENT)
Dept: FAMILY MEDICINE | Facility: CLINIC | Age: 55
End: 2025-03-11
Payer: COMMERCIAL

## 2025-03-11 VITALS
SYSTOLIC BLOOD PRESSURE: 134 MMHG | OXYGEN SATURATION: 98 % | TEMPERATURE: 99 F | BODY MASS INDEX: 35.16 KG/M2 | WEIGHT: 192.25 LBS | DIASTOLIC BLOOD PRESSURE: 72 MMHG | HEART RATE: 80 BPM

## 2025-03-11 DIAGNOSIS — H66.91 RIGHT OTITIS MEDIA, UNSPECIFIED OTITIS MEDIA TYPE: Primary | ICD-10-CM

## 2025-03-11 PROCEDURE — 1160F RVW MEDS BY RX/DR IN RCRD: CPT | Mod: CPTII,S$GLB,COE,

## 2025-03-11 PROCEDURE — 1159F MED LIST DOCD IN RCRD: CPT | Mod: CPTII,S$GLB,COE,

## 2025-03-11 PROCEDURE — 99999 PR PBB SHADOW E&M-EST. PATIENT-LVL IV: CPT | Mod: PBBFAC,COE,,

## 2025-03-11 PROCEDURE — 3075F SYST BP GE 130 - 139MM HG: CPT | Mod: CPTII,S$GLB,COE,

## 2025-03-11 PROCEDURE — 3008F BODY MASS INDEX DOCD: CPT | Mod: CPTII,S$GLB,COE,

## 2025-03-11 PROCEDURE — 3078F DIAST BP <80 MM HG: CPT | Mod: CPTII,S$GLB,COE,

## 2025-03-11 PROCEDURE — 99214 OFFICE O/P EST MOD 30 MIN: CPT | Mod: S$GLB,COE,,

## 2025-03-11 RX ORDER — AMOXICILLIN AND CLAVULANATE POTASSIUM 875; 125 MG/1; MG/1
1 TABLET, FILM COATED ORAL 2 TIMES DAILY
Qty: 14 TABLET | Refills: 0 | Status: SHIPPED | OUTPATIENT
Start: 2025-03-11 | End: 2025-03-18

## 2025-03-11 NOTE — PROGRESS NOTES
[facility]    3/11/2025      Subjective:       Patient ID:  Isaura is a 54 y.o. female .  has a past medical history of Hypertension.    History of Present Illness    CHIEF COMPLAINT:  Isaura presents today with right ear pain    HISTORY OF PRESENT ILLNESS:  She reports right ear pain that started this morning, accompanied by ringing. The pain is localized behind the ear. She experienced congestion yesterday which has persisted. She took Claritin this morning, suspecting allergies as the cause. The ear pain and ringing are isolated to the right ear only. She denies recent swimming, runny nose, or cough.      ROS:  Constitutional: -chills, -fever  Respiratory: -cough, -shortness of breath  Cardiovascular: -chest pain  Gastrointestinal: -abdominal pain, -constipation, -diarrhea, -nausea, -vomiting  Neurological: -dizziness, -lightheadedness, -headaches  Ears: +ear pain  Nose: +nasal congestion           Problem List[1]      Last HgbA1C:    Lab Results   Component Value Date    HGBA1C 5.0 02/26/2024    HGBA1C 5.0 01/18/2023    HGBA1C 5.2 01/19/2022         Last Lipid Panel:    Lab Results   Component Value Date    HDL 67 02/26/2024    HDL 63 01/18/2023    HDL 54 01/19/2022       Lab Results   Component Value Date    LDLCALC 93.4 02/26/2024    LDLCALC 123.8 01/18/2023    LDLCALC 120.2 01/19/2022       Lab Results   Component Value Date    TRIG 53 02/26/2024    TRIG 46 01/18/2023    TRIG 129 01/19/2022       Lab Results   Component Value Date    CHOLHDL 39.2 02/26/2024    CHOLHDL 32.1 01/18/2023    CHOLHDL 27.0 01/19/2022         Review of patient's allergies indicates:  No Known Allergies     Medication List with Changes/Refills   New Medications    AMOXICILLIN-CLAVULANATE 875-125MG (AUGMENTIN) 875-125 MG PER TABLET    Take 1 tablet by mouth 2 (two) times a day. for 7 days   Current Medications    ASPIRIN (ECOTRIN) 81 MG EC TABLET    Take 81 mg by mouth once daily.    CELECOXIB  "(CELEBREX) 200 MG CAPSULE    Take 1 capsule (200 mg total) by mouth 2 (two) times daily.    CHOLECALCIFEROL, VITAMIN D3, (VITAMIN D3) 25 MCG (1,000 UNIT) CAPSULE    Take 2 capsules (2,000 Units total) by mouth once daily.    HYDROCHLOROTHIAZIDE (HYDRODIURIL) 25 MG TABLET    Take 1 tablet (25 mg total) by mouth once daily.    LEVOTHYROXINE (SYNTHROID) 150 MCG TABLET    Take 1 tab 6 days a week and 0.5 tabs on Sundays for total of 6.5 tabs weekly    LORATADINE (CLARITIN) 10 MG TABLET    Take 1 tablet (10 mg total) by mouth once daily.    LOSARTAN (COZAAR) 25 MG TABLET    Take 1 tablet (25 mg total) by mouth once daily.    PREGABALIN (LYRICA) 50 MG CAPSULE    Take 1 capsule (50 mg total) by mouth 3 (three) times daily.    SENNA-DOCUSATE 8.6-50 MG (PERICOLACE) 8.6-50 MG PER TABLET    Take 1 tablet by mouth 2 (two) times daily.               Objective:      /72 (BP Location: Left arm, Patient Position: Sitting)   Pulse 80   Temp 99 °F (37.2 °C)   Wt 87.2 kg (192 lb 3.9 oz)   LMP 01/30/2025 (Approximate)   SpO2 98%   BMI 35.16 kg/m²   Estimated body mass index is 35.16 kg/m² as calculated from the following:    Height as of 2/17/25: 5' 2" (1.575 m).    Weight as of this encounter: 87.2 kg (192 lb 3.9 oz).    Physical Exam  Vitals reviewed.   Constitutional:       Appearance: Normal appearance.   HENT:      Head: Normocephalic and atraumatic.      Right Ear: Tympanic membrane is erythematous and bulging.      Left Ear: Tympanic membrane and ear canal normal. There is no impacted cerumen.      Mouth/Throat:      Mouth: Mucous membranes are moist.   Eyes:      Conjunctiva/sclera: Conjunctivae normal.   Cardiovascular:      Rate and Rhythm: Normal rate.   Pulmonary:      Effort: Pulmonary effort is normal. No respiratory distress.   Musculoskeletal:         General: Normal range of motion.      Cervical back: Normal range of motion.   Skin:     General: Skin is warm.   Neurological:      Mental Status: She is " alert and oriented to person, place, and time.   Psychiatric:         Mood and Affect: Mood normal.         Behavior: Behavior normal.             Assessment and Plan:   1. Right otitis media, unspecified otitis media type  - amoxicillin-clavulanate 875-125mg (AUGMENTIN) 875-125 mg per tablet; Take 1 tablet by mouth 2 (two) times a day. for 7 days  Dispense: 14 tablet; Refill: 0     Assessment & Plan    IMPRESSION:  - Diagnosed right ear infection based on patient symptoms and physical exam findings  - Determined oral antibiotic treatment appropriate due to infection location behind the eardrum  - Considered weight management options, including potential prescription medications, given patient's reported BMI increase    ACUTE OTITIS MEDIA:  - Examined the patient's right ear and found a bulging, red, and inflamed tympanic membrane.  - Diagnosed acute otitis media in the right ear.  - Explained the nature of the ear infection and the rationale for oral antibiotic treatment instead of otic drops.  - Prescribed a 7-day course of oral antibiotics for the ear infection.  - Instructed the patient to report if ear pain and congestion persist or worsen after completing the antibiotic course.    TINNITUS:  - Noted the patient's report of tinnitus in the right ear.  - Advised to monitor the frequency and intensity of the tinnitus.  - Recommend follow-up if tinnitus persists or worsens.    ALLERGIES:  - Continued Claritin as needed for allergy symptoms.    NASAL CONGESTION:  - Noted the patient's report of nasal congestion.  - Advised on proper nasal hygiene techniques, including gentle nose blowing and saline nasal irrigation.  - Recommend OTC decongestants if symptoms persist.    WEIGHT MANAGEMENT:  - Discussed potential costs of weight loss medications not covered by insurance.  - Isaura to contact insurance to inquire about coverage for weight loss medications from provided list.  - Contact office to discuss weight loss  medication options after checking insurance coverage.        The patient was informed of the following statements     Emergency Care:Seek immediate medical attention in the emergency room if you experience any new or worsening symptoms, or if your current condition significantly changes or becomes more severe.  Patient Acknowledgment: Patient verbalizes understanding of the plan and agrees to proceed with the recommended care.    Follow Up:  RTC as scheduled                    Other Orders Placed This Visit:  No orders of the defined types were placed in this encounter.        This note was generated with the assistance of ambient listening technology. Verbal consent was obtained by the patient and accompanying visitor(s) for the recording of patient appointment to facilitate this note. I attest to having reviewed and edited the generated note for accuracy, though some syntax or spelling errors may persist. Please contact the author of this note for any clarification.        Tammy Swain PA-C        I spent a total of 30 minutes on the day of the visit.This includes face to face time and non-face to face time preparing to see the patient (eg, review of tests), obtaining and/or reviewing separately obtained history, documenting clinical information in the electronic or other health record, independently interpreting results and communicating results to the patient/family/caregiver, or care coordinator.          [1]   Patient Active Problem List  Diagnosis    Essential hypertension    Snoring    Obesity (BMI 30-39.9)    Insomnia    Chronic bilateral low back pain with left-sided sciatica    Class 2 obesity with body mass index (BMI) of 37.0 to 37.9 in adult    Lumbar facet arthropathy    Spondylolisthesis at L4-L5 level    Sacroiliitis    Muscle strain of right forearm    Headache    Constipation    Gastroesophageal reflux disease    Family history of premature CAD    Degenerative disc disease, lumbar    Uterine  leiomyoma    Abnormal uterine bleeding (AUB)    Hurthle cell carcinoma of thyroid    Postoperative hypothyroidism    Mass of right side of neck    STAS (obstructive sleep apnea)    Decreased ROM of trunk and back    Decreased range of motion of lumbar spine    Right leg paresthesias

## 2025-03-11 NOTE — PATIENT INSTRUCTIONS
Weight control  For Diet   - Try the DASH and/or the Mediterranean Diet  - DASH- https://www.nhlbi.nih.gov/health-topics/dash-eating-plan  - Mediterranean - https://www.healthline.com/nutrition/mediterranean-diet-meal-plan  - Try meeting with a Nutritionist  - https://www.BizSlate.Employma/us/nutritionists-dietitians/la/new-[facility] .     For Food delivery program try  - Hungry Douglas  - https://YOLLEGE.deskwolf/  - Purple carrot - https://www.OrbFlex/    Try a portion control plate  https://Fix8.org/portion-control-plates-that-get-results/    For Exercise  Start with at least 20 min a day of moderate intensity exercise  This can be done at your gym or at home  Some home workout beginner plans include   - https://Proteopure.Employma/  - https://SuperCloud.Employma    For medications  - please look up the following medication to check for your insurance ozempic, trulicity, mounjaro, Wegovy, rybelsus, Contrave, Qsymia, adipex, zepbound,    For applications  - Myplate.com - https://www.myplate.gov/   - myfitnesspal - https://www.myfitnesspal.com/  - Noom - https://www.noom.com/  - Products and Programs  optavia

## 2025-03-12 ENCOUNTER — CLINICAL SUPPORT (OUTPATIENT)
Dept: REHABILITATION | Facility: HOSPITAL | Age: 55
End: 2025-03-12
Payer: COMMERCIAL

## 2025-03-12 DIAGNOSIS — M53.86 DECREASED RANGE OF MOTION OF LUMBAR SPINE: Primary | ICD-10-CM

## 2025-03-12 DIAGNOSIS — R20.2 RIGHT LEG PARESTHESIAS: ICD-10-CM

## 2025-03-12 PROCEDURE — 97140 MANUAL THERAPY 1/> REGIONS: CPT

## 2025-03-12 PROCEDURE — 97110 THERAPEUTIC EXERCISES: CPT

## 2025-03-12 PROCEDURE — 97112 NEUROMUSCULAR REEDUCATION: CPT

## 2025-03-12 NOTE — PROGRESS NOTES
Outpatient Rehab    Physical Therapy Visit    Patient Name: Isaura Mancini  MRN: [id number]  YOB: 1970  Encounter Date: 3/12/2025    Therapy Diagnosis:   Encounter Diagnoses   Name Primary?    Decreased range of motion of lumbar spine Yes    Right leg paresthesias      Physician: Bill Simpson MD    Physician Orders: Eval and Treat  Medical Diagnosis: Spondylolisthesis of lumbar region [M43.16]      Visit # / Visits Authorized:  1 / 10   Date of Evaluation:  3/5/2025   Insurance Authorization Period: 3/5/2025 to 12/31/2025  Plan of Care Certification:  3/5/2025 to 5/30/2025       Time In: 0903   Time Out: 1000  Total Time: 57   Total Billable Time: 55    FOTO:  Intake Score:  %  Survey Score 1:  %  Survey Score 2:  %         Subjective   having back pain into the middle and buttocks region. Also having pain into the right leg with forward flexion. Has a lot of trouble with lifting at work..  Pain reported as 4/10.      Objective            Treatment:  Therapeutic Exercise  Therapeutic Exercise Activity 1: Thoracic rotation in sidelying, 2 x 10  Therapeutic Exercise Activity 2: HEP review    Manual Therapy  Manual Therapy Activity 1: Long axis hip distraction, grades II/III    Balance/Neuromuscular Re-Education  Balance/Neuromuscular Re-Education Activity 1: Lateral stepping RTB above ankles, 4 laps x 10 ft  Balance/Neuromuscular Re-Education Activity 2: Hooklying hip adduction with ball + bridge, 2 x 12  Balance/Neuromuscular Re-Education Activity 3: Paloff press BTB, 2 x 10 each direction  Balance/Neuromuscular Re-Education Activity 4: Neurodynamic assessment  Balance/Neuromuscular Re-Education Activity 5: Hooklying clamshells, RTB, 2 x 12  Balance/Neuromuscular Re-Education Activity 6: Hip hinging, 2 x 12 with dowel  Balance/Neuromuscular Re-Education Activity 7: Shoulder extension with mini marches TA activation, standing, GTB, 2 x 12         Assessment & Plan   Assessment: Patient presents with  continued adverse neural tension into right lower extremity with slump testing. Has negative SLR testing at this time. Able to work on standing progressions of core and lateral/posterior hip strengthening.  Evaluation/Treatment Tolerance: Patient tolerated treatment well    Patient will continue to benefit from skilled outpatient physical therapy to address the deficits listed in the problem list box on initial evaluation, provide pt/family education and to maximize pt's level of independence in the home and community environment.     Patient's spiritual, cultural, and educational needs considered and patient agreeable to plan of care and goals.           Plan:      Goals:   Active       Physical Therapy       Physical Therapy Goal (Progressing)       Start:  03/09/25    Expected End:  05/30/25       Short Term Goals:  4 weeks  1.Report decreased low back pain </= 2/10 to increase tolerance for progressing exercises in therapy.   2. Increase pain-free flexion and extension lumbar ROM by 5-10 degrees where limited in order to perform ADLs without difficulty.  3. Increase strength by 1/3 MMT grade in bilateral hip flexion, abduction, and extension to increase tolerance for ADL and work activities.  4. Pt to tolerate HEP to improve ROM and independence with ADL's    Long Term Goals: 10 weeks  1.Report decreased low back pain < / = 1/10 to increase tolerance for progressing lifting and returning to work.   2.Patient goal: be able to better tolerate ADLs and working/lifting without low back pain.   3.Increase strength to 4+/5 in  bilateral hip flexion, abduction, extension to increase tolerance for ADL and work activities.  4. Pt will report at >/= 60% on FOTO  to demonstrate increase in LE function with every day tasks.                Derrick Colon, PT

## 2025-03-14 ENCOUNTER — OFFICE VISIT (OUTPATIENT)
Dept: FAMILY MEDICINE | Facility: CLINIC | Age: 55
End: 2025-03-14
Payer: COMMERCIAL

## 2025-03-14 DIAGNOSIS — Z71.3 ENCOUNTER FOR WEIGHT LOSS COUNSELING: Primary | ICD-10-CM

## 2025-03-14 DIAGNOSIS — E66.01 SEVERE OBESITY (BMI 35.0-35.9 WITH COMORBIDITY): ICD-10-CM

## 2025-03-18 ENCOUNTER — OFFICE VISIT (OUTPATIENT)
Dept: FAMILY MEDICINE | Facility: CLINIC | Age: 55
End: 2025-03-18
Payer: COMMERCIAL

## 2025-03-18 VITALS
OXYGEN SATURATION: 98 % | BODY MASS INDEX: 36.1 KG/M2 | HEART RATE: 68 BPM | WEIGHT: 196.19 LBS | DIASTOLIC BLOOD PRESSURE: 70 MMHG | SYSTOLIC BLOOD PRESSURE: 124 MMHG | HEIGHT: 62 IN

## 2025-03-18 DIAGNOSIS — H65.91 MIDDLE EAR EFFUSION, RIGHT: Primary | ICD-10-CM

## 2025-03-18 PROCEDURE — 99999 PR PBB SHADOW E&M-EST. PATIENT-LVL IV: CPT | Mod: PBBFAC,COE,,

## 2025-03-18 PROCEDURE — 3008F BODY MASS INDEX DOCD: CPT | Mod: CPTII,S$GLB,COE,

## 2025-03-18 PROCEDURE — 3074F SYST BP LT 130 MM HG: CPT | Mod: CPTII,S$GLB,COE,

## 2025-03-18 PROCEDURE — 99214 OFFICE O/P EST MOD 30 MIN: CPT | Mod: S$GLB,COE,,

## 2025-03-18 PROCEDURE — 1159F MED LIST DOCD IN RCRD: CPT | Mod: CPTII,S$GLB,COE,

## 2025-03-18 PROCEDURE — 1160F RVW MEDS BY RX/DR IN RCRD: CPT | Mod: CPTII,S$GLB,COE,

## 2025-03-18 PROCEDURE — 3078F DIAST BP <80 MM HG: CPT | Mod: CPTII,S$GLB,COE,

## 2025-03-18 RX ORDER — FLUTICASONE PROPIONATE 50 MCG
1 SPRAY, SUSPENSION (ML) NASAL DAILY
Qty: 15.8 ML | Refills: 0 | Status: SHIPPED | OUTPATIENT
Start: 2025-03-18

## 2025-03-18 NOTE — PROGRESS NOTES
[facility]    3/18/2025      Subjective:       Patient ID:  Isaura is a 54 y.o. female .  has a past medical history of Hypertension.    History of Present Illness    CHIEF COMPLAINT:  Isaura presents today for persistent ear issues after completing medication    ENT SYMPTOMS:  She reports persistent tinnitus and hearing difficulty in the right ear with a sensation of blockage. She experiences autophony but has difficulty hearing others clearly. She notes a sensation of wanting to pop the ear when yawning without success. She denies ear pain but reports occasional nasal congestion. She denies fever and chills. She denies use of nasal decongestant spray. Recently completed antibiotic treatment for ear infection.         ROS:  Constitutional: -chills, -fever  Respiratory: -cough, -shortness of breath  Cardiovascular: -chest pain  Gastrointestinal: -abdominal pain, -constipation, -diarrhea, -nausea, -vomiting  Neurological: -dizziness, -lightheadedness, -headaches  Ears: +tinnitus, +hearing loss, +difficulty hearing, +ear pressure  Nose: +nasal congestion           Problem List[1]      Last HgbA1C:    Lab Results   Component Value Date    HGBA1C 5.0 02/26/2024    HGBA1C 5.0 01/18/2023    HGBA1C 5.2 01/19/2022         Last Lipid Panel:    Lab Results   Component Value Date    HDL 67 02/26/2024    HDL 63 01/18/2023    HDL 54 01/19/2022       Lab Results   Component Value Date    LDLCALC 93.4 02/26/2024    LDLCALC 123.8 01/18/2023    LDLCALC 120.2 01/19/2022       Lab Results   Component Value Date    TRIG 53 02/26/2024    TRIG 46 01/18/2023    TRIG 129 01/19/2022       Lab Results   Component Value Date    CHOLHDL 39.2 02/26/2024    CHOLHDL 32.1 01/18/2023    CHOLHDL 27.0 01/19/2022         Review of patient's allergies indicates:  No Known Allergies     Medication List with Changes/Refills   New Medications    FLUTICASONE PROPIONATE (FLONASE) 50 MCG/ACTUATION NASAL SPRAY    1 spray  "(50 mcg total) by Each Nostril route once daily.   Current Medications    AMOXICILLIN-CLAVULANATE 875-125MG (AUGMENTIN) 875-125 MG PER TABLET    Take 1 tablet by mouth 2 (two) times a day. for 7 days    ASPIRIN (ECOTRIN) 81 MG EC TABLET    Take 81 mg by mouth once daily.    CELECOXIB (CELEBREX) 200 MG CAPSULE    Take 1 capsule (200 mg total) by mouth 2 (two) times daily.    CHOLECALCIFEROL, VITAMIN D3, (VITAMIN D3) 25 MCG (1,000 UNIT) CAPSULE    Take 2 capsules (2,000 Units total) by mouth once daily.    HYDROCHLOROTHIAZIDE (HYDRODIURIL) 25 MG TABLET    Take 1 tablet (25 mg total) by mouth once daily.    LEVOTHYROXINE (SYNTHROID) 150 MCG TABLET    Take 1 tab 6 days a week and 0.5 tabs on Sundays for total of 6.5 tabs weekly    LORATADINE (CLARITIN) 10 MG TABLET    Take 1 tablet (10 mg total) by mouth once daily.    LOSARTAN (COZAAR) 25 MG TABLET    Take 1 tablet (25 mg total) by mouth once daily.    PREGABALIN (LYRICA) 50 MG CAPSULE    Take 1 capsule (50 mg total) by mouth 3 (three) times daily.    SENNA-DOCUSATE 8.6-50 MG (PERICOLACE) 8.6-50 MG PER TABLET    Take 1 tablet by mouth 2 (two) times daily.               Objective:      /70 (BP Location: Left arm, Patient Position: Sitting)   Pulse 68   Ht 5' 2" (1.575 m)   Wt 89 kg (196 lb 3.4 oz)   LMP 01/30/2025 (Approximate)   SpO2 98%   BMI 35.89 kg/m²   Estimated body mass index is 35.89 kg/m² as calculated from the following:    Height as of this encounter: 5' 2" (1.575 m).    Weight as of this encounter: 89 kg (196 lb 3.4 oz).    Physical Exam  Vitals reviewed.   Constitutional:       General: She is not in acute distress.     Appearance: Normal appearance.   HENT:      Head: Normocephalic and atraumatic.      Right Ear: A middle ear effusion is present.      Left Ear: Tympanic membrane and ear canal normal.  No middle ear effusion.      Mouth/Throat:      Mouth: Mucous membranes are moist.   Eyes:      Extraocular Movements: Extraocular movements " intact.      Conjunctiva/sclera: Conjunctivae normal.   Cardiovascular:      Rate and Rhythm: Normal rate and regular rhythm.      Pulses: Normal pulses.   Pulmonary:      Effort: Pulmonary effort is normal. No respiratory distress.   Abdominal:      General: Bowel sounds are normal.      Palpations: Abdomen is soft.      Tenderness: There is no abdominal tenderness.   Musculoskeletal:         General: Normal range of motion.      Cervical back: Normal range of motion.   Skin:     General: Skin is warm.   Neurological:      Mental Status: She is alert and oriented to person, place, and time.   Psychiatric:         Mood and Affect: Mood normal.         Behavior: Behavior normal.             Assessment and Plan:   1. Middle ear effusion, right  - fluticasone propionate (FLONASE) 50 mcg/actuation nasal spray; 1 spray (50 mcg total) by Each Nostril route once daily.  Dispense: 15.8 mL; Refill: 0     Assessment & Plan    IMPRESSION:  - Assessed ear complaint: infection resolved but fluid buildup persists behind ear causing persistent symptoms.  - Considered nasal decongestant spray to help drain fluid from behind ear.  - Evaluated ability to equalize ear pressure.      OTITIS MEDIA (RIGHT EAR):  - Examined the ear and found significant fluid buildup behind the ear.  - The previously infected area, which was red and bulging, is now presenting only fluid.  - Assessed the condition by checking for pain on pressure and inquiring about recent swimming activities.  - Explained to the patient that the fluid buildup behind the ear is causing persistent symptoms, including hearing difficulty and a feeling of blockage in the right ear.  - Instructed the patient on proper use of nasal spray, emphasizing aiming towards the ears.  - Prescribed a nasal decongestant spray to help drain the fluid from behind the ear.  - if symptoms persists, will refer to EN        The patient was informed of the following statements     Emergency  Care:Seek immediate medical attention in the emergency room if you experience any new or worsening symptoms, or if your current condition significantly changes or becomes more severe.  Patient Acknowledgment: Patient verbalizes understanding of the plan and agrees to proceed with the recommended care.    Follow Up:  RTC as scheduled                    Other Orders Placed This Visit:  No orders of the defined types were placed in this encounter.        This note was generated with the assistance of ambient listening technology. Verbal consent was obtained by the patient and accompanying visitor(s) for the recording of patient appointment to facilitate this note. I attest to having reviewed and edited the generated note for accuracy, though some syntax or spelling errors may persist. Please contact the author of this note for any clarification.        Tammy Swain PA-C        I spent a total of 30 minutes on the day of the visit.This includes face to face time and non-face to face time preparing to see the patient (eg, review of tests), obtaining and/or reviewing separately obtained history, documenting clinical information in the electronic or other health record, independently interpreting results and communicating results to the patient/family/caregiver, or care coordinator.          [1]   Patient Active Problem List  Diagnosis    Essential hypertension    Snoring    Obesity (BMI 30-39.9)    Insomnia    Chronic bilateral low back pain with left-sided sciatica    Class 2 obesity with body mass index (BMI) of 37.0 to 37.9 in adult    Lumbar facet arthropathy    Spondylolisthesis at L4-L5 level    Sacroiliitis    Muscle strain of right forearm    Headache    Constipation    Gastroesophageal reflux disease    Family history of premature CAD    Degenerative disc disease, lumbar    Uterine leiomyoma    Abnormal uterine bleeding (AUB)    Hurthle cell carcinoma of thyroid    Postoperative hypothyroidism    Mass of  right side of neck    STAS (obstructive sleep apnea)    Decreased ROM of trunk and back    Decreased range of motion of lumbar spine    Right leg paresthesias

## 2025-03-26 ENCOUNTER — CLINICAL SUPPORT (OUTPATIENT)
Dept: REHABILITATION | Facility: HOSPITAL | Age: 55
End: 2025-03-26
Payer: COMMERCIAL

## 2025-03-26 DIAGNOSIS — M53.86 DECREASED RANGE OF MOTION OF LUMBAR SPINE: Primary | ICD-10-CM

## 2025-03-26 DIAGNOSIS — R20.2 RIGHT LEG PARESTHESIAS: ICD-10-CM

## 2025-03-26 PROCEDURE — 97140 MANUAL THERAPY 1/> REGIONS: CPT

## 2025-03-26 PROCEDURE — 97110 THERAPEUTIC EXERCISES: CPT

## 2025-03-26 PROCEDURE — 97112 NEUROMUSCULAR REEDUCATION: CPT

## 2025-03-26 NOTE — PROGRESS NOTES
Outpatient Rehab    Physical Therapy Visit    Patient Name: Isaura Mancini  MRN: [id number]  YOB: 1970  Encounter Date: 3/26/2025    Therapy Diagnosis:   Encounter Diagnoses   Name Primary?    Decreased range of motion of lumbar spine Yes    Right leg paresthesias      Physician: Bill Simpson MD    Physician Orders: Eval and Treat  Medical Diagnosis: Spondylolisthesis of lumbar region    Visit # / Visits Authorized:  2 / 10  Insurance Authorization Period: 3/5/2025 to 12/31/2025  Date of Evaluation: 3/5/2025  Plan of Care Certification: 3/5/2025 to 5/30/2025     PT/PTA:     Number of PTA visits since last PT visit:   Time In: 1302   Time Out: 1402  Total Time: 60   Total Billable Time: 55    FOTO:  Intake Score:  %  Survey Score 1:  %  Survey Score 2:  %         Subjective   has some back pain still, but was sick with the flu last week so she had to miss PT. Feeling pain in low back and right leg today.  Pain reported as 3/10.      Objective            Treatment:  Therapeutic Exercise  TE 1: Nustep recumbent stepper, level 1.0, 9 mins for lower extremity endurance  TE 2: HEP review  Manual Therapy  MT 1: Long axis hip distraction, grades II/III  MT 2: Manual decompression bilateral LE, grades II/III  MT 3: Thoracic PA mobs, grades II/III  Balance/Neuromuscular Re-Education  NMR 1: TA activation with biofeedback cuff, 2 x 10 with 40-50 mmHg  NMR 2: Hooklying hip adduction with ball, 2 x 12  NMR 3: Paloff press BTB, 2 x 10 each direction  NMR 4: Neurodynamic assessment  NMR 5: Hooklying clamshells, GTB, 2 x 12  NMR 6: Hip hinging, 2 x 12 with dowel at wall    Time Entry(in minutes):  Manual Therapy Time Entry: 10  Neuromuscular Re-Education Time Entry: 32  Therapeutic Exercise Time Entry: 13    Assessment & Plan   Assessment: Patient presents with continued adverse neural tension positive with slump, but negative with slr passive testing. She continues to work on strength in bilateral lower  extremities and initiated TA activation with biofeedback cuff. Continues to show worsening symptoms with extension and flexion based approaches, so mainly have been activating core in neutral spine. Has negative SLR testing at this time. Able to work on standing progressions of core and lateral/posterior hip strengthening.  Evaluation/Treatment Tolerance: Patient tolerated treatment well    Patient will continue to benefit from skilled outpatient physical therapy to address the deficits listed in the problem list box on initial evaluation, provide pt/family education and to maximize pt's level of independence in the home and community environment.     Patient's spiritual, cultural, and educational needs considered and patient agreeable to plan of care and goals.           Plan:      Goals:   Active       Physical Therapy       Physical Therapy Goal (Progressing)       Start:  03/09/25    Expected End:  05/30/25       Short Term Goals:  4 weeks  1.Report decreased low back pain </= 2/10 to increase tolerance for progressing exercises in therapy.   2. Increase pain-free flexion and extension lumbar ROM by 5-10 degrees where limited in order to perform ADLs without difficulty.  3. Increase strength by 1/3 MMT grade in bilateral hip flexion, abduction, and extension to increase tolerance for ADL and work activities.  4. Pt to tolerate HEP to improve ROM and independence with ADL's    Long Term Goals: 10 weeks  1.Report decreased low back pain < / = 1/10 to increase tolerance for progressing lifting and returning to work.   2.Patient goal: be able to better tolerate ADLs and working/lifting without low back pain.   3.Increase strength to 4+/5 in  bilateral hip flexion, abduction, extension to increase tolerance for ADL and work activities.  4. Pt will report at >/= 60% on FOTO  to demonstrate increase in LE function with every day tasks.                Derrick Colon, PT

## 2025-04-02 ENCOUNTER — CLINICAL SUPPORT (OUTPATIENT)
Dept: REHABILITATION | Facility: HOSPITAL | Age: 55
End: 2025-04-02
Payer: COMMERCIAL

## 2025-04-02 DIAGNOSIS — R20.2 RIGHT LEG PARESTHESIAS: ICD-10-CM

## 2025-04-02 DIAGNOSIS — M53.86 DECREASED RANGE OF MOTION OF LUMBAR SPINE: Primary | ICD-10-CM

## 2025-04-02 PROCEDURE — 97112 NEUROMUSCULAR REEDUCATION: CPT

## 2025-04-02 PROCEDURE — 97110 THERAPEUTIC EXERCISES: CPT

## 2025-04-02 PROCEDURE — 97140 MANUAL THERAPY 1/> REGIONS: CPT

## 2025-04-02 NOTE — PROGRESS NOTES
Outpatient Rehab    Physical Therapy Progress Note    Patient Name: Isaura Mancini  MRN: [id number]  YOB: 1970  Encounter Date: 4/2/2025    Therapy Diagnosis:   Encounter Diagnoses   Name Primary?    Decreased range of motion of lumbar spine Yes    Right leg paresthesias      Physician: Bill Simpson MD    Physician Orders: Eval and Treat  Medical Diagnosis: Spondylolisthesis of lumbar region    Visit # / Visits Authorized:  3 / 10  Insurance Authorization Period: 3/5/2025 to 12/31/2025  Date of Evaluation: 3/5/2025  Plan of Care Certification: 3/5/2025 to 5/30/2025     PT/PTA:     Number of PTA visits since last PT visit:   Time In: 1305   Time Out: 1400  Total Time: 55   Total Billable Time: 55    FOTO:  Intake Score:  %  Survey Score 1:  %  Survey Score 2:  %         Subjective   has some increased levels of low back pain and now has left sided glute pain/leg pain. States prior to yesterday she was doing ok with pain levels. She states she is hoping to get into the doctor soon due to her increased pain..  Pain reported as 2/10.      Objective      Lower Extremity Sensation  Right Lumbar/Lower Extremity Sensation  Intact: Light Touch       Left Lumbar/Lower Extremity Sensation  Intact: Light Touch                Lumbar Range of Motion   Active (deg) Passive (deg) Pain   Flexion 30   Yes   Extension 5   Yes   Right Lateral Flexion 15       Right Rotation 5       Left Lateral Flexion 20       Left Rotation 5                   + Slump and SLR bilaterally     Treatment:  Therapeutic Exercise  TE 1: Sidelying thoracic rotation, 2 x 12  TE 2: HEP review/Education on walking and light activity  Manual Therapy  MT 1: Long axis hip distraction, Right only, grades II/III  MT 2: Manual decompression bilateral LE, grades II/III  Balance/Neuromuscular Re-Education  NMR 1: TA activation with biofeedback cuff, 2 x 10 with 40-50 mmHg  NMR 2: Sidelying TA with cueing, 2 x 10  NMR 3: TA activation with bent knee  fallouts biofeedback cuff, 2 x 10 with 40-50 mmHg  NMR 4: Neurodynamic assessment  NMR 5: Sidelying clamshells, no band, 2 x 12    Time Entry(in minutes):  Manual Therapy Time Entry: 9  Neuromuscular Re-Education Time Entry: 36  Therapeutic Exercise Time Entry: 10    Assessment & Plan   Assessment: Patient presents for her re-assessment and has completed 3 visits of therapy thus far. Patient has had a recent pain exacerbation as of yesterday and is now having bilateral lower extremity nerve pain. Incorporated biofeedback cuff for light TA activation. She has a positive slump test bilaterally and positive passive SLR test as well. Patient to continue working on improving towards her goals.  Evaluation/Treatment Tolerance: Patient tolerated treatment well    Patient will continue to benefit from skilled outpatient physical therapy to address the deficits listed in the problem list box on initial evaluation, provide pt/family education and to maximize pt's level of independence in the home and community environment.     Patient's spiritual, cultural, and educational needs considered and patient agreeable to plan of care and goals.           Plan:      Goals:   Active       Physical Therapy       Physical Therapy Goal (Progressing)       Start:  03/09/25    Expected End:  05/30/25       Short Term Goals:  4 weeks  1.Report decreased low back pain </= 2/10 to increase tolerance for progressing exercises in therapy.   2. Increase pain-free flexion and extension lumbar ROM by 5-10 degrees where limited in order to perform ADLs without difficulty.  3. Increase strength by 1/3 MMT grade in bilateral hip flexion, abduction, and extension to increase tolerance for ADL and work activities.  4. Pt to tolerate HEP to improve ROM and independence with ADL's    Long Term Goals: 10 weeks  1.Report decreased low back pain < / = 1/10 to increase tolerance for progressing lifting and returning to work.   2.Patient goal: be able to  better tolerate ADLs and working/lifting without low back pain.   3.Increase strength to 4+/5 in  bilateral hip flexion, abduction, extension to increase tolerance for ADL and work activities.  4. Pt will report at >/= 60% on FOTO  to demonstrate increase in LE function with every day tasks.                Derrick Colon, PT

## 2025-04-04 ENCOUNTER — TELEPHONE (OUTPATIENT)
Dept: ORTHOPEDICS | Facility: CLINIC | Age: 55
End: 2025-04-04
Payer: COMMERCIAL

## 2025-04-04 NOTE — TELEPHONE ENCOUNTER
Left message for pt advising that we have set up a follow up for her with Dr. Simpson at the [place] location at 10:00 am.

## 2025-04-09 ENCOUNTER — PATIENT MESSAGE (OUTPATIENT)
Dept: REHABILITATION | Facility: HOSPITAL | Age: 55
End: 2025-04-09
Payer: COMMERCIAL

## 2025-04-16 ENCOUNTER — CLINICAL SUPPORT (OUTPATIENT)
Dept: REHABILITATION | Facility: HOSPITAL | Age: 55
End: 2025-04-16
Payer: COMMERCIAL

## 2025-04-16 DIAGNOSIS — R20.2 RIGHT LEG PARESTHESIAS: ICD-10-CM

## 2025-04-16 DIAGNOSIS — M53.86 DECREASED RANGE OF MOTION OF LUMBAR SPINE: Primary | ICD-10-CM

## 2025-04-16 PROCEDURE — 97112 NEUROMUSCULAR REEDUCATION: CPT

## 2025-04-16 PROCEDURE — 97140 MANUAL THERAPY 1/> REGIONS: CPT

## 2025-04-16 PROCEDURE — 97110 THERAPEUTIC EXERCISES: CPT

## 2025-04-16 NOTE — PROGRESS NOTES
Outpatient Rehab    Physical Therapy Visit    Patient Name: Isaura Mancini  MRN: [id number]  YOB: 1970  Encounter Date: 4/16/2025    Therapy Diagnosis:   Encounter Diagnoses   Name Primary?    Decreased range of motion of lumbar spine Yes    Right leg paresthesias      Physician: Bill Simpson MD    Physician Orders: Eval and Treat  Medical Diagnosis: Spondylolisthesis of lumbar region    Visit # / Visits Authorized:  4 / 10  Insurance Authorization Period: 3/5/2025 to 12/31/2025  Date of Evaluation: 3/5/2025  Plan of Care Certification: 3/5/2025 to 5/30/2025     PT/PTA:     Number of PTA visits since last PT visit:   Time In: 1500   Time Out: 1555  Total Time: 55   Total Billable Time: 55    FOTO:  Intake Score: 47%  Survey Score 1: 42%  Survey Score 2:  %         Subjective   states her back pain has continued to worsen and she has had to take a few days off.  Pain reported as 2/10.      Objective            Treatment:  Therapeutic Exercise  TE 1: Seated thoracic extension, 4 x 10 (stool under feet for low back comfort)  TE 2: HEP review/Education on walking and light activity  Manual Therapy  MT 1: Long axis hip distraction, Right only, grades II/III  Balance/Neuromuscular Re-Education  NMR 1: TA activation, 2 x 5 (d/c due to pain)  NMR 2: Hooklying clamshell RTB, 2 x 10  NMR 3: Hooklying hip adduction into ball, 2 x 10  NMR 4: Neurodynamic assessment - seated slump    Time Entry(in minutes):  Manual Therapy Time Entry: 10  Neuromuscular Re-Education Time Entry: 20  Therapeutic Exercise Time Entry: 25    Assessment & Plan   Assessment: Patient presents with bilateral low back pain exacerbation which has worsened compared to previous weeks. She has more pain with extension, but notes some symptoms have centralized since initial evaluation, but has worse intensity of back pain and glute pain. Continues to show positive seated slump neural provocation testing. Session limited due to pain. Patient  to continue working on improving towards her goals.  Evaluation/Treatment Tolerance: Patient tolerated treatment well    Patient will continue to benefit from skilled outpatient physical therapy to address the deficits listed in the problem list box on initial evaluation, provide pt/family education and to maximize pt's level of independence in the home and community environment.     Patient's spiritual, cultural, and educational needs considered and patient agreeable to plan of care and goals.           Plan:      Goals:   Active       Physical Therapy       Physical Therapy Goal (Progressing)       Start:  03/09/25    Expected End:  05/30/25       Short Term Goals:  4 weeks  1.Report decreased low back pain </= 2/10 to increase tolerance for progressing exercises in therapy.   2. Increase pain-free flexion and extension lumbar ROM by 5-10 degrees where limited in order to perform ADLs without difficulty.  3. Increase strength by 1/3 MMT grade in bilateral hip flexion, abduction, and extension to increase tolerance for ADL and work activities.  4. Pt to tolerate HEP to improve ROM and independence with ADL's    Long Term Goals: 10 weeks  1.Report decreased low back pain < / = 1/10 to increase tolerance for progressing lifting and returning to work.   2.Patient goal: be able to better tolerate ADLs and working/lifting without low back pain.   3.Increase strength to 4+/5 in  bilateral hip flexion, abduction, extension to increase tolerance for ADL and work activities.  4. Pt will report at >/= 60% on FOTO  to demonstrate increase in LE function with every day tasks.                Derrick Colon, PT

## 2025-04-23 ENCOUNTER — PATIENT MESSAGE (OUTPATIENT)
Dept: REHABILITATION | Facility: HOSPITAL | Age: 55
End: 2025-04-23
Payer: COMMERCIAL

## 2025-04-30 ENCOUNTER — CLINICAL SUPPORT (OUTPATIENT)
Dept: REHABILITATION | Facility: HOSPITAL | Age: 55
End: 2025-04-30
Payer: COMMERCIAL

## 2025-04-30 DIAGNOSIS — M53.86 DECREASED RANGE OF MOTION OF LUMBAR SPINE: Primary | ICD-10-CM

## 2025-04-30 DIAGNOSIS — R20.2 RIGHT LEG PARESTHESIAS: ICD-10-CM

## 2025-04-30 PROCEDURE — 97140 MANUAL THERAPY 1/> REGIONS: CPT

## 2025-04-30 PROCEDURE — 97112 NEUROMUSCULAR REEDUCATION: CPT

## 2025-04-30 PROCEDURE — 97110 THERAPEUTIC EXERCISES: CPT

## 2025-04-30 NOTE — PROGRESS NOTES
"  Outpatient Rehab    Physical Therapy Progress Note    Patient Name: Isaura Mancini  MRN: [id number]  YOB: 1970  Encounter Date: 4/30/2025    Therapy Diagnosis:   Encounter Diagnoses   Name Primary?    Decreased range of motion of lumbar spine Yes    Right leg paresthesias      Physician: Bill Simpson MD    Physician Orders: Eval and Treat  Medical Diagnosis: Spondylolisthesis of lumbar region    Visit # / Visits Authorized:  5 / 10  Insurance Authorization Period: 3/5/2025 to 12/31/2025  Date of Evaluation: 3/5/2025  Plan of Care Certification: 3/5/2025 to 5/30/2025     PT/PTA:     Number of PTA visits since last PT visit:   Time In: 1300   Time Out: 1359  Total Time: 59   Total Billable Time: 55    FOTO:  Intake Score:  %  Survey Score 1:  %  Survey Score 2:  %         Subjective   feels like the pain in her legs has become more tolerable but she has felt like the lower back pain has worsened.  Pain reported as 3/10.      Objective           Treatment:  Therapeutic Exercise  TE 1: Seated thoracic extension, 2 x 10 (stool under feet for low back comfort)  TE 2: HEP review/Education on walking and light activity  Manual Therapy  MT 1: Long axis hip distraction, Right only, grades II/III  Balance/Neuromuscular Re-Education  NMR 1: neurodynamics assessment  NMR 2: Hooklying clamshell GTB, 2 x 10 unilateral  NMR 3: Hooklying hip adduction into ball, 3 x 10 with 3" holds  NMR 4: Trunk supported hip abduction 2 x 10  NMR 5: Bilateral UE pull downs Red Tband, 2 x 10    Time Entry(in minutes):  Manual Therapy Time Entry: 10  Neuromuscular Re-Education Time Entry: 29  Therapeutic Exercise Time Entry: 16    Assessment & Plan   Assessment: Patient presents for her re-assessment and has completed 5 visits of therapy. She has continued low back pain centrally and bilaterally with flexion and extension active motions. She had a negative seated slump test today however continues to have pain with passive SLR " neural tension testing. Patient to continue working on improving towards her goals, but has shown limited progress with therapy thus far.  Evaluation/Treatment Tolerance: Patient tolerated treatment well    Patient will continue to benefit from skilled outpatient physical therapy to address the deficits listed in the problem list box on initial evaluation, provide pt/family education and to maximize pt's level of independence in the home and community environment.     Patient's spiritual, cultural, and educational needs considered and patient agreeable to plan of care and goals.           Plan:      Goals:   Active       Physical Therapy       Physical Therapy Goal (Progressing)       Start:  03/09/25    Expected End:  05/30/25       Short Term Goals:  4 weeks  1.Report decreased low back pain </= 2/10 to increase tolerance for progressing exercises in therapy.   2. Increase pain-free flexion and extension lumbar ROM by 5-10 degrees where limited in order to perform ADLs without difficulty.  3. Increase strength by 1/3 MMT grade in bilateral hip flexion, abduction, and extension to increase tolerance for ADL and work activities.  4. Pt to tolerate HEP to improve ROM and independence with ADL's    Long Term Goals: 10 weeks  1.Report decreased low back pain < / = 1/10 to increase tolerance for progressing lifting and returning to work.   2.Patient goal: be able to better tolerate ADLs and working/lifting without low back pain.   3.Increase strength to 4+/5 in  bilateral hip flexion, abduction, extension to increase tolerance for ADL and work activities.  4. Pt will report at >/= 60% on FOTO  to demonstrate increase in LE function with every day tasks.                Derrick Colon, PT

## 2025-05-06 ENCOUNTER — TELEPHONE (OUTPATIENT)
Dept: NEUROSURGERY | Facility: CLINIC | Age: 55
End: 2025-05-06
Payer: COMMERCIAL

## 2025-05-12 ENCOUNTER — OFFICE VISIT (OUTPATIENT)
Dept: NEUROSURGERY | Facility: CLINIC | Age: 55
End: 2025-05-12
Payer: COMMERCIAL

## 2025-05-12 VITALS
DIASTOLIC BLOOD PRESSURE: 82 MMHG | SYSTOLIC BLOOD PRESSURE: 125 MMHG | HEIGHT: 62 IN | WEIGHT: 198.44 LBS | BODY MASS INDEX: 36.52 KG/M2 | HEART RATE: 72 BPM

## 2025-05-12 DIAGNOSIS — M43.16 SPONDYLOLISTHESIS OF LUMBAR REGION: Primary | ICD-10-CM

## 2025-05-12 PROCEDURE — 1159F MED LIST DOCD IN RCRD: CPT | Mod: CPTII,S$GLB,COE, | Performed by: NEUROLOGICAL SURGERY

## 2025-05-12 PROCEDURE — 3008F BODY MASS INDEX DOCD: CPT | Mod: CPTII,S$GLB,COE, | Performed by: NEUROLOGICAL SURGERY

## 2025-05-12 PROCEDURE — 99213 OFFICE O/P EST LOW 20 MIN: CPT | Mod: S$GLB,COE,, | Performed by: NEUROLOGICAL SURGERY

## 2025-05-12 PROCEDURE — 3079F DIAST BP 80-89 MM HG: CPT | Mod: CPTII,S$GLB,COE, | Performed by: NEUROLOGICAL SURGERY

## 2025-05-12 PROCEDURE — 99999 PR PBB SHADOW E&M-EST. PATIENT-LVL III: CPT | Mod: PBBFAC,COE,, | Performed by: NEUROLOGICAL SURGERY

## 2025-05-12 PROCEDURE — 4010F ACE/ARB THERAPY RXD/TAKEN: CPT | Mod: CPTII,S$GLB,COE, | Performed by: NEUROLOGICAL SURGERY

## 2025-05-12 PROCEDURE — 3074F SYST BP LT 130 MM HG: CPT | Mod: CPTII,S$GLB,COE, | Performed by: NEUROLOGICAL SURGERY

## 2025-05-12 NOTE — PROGRESS NOTES
CHIEF COMPLAINT:  Follow up after PT and injections     I, Linnea Archer, attest that this documentation has been prepared under the direction and in the presence of Bill Simpson MD.    HPI from 08/05/2020 (Dr. Keen):   This is a very pleasant 49 y.o. female who presents as referral for low back pain and leg pain. She says the back pain started around 3 years ago but has gotten worse over the past 6 months. Patient also has pain going down the posterolateral leg to the dorsum of the foot that is almost constant on the right and intermittent on the left. No specific positions make the pain better.  Right more than left leg pain.  Back pain is as severe as leg pain.  She be up and moving for 1-2 hours before having to rest due to severe pain in the back and legs. Denies any numbness or weakness.  No sphincter dysfunction symptoms.  She has done physical therapy 2 years ago without significant pain relief.  She is taking gabapentin with partial pain relief.  Pain is interfering with walking.  She is now limping because of the pain.  Pain is interfering with working.  She is working at [employer].  Pain is interfering with quality of life.       HPI from 08/08/2023 (Dr. Keen):   Isaura Mancini is a 52 y.o. female who presents with the above CC.  Patient was scheduled for surgery in 2020.  She states the surgery was canceled and then she started having medical problems with her thyroid and surgery so she put off returning back to clinic.  Patient states the back pain has gotten progressively worse and is constant across the low back.  It is better with flexion and worse with walking or standing and also when laying down.  She has pain in the left foot and has seen podiatry for this.  She has bilateral posterior leg pain and numbness mainly when lying down at night.  The left leg is worse than the right leg.  The back and legs bother her equally.  She would physical therapy 3 years ago without relief.  No  interventional pain procedures.  No spine surgery.  She takes gabapentin 100 mg and ibuprofen. Patient denies any recent accidents or trauma, no saddle anesthesias, and no bowel or bladder incontinence.     HPI from 2023 (Dr. Keen):  Six to 10/10 of low back pain, she reports severe left leg pain.  Pain is interfering with ability to stand, walk.  She works at [employer] in the [employer] department.  She has to lift up to 50 lb.  She also has a manager positioned.  Pain is affecting her quality of life and functional status.  She has completed a full conservative management including physical therapy     HPI from 2024 (Dr. Keen):   Complains of worsening difficulty ambulating.  Back pain radiates in bilateral legs.  Patient tends to lean forward due to pain.  Pain is affecting her quality of life and functional status.     HPI from 2025:   Today the patient reports that she was initially only experiencing back pain, but now is experiencing leg pain down to her right knee. She has tried PT, but has never had injections. She feels that her symptoms have gotten worse within the last 6 months. She's been leaning forward due to the pain. She denies any bladder dysfunction.     Interval Hx:   Today the patient reports that she's having the same back pain as before but that it's becoming sharper. She's also noticed some pain going into her legs, right worse than left. She states she's been experiencing more constipation lately as well.     Patient denies any other complaints at this time.    Review of patient's allergies indicates:  No Known Allergies    Past Medical History:   Diagnosis Date    Hypertension      Past Surgical History:   Procedure Laterality Date     SECTION      EPIDURAL STEROID INJECTION INTO LUMBAR SPINE N/A 2025    Procedure: L4-5 [facility];  Surgeon: Brian Tobias MD;  Location: [facility];  Service: Pain Management;  Laterality: N/A;   oral - ASA 5 days    NECK MASS EXCISION N/A 4/9/2024    Procedure: EXCISION, MASS, NECK;  Surgeon: Fabrice Adams MD;  Location: [facility] OR;  Service: General;  Laterality: N/A;  Prone    THYROIDECTOMY N/A 10/28/2022    Procedure: THYROIDECTOMY, total;  Surgeon: Isaura Hayes MD;  Location: [facility] OR [address] [place] [address];  Service: General;  Laterality: N/A;    TUBAL LIGATION       Family History   Problem Relation Name Age of Onset    Heart disease Father          age 70 , started 30s    Hypertension Father      Breast cancer Paternal Aunt  60    Diabetes Neg Hx      Colon cancer Neg Hx      Ovarian cancer Neg Hx       Social History[1]     Review of Systems   Gastrointestinal:  Positive for constipation.   Musculoskeletal:  Positive for back pain.        Leg pain.    All other systems reviewed and are negative.      OBJECTIVE:   Vital Signs:       Physical Exam:    Vital signs: All nursing notes and vital signs reviewed -- afebrile, vital signs stable.  Constitutional: Patient sitting comfortably in chair. Appears well developed and well nourished.  Skin: Exposed areas are intact without abnormal markings, rashes or other lesions.  HEENT: Normocephalic. Normal conjunctivae.  Cardiovascular: Normal rate and regular rhythm.  Respiratory: Chest wall rises and falls symmetrically, without signs of respiratory distress.  Abdomen: Soft and non-tender.  Extremities: Warm and without edema. Calves supple, non-tender.  Psych/Behavior: Normal affect.    Neurological:    Mental status: Alert and oriented. Conversational and appropriate.       Cranial Nerves: VFF to confrontation. PERRL. EOMI without nystagmus. Facial STLT normal and symmetric. Strong, symmetric muscles of mastication. Facial strength full and symmetric. Hearing equal bilaterally to finger rub. Palate and uvula rise and fall normally in midline. Shoulder shrug 5/5 strength. Tongue midline.     Motor:    Upper:  Deltoids Triceps Biceps WE WF     R 5/5 5/5 5/5 5/5  5/5 5/5    L 5/5 5/5 5/5 5/5 5/5 5/5      Lower:  HF KE KF DF PF EHL    R 5/5 5/5 5/5 5/5 5/5 5/5    L 5/5 5/5 5/5 5/5 5/5 5/5     Sensory: Intact sensation to light touch in all extremities. Romberg negative.    Reflexes:          DTR: 2+ symmetrically throughout.     Gillis's: Negative.     Babinski's: Negative.     Clonus: Negative.    Cerebellar: Finger-to-nose and rapid alternating movements normal. Gait stable, fluid.    Spine:    Posture: Head well aligned over pelvis in front and side views.  No focal or global spinal deformity visible on inspection. Shoulders and hips even. No obvious leg length discrepancy. No scapula winging.    Bending: Full ROM with forward, back and lateral bending. No rib prominence with forward bend.    Cervical:      ROM: Full with flexion, extension, lateral rotation and ear-to-shoulder bend.      Midline TTP: Negative.     Spurling's test: Negative.     Lhermitte's: Negative.    Thoracic:     Midline TTP: Negative    Lumbar:     Midline TTP: Negative     Straight Leg Test: Negative     Crossed Straight Leg Test: Negative     Sciatic notch tenderness: Negative.    Other:     SI joint TTP: Negative.     Greater trochanter TTP: Negative.     Tenderness with external/internal hip rotation: Negative.    ASSESSMENT/PLAN:     Isaura Mancini has L4-5 grade 2 spondylolisthesis and severe stenosis causing debilitating axial low back and right leg pain that has failed conservative measurement. I recommend an L4-5 TLIF. R/B/A/I/M were reviewed in detail and she wishes to proceed.    The patient understands and agrees with the plan of care. All questions were answered.     1. L4-5 TLIF    I, Dr. Bill Simpson personally performed the services described in this documentation. All medical record entries made by the scribe, Linnea Archer, were at my direction and in my presence. I have reviewed the chart and agree that the record reflects my personal performance and is accurate and  complete.      Bill Simpson M.D.  Department of Neurosurgery  [facility]            [1]   Social History  Tobacco Use    Smoking status: Never     Passive exposure: Never    Smokeless tobacco: Never   Substance Use Topics    Alcohol use: No    Drug use: No

## 2025-05-14 ENCOUNTER — TELEPHONE (OUTPATIENT)
Dept: ORTHOPEDICS | Facility: CLINIC | Age: 55
End: 2025-05-14
Payer: COMMERCIAL

## 2025-05-14 NOTE — TELEPHONE ENCOUNTER
Clinicals sent to [facility], no pcp involved. All appts linked, evaluation bundle resolved and request for surgery referral requested by Sofía.

## 2025-05-15 ENCOUNTER — TELEPHONE (OUTPATIENT)
Dept: ORTHOPEDICS | Facility: CLINIC | Age: 55
End: 2025-05-15
Payer: COMMERCIAL

## 2025-05-15 ENCOUNTER — TELEPHONE (OUTPATIENT)
Dept: NEUROSURGERY | Facility: CLINIC | Age: 55
End: 2025-05-15
Payer: COMMERCIAL

## 2025-05-15 ENCOUNTER — PATIENT MESSAGE (OUTPATIENT)
Dept: ORTHOPEDICS | Facility: CLINIC | Age: 55
End: 2025-05-15
Payer: COMMERCIAL

## 2025-05-15 DIAGNOSIS — M48.061 SPINAL STENOSIS OF LUMBAR REGION WITHOUT NEUROGENIC CLAUDICATION: Primary | ICD-10-CM

## 2025-05-15 NOTE — TELEPHONE ENCOUNTER
Spoke with pt and advised that her travel dates for her surgery are 7-8-25 thru 7-15-25. I advised that she will check into hotel on 7-8-25 and later that day at 1:30, she will have a pre-op appt with FRANCE Sandoval. I advised that the next day will be her surgery and then she will stay in hospital 1-2 nights and then go to the [facility]. I advised that her post op clear to travel visit will be on 7-15-25 at 11:30, and then she is able to check out of hotel and go home that same day after the visit.  I explained the boot camp class and advised that she will be getting a link for it that she will need to click on to accept the appt.  I advised that I am mailing her a surgery guide as well and advised that she has an apt with the pre-op center a month before surgery on 6-9-25 and that this visit will go under her regular insurance.  Pt verbalized understanding and I encouraged her to call with any questions. I advised that I will send these dates to her my [employer] as she was driving during our conversation.

## 2025-05-16 ENCOUNTER — TELEPHONE (OUTPATIENT)
Dept: NEUROSURGERY | Facility: CLINIC | Age: 55
End: 2025-05-16
Payer: COMMERCIAL

## 2025-05-16 NOTE — TELEPHONE ENCOUNTER
[facility] patient met with Dr. Simpson to schedule surgery. L4-5 TLIF will be scheduled on July 9. Patient is local [facility] and opted in to stay at hotel with caregiver. Will submit plan of care to [facility] for surgery bundle 7/8-7/15. Instructed patient to hold any weight loss medications at least 8 days prior to surgery (patient stated her insurance would not approve the ozempic). Educated patient to hold all blood thinners, aspirin, NSAIDS 1 week prior to surgery. Educated patient that we have a new Spine boot camp education class via zoom that she will receive an email to attend in June. Confirmed email address. Patient verbalized understanding and in agreement with plan.    Future Appointments   Date Time Provider Department Center   5/23/2025  9:20 AM Ashok Braden III, MD [facility]   6/9/2025  2:00 PM Veronica Esqueda MD [facility] PREOP [place]   6/10/2025  3:00 PM [facility] OD XR-B LIMIT 500 LBS [facility] XRAY OP [place] [facility]   6/10/2025  4:00 PM [facility] OD XR-B LIMIT 500 LBS [facility] XRAY OP [place] [facility]   6/12/2025  9:45 AM Sarahi Cruz, RAQUEL [facility] PODIAT [place] [facility]   7/8/2025  1:30 PM Constanza Sandoval, NP [facility] NEUROSSubha Miguel   7/15/2025 11:30 AM Constanza Sandoval, NP [facility] NEUROSSubha Juares [facility]       Luma Jung, RN, CMSRN  RN Navigator  [facility]   [phone]  Fax [phone]

## 2025-05-19 ENCOUNTER — TELEPHONE (OUTPATIENT)
Dept: CASE MANAGEMENT | Facility: HOSPITAL | Age: 55
End: 2025-05-19
Payer: COMMERCIAL

## 2025-05-22 DIAGNOSIS — H65.91 MIDDLE EAR EFFUSION, RIGHT: ICD-10-CM

## 2025-05-22 NOTE — TELEPHONE ENCOUNTER
Refill Routing Note   Medication(s) are not appropriate for processing by [facility] for the following reason(s):        Non-participating provider    [facility] action(s):  Route               Appointments  past 12m or future 3m with PCP    Date Provider   Last Visit   3/18/2025 Tammy Swain PA-C   Next Visit   Visit date not found Tammy Swain PA-C   ED visits in past 90 days: 0        Note composed:4:58 PM 05/22/2025

## 2025-05-23 ENCOUNTER — LAB VISIT (OUTPATIENT)
Dept: LAB | Facility: HOSPITAL | Age: 55
End: 2025-05-23
Attending: INTERNAL MEDICINE
Payer: COMMERCIAL

## 2025-05-23 ENCOUNTER — OFFICE VISIT (OUTPATIENT)
Dept: FAMILY MEDICINE | Facility: CLINIC | Age: 55
End: 2025-05-23
Payer: COMMERCIAL

## 2025-05-23 VITALS
HEART RATE: 89 BPM | WEIGHT: 194.69 LBS | OXYGEN SATURATION: 98 % | HEIGHT: 62 IN | BODY MASS INDEX: 35.83 KG/M2 | DIASTOLIC BLOOD PRESSURE: 74 MMHG | SYSTOLIC BLOOD PRESSURE: 120 MMHG

## 2025-05-23 DIAGNOSIS — I10 ESSENTIAL HYPERTENSION: ICD-10-CM

## 2025-05-23 DIAGNOSIS — Z01.00 ROUTINE EYE EXAM: ICD-10-CM

## 2025-05-23 DIAGNOSIS — K21.9 GASTROESOPHAGEAL REFLUX DISEASE WITHOUT ESOPHAGITIS: ICD-10-CM

## 2025-05-23 DIAGNOSIS — H81.10 BENIGN PAROXYSMAL POSITIONAL VERTIGO, UNSPECIFIED LATERALITY: ICD-10-CM

## 2025-05-23 DIAGNOSIS — Z23 NEED FOR VACCINATION: ICD-10-CM

## 2025-05-23 DIAGNOSIS — K21.9 GASTROESOPHAGEAL REFLUX DISEASE, UNSPECIFIED WHETHER ESOPHAGITIS PRESENT: ICD-10-CM

## 2025-05-23 DIAGNOSIS — E66.812 CLASS 2 SEVERE OBESITY WITH SERIOUS COMORBIDITY AND BODY MASS INDEX (BMI) OF 37.0 TO 37.9 IN ADULT, UNSPECIFIED OBESITY TYPE: Primary | ICD-10-CM

## 2025-05-23 DIAGNOSIS — E66.01 CLASS 2 SEVERE OBESITY WITH SERIOUS COMORBIDITY AND BODY MASS INDEX (BMI) OF 37.0 TO 37.9 IN ADULT, UNSPECIFIED OBESITY TYPE: Primary | ICD-10-CM

## 2025-05-23 LAB
ANION GAP (OHS): 9 MMOL/L (ref 8–16)
BUN SERPL-MCNC: 17 MG/DL (ref 6–20)
CALCIUM SERPL-MCNC: 9.7 MG/DL (ref 8.7–10.5)
CHLORIDE SERPL-SCNC: 104 MMOL/L (ref 95–110)
CO2 SERPL-SCNC: 26 MMOL/L (ref 23–29)
CREAT SERPL-MCNC: 0.8 MG/DL (ref 0.5–1.4)
GFR SERPLBLD CREATININE-BSD FMLA CKD-EPI: >60 ML/MIN/1.73/M2
GLUCOSE SERPL-MCNC: 99 MG/DL (ref 70–110)
POTASSIUM SERPL-SCNC: 3.7 MMOL/L (ref 3.5–5.1)
SODIUM SERPL-SCNC: 139 MMOL/L (ref 136–145)

## 2025-05-23 PROCEDURE — 3078F DIAST BP <80 MM HG: CPT | Mod: CPTII,S$GLB,COE, | Performed by: INTERNAL MEDICINE

## 2025-05-23 PROCEDURE — 99999 PR PBB SHADOW E&M-EST. PATIENT-LVL V: CPT | Mod: PBBFAC,COE,, | Performed by: INTERNAL MEDICINE

## 2025-05-23 PROCEDURE — 36415 COLL VENOUS BLD VENIPUNCTURE: CPT | Mod: PO

## 2025-05-23 PROCEDURE — 4010F ACE/ARB THERAPY RXD/TAKEN: CPT | Mod: CPTII,S$GLB,COE, | Performed by: INTERNAL MEDICINE

## 2025-05-23 PROCEDURE — 90471 IMMUNIZATION ADMIN: CPT | Mod: S$GLB,COE,,

## 2025-05-23 PROCEDURE — 3008F BODY MASS INDEX DOCD: CPT | Mod: CPTII,S$GLB,COE, | Performed by: INTERNAL MEDICINE

## 2025-05-23 PROCEDURE — 99214 OFFICE O/P EST MOD 30 MIN: CPT | Mod: 25,S$GLB,COE, | Performed by: INTERNAL MEDICINE

## 2025-05-23 PROCEDURE — 80048 BASIC METABOLIC PNL TOTAL CA: CPT

## 2025-05-23 PROCEDURE — 1159F MED LIST DOCD IN RCRD: CPT | Mod: CPTII,S$GLB,COE, | Performed by: INTERNAL MEDICINE

## 2025-05-23 PROCEDURE — 3074F SYST BP LT 130 MM HG: CPT | Mod: CPTII,S$GLB,COE, | Performed by: INTERNAL MEDICINE

## 2025-05-23 PROCEDURE — 1160F RVW MEDS BY RX/DR IN RCRD: CPT | Mod: CPTII,S$GLB,COE, | Performed by: INTERNAL MEDICINE

## 2025-05-23 PROCEDURE — 90750 HZV VACC RECOMBINANT IM: CPT | Mod: S$GLB,COE,,

## 2025-05-23 RX ORDER — PANTOPRAZOLE SODIUM 40 MG/1
40 TABLET, DELAYED RELEASE ORAL DAILY
Qty: 90 TABLET | Refills: 3 | Status: SHIPPED | OUTPATIENT
Start: 2025-05-23 | End: 2026-05-23

## 2025-05-23 RX ORDER — FLUTICASONE PROPIONATE 50 MCG
1 SPRAY, SUSPENSION (ML) NASAL DAILY
Qty: 48 ML | Refills: 1 | Status: SHIPPED | OUTPATIENT
Start: 2025-05-23

## 2025-05-23 NOTE — Clinical Note
hi Just wanted to be double sure - since pt had hurthle - not - medullary thyroid cancer is is eligible to get glp1 correct

## 2025-05-23 NOTE — PROGRESS NOTES
Assessment:         1. Class 2 severe obesity with serious comorbidity and body mass index (BMI) of 37.0 to 37.9 in adult, unspecified obesity type    2. Need for vaccination    3. Essential hypertension    4. Benign paroxysmal positional vertigo, unspecified laterality    5. Routine eye exam    6. Gastroesophageal reflux disease, unspecified whether esophagitis present    7. Gastroesophageal reflux disease without esophagitis          Plan:           Isaura was seen today for follow-up, dizziness, gastroesophageal reflux and back pain.    Diagnoses and all orders for this visit:    Class 2 severe obesity with serious comorbidity and body mass index (BMI) of 37.0 to 37.9 in adult, unspecified obesity type  -     [facility] E-VISIT  -     semaglutide, weight loss, (WEGOVY) 0.25 mg/0.5 mL PnIj; Inject 0.25 mg into the skin every 7 days.    Need for vaccination  -     varicella zoster (Shingrix) IM vaccine (>/= 51 yo)  -     Discontinue: COVID-19 (Moderna) 50 mcg/0.5 mL IM vaccine (>/= 13 yo) 0.5 mL    Essential hypertension  -     Basic Metabolic Panel; Standing  -     semaglutide, weight loss, (WEGOVY) 0.25 mg/0.5 mL PnIj; Inject 0.25 mg into the skin every 7 days.    Benign paroxysmal positional vertigo, unspecified laterality  -     Ambulatory Referral/Consult to Physical Therapy; Future    Routine eye exam  -     Ambulatory referral/consult to Ophthalmology; Future    Gastroesophageal reflux disease, unspecified whether esophagitis present  -     pantoprazole (PROTONIX) 40 MG tablet; Take 1 tablet (40 mg total) by mouth once daily.    Gastroesophageal reflux disease without esophagitis  -     H. pylori antigen, stool; Standing        Assessment & Plan    IMPRESSION:  BP control improved with recent losartan dose increase to 50 mg daily.  Crum-Hallpike test positive, suggesting benign paroxysmal positional vertigo (BPPV).  Discussed options for weight management, including GLP-1 receptor agonist therapy.  COVID-19 and  shingles vaccinations up-to-date.    PATIENT EDUCATION:  - Explained BPPV as likely cause of vertigo symptoms, describing it as a problem with crystals in the inner ear.    ACTION ITEMS/LIFESTYLE:  - Patient to perform at-home exercises for benign positional vertigo as instructed in after-visit summary.  - Patient to continue limiting caffeine intake to daily at breakfast, preferably decaffeinated.  - Recommend avoiding tomatoes and soda for acid reflux management.  - Complete questionnaire sent regarding weight loss medication use and progress.    MEDICATIONS:  - Started Protonix (proton pump inhibitor) to be taken daily with breakfast for acid reflux symptoms.  - Prescribed once-weekly weight loss injection to be obtained directly from .    ORDERS:  - Ordered electrolyte and renal function blood test.  - Ordered stool test to check for infections potentially causing reflux.    FOLLOW UP:  - Follow up in a few months to reassess weight management progress and medication efficacy.             Subjective:       Patient ID: Isaura Mancini is a 54 y.o. female.    Chief Complaint: Follow-up, Dizziness, Gastroesophageal Reflux, and Back Pain      Interim Hx  Concerns today  Chronic problems           Last 5 Patient Entered Readings                Current 30 Day Average: 131/81  Recent Readings 5/22/2025 5/22/2025 5/20/2025 5/20/2025 5/13/2025   SBP (mmHg) 129 133 130 147 125   DBP (mmHg) 79 82 80 79 82   Pulse 58 60 64 63 73               History of Present Illness    CHIEF COMPLAINT:  Patient presents today for follow up of blood pressure, vertigo, and acid reflux.    VERTIGO AND ENT:  She reports worsening vertigo symptoms over the past 1-2 months, experiencing dizziness when lying down and turning her head with a spinning sensation. She has watery eyes and pressure sensation. She recently had a right-sided ear infection that was treated with medication. While the tinnitus has resolved, she continues to  "have some congestion. She denies headaches, blurry vision, double vision, difficulty speaking, or finding words.    HYPERTENSION:  Her losartan dosage was increased to 50 mg three weeks ago due to consistently elevated diastolic blood pressure.    GERD:  She reports worsening acid reflux symptoms managed with OTC Tums as needed for severe symptoms. She follows a reflux diet, limits coffee to one small cup at breakfast and is transitioning to decaf. She denies consumption of soda or tomatoes.    WEIGHT MANAGEMENT:  She reports a 30-pound weight gain since thyroid surgery in 2022, increasing from 164 to 194 lbs, despite dietary modifications.    SURGICAL HISTORY:  History of thyroid surgery in 2022. Spine surgery scheduled for July 9th at [facility] [address] [place].      ROS:  Eyes: +eye strain, +eye watering  ENT: +tinnitus, +ear pressure  Cardiovascular: -chest pain  Gastrointestinal: +indigestion, +heartburn  Neurological: -headache, +dizziness, +vertigo           Review of Systems        Health Maintenance Due   Topic Date Due    COVID-19 Vaccine (5 - 2024-25 season) 12/09/2024         Objective:     /74 (BP Location: Right arm, Patient Position: Sitting)   Pulse 89   Ht 5' 2" (1.575 m)   Wt 88.3 kg (194 lb 10.7 oz)   SpO2 98%   BMI 35.60 kg/m²   .Physical Exam    Vitals: Blood pressure: 131/81.  Eyes: EOMI.               5/23/2025     8:57 AM 5/12/2025    10:00 AM 3/18/2025    12:40 PM 3/11/2025     2:36 PM 2/17/2025     8:24 AM   Vitals   Height 5' 2" (1.575 m) 5' 2" (1.575 m) 5' 2" (1.575 m)  5' 2" (1.575 m)   Weight (lbs) 194.67 198.41 196.21 192.24 191.58   BMI (kg/m2) 35.6 36.3 35.9  35          Physical Exam  Vitals and nursing note reviewed.   Constitutional:       General: She is not in acute distress.     Appearance: Normal appearance. She is well-developed. She is not ill-appearing, toxic-appearing or diaphoretic.   HENT:      Head: Normocephalic and atraumatic.      Right Ear: Tympanic membrane " normal.      Left Ear: Tympanic membrane normal.      Nose: Nose normal.   Eyes:      General: No scleral icterus.        Right eye: No discharge.         Left eye: No discharge.      Extraocular Movements: Extraocular movements intact.      Conjunctiva/sclera: Conjunctivae normal.      Pupils: Pupils are equal, round, and reactive to light.   Cardiovascular:      Rate and Rhythm: Normal rate and regular rhythm.      Heart sounds: Normal heart sounds. No murmur heard.     No friction rub. No gallop.   Pulmonary:      Effort: Pulmonary effort is normal. No respiratory distress.      Breath sounds: Normal breath sounds.   Abdominal:      General: Bowel sounds are normal. There is no distension.      Palpations: Abdomen is soft.   Musculoskeletal:         General: No deformity. Normal range of motion.      Cervical back: Normal range of motion.   Skin:     General: Skin is warm.   Neurological:      General: No focal deficit present.      Mental Status: She is alert and oriented to person, place, and time.      Cranial Nerves: No cranial nerve deficit.      Sensory: No sensory deficit.      Motor: No weakness.      Coordination: Coordination normal.      Gait: Gait normal.      Deep Tendon Reflexes: Reflexes normal.      Comments: Alert and oriented X3  CN 2-12 intact  Sensation normal throughout  Strength normal in UE/LE  Reflexes +2  Finger-to-nose testing normal   No pronator drift  Negative romberg   + DHP               ASCVD  The 10-year ASCVD risk score (Jackie DK, et al., 2019) is: 1.2%    Values used to calculate the score:      Age: 54 years      Sex: Female      Is Non- : No      Diabetic: No      Tobacco smoker: No      Systolic Blood Pressure: 120 mmHg      Is BP treated: Yes      HDL Cholesterol: 67 mg/dL      Total Cholesterol: 171 mg/dL    Basic Labs    BMP  Lab Results   Component Value Date     05/23/2025    K 3.7 05/23/2025     05/23/2025    CO2 26 05/23/2025    BUN  "17 05/23/2025    CREATININE 0.8 05/23/2025    CALCIUM 9.7 05/23/2025    ANIONGAP 9 05/23/2025    ESTGFRAFRICA >60.0 07/15/2022    EGFRNONAA >60.0 07/15/2022     Lab Results   Component Value Date    EGFRNORACEVR >60 05/23/2025       Lab Results   Component Value Date    ALT 7 (L) 12/17/2024    AST 15 12/17/2024    ALKPHOS 60 12/17/2024    BILITOT 0.3 12/17/2024         Lab Results   Component Value Date    TSH 1.151 11/29/2024     Lab Results   Component Value Date    WBC 8.78 12/17/2024    HGB 13.3 12/17/2024    HCT 41.0 12/17/2024    MCV 91 12/17/2024     12/17/2024           STD  Lab Results   Component Value Date    [id number] Negative 01/19/2022     No results found for: "RPR"  Lab Results   Component Value Date    HEPCAB Negative 01/19/2022     Lab Results   Component Value Date    LABNGO Not Detected 07/30/2024    LABCHLA Not Detected 07/30/2024           Lipids  Lab Results   Component Value Date    CHOL 171 02/26/2024     Lab Results   Component Value Date    HDL 67 02/26/2024     Lab Results   Component Value Date    LDLCALC 93.4 02/26/2024     Lab Results   Component Value Date    TRIG 53 02/26/2024     Lab Results   Component Value Date    CHOLHDL 39.2 02/26/2024       DM  Lab Results   Component Value Date    HGBA1C 5.0 02/26/2024           Future Appointments   Date Time Provider Department Center   6/9/2025  2:00 PM [facility]onica [facility]eda [facility] [address]   6/10/2025  3:00 PM [facility] OD XR-B LIMIT 500 LBS [facility] XRAY OP [place] [facility]   6/10/2025  4:00 PM [facility] ODC XR-B LIMIT 500 LBS [facility] XRAY OP [place] [facility]   6/12/2025  9:45 AM Sarahi Cruz, DPM [facility] PODIAT [place] [facility]   6/27/2025  1:00 PM Gay Frey, PT [facility]   7/8/2025  1:30 PM Constanza Sandoval, NP [facility] NEUROS8 Mor [facility]   7/15/2025 11:30 AM Constanza Sandoval, NP [facility] NEUROSSubha Juares [facility]   7/18/2025 To Be Determined [facility] E-VISIT [facility] EVISIT None   9/12/2025  2:40 PM Chelsie Posadas, OD DESC OPTOMTY " [place]   11/24/2025  8:00 AM LAB, NINO KENH [facility]   11/26/2025 10:40 AM Ashok Braden III, MD [facility]         Medication List with Changes/Refills   New Medications    PANTOPRAZOLE (PROTONIX) 40 MG TABLET    Take 1 tablet (40 mg total) by mouth once daily.    SEMAGLUTIDE, WEIGHT LOSS, (WEGOVY) 0.25 MG/0.5 ML PNIJ    Inject 0.25 mg into the skin every 7 days.   Current Medications    ASPIRIN (ECOTRIN) 81 MG EC TABLET    Take 81 mg by mouth once daily.    CELECOXIB (CELEBREX) 200 MG CAPSULE    Take 1 capsule (200 mg total) by mouth 2 (two) times daily.    CHOLECALCIFEROL, VITAMIN D3, (VITAMIN D3) 25 MCG (1,000 UNIT) CAPSULE    Take 2 capsules (2,000 Units total) by mouth once daily.    HYDROCHLOROTHIAZIDE (HYDRODIURIL) 25 MG TABLET    Take 1 tablet (25 mg total) by mouth once daily.    LEVOTHYROXINE (SYNTHROID) 150 MCG TABLET    Take 1 tab 6 days a week and 0.5 tabs on Sundays for total of 6.5 tabs weekly    LORATADINE (CLARITIN) 10 MG TABLET    Take 1 tablet (10 mg total) by mouth once daily.    LOSARTAN (COZAAR) 50 MG TABLET    Take 1 tablet (50 mg total) by mouth once daily.    PREGABALIN (LYRICA) 50 MG CAPSULE    Take 1 capsule (50 mg total) by mouth 3 (three) times daily.    SENNA-DOCUSATE 8.6-50 MG (PERICOLACE) 8.6-50 MG PER TABLET    Take 1 tablet by mouth 2 (two) times daily.   Changed and/or Refilled Medications    Modified Medication Previous Medication    FLUTICASONE PROPIONATE (FLONASE) 50 MCG/ACTUATION NASAL SPRAY fluticasone propionate (FLONASE) 50 mcg/actuation nasal spray       1 spray (50 mcg total) by Each Nostril route once daily.    1 spray (50 mcg total) by Each Nostril route once daily.   Discontinued Medications    SEMAGLUTIDE (OZEMPIC) 0.25 MG OR 0.5 MG (2 MG/3 ML) PEN INJECTOR    Inject 0.5 mg into the skin every 7 days.         Disclaimer:  This note has been generated using voice-recognition software. There may be grammatical or spelling errors that have been  missed during proof-reading   This note was generated with the assistance of ambient listening technology. Verbal consent was obtained by the patient and accompanying visitor(s) for the recording of patient appointment to facilitate this note. I attest to having reviewed and edited the generated note for accuracy, though some syntax or spelling errors may persist. Please contact the author of this note for any clarification.  Sarahi Whelan MD Hill, Norman V. III, MD  Correct, she can use GLP1!

## 2025-05-23 NOTE — PATIENT INSTRUCTIONS
Greetings  Thank you for your question    Are you an employee of [facility] ?      If not , Unfortunately this medication you would have to pay more than 1000$ a month through insurance  if you do not have diabetes. We can send directly to the [employer] for a low cost    [facility]  [facility]  [facility]     Authentic Zepbound® (tirzepatide) Shipped to You  Vial or Pen  Lilly[facility]®   [facility]iana [facility]     Otherwise please see some additional options below       Weight control  For Diet   - Try the DASH and/or the Mediterranean Diet  - DASH- https://www.nhlbi.nih.gov/health-topics/dash-eating-plan  - Mediterranean - https://www.Allostatix.com/nutrition/mediterranean-diet-meal-plan  - Try meeting with a Nutritionist  - https://www.Netechy.Halldis/us/nutritionists-dietitians/la/new-orchan .     For Food delivery program try  - Hungry Manning  - https://Chibwe/  - Purple carrot - https://www.Sanwu Internet Technology/    Try a portion control plate  https://WAY Systems.org/portion-control-plates-that-get-results/    For Exercise  Start with at least 20 min a day of moderate intensity exercise  This can be done at your gym or at home  Some home workout beginner plans include   - https://Shompton.Halldis/  - https://yogaN4G.com.Halldis    For Referral   - we can refer you to our bariatric medicine team to ciro with specialist and nutirtionist  - we can refer you to our bariatic surgery team to discuss weight loss surgery options [phone]    For applications  - Myplate.com - https://www.Leiyoo.gov/   - myfitnesspal - https://www.my[place] Operapal.com/  - Noom - https://www.noom.com/  - Products and Programs  optavia       Weight control  For Diet   - Try the DASH and/or the Mediterranean Diet  - DASH- https://www.nhlbi.nih.gov/health-topics/dash-eating-plan  - Mediterranean - https://www.healthline.com/nutrition/mediterranean-diet-meal-plan  - Try meeting with a Nutritionist  -  https://www.[employer] Solutions.com/us/nutritionists-dietitians/la/new-[place] .     [facility] Food delivery program try  - Hungry Buras  - https://99designs.Cydan/  - Purple carrot - https://www.[employer] In-Home Mediarot.S[employer] Solutions/    Try a portion control plate  https://Qspex Technologies.org/portion-control-plates-that-get-results/    For Exercise  Start with at least 20 min a day of moderate intensity exercise  This can be done at your gym or at home  Some home workout beginner plans include   - https://Emerging Travel.Sustainable [employer] Solutions/  - https://yogawithadriene.com      For applications  - Myplate.com - https://www.myplate.gov/   - myfitnesspal - https://www.myFirefly Energypal.com/  - Noom - https://www.noom.com/  - Products and Programs  optavia

## 2025-05-25 RX ORDER — SEMAGLUTIDE 0.25 MG/.5ML
0.25 INJECTION, SOLUTION SUBCUTANEOUS
Qty: 3 ML | Refills: 0 | Status: SHIPPED | OUTPATIENT
Start: 2025-05-25

## 2025-05-29 ENCOUNTER — PATIENT MESSAGE (OUTPATIENT)
Dept: FAMILY MEDICINE | Facility: CLINIC | Age: 55
End: 2025-05-29
Payer: COMMERCIAL

## 2025-06-02 ENCOUNTER — HOSPITAL ENCOUNTER (OUTPATIENT)
Facility: HOSPITAL | Age: 55
Discharge: HOME | End: 2025-06-02
Payer: COMMERCIAL

## 2025-06-02 ENCOUNTER — OFFICE VISIT (OUTPATIENT)
Dept: ORTHOPEDICS | Facility: CLINIC | Age: 55
End: 2025-06-02
Payer: COMMERCIAL

## 2025-06-02 DIAGNOSIS — M79.644 PAIN OF RIGHT THUMB: Primary | ICD-10-CM

## 2025-06-02 DIAGNOSIS — R52 PAIN: ICD-10-CM

## 2025-06-02 DIAGNOSIS — R52 PAIN: Primary | ICD-10-CM

## 2025-06-02 PROCEDURE — 1160F RVW MEDS BY RX/DR IN RCRD: CPT | Mod: CPTII,S$GLB,COE,

## 2025-06-02 PROCEDURE — 4010F ACE/ARB THERAPY RXD/TAKEN: CPT | Mod: CPTII,S$GLB,COE,

## 2025-06-02 PROCEDURE — 73110 X-RAY EXAM OF WRIST: CPT | Mod: TC,PN,RT

## 2025-06-02 PROCEDURE — 1159F MED LIST DOCD IN RCRD: CPT | Mod: CPTII,S$GLB,COE,

## 2025-06-02 PROCEDURE — 99204 OFFICE O/P NEW MOD 45 MIN: CPT | Mod: S$GLB,COE,,

## 2025-06-02 PROCEDURE — 73110 X-RAY EXAM OF WRIST: CPT | Mod: 26,RT,COE, | Performed by: RADIOLOGY

## 2025-06-02 PROCEDURE — 99999 PR PBB SHADOW E&M-EST. PATIENT-LVL III: CPT | Mod: PBBFAC,COE,,

## 2025-06-02 RX ORDER — NAPROXEN 500 MG/1
500 TABLET ORAL 2 TIMES DAILY WITH MEALS
Qty: 60 TABLET | Refills: 2 | Status: SHIPPED | OUTPATIENT
Start: 2025-06-02

## 2025-06-04 ENCOUNTER — HOSPITAL ENCOUNTER (OUTPATIENT)
Dept: RADIOLOGY | Facility: HOSPITAL | Age: 55
Discharge: HOME | End: 2025-06-04
Attending: STUDENT IN AN ORGANIZED HEALTH CARE EDUCATION/TRAINING PROGRAM
Payer: COMMERCIAL

## 2025-06-04 ENCOUNTER — OFFICE VISIT (OUTPATIENT)
Dept: PODIATRY | Facility: CLINIC | Age: 55
End: 2025-06-04
Payer: COMMERCIAL

## 2025-06-04 VITALS
SYSTOLIC BLOOD PRESSURE: 139 MMHG | WEIGHT: 194.69 LBS | BODY MASS INDEX: 35.83 KG/M2 | DIASTOLIC BLOOD PRESSURE: 88 MMHG | HEIGHT: 62 IN | HEART RATE: 76 BPM

## 2025-06-04 DIAGNOSIS — M79.671 RIGHT FOOT PAIN: ICD-10-CM

## 2025-06-04 DIAGNOSIS — M79.671 RIGHT FOOT PAIN: Primary | ICD-10-CM

## 2025-06-04 PROCEDURE — 4010F ACE/ARB THERAPY RXD/TAKEN: CPT | Mod: CPTII,S$GLB,COE, | Performed by: STUDENT IN AN ORGANIZED HEALTH CARE EDUCATION/TRAINING PROGRAM

## 2025-06-04 PROCEDURE — 73630 X-RAY EXAM OF FOOT: CPT | Mod: TC,FY,RT

## 2025-06-04 PROCEDURE — 99999 PR PBB SHADOW E&M-EST. PATIENT-LVL IV: CPT | Mod: PBBFAC,COE,, | Performed by: STUDENT IN AN ORGANIZED HEALTH CARE EDUCATION/TRAINING PROGRAM

## 2025-06-04 PROCEDURE — 3079F DIAST BP 80-89 MM HG: CPT | Mod: CPTII,S$GLB,COE, | Performed by: STUDENT IN AN ORGANIZED HEALTH CARE EDUCATION/TRAINING PROGRAM

## 2025-06-04 PROCEDURE — 1159F MED LIST DOCD IN RCRD: CPT | Mod: CPTII,S$GLB,COE, | Performed by: STUDENT IN AN ORGANIZED HEALTH CARE EDUCATION/TRAINING PROGRAM

## 2025-06-04 PROCEDURE — 3008F BODY MASS INDEX DOCD: CPT | Mod: CPTII,S$GLB,COE, | Performed by: STUDENT IN AN ORGANIZED HEALTH CARE EDUCATION/TRAINING PROGRAM

## 2025-06-04 PROCEDURE — 3075F SYST BP GE 130 - 139MM HG: CPT | Mod: CPTII,S$GLB,COE, | Performed by: STUDENT IN AN ORGANIZED HEALTH CARE EDUCATION/TRAINING PROGRAM

## 2025-06-04 PROCEDURE — 99214 OFFICE O/P EST MOD 30 MIN: CPT | Mod: S$GLB,COE,, | Performed by: STUDENT IN AN ORGANIZED HEALTH CARE EDUCATION/TRAINING PROGRAM

## 2025-06-04 PROCEDURE — 73630 X-RAY EXAM OF FOOT: CPT | Mod: 26,RT,COE, | Performed by: RADIOLOGY

## 2025-06-05 ENCOUNTER — TELEPHONE (OUTPATIENT)
Dept: NEUROSURGERY | Facility: CLINIC | Age: 55
End: 2025-06-05
Payer: COMMERCIAL

## 2025-06-09 ENCOUNTER — OFFICE VISIT (OUTPATIENT)
Dept: INTERNAL MEDICINE | Facility: CLINIC | Age: 55
End: 2025-06-09
Payer: COMMERCIAL

## 2025-06-09 VITALS
TEMPERATURE: 98 F | HEIGHT: 62 IN | OXYGEN SATURATION: 97 % | RESPIRATION RATE: 14 BRPM | HEART RATE: 73 BPM | BODY MASS INDEX: 34.41 KG/M2 | WEIGHT: 187 LBS

## 2025-06-09 DIAGNOSIS — Z82.49 FAMILY HISTORY OF PREMATURE CAD: ICD-10-CM

## 2025-06-09 DIAGNOSIS — C73 HURTHLE CELL CARCINOMA OF THYROID: ICD-10-CM

## 2025-06-09 DIAGNOSIS — I10 ESSENTIAL HYPERTENSION: ICD-10-CM

## 2025-06-09 DIAGNOSIS — H81.10 BENIGN PAROXYSMAL POSITIONAL VERTIGO, UNSPECIFIED LATERALITY: Primary | ICD-10-CM

## 2025-06-09 DIAGNOSIS — K59.00 CONSTIPATION, UNSPECIFIED CONSTIPATION TYPE: ICD-10-CM

## 2025-06-09 DIAGNOSIS — Z86.16 HISTORY OF COVID-19: ICD-10-CM

## 2025-06-09 DIAGNOSIS — Z91.09 ENVIRONMENTAL ALLERGIES: ICD-10-CM

## 2025-06-09 DIAGNOSIS — G47.33 OSA (OBSTRUCTIVE SLEEP APNEA): ICD-10-CM

## 2025-06-09 DIAGNOSIS — N93.9 ABNORMAL UTERINE BLEEDING (AUB): ICD-10-CM

## 2025-06-09 DIAGNOSIS — K21.9 GASTROESOPHAGEAL REFLUX DISEASE, UNSPECIFIED WHETHER ESOPHAGITIS PRESENT: ICD-10-CM

## 2025-06-09 PROBLEM — R51.9 HEADACHE: Status: RESOLVED | Noted: 2017-07-03 | Resolved: 2025-06-09

## 2025-06-09 PROBLEM — R22.1 MASS OF RIGHT SIDE OF NECK: Status: RESOLVED | Noted: 2024-04-09 | Resolved: 2025-06-09

## 2025-06-09 PROCEDURE — 1159F MED LIST DOCD IN RCRD: CPT | Mod: CPTII,S$GLB,COE, | Performed by: HOSPITALIST

## 2025-06-09 PROCEDURE — 99999 PR PBB SHADOW E&M-EST. PATIENT-LVL IV: CPT | Mod: PBBFAC,COE,, | Performed by: HOSPITALIST

## 2025-06-09 PROCEDURE — 99215 OFFICE O/P EST HI 40 MIN: CPT | Mod: S$GLB,COE,, | Performed by: HOSPITALIST

## 2025-06-09 PROCEDURE — 3008F BODY MASS INDEX DOCD: CPT | Mod: CPTII,S$GLB,COE, | Performed by: HOSPITALIST

## 2025-06-09 PROCEDURE — 4010F ACE/ARB THERAPY RXD/TAKEN: CPT | Mod: CPTII,S$GLB,COE, | Performed by: HOSPITALIST

## 2025-06-09 NOTE — ASSESSMENT & PLAN NOTE
She is currently on hydrochlorothiazide and olmesartan that I suggested for her not to take on the morning of surgery    Home BP readings -121/79   Her losartan was changed to olmesartan which she finds to be helpful    Hypertension-  Blood pressure is acceptable . I suggest holding ----hydrochlorothiazide and olmesartan---- on the morning of the surgery and can continue that  post operatively under blood pressure, electrolyte and renal function monitoring as long as they are acceptable.I suggest addressing pain control as uncontrolled pain can increased blood pressure    HTN- under digital Medicine hydrochlorothiazide    ASA-primary prevention-she usually holds aspirin for procedure and plans on doing the same thing this time for 7 days prior to surgery and the last day of aspirin use prior to surgery is July 1st
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
## 2025-06-09 NOTE — ASSESSMENT & PLAN NOTE
She has started using Wegovy on June 6th and tolerated that fairly well accept nausea for a few days after  Her surgery is on July 9th which is a Wednesday and I suggested for her not to take the Wegovy on July 4th in preparation for surgery on July 9th  Last dose of Wegovy prior to surgery is June 27th    Weight related conditions     Known to have     HTN  Sleep apnea   Acid reflux       Not troubled with / Not known to have       Type 2 Diabetes   Prediabetes       Encouraged maintaining healthy weight for improved health

## 2025-06-09 NOTE — ASSESSMENT & PLAN NOTE
She feels good using CPAP   I suggested to bring the CPAP for hospital use as she may need the CPAP after extubation    Sleep apnea   Uses CPAP   Suggested bringing for hospital use .   Informed the risk of worsening sleep apnea in the perioperative period and suggest using CPAP  use any time in 24 hrs ( day or night )for planned sleep     I suggest  caution with usage of medication that can cause respiratory suppression in the perioperative period  Suggested care with driving    Avoidance of  supine sleep, weight gain , alcoholic beverages , care with , sedative , CNS depressant use indicated  since all of these can worsen STAS

## 2025-06-09 NOTE — OUTPATIENT SUBJECTIVE & OBJECTIVE
"Outpatient Subjective & Objective     Chief complaint-Preoperative evaluation, Perioperative Medical management, complication reduction plan     Active cardiac conditions- none    Revised cardiac risk index predictors- none    Functional capacity -Examples of physical activity  can take 1 flight of stairs----- She can undertake all the above activities without  chest pain,chest tightness,  ,dizziness,lightheadedness making her exercise tolerance more, than 4 Mets.       Review of Systems   Constitutional:  Negative for chills and fever.        No unusual weight changes       HENT:          Sleep apnea   Eyes:         No new visual changes   Respiratory:          No cough , phlegm    No Hemoptysis   Cardiovascular:         As noted   Gastrointestinal:         No overt GI/ blood losses  Bowel movements- Regular    Endocrine:        Prednisone use > 20 mg daily for 3 weeks- none     Genitourinary:  Negative for dysuria.        No urinary hesitancy    Musculoskeletal:         As above      Skin:  Negative for rash.   Neurological:  Negative for syncope.        No unilateral weakness   Hematological:         Current use of Anticoagulants  None  Discussed anti-inflammatory medicine hold 1 week prior to surgery   Psychiatric/Behavioral:          No Depression,Anxiety             No heart palpitations    No anesthesia, bleeding, cardiac problems, PONV with previous surgeries/procedures.  Medications and Allergies reviewed in epic.   FH- No anesthesia,bleeding / venous thrombosis in family    Physical Exam  Pulse 73, temperature 98.1 °F (36.7 °C), temperature source Oral, resp. rate 14, height 5' 2" (1.575 m), weight 84.8 kg (187 lb), SpO2 97%.      Physical Exam  Constitutional- Vitals - Body mass index is 34.2 kg/m².,   Vitals:    06/09/25 1436   Pulse: 73   Resp: 14   Temp: 98.1 °F (36.7 °C)     General appearance-Conscious,Coherent  Eyes- No conjunctival icterus,pupils  round  and reactive to light   ENT-Oral cavity- " moist    , Hearing grossly normal   Neck- No thyromegaly ,Trachea -central, No jugular venous distension,   No Carotid Bruit   Cardiovascular -Heart Sounds- Normal  and  no murmur   , No gallop rhythm   Respiratory - Normal Respiratory Effort, Normal breath sounds,  no wheeze , and  no forced expiratory wheeze    Peripheral pitting pedal edema-- none , no calf pain   Gastrointestinal -Soft abdomen, No palpable masses, Non Tender,Liver,Spleen not palpable. No-- free fluid and shifting dullness  Musculoskeletal- No finger Clubbing. Strength grossly normal   Lymphatic-No Palpable cervical, axillary,Inguinal lymphadenopathy   Psychiatric - normal effect,Orientation  Rt Dorsalis pedis pulses-palpable    Lt Dorsalis pedis pulses- palpable   Rt Posterior tibial pulses -palpable   Left posterior tibial pulses -palpable   Miscellaneous -  no asterixis,  no dupuytren's contracture,  Surgical scarneck , and  no renal bruit   Investigations  Lab and Imaging have been reviewed in [facility].    Review of Medicine tests    2019  The EKG portion of this study is negative for myocardial ischemia.  Overall, the patient's exercise capacity was average.  There were no arrhythmias during stress.  Normal left ventricular systolic function. The estimated ejection fraction is 55%  The stress echo portion of this study is negative for myocardial ischemia.      EKG- I had independently reviewed the EKG from--Dec 2024  It was reported to be showing       Normal sinus rhythm   Normal ECG   When compared with ECG of 14-Mar-2024 14:05,   No significant change was found   Review of clinical lab tests:  Lab Results   Component Value Date    CREATININE 0.8 05/23/2025    HGB 13.3 12/17/2024     12/17/2024             Review of old records- Was done and information gathered regards to events leading to surgery and health conditions of significance in the perioperative period.    Outpatient Subjective & Objective

## 2025-06-09 NOTE — ASSESSMENT & PLAN NOTE
History of COVID 2020    Did not require hospitalization, intubation or supplemental oxygen use   Had been vaccinated   Recovered from COVID    COVID screening     No fever   No cough   No SOB  No sore throat   No loss of taste or smell   No muscle aches   No nausea, vomiting , diarrhea

## 2025-06-09 NOTE — ASSESSMENT & PLAN NOTE
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

## 2025-06-09 NOTE — ASSESSMENT & PLAN NOTE
She seems to be attending vestibular rehab tomorrow  She has occasional vertigo in certain movements if she does the movement suddenly  The cause for her vertigo seems to be peripheral due to urine infection that she had in the past that she no longer is having and no history of stroke  Fall precautions discussed

## 2025-06-09 NOTE — ASSESSMENT & PLAN NOTE
She is not known to have heart disease  She stays active and works full-time 40 hours a week  She does a physical job  She does housework  She is able to take a flight of stairs  She has no exertional chest pain chest tightness, dizziness lightheadedness   She gets winded on exertion that she attributes to her weight which has been stable-she seems to be an evaluated for this and she is on weight loss medication

## 2025-06-09 NOTE — ASSESSMENT & PLAN NOTE
She has heavier menstrual cycles from fibroids  She does not pass clots  She is not anemic    Started with cycles-15    Birth control ,IUD,Hormone medication use      She is sexually active with her male boyfriend and sexually active once a month    Her last menstrual cycle was between May 26th to May 30    She had tubal ligation  She is currently not using any other contraceptive measures and she is not pregnant to her understanding    Most recent CBC showed a normal hemoglobin

## 2025-06-09 NOTE — ASSESSMENT & PLAN NOTE
She has longstanding constipation  She is doing good from a constipating standpoint    Suggested working on bowel movements in preparation for surgery   Constipation- I suggest giving bowel movement regimen as opioid use,reduced ambulation  can increase the constipation

## 2025-06-09 NOTE — ASSESSMENT & PLAN NOTE
Does not sound Cardiac   Tips to control reflux discussed   She was not given prescription for Protonix but she is unable to take it yet because of insurance reasons    GERD-  I suggest continuation of the Proton pump inhibitor in the perioperative period . I suggest aspiration precautions    I suggest watching fatty and spicy food  Citrus and tomato based  Alcohol, caffeine, soda  Chocolate, peppermint spearmint  Not to lay down within 3 hours after eating  Not to snack before going to bed  Sleep elevated  Watching weight  Watching anti-inflammatory medication    Contributing factors for Reflux     Naproxen-twice a day for 1 month  Coffee 2 to 3 times a day  Chocolate  Wegovy  Weight  She goes to sleep about sound p.m. and she has meals later sometimes  Aspirin    I suggested working on medication to help with acid reflux
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
## 2025-06-11 ENCOUNTER — OFFICE VISIT (OUTPATIENT)
Dept: PODIATRY | Facility: CLINIC | Age: 55
End: 2025-06-11
Payer: COMMERCIAL

## 2025-06-11 VITALS
DIASTOLIC BLOOD PRESSURE: 86 MMHG | HEART RATE: 80 BPM | HEIGHT: 62 IN | SYSTOLIC BLOOD PRESSURE: 137 MMHG | WEIGHT: 186.94 LBS | BODY MASS INDEX: 34.4 KG/M2

## 2025-06-11 DIAGNOSIS — M79.671 RIGHT FOOT PAIN: Primary | ICD-10-CM

## 2025-06-11 PROCEDURE — 4010F ACE/ARB THERAPY RXD/TAKEN: CPT | Mod: CPTII,S$GLB,COE, | Performed by: STUDENT IN AN ORGANIZED HEALTH CARE EDUCATION/TRAINING PROGRAM

## 2025-06-11 PROCEDURE — 1159F MED LIST DOCD IN RCRD: CPT | Mod: CPTII,S$GLB,COE, | Performed by: STUDENT IN AN ORGANIZED HEALTH CARE EDUCATION/TRAINING PROGRAM

## 2025-06-11 PROCEDURE — 99214 OFFICE O/P EST MOD 30 MIN: CPT | Mod: S$GLB,COE,, | Performed by: STUDENT IN AN ORGANIZED HEALTH CARE EDUCATION/TRAINING PROGRAM

## 2025-06-11 PROCEDURE — 3079F DIAST BP 80-89 MM HG: CPT | Mod: CPTII,S$GLB,COE, | Performed by: STUDENT IN AN ORGANIZED HEALTH CARE EDUCATION/TRAINING PROGRAM

## 2025-06-11 PROCEDURE — 3008F BODY MASS INDEX DOCD: CPT | Mod: CPTII,S$GLB,COE, | Performed by: STUDENT IN AN ORGANIZED HEALTH CARE EDUCATION/TRAINING PROGRAM

## 2025-06-11 PROCEDURE — 3075F SYST BP GE 130 - 139MM HG: CPT | Mod: CPTII,S$GLB,COE, | Performed by: STUDENT IN AN ORGANIZED HEALTH CARE EDUCATION/TRAINING PROGRAM

## 2025-06-11 PROCEDURE — 99999 PR PBB SHADOW E&M-EST. PATIENT-LVL IV: CPT | Mod: PBBFAC,COE,, | Performed by: STUDENT IN AN ORGANIZED HEALTH CARE EDUCATION/TRAINING PROGRAM

## 2025-06-11 NOTE — PROGRESS NOTES
Subjective:     Patient ID: Isaura Mancini is a 54 y.o. female.    Chief Complaint: Follow-up (X-rays, labs,) and Foot Pain (States Boot hurts foot more then helps)    Isaura is a 54 y.o. female who presents to the podiatry clinic  with complaint of  left foot pain. Onset of the symptoms was several months ago. Precipitating event: none known. Current symptoms include: pain to base of 5th metatarsal and to anteriolateral ankle joint of left foot. Aggravating factors: any weight bearing. Symptoms have gradually worsened. Patient has had no prior foot problems. Evaluation to date: none. Treatment to date: none. Patients rates pain 6/10 on pain scale.    7/20/23:  Pt seen today, states has been in boot but is still having pain. Relates she has a baseline pain but it flairs up from time to time with severe pain. Relates site is still swollen, points to base of 5th metatarsal to lateral hindfoot/ankle. No further pedal complaints. States history of sciatica, has followed up with neurosurgery in past, requesting for referral.     5/15/24: Seen today for painful ingrown toenail right great toe, points to inside border. Denies signs of infection    6/4/25: Seen today, relates pain to right foot, points to right 5th metatarsal. States she has had this pain for several months. Denies injury. No further pedal complaints.     6/11/25: Seen today to review imaging results    Review of Systems   Constitutional: Negative for chills, decreased appetite, diaphoresis and fever.   HENT:  Negative for congestion and hearing loss.    Cardiovascular:  Negative for chest pain, claudication, leg swelling and syncope.   Respiratory:  Negative for cough and shortness of breath.    Skin:  Positive for nail changes. Negative for color change, dry skin, flushing, itching, poor wound healing and rash.   Musculoskeletal:  Positive for arthritis, back pain and joint pain. Negative for joint swelling.   Gastrointestinal:  Negative for nausea and  vomiting.   Neurological:  Positive for paresthesias. Negative for numbness and weakness.   Psychiatric/Behavioral:  Negative for altered mental status. The patient is not nervous/anxious.         Objective:     Physical Exam  Constitutional:       General: She is not in acute distress.     Appearance: She is well-developed. She is not diaphoretic.   Cardiovascular:      Comments: Dorsalis pedis and posterior tibial pulses are within normal limits. Skin temperature is within normal limits. Toes are cool to touch and feet are warm proximally. Hair growth is within normal limits. Skin is normotrophic and without hyperpigmentation. No edema noted. No spider veins or varicosities noted, bilaterally.   Musculoskeletal:         General: Tenderness present.      Comments: Adequate joint range of motion without pain, limitation, nor crepitation to bilateral feet and ankle joints. Muscle strength is 5/5 in all groups bilaterally.    Tenderness along peroneal tendon and to base of 5th metatarsal      Lymphadenopathy:      Comments: Negative lymphangitic streaking    Skin:     General: Skin is warm and dry.      Findings: No lesion.      Comments: Skin is warm and dry, no acute signs of infection noted. No open wounds, macerations or hyperkeratotic lesions, bilaterally.     Toenails are well trimmed and of normal morphology, bilaterally.      Neurological:      Mental Status: She is alert and oriented to person, place, and time.      Sensory: No sensory deficit.      Motor: No abnormal muscle tone.      Comments: Light touch within normal limits.    Psychiatric:         Behavior: Behavior normal.         Thought Content: Thought content normal.         Judgment: Judgment normal.       Xray right foot  FINDINGS:  Mild DJD change calcaneal navicular naviculocuneiform joints, remote linea density overlies lateral margin cortex base 5th metatarsal versus hypertrophic change.  Moderate heel spur and prominent spurring at Achilles  tendon insertion site posterior calcaneus.     Impression:     No fracture dislocation.  Additional findings above.    Assessment:      Encounter Diagnosis   Name Primary?    Right foot pain Yes           Plan:     Isaura was seen today for follow-up and foot pain.    Diagnoses and all orders for this visit:    Right foot pain  -     MRI Foot (Forefoot) Right W W/O Contrast; Future            I counseled the patient on her conditions, their implications and medical management.  Previous xray independently reviewed with patient, questionable fracture to base of 5th metatarsal. MRI ordered, possible stress fracture vs peroneal tendonitis  RICE, NSAIDs PRN pain  Fracture boot fitted and dispensed, she is to wear at all times while ambulating  Return to clinic in 1-2 weeks to review results.

## 2025-06-17 DIAGNOSIS — E66.812 CLASS 2 SEVERE OBESITY WITH SERIOUS COMORBIDITY AND BODY MASS INDEX (BMI) OF 37.0 TO 37.9 IN ADULT, UNSPECIFIED OBESITY TYPE: ICD-10-CM

## 2025-06-17 DIAGNOSIS — E66.01 CLASS 2 SEVERE OBESITY WITH SERIOUS COMORBIDITY AND BODY MASS INDEX (BMI) OF 37.0 TO 37.9 IN ADULT, UNSPECIFIED OBESITY TYPE: ICD-10-CM

## 2025-06-17 DIAGNOSIS — I10 ESSENTIAL HYPERTENSION: ICD-10-CM

## 2025-06-18 RX ORDER — SEMAGLUTIDE 0.25 MG/.5ML
INJECTION, SOLUTION SUBCUTANEOUS
Qty: 2 ML | Refills: 0 | Status: SHIPPED | OUTPATIENT
Start: 2025-06-18

## 2025-06-18 NOTE — TELEPHONE ENCOUNTER
Refill Routing Note   Medication(s) are not appropriate for processing by [facility] for the following reason(s):        Required labs outdated  New or recently adjusted medication    [facility] action(s):  Defer     Requires labs : Yes             Appointments  past 12m or future 3m with PCP    Date Provider   Last Visit   5/23/2025 Ashok Braden III, MD   Next Visit   11/26/2025 Ashok Braden III, MD   ED visits in past 90 days: 0        Note composed:10:09 PM 06/17/2025

## 2025-06-18 NOTE — TELEPHONE ENCOUNTER
Care Due:                  Date            Visit Type   Department     Provider  --------------------------------------------------------------------------------                                EP -                              PRIMARY      [facility]  Last Visit: 05-      CARE ([facility])   MEDICINE       Ashok Braden                               -                              [facility]  Next Visit: 11-      CARE ([facility])   MEDICINE       Ashok Braden                                                            Last  Test          Frequency    Reason                     Performed    Due Date  --------------------------------------------------------------------------------    HBA1C.......  6 months...  semaglutide,.............  02- 08-    [facility] Embedded Care Due Messages. Reference number: [id number].   6/17/2025 7:30:28 PM CDT

## 2025-06-25 ENCOUNTER — HOSPITAL ENCOUNTER (OUTPATIENT)
Dept: RADIOLOGY | Facility: HOSPITAL | Age: 55
Discharge: HOME | End: 2025-06-25
Attending: STUDENT IN AN ORGANIZED HEALTH CARE EDUCATION/TRAINING PROGRAM
Payer: COMMERCIAL

## 2025-06-25 DIAGNOSIS — M79.671 RIGHT FOOT PAIN: ICD-10-CM

## 2025-06-25 PROCEDURE — A9585 GADOBUTROL INJECTION: HCPCS | Performed by: STUDENT IN AN ORGANIZED HEALTH CARE EDUCATION/TRAINING PROGRAM

## 2025-06-25 PROCEDURE — 25500020 PHARM REV CODE 255: Performed by: STUDENT IN AN ORGANIZED HEALTH CARE EDUCATION/TRAINING PROGRAM

## 2025-06-25 PROCEDURE — 73720 MRI LWR EXTREMITY W/O&W/DYE: CPT | Mod: 26,RT,COE, | Performed by: RADIOLOGY

## 2025-06-25 PROCEDURE — 73720 MRI LWR EXTREMITY W/O&W/DYE: CPT | Mod: TC,RT

## 2025-06-25 RX ORDER — GADOBUTROL 604.72 MG/ML
8 INJECTION INTRAVENOUS
Status: COMPLETED | OUTPATIENT
Start: 2025-06-25 | End: 2025-06-25

## 2025-06-25 RX ADMIN — GADOBUTROL 8 ML: 604.72 INJECTION INTRAVENOUS at 02:06

## 2025-06-27 ENCOUNTER — TELEPHONE (OUTPATIENT)
Dept: NEUROSURGERY | Facility: CLINIC | Age: 55
End: 2025-06-27
Payer: COMMERCIAL

## 2025-06-27 ENCOUNTER — PATIENT MESSAGE (OUTPATIENT)
Dept: ADMINISTRATIVE | Facility: OTHER | Age: 55
End: 2025-06-27
Payer: COMMERCIAL

## 2025-06-27 NOTE — TELEPHONE ENCOUNTER
Patient completed required Spine Class prior to her surgery (JAMES-L4-5 TLIF ) scheduled on 07/09/2025 with Dr Bill Simpson.  All questions answered to patient satisfaction.       Patient completed AUDIO Spine Education. Reviewed pain medication usage - discussed how to use muscle relaxers and pain medication, driving 3-4 week post surgery and only if not requiring narcotics for pain, Lifting restrictions - nothing heavier than a gallon of milk or equivalent of 8 pounds and to be mindful of bending, lifting and twisting.  Mobility - proper use of assistive devices (cane or walker) as necessary to aid with mobility needs, Walk as tolerated or 10 minutes building up to 30 minutes every 3 hours, Limit time in bed using favorite chair/recliner as tolerated, if indicated wear prescribed brace if indicated as prescribed at all times taking it off only to sleep and shower. Showering reviewed. Reviewed incisional care and when to call provider (redness, increased drainage, warmth to incision or neighboring skin, chills and fever higher than 100.0).    Advised that inpatient Case Management will be involved with discharge plan and will be working with the team and physical therapy to have a safe discharge. Post operative follow-up  appointments will be scheduled prior to discharge from the hospital.    My direct number provided. Will follow-up with patient on POD 1 and will complete post discharge call on 07/15/2025    Luma Jung RN will remove staples and monitor incision.           Orin Holman RN (Dee)CM  Complex Spine Navigator  [facility]         [phone]

## 2025-07-02 ENCOUNTER — TELEPHONE (OUTPATIENT)
Dept: NEUROSURGERY | Facility: CLINIC | Age: 55
End: 2025-07-02
Payer: COMMERCIAL

## 2025-07-02 ENCOUNTER — OFFICE VISIT (OUTPATIENT)
Dept: PODIATRY | Facility: CLINIC | Age: 55
End: 2025-07-02
Payer: COMMERCIAL

## 2025-07-02 VITALS
HEART RATE: 73 BPM | HEIGHT: 62 IN | WEIGHT: 186.94 LBS | DIASTOLIC BLOOD PRESSURE: 73 MMHG | SYSTOLIC BLOOD PRESSURE: 101 MMHG | BODY MASS INDEX: 34.4 KG/M2

## 2025-07-02 DIAGNOSIS — M67.40 GANGLION CYST: Primary | ICD-10-CM

## 2025-07-02 DIAGNOSIS — Q66.70 PES CAVUS: ICD-10-CM

## 2025-07-02 PROCEDURE — 3078F DIAST BP <80 MM HG: CPT | Mod: CPTII,S$GLB,COE, | Performed by: STUDENT IN AN ORGANIZED HEALTH CARE EDUCATION/TRAINING PROGRAM

## 2025-07-02 PROCEDURE — 4010F ACE/ARB THERAPY RXD/TAKEN: CPT | Mod: CPTII,S$GLB,COE, | Performed by: STUDENT IN AN ORGANIZED HEALTH CARE EDUCATION/TRAINING PROGRAM

## 2025-07-02 PROCEDURE — 99999 PR PBB SHADOW E&M-EST. PATIENT-LVL III: CPT | Mod: PBBFAC,COE,, | Performed by: STUDENT IN AN ORGANIZED HEALTH CARE EDUCATION/TRAINING PROGRAM

## 2025-07-02 PROCEDURE — 3074F SYST BP LT 130 MM HG: CPT | Mod: CPTII,S$GLB,COE, | Performed by: STUDENT IN AN ORGANIZED HEALTH CARE EDUCATION/TRAINING PROGRAM

## 2025-07-02 PROCEDURE — 99214 OFFICE O/P EST MOD 30 MIN: CPT | Mod: S$GLB,COE,, | Performed by: STUDENT IN AN ORGANIZED HEALTH CARE EDUCATION/TRAINING PROGRAM

## 2025-07-02 PROCEDURE — 3008F BODY MASS INDEX DOCD: CPT | Mod: CPTII,S$GLB,COE, | Performed by: STUDENT IN AN ORGANIZED HEALTH CARE EDUCATION/TRAINING PROGRAM

## 2025-07-02 NOTE — PROGRESS NOTES
Subjective:     Patient ID: Isaura Mancini is a 54 y.o. female.    Chief Complaint: Follow-up     Isaura is a 54 y.o. female who presents to the podiatry clinic  with complaint of  left foot pain. Onset of the symptoms was several months ago. Precipitating event: none known. Current symptoms include: pain to base of 5th metatarsal and to anteriolateral ankle joint of left foot. Aggravating factors: any weight bearing. Symptoms have gradually worsened. Patient has had no prior foot problems. Evaluation to date: none. Treatment to date: none. Patients rates pain 6/10 on pain scale.    7/20/23:  Pt seen today,  has been in boot but is still having pain. Relates she has a baseline pain but it flairs up from time to time with severe pain. Relates site is still swollen, points to base of 5th metatarsal to lateral hindfoot/ankle. No further pedal complaints. States history of sciatica, has followed up with neurosurgery in past, requesting for referral.     5/15/24: Seen today for painful ingrown toenail right great toe, points to inside border. Denies signs of infection    6/4/25: Seen today, relates pain to right foot, points to right 5th metatarsal. States she has had this pain for several months. Denies injury. No further pedal complaints.     6/11/25: Seen today to review imaging results. Sekou has not been wearing the boot since it was uncomfortable.     Review of Systems   Constitutional: Negative for chills, decreased appetite, diaphoresis and fever.   HENT:  Negative for congestion and hearing loss.    Cardiovascular:  Negative for chest pain, claudication, leg swelling and syncope.   Respiratory:  Negative for cough and shortness of breath.    Skin:  Positive for nail changes. Negative for color change, dry skin, flushing, itching, poor wound healing and rash.   Musculoskeletal:  Positive for arthritis, back pain and joint pain. Negative for joint swelling.   Gastrointestinal:  Negative for nausea and  vomiting.   Neurological:  Positive for paresthesias. Negative for numbness and weakness.   Psychiatric/Behavioral:  Negative for altered mental status. The patient is not nervous/anxious.         Objective:     Physical Exam  Constitutional:       General: She is not in acute distress.     Appearance: She is well-developed. She is not diaphoretic.   Cardiovascular:      Comments: Dorsalis pedis and posterior tibial pulses are within normal limits. Skin temperature is within normal limits. Toes are cool to touch and feet are warm proximally. Hair growth is within normal limits. Skin is normotrophic and without hyperpigmentation. No edema noted. No spider veins or varicosities noted, bilaterally.   Musculoskeletal:         General: Tenderness present.      Comments: Adequate joint range of motion without pain, limitation, nor crepitation to bilateral feet and ankle joints. Muscle strength is 5/5 in all groups bilaterally.    Tenderness along peroneal tendon and to base of 5th metatarsal      Lymphadenopathy:      Comments: Negative lymphangitic streaking    Skin:     General: Skin is warm and dry.      Findings: No lesion.      Comments: Skin is warm and dry, no acute signs of infection noted. No open wounds, macerations or hyperkeratotic lesions, bilaterally.     Toenails are well trimmed and of normal morphology, bilaterally.      Neurological:      Mental Status: She is alert and oriented to person, place, and time.      Sensory: No sensory deficit.      Motor: No abnormal muscle tone.      Comments: Light touch within normal limits.    Psychiatric:         Behavior: Behavior normal.         Thought Content: Thought content normal.         Judgment: Judgment normal.       Xray right foot  FINDINGS:  Mild DJD change calcaneal navicular naviculocuneiform joints, remote linea density overlies lateral margin cortex base 5th metatarsal versus hypertrophic change.  Moderate heel spur and prominent spurring at Achilles  tendon insertion site posterior calcaneus.     Impression:     No fracture dislocation.  Additional findings above.    MRI right forefoot 6/26/25  Impression:     Fifth tarsometatarsal joint ganglion cyst and adjacent soft tissue and muscle inflammation at the plantar aponeurosis attachment to the 5th metatarsal base.     First MTP osteoarthritis    Assessment:      Encounter Diagnoses   Name Primary?    Ganglion cyst Yes    Pes cavus              Plan:     Isaura was seen today for follow-up.    Diagnoses and all orders for this visit:    Ganglion cyst    Pes cavus              I counseled the patient on her conditions, their implications and medical management.  MRI independently reviewed with patient noting ganglion cyst to 5th TMTJ and adjacent muscle inflammation at attachment to plantar 5th metatarsal base aponeurosis.   RICE, NSAIDs PRN pain  Supportive tennis shoes with arch supports at all times while ambulating. Consider custom arch supports if needed in the future  Declines steroid injection. Relates she is having back surgery soon and would like to focus on that  Return to clinic PRN

## 2025-07-02 NOTE — TELEPHONE ENCOUNTER
spoke with patient to discuss preop medication hold. Patient's last GLP injection was 6/27 and verbalizes understanding of holding along with holding blood thinners, NSAIDs. Discussed preop appointments, Patient verbalizes understanding.    Future Appointments   Date Time Provider Department Center   7/3/2025  2:30 PM Rica Wolf LOTR [facility] OP [facility]   7/8/2025 11:00 AM PRE- ADMIT, [facility] PRE- ADMIT [facility] PREADM [place]   7/8/2025  1:30 PM Constanza Sandoval, NP [facility] NEUROS8 [facility]   7/15/2025 11:30 AM Constanza Sandoval, NP [facility] NEUROS8 [facility]   7/18/2025 To Be Determined [facility] E-VISIT [facility] EVISIT None   7/25/2025  8:00 AM Gay Frey, PT [facility]   9/8/2025  9:00 AM Priya Belle, PA-C [facility] ORTHO [facility]   9/12/2025  2:40 PM Chelsie Posadas, OD [facility] [place]   11/24/2025  8:00 AM LAB, [facility] LAB [place]   11/26/2025 10:40 AM Ashok Braden III, MD [facility]         Luma Jung, RN, CMSRN  RN Navigator  [facility]   [phone]  Fax [phone]

## 2025-07-02 NOTE — PATIENT INSTRUCTIONS
[employer], arch supports. [employer]. Consider [employer]    [employer] on [address] [facility]:  [address], [place] [address]

## 2025-07-08 ENCOUNTER — OFFICE VISIT (OUTPATIENT)
Dept: NEUROSURGERY | Facility: CLINIC | Age: 55
End: 2025-07-08
Payer: COMMERCIAL

## 2025-07-08 ENCOUNTER — TELEPHONE (OUTPATIENT)
Dept: ORTHOPEDICS | Facility: CLINIC | Age: 55
End: 2025-07-08
Payer: COMMERCIAL

## 2025-07-08 ENCOUNTER — ANESTHESIA EVENT (OUTPATIENT)
Dept: SURGERY | Facility: HOSPITAL | Age: 55
End: 2025-07-08
Payer: COMMERCIAL

## 2025-07-08 ENCOUNTER — LAB VISIT (OUTPATIENT)
Dept: LAB | Facility: HOSPITAL | Age: 55
End: 2025-07-08
Attending: ANESTHESIOLOGY
Payer: COMMERCIAL

## 2025-07-08 VITALS
SYSTOLIC BLOOD PRESSURE: 120 MMHG | DIASTOLIC BLOOD PRESSURE: 78 MMHG | WEIGHT: 183 LBS | TEMPERATURE: 98 F | BODY MASS INDEX: 33.47 KG/M2 | HEART RATE: 82 BPM

## 2025-07-08 DIAGNOSIS — M54.16 LUMBAR RADICULOPATHY: ICD-10-CM

## 2025-07-08 DIAGNOSIS — C73 HURTHLE CELL CARCINOMA OF THYROID: ICD-10-CM

## 2025-07-08 DIAGNOSIS — M48.061 SPINAL STENOSIS OF LUMBAR REGION WITHOUT NEUROGENIC CLAUDICATION: ICD-10-CM

## 2025-07-08 DIAGNOSIS — M43.16 SPONDYLOLISTHESIS OF LUMBAR REGION: ICD-10-CM

## 2025-07-08 DIAGNOSIS — M43.16 SPONDYLOLISTHESIS AT L4-L5 LEVEL: Primary | ICD-10-CM

## 2025-07-08 DIAGNOSIS — M47.816 LUMBAR FACET ARTHROPATHY: ICD-10-CM

## 2025-07-08 DIAGNOSIS — M43.16 SPONDYLOLISTHESIS AT L4-L5 LEVEL: ICD-10-CM

## 2025-07-08 LAB
ABSOLUTE EOSINOPHIL (OHS): 0.19 K/UL
ABSOLUTE MONOCYTE (OHS): 0.56 K/UL (ref 0.3–1)
ABSOLUTE NEUTROPHIL COUNT (OHS): 5.58 K/UL (ref 1.8–7.7)
APTT PPP: 26.2 SECONDS (ref 21–32)
BASOPHILS # BLD AUTO: 0.03 K/UL
BASOPHILS NFR BLD AUTO: 0.3 %
ERYTHROCYTE [DISTWIDTH] IN BLOOD BY AUTOMATED COUNT: 11.8 % (ref 11.5–14.5)
HCT VFR BLD AUTO: 40.1 % (ref 37–48.5)
HGB BLD-MCNC: 13.2 GM/DL (ref 12–16)
IMM GRANULOCYTES # BLD AUTO: 0.02 K/UL (ref 0–0.04)
IMM GRANULOCYTES NFR BLD AUTO: 0.2 % (ref 0–0.5)
INR PPP: 1 (ref 0.8–1.2)
LYMPHOCYTES # BLD AUTO: 2.44 K/UL (ref 1–4.8)
MCH RBC QN AUTO: 29.1 PG (ref 27–31)
MCHC RBC AUTO-ENTMCNC: 32.9 G/DL (ref 32–36)
MCV RBC AUTO: 88 FL (ref 82–98)
NUCLEATED RBC (/100WBC) (OHS): 0 /100 WBC
PLATELET # BLD AUTO: 406 K/UL (ref 150–450)
PMV BLD AUTO: 11.3 FL (ref 9.2–12.9)
PROTHROMBIN TIME: 10.6 SECONDS (ref 9–12.5)
RBC # BLD AUTO: 4.54 M/UL (ref 4–5.4)
RELATIVE EOSINOPHIL (OHS): 2.2 %
RELATIVE LYMPHOCYTE (OHS): 27.7 % (ref 18–48)
RELATIVE MONOCYTE (OHS): 6.3 % (ref 4–15)
RELATIVE NEUTROPHIL (OHS): 63.3 % (ref 38–73)
T4 FREE SERPL-MCNC: 1.68 NG/DL (ref 0.71–1.51)
TSH SERPL-ACNC: 0.17 UIU/ML (ref 0.4–4)
WBC # BLD AUTO: 8.82 K/UL (ref 3.9–12.7)

## 2025-07-08 PROCEDURE — 99999 PR PBB SHADOW E&M-EST. PATIENT-LVL III: CPT | Mod: PBBFAC,COE,, | Performed by: NURSE PRACTITIONER

## 2025-07-08 PROCEDURE — 85730 THROMBOPLASTIN TIME PARTIAL: CPT

## 2025-07-08 PROCEDURE — 85610 PROTHROMBIN TIME: CPT

## 2025-07-08 PROCEDURE — 85025 COMPLETE CBC W/AUTO DIFF WBC: CPT

## 2025-07-08 PROCEDURE — 84439 ASSAY OF FREE THYROXINE: CPT

## 2025-07-08 PROCEDURE — 99213 OFFICE O/P EST LOW 20 MIN: CPT | Mod: S$GLB,COE,, | Performed by: NURSE PRACTITIONER

## 2025-07-08 PROCEDURE — 84443 ASSAY THYROID STIM HORMONE: CPT

## 2025-07-08 PROCEDURE — 36415 COLL VENOUS BLD VENIPUNCTURE: CPT

## 2025-07-08 NOTE — ANESTHESIA PREPROCEDURE EVALUATION
[facility]  Anesthesia Pre-Operative Evaluation     Patient Name: Isaura Mancini  YOB: 1970  MRN: [id number]  [facility]: [id number]       Admit Date: 7/9/2025   Admit Team: Networked reference to record PCT   Hospital Day: 1  Date of Procedure: 7/9/2025  Anesthesia: General Procedure: Procedure(s) (LRB):  JAMES-L4-5 TLIF (N/A)  Pre-Operative Diagnosis: Spinal stenosis of lumbar region without neurogenic claudication [M48.061]  Proceduralist:Surgeons and Role:     * Bill Simpson MD - Primary  Code Status: Prior   Advanced Directive: <no information>  Isolation Precautions: No active isolations  Capacity: Full capacity     SUBJECTIVE:   Isaura Mancini is a 54 y.o. female who  has a past medical history of GERD (gastroesophageal reflux disease), Hypertension, and Sleep apnea.    This is a very pleasant 49 y.o. female who presents as referral for low back pain and leg pain.  After the last appointment with Dr. Simpson, he recommended a L4-5 TLIF due to the grade 2 spondylolisthesis and severe stenosis causing debilitating axial low back and right leg pain that has failed conservative measurement. She is being seen in clinic today for her pre-op evaluation to answer additional questions and concerns. Denies changes in her symptoms since the last appointment.     Hospital LOS: 0 days  ICU LOS: Patient does not have an ICU stay during this admission.    she has a current medication list which includes the following long-term medication(s): aspirin, hydrochlorothiazide, levothyroxine, olmesartan, pantoprazole, and pregabalin.   Current Outpatient Medications   Medication Instructions    aspirin (ECOTRIN) 81 mg, Daily    celecoxib (CELEBREX) 200 mg, Oral, 2 times daily    cholecalciferol (vitamin D3) (VITAMIN D3) 2,000 Units, Oral, Daily    fluticasone propionate (FLONASE) 50 mcg, Each Nostril, Daily    hydroCHLOROthiazide  (HYDRODIURIL) 25 mg, Oral, Daily    levothyroxine (SYNTHROID) 150 MCG tablet Take 1 tab 6 days a week and 0.5 tabs on Sundays for total of 6.5 tabs weekly    naproxen (NAPROSYN) 500 mg, Oral, 2 times daily with meals    olmesartan (BENICAR) 20 mg, Oral, Daily    pantoprazole (PROTONIX) 40 mg, Oral, Daily    pregabalin (LYRICA) 50 mg, Oral, 3 times daily    WEGOVY 0.25 mg/0.5 mL PnIj Inject contents of one pen (0.25 MG) subcutaneously once weekly as directed     ALLERGIES:   Review of patient's allergies indicates:  No Known Allergies  LDA:   AIRWAY:              Airway - Non-Surgical 04/09/24 0806 Nasal Cannula (Active)         Lines/Drains/Airways       Airway  Duration                  Airway - Non-Surgical 04/09/24 0806 Nasal Cannula 455 days              Peripheral Intravenous Line  Duration             Peripheral IV 07/09/25 0710 20 G Right Forearm <1 day                   Anesthesia Evaluation      Airway   Mallampati: I  Neck ROM: Normal ROM  Dental    (+) Partial Dentures    Pulmonary    (+) sleep apnea on CPAP  (-) shortness of breath  Cardiovascular   Exercise tolerance: good  (-) angina    Rate: Normal    Neuro/Psych    (+) neuromuscular disease, headaches  (-) seizures, TIA, CVA    GI/Hepatic/Renal    (+) GERD well controlled    Endo/Other    (+) hypothyroidism, arthritis  Abdominal                    MEDICATIONS:     Current Outpatient Medications on File Prior to Encounter   Medication Sig Dispense Refill Last Dose/Taking    aspirin (ECOTRIN) 81 MG EC tablet Take 81 mg by mouth once daily.   Past Month    celecoxib (CELEBREX) 200 MG capsule Take 1 capsule (200 mg total) by mouth 2 (two) times daily. 60 capsule 3 Past Month    cholecalciferol, vitamin D3, (VITAMIN D3) 25 mcg (1,000 unit) capsule Take 2 capsules (2,000 Units total) by mouth once daily.  0 Past Month    hydroCHLOROthiazide (HYDRODIURIL) 25 MG tablet Take 1 tablet (25 mg total) by mouth once daily. 90 tablet 3 7/8/2025 Morning     "levothyroxine (SYNTHROID) 150 MCG tablet Take 1 tab 6 days a week and 0.5 tabs on Sundays for total of 6.5 tabs weekly 90 tablet 3 2025 Morning    pregabalin (LYRICA) 50 MG capsule Take 1 capsule (50 mg total) by mouth 3 (three) times daily. 90 capsule 3 Past Month      Inpatient Medications:  Antibiotics (From admission, onward)      Start     Stop Route Frequency Ordered    25 0900  mupirocin 2 % ointment 1 g         07/10/25 0859 Nasl 2 times daily 25 0611    25 06  mupirocin 2 % ointment         -- Nasl On Call Procedure 25 06          VTE Risk Mitigation (From admission, onward)           Ordered     IP VTE HIGH RISK PATIENT  Once         25 06     Place sequential compression device  Until discontinued         25                   mupirocin  1 g Nasal BID       Current Medications[1]       History:     Active Hospital Problems    Diagnosis  POA    *Spondylolisthesis at L4-L5 level [M43.16]  Not Applicable      Resolved Hospital Problems   No resolved problems to display.     Surgical History:    has a past surgical history that includes  section; Tubal ligation; Thyroidectomy (N/A, 10/28/2022); Neck mass excision (N/A, 2024); and Epidural steroid injection into lumbar spine (N/A, 2025).   Social History:    reports being sexually active and has had partner(s) who are male. She reports using the following method of birth control/protection: Surgical.  reports that she has never smoked. She has never been exposed to tobacco smoke. She has never used smokeless tobacco. She reports that she does not drink alcohol and does not use drugs.    Vitals:    25 0638 25 0641 25 0714   BP:   128/67   Pulse: (!) 59 67    Resp: 16     Temp: 36.5 °C (97.7 °F)     SpO2: 98%     Weight: 82.9 kg (182 lb 12.2 oz)     Height: 5' 2" (1.575 m)       Vital Signs Range (Last 24H):  Temp:  [36.4 °C (97.6 °F)-36.7 °C (98.1 °F)]   Pulse:  [59-82] "   Resp:  [16]   BP: (113-128)/(67-80)   SpO2:  [98 %]     Body mass index is 33.43 kg/m².  Wt Readings from Last 4 Encounters:   07/09/25 82.9 kg (182 lb 12.2 oz)   07/08/25 83 kg (182 lb 15.7 oz)   07/08/25 83 kg (183 lb)   07/02/25 84.8 kg (186 lb 15.2 oz)        Intake/Output - Last 3 Shifts       None          Lab Results   Component Value Date    WBC 8.82 07/08/2025    HGB 13.2 07/08/2025    HCT 40.1 07/08/2025     07/08/2025     05/23/2025    K 3.7 05/23/2025     05/23/2025    CREATININE 0.8 05/23/2025    BUN 17 05/23/2025    CO2 26 05/23/2025    GLU 99 05/23/2025    CALCIUM 9.7 05/23/2025    MG 1.7 12/19/2022    PHOS 3.2 05/25/2023    ALKPHOS 60 12/17/2024    ALT 7 (L) 12/17/2024    AST 15 12/17/2024    ALBUMIN 3.6 12/17/2024    INR 1.0 07/08/2025    PROTIME 10.6 07/08/2025    APTT 26.2 07/08/2025    HGBA1C 5.0 02/26/2024     No results found for this or any previous visit (from the past 12 hours).  Recent Labs   Lab 07/08/25  1241   WBC 8.82   HGB 13.2   HCT 40.1      INR 1.0     No LMP recorded.    EKG:   Results for orders placed or performed in visit on 12/17/24   EKG 12-lead    Collection Time: 12/17/24  9:47 AM   Result Value Ref Range    QRS Duration 88 ms    OHS QTC Calculation 429 ms    Narrative    Test Reason :    Vent. Rate :  66 BPM     Atrial Rate :  66 BPM     P-R Int : 154 ms          QRS Dur :  88 ms      QT Int : 410 ms       P-R-T Axes :  27  58  42 degrees    QTcB Int : 429 ms    Normal sinus rhythm  Normal ECG  When compared with ECG of 14-Mar-2024 14:05,  No significant change was found  Confirmed by Fidel Lopez ([id number]) on 12/19/2024 9:52:14 PM    Referred By: JOHNNY HERNÁNDEZ           Confirmed By: Fidel Lopez     TTE:  No results found for this or any previous visit.    MICHELLE:  No results found for this or any previous visit.    Stress Test:  No results found for this or any previous visit.    Results for orders placed during the hospital encounter of  09/20/19    Stress Echo Which stress agent will be used? Treadmill Exercise; Color Flow Doppler? No    Interpretation Summary  · The EKG portion of this study is negative for myocardial ischemia.  · Overall, the patient's exercise capacity was average.  · There were no arrhythmias during stress.  · Normal left ventricular systolic function. The estimated ejection fraction is 55%  · The stress echo portion of this study is negative for myocardial ischemia.    LHC:  No results found for this or any previous visit.    Cardiac Device Check   No results found for this or any previous visit.    No results found for this or any previous visit.      Pre-op Assessment    I have reviewed the Patient Summary Reports.     I have reviewed the Nursing Notes.    I have reviewed the Medications.     Review of Systems  Anesthesia Hx:  No problems with previous Anesthesia               Denies Personal Hx of Anesthesia complications.                    Social:  Non-Smoker, Social Alcohol Use       Hematology/Oncology:                        --  Cancer in past history:                     Cardiovascular:  Exercise tolerance: good          Denies Angina.                              Hypertension, Essential Hypertension , Pt in optimal range, systolic < 120 and diastolic < 80        Pulmonary:       Denies Shortness of breath.  Sleep Apnea, CPAP           Education provided regarding risk of obstructive sleep apnea            Hepatic/GI:     GERD, well controlled    Taking GLP-1 Agonists Instructed to Hold for 7 Days  Hepatic/GI Symptoms: constipation.          Musculoskeletal:  Arthritis          Spine Disorders: lumbar Disc disease and Degenerative disease           Neurological:  Denies TIA.  Denies CVA. Neuromuscular Disease,  Headaches Denies Seizures.    Benign paroxysmal positional vertigo      Peripheral Neuropathy (bilateral hands)                          Endocrine:   Hypothyroidism        Obesity / BMI > 30      Physical  Exam  General: Cooperative, Oriented, Well nourished and Alert    Airway:  Mallampati: I   Mouth Opening: Normal  Tongue: Normal  Neck ROM: Normal ROM    Dental:  Partial Dentures  Upper partial denture  Heart:  Rate: Normal    Skin:  Ecchymosis  CHIN    Lab Results   Component Value Date    WBC 8.78 12/17/2024    HGB 13.3 12/17/2024    HCT 41.0 12/17/2024     12/17/2024    CHOL 171 02/26/2024    TRIG 53 02/26/2024    HDL 67 02/26/2024    ALT 7 (L) 12/17/2024    AST 15 12/17/2024     05/23/2025    K 3.7 05/23/2025     05/23/2025    CREATININE 0.8 05/23/2025    BUN 17 05/23/2025    CO2 26 05/23/2025    TSH 1.151 11/29/2024    INR 1.0 12/17/2024    HGBA1C 5.0 02/26/2024     Results for orders placed or performed in visit on 12/17/24   EKG 12-lead    Collection Time: 12/17/24  9:47 AM   Result Value Ref Range    QRS Duration 88 ms    OHS QTC Calculation 429 ms    Narrative    Test Reason :    Vent. Rate :  66 BPM     Atrial Rate :  66 BPM     P-R Int : 154 ms          QRS Dur :  88 ms      QT Int : 410 ms       P-R-T Axes :  27  58  42 degrees    QTcB Int : 429 ms    Normal sinus rhythm  Normal ECG  When compared with ECG of 14-Mar-2024 14:05,  No significant change was found  Confirmed by Fidel Lopez ([id number]) on 12/19/2024 9:52:14 PM    Referred By: JOHNNY HERNÁNDEZ           Confirmed By: Fidel Lopez     EXAMINATION:  XR CHEST PA AND LATERAL    CLINICAL HISTORY:  Encounter for preprocedural cardiovascular examination    TECHNIQUE:  PA and lateral views of the chest were performed.    COMPARISON:  10/18/2022    FINDINGS:  The cardiac silhouette is normal in size. The hilar and mediastinal contours are unremarkable.    The lungs are clear, with normal appearance of pulmonary vasculature and no pleural effusion or pneumothorax.    Bones are intact.    Impression:    No acute cardiopulmonary abnormality.    Electronically signed by: Francisco Javier Paiz Jr  Date: 12/17/2024  Time: 14:43     Anesthesia  Plan  Type of Anesthesia, risks & benefits discussed:    Anesthesia Type: Gen ETT  Intra-op Monitoring Plan: Standard ASA Monitors  Induction:  IV  Informed Consent: Informed consent signed with the Patient and all parties understand the risks and agree with anesthesia plan.  All questions answered.   ASA Score: 2  Day of Surgery Review of History & Physical: H&P Update referred to the surgeon/provider.H&P completed by Anesthesiologist.  Anesthesia Plan Notes:       Ready For Surgery From Anesthesia Perspective.     .           [1]   Current Facility-Administered Medications   Medication Dose Route Frequency Provider Last Rate Last Admin    mupirocin 2 % ointment 1 g  1 g Nasal BID Arya Gracia MD        mupirocin 2 % ointment   Nasal On Call Procedure Arya Gracia MD   Given at 07/09/25 0656

## 2025-07-08 NOTE — TELEPHONE ENCOUNTER
Attended patients pre-op appt with her. All questions were answered, patient is aware that she is to be NPO after midnight and that her arrival time for 7-9-25 is 9:30 am.

## 2025-07-08 NOTE — PROGRESS NOTES
Neurosurgery  Established Patient    SUBJECTIVE:     HPI from 08/05/2020 (Dr. Keen):   This is a very pleasant 49 y.o. female who presents as referral for low back pain and leg pain. She says the back pain started around 3 years ago but has gotten worse over the past 6 months. Patient also has pain going down the posterolateral leg to the dorsum of the foot that is almost constant on the right and intermittent on the left. No specific positions make the pain better.  Right more than left leg pain.  Back pain is as severe as leg pain.  She be up and moving for 1-2 hours before having to rest due to severe pain in the back and legs. Denies any numbness or weakness.  No sphincter dysfunction symptoms.  She has done physical therapy 2 years ago without significant pain relief.  She is taking gabapentin with partial pain relief.  Pain is interfering with walking.  She is now limping because of the pain.  Pain is interfering with working.  She is working at [employer].  Pain is interfering with quality of life.       HPI from 08/08/2023 (Dr. Keen):   Isaura Mancini is a 52 y.o. female who presents with the above CC.  Patient was scheduled for surgery in 2020.  She states the surgery was canceled and then she started having medical problems with her thyroid and surgery so she put off returning back to clinic.  Patient states the back pain has gotten progressively worse and is constant across the low back.  It is better with flexion and worse with walking or standing and also when laying down.  She has pain in the left foot and has seen podiatry for this.  She has bilateral posterior leg pain and numbness mainly when lying down at night.  The left leg is worse than the right leg.  The back and legs bother her equally.  She would physical therapy 3 years ago without relief.  No interventional pain procedures.  No spine surgery.  She takes gabapentin 100 mg and ibuprofen. Patient denies any recent accidents or trauma, no  saddle anesthesias, and no bowel or bladder incontinence.     HPI from 09/27/2023 (Dr. Keen):  Six to 10/10 of low back pain, she reports severe left leg pain.  Pain is interfering with ability to stand, walk.  She works at [employer] in the "[employer] department.  She has to lift up to 50 lb.  She also has a manager positioned.  Pain is affecting her quality of life and functional status.  She has completed a full conservative management including physical therapy     HPI from 11/20/2024 (Dr. Keen):   Complains of worsening difficulty ambulating.  Back pain radiates in bilateral legs.  Patient tends to lean forward due to pain.  Pain is affecting her quality of life and functional status.     HPI from 2/17/2025:   Today the patient reports that she was initially only experiencing back pain, but now is experiencing leg pain down to her right knee. She has tried PT, but has never had injections. She feels that her symptoms have gotten worse within the last 6 months. She's been leaning forward due to the pain. She denies any bladder dysfunction.      Interval Hx per  note 5/12/25:   Today the patient reports that she's having the same back pain as before but that it's becoming sharper. She's also noticed some pain going into her legs, right worse than left. She states she's been experiencing more constipation lately as well.      Interval Hx 7/8/25: After the last appointment with Dr. Simpson, he recommended a L4-5 TLIF due to the grade 2 spondylolisthesis and severe stenosis causing debilitating axial low back and right leg pain that has failed conservative measurement. She is being seen in clinic today for her pre-op evaluation to answer additional questions and concerns. Denies changes in her symptoms since the last appointment.     Review of patient's allergies indicates:  No Known Allergies    Current Medications[1]    Past Medical History:   Diagnosis Date    GERD (gastroesophageal reflux disease)      Hypertension      Past Surgical History:   Procedure Laterality Date     SECTION      EPIDURAL STEROID INJECTION INTO LUMBAR SPINE N/A 2025    Procedure: L4-5 [facility] PAT[facility];  Surgeon: Brian Tobias MD;  Location: [facility];  Service: Pain Management;  Laterality: N/A;  oral - ASA 5 days    NECK MASS EXCISION N/A 2024    Procedure: EXCISION, MASS, NECK;  Surgeon: Fabrice Adams MD;  Location: [facility] OR;  Service: General;  Laterality: N/A;  Prone    THYROIDECTOMY N/A 10/28/2022    Procedure: THYROIDECTOMY, total;  Surgeon: Isaura Hayes MD;  [facility];  Service: General;  Laterality: N/A;    TUBAL LIGATION           Family History       Problem Relation (Age of Onset)    Breast cancer Paternal Aunt (60)    Heart disease Father    Hypertension Father          Social History     Socioeconomic History    Marital status: Single    Number of children: 3   Occupational History     Employer: [id number]     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   Tobacco Use    Smoking status: Never     Passive exposure: Never    Smokeless tobacco: Never   Substance and Sexual Activity    Alcohol use: No    Drug use: No    Sexual activity: Yes     Partners: Male     Birth control/protection: Surgical     Comment: BTL   Social History Narrative    [place] from [place]     Works in [employer] at [employer]     Lives at home with two adult children, son and daughter ages 32 and 25      Social Drivers of Health     Financial Resource Strain: Medium Risk (3/11/2025)    Overall Financial Resource Strain (CARDIA)     Difficulty of Paying Living Expenses: Somewhat hard   Food Insecurity: Food Insecurity Present (3/11/2025)    Hunger Vital Sign     Worried About Running Out of Food in the Last Year: Sometimes true     Ran Out of Food in the Last Year: Sometimes true   Transportation Needs: No Transportation Needs (3/11/2025)    PRAPARE - Transportation      Lack of Transportation (Medical): No     Lack of Transportation (Non-Medical): No   Physical Activity: Inactive (3/11/2025)    Exercise Vital Sign     Days of Exercise per Week: 0 days     Minutes of Exercise per Session: 0 min   Stress: No Stress Concern Present (3/11/2025)    [place] [employer] - Occupational Stress Questionnaire     Feeling of Stress : Not at all   Housing Stability: High Risk (3/11/2025)    Housing Stability Vital Sign     Unable to Pay for Housing in the Last Year: Yes     Number of Times Moved in the Last Year: 0     Homeless in the Last Year: No       Review of Systems    OBJECTIVE:     Vital Signs  Temp: 98.1 °F (36.7 °C)  Pulse: 82  BP: 120/78  Pain Score: 0-No pain  Weight: 83 kg (182 lb 15.7 oz)  Body mass index is 33.47 kg/m².    Neurosurgery Physical Exam  General: well developed, well nourished, no distress.   Head: normocephalic, atraumatic  Neurologic: Alert and oriented. Thought content appropriate.  GCS: Motor: 6/Verbal: 5/Eyes: 4 GCS Total: 15  Mental Status: Awake, Alert, Oriented x 4  Language: No aphasia  Speech: No dysarthria  Cranial nerves: face symmetric, tongue midline, CN II-XII grossly intact.   Eyes: pupils equal, round, reactive to light with accomodation, EOMI.   Pulmonary: normal respirations, no signs of respiratory distress  Abdomen: soft, non-distended  Skin: Skin is warm, dry and intact.  Sensory: intact to light touch throughout  Motor Strength:Moves all extremities spontaneously with good tone.         ASSESSMENT/PLAN:     Isaura Mancini is a 54 y.o. female seen in clinic today for her pre-op evaluation to answer additional questions and concerns for her upcoming surgery. She is scheduled for a TLIF L4-5 due to the grade 2 spondylolisthesis and severe stenosis causing debilitating axial low back and right leg pain that has failed conservative measurement. R/B/A/I/M were reviewed in detail and she wishes to proceed.     Constanza Sandoval,  FNP-C  Neurosurgery  [facility].        Note dictated with voice recognition software, please excuse any grammatical errors.          [1]   Current Outpatient Medications   Medication Sig Dispense Refill    aspirin (ECOTRIN) 81 MG EC tablet Take 81 mg by mouth once daily.      celecoxib (CELEBREX) 200 MG capsule Take 1 capsule (200 mg total) by mouth 2 (two) times daily. 60 capsule 3    cholecalciferol, vitamin D3, (VITAMIN D3) 25 mcg (1,000 unit) capsule Take 2 capsules (2,000 Units total) by mouth once daily.  0    fluticasone propionate (FLONASE) 50 mcg/actuation nasal spray 1 spray (50 mcg total) by Each Nostril route once daily. 48 mL 1    hydroCHLOROthiazide (HYDRODIURIL) 25 MG tablet Take 1 tablet (25 mg total) by mouth once daily. 90 tablet 3    levothyroxine (SYNTHROID) 150 MCG tablet Take 1 tab 6 days a week and 0.5 tabs on Sundays for total of 6.5 tabs weekly 90 tablet 3    naproxen (NAPROSYN) 500 MG tablet Take 1 tablet (500 mg total) by mouth 2 (two) times daily with meals. 60 tablet 2    olmesartan (BENICAR) 20 MG tablet Take 1 tablet (20 mg total) by mouth once daily. 30 tablet 5    pantoprazole (PROTONIX) 40 MG tablet Take 1 tablet (40 mg total) by mouth once daily. 90 tablet 3    senna-docusate 8.6-50 mg (PERICOLACE) 8.6-50 mg per tablet Take 1 tablet by mouth 2 (two) times daily. 60 tablet 0    WEGOVY 0.25 mg/0.5 mL PnIj Inject contents of one pen (0.25 MG) subcutaneously once weekly as directed 2 mL 0    pregabalin (LYRICA) 50 MG capsule Take 1 capsule (50 mg total) by mouth 3 (three) times daily. 90 capsule 3     No current facility-administered medications for this visit.

## 2025-07-09 ENCOUNTER — ANESTHESIA (OUTPATIENT)
Dept: SURGERY | Facility: HOSPITAL | Age: 55
End: 2025-07-09
Payer: COMMERCIAL

## 2025-07-09 ENCOUNTER — HOSPITAL ENCOUNTER (INPATIENT)
Facility: HOSPITAL | Age: 55
LOS: 4 days | Discharge: HOME | DRG: 402 | End: 2025-07-13
Attending: NEUROLOGICAL SURGERY | Admitting: NEUROLOGICAL SURGERY
Payer: COMMERCIAL

## 2025-07-09 DIAGNOSIS — I10 ESSENTIAL HYPERTENSION: ICD-10-CM

## 2025-07-09 DIAGNOSIS — M43.16 SPONDYLOLISTHESIS AT L4-L5 LEVEL: Primary | ICD-10-CM

## 2025-07-09 DIAGNOSIS — G89.29 CHRONIC BILATERAL LOW BACK PAIN WITH LEFT-SIDED SCIATICA: ICD-10-CM

## 2025-07-09 DIAGNOSIS — M54.42 CHRONIC BILATERAL LOW BACK PAIN WITH LEFT-SIDED SCIATICA: ICD-10-CM

## 2025-07-09 DIAGNOSIS — M47.816 LUMBAR FACET ARTHROPATHY: ICD-10-CM

## 2025-07-09 DIAGNOSIS — M47.816 LUMBAR SPONDYLOSIS: ICD-10-CM

## 2025-07-09 PROBLEM — Z74.09 OTHER REDUCED MOBILITY: Status: ACTIVE | Noted: 2025-07-09

## 2025-07-09 LAB
ABORH RETYPE: NORMAL
INDIRECT COOMBS: NORMAL
RH BLD: NORMAL

## 2025-07-09 PROCEDURE — 0QS004Z REPOSITION LUMBAR VERTEBRA WITH INTERNAL FIXATION DEVICE, OPEN APPROACH: ICD-10-PCS | Performed by: NEUROLOGICAL SURGERY

## 2025-07-09 PROCEDURE — 86850 RBC ANTIBODY SCREEN: CPT | Performed by: NEUROLOGICAL SURGERY

## 2025-07-09 PROCEDURE — 99900035 HC TECH TIME PER 15 MIN (STAT)

## 2025-07-09 PROCEDURE — 0SG0071 FUSION OF LUMBAR VERTEBRAL JOINT WITH AUTOLOGOUS TISSUE SUBSTITUTE, POSTERIOR APPROACH, POSTERIOR COLUMN, OPEN APPROACH: ICD-10-PCS | Performed by: NEUROLOGICAL SURGERY

## 2025-07-09 PROCEDURE — 63600175 PHARM REV CODE 636 W HCPCS

## 2025-07-09 PROCEDURE — 94761 N-INVAS EAR/PLS OXIMETRY MLT: CPT

## 2025-07-09 PROCEDURE — 71000016 HC POSTOP RECOV ADDL HR: Performed by: NEUROLOGICAL SURGERY

## 2025-07-09 PROCEDURE — 0SB20ZZ EXCISION OF LUMBAR VERTEBRAL DISC, OPEN APPROACH: ICD-10-PCS | Performed by: NEUROLOGICAL SURGERY

## 2025-07-09 PROCEDURE — 27201423 OPTIME MED/SURG SUP & DEVICES STERILE SUPPLY: Performed by: NEUROLOGICAL SURGERY

## 2025-07-09 PROCEDURE — 22853 INSJ BIOMECHANICAL DEVICE: CPT | Mod: COE,,, | Performed by: NEUROLOGICAL SURGERY

## 2025-07-09 PROCEDURE — 20930 SP BONE ALGRFT MORSEL ADD-ON: CPT | Mod: COE,,, | Performed by: NEUROLOGICAL SURGERY

## 2025-07-09 PROCEDURE — 63600175 PHARM REV CODE 636 W HCPCS: Performed by: NURSE ANESTHETIST, CERTIFIED REGISTERED

## 2025-07-09 PROCEDURE — 25000003 PHARM REV CODE 250: Performed by: ANESTHESIOLOGY

## 2025-07-09 PROCEDURE — 25000003 PHARM REV CODE 250: Performed by: STUDENT IN AN ORGANIZED HEALTH CARE EDUCATION/TRAINING PROGRAM

## 2025-07-09 PROCEDURE — 63600175 PHARM REV CODE 636 W HCPCS: Performed by: STUDENT IN AN ORGANIZED HEALTH CARE EDUCATION/TRAINING PROGRAM

## 2025-07-09 PROCEDURE — 63600175 PHARM REV CODE 636 W HCPCS: Performed by: ANESTHESIOLOGY

## 2025-07-09 PROCEDURE — 27800903 OPTIME MED/SURG SUP & DEVICES OTHER IMPLANTS: Performed by: NEUROLOGICAL SURGERY

## 2025-07-09 PROCEDURE — 71000033 HC RECOVERY, INTIAL HOUR: Performed by: NEUROLOGICAL SURGERY

## 2025-07-09 PROCEDURE — 94799 UNLISTED PULMONARY SVC/PX: CPT

## 2025-07-09 PROCEDURE — 27201037 HC PRESSURE MONITORING SET UP

## 2025-07-09 PROCEDURE — 37000008 HC ANESTHESIA 1ST 15 MINUTES: Performed by: NEUROLOGICAL SURGERY

## 2025-07-09 PROCEDURE — 22840 INSERT SPINE FIXATION DEVICE: CPT | Mod: COE,,, | Performed by: NEUROLOGICAL SURGERY

## 2025-07-09 PROCEDURE — 36000711: Performed by: NEUROLOGICAL SURGERY

## 2025-07-09 PROCEDURE — 22214 INCIS 1 VERTEBRAL SEG LUMBAR: CPT | Mod: 51,COE,, | Performed by: NEUROLOGICAL SURGERY

## 2025-07-09 PROCEDURE — 11000001 HC ACUTE MED/SURG PRIVATE ROOM

## 2025-07-09 PROCEDURE — C1729 CATH, DRAINAGE: HCPCS | Performed by: NEUROLOGICAL SURGERY

## 2025-07-09 PROCEDURE — C1713 ANCHOR/SCREW BN/BN,TIS/BN: HCPCS | Performed by: NEUROLOGICAL SURGERY

## 2025-07-09 PROCEDURE — 0SG00AJ FUSION OF LUMBAR VERTEBRAL JOINT WITH INTERBODY FUSION DEVICE, POSTERIOR APPROACH, ANTERIOR COLUMN, OPEN APPROACH: ICD-10-PCS | Performed by: NEUROLOGICAL SURGERY

## 2025-07-09 PROCEDURE — 25000003 PHARM REV CODE 250

## 2025-07-09 PROCEDURE — 36000710: Performed by: NEUROLOGICAL SURGERY

## 2025-07-09 PROCEDURE — 71000015 HC POSTOP RECOV 1ST HR: Performed by: NEUROLOGICAL SURGERY

## 2025-07-09 PROCEDURE — 37000009 HC ANESTHESIA EA ADD 15 MINS: Performed by: NEUROLOGICAL SURGERY

## 2025-07-09 PROCEDURE — 25000003 PHARM REV CODE 250: Performed by: NEUROLOGICAL SURGERY

## 2025-07-09 PROCEDURE — 20936 SP BONE AGRFT LOCAL ADD-ON: CPT | Mod: COE,,, | Performed by: NEUROLOGICAL SURGERY

## 2025-07-09 PROCEDURE — 25000003 PHARM REV CODE 250: Performed by: NURSE ANESTHETIST, CERTIFIED REGISTERED

## 2025-07-09 PROCEDURE — 01NB0ZZ RELEASE LUMBAR NERVE, OPEN APPROACH: ICD-10-PCS | Performed by: NEUROLOGICAL SURGERY

## 2025-07-09 PROCEDURE — 00NY0ZZ RELEASE LUMBAR SPINAL CORD, OPEN APPROACH: ICD-10-PCS | Performed by: NEUROLOGICAL SURGERY

## 2025-07-09 PROCEDURE — 63600175 PHARM REV CODE 636 W HCPCS: Performed by: NEUROLOGICAL SURGERY

## 2025-07-09 PROCEDURE — 22633 ARTHRD CMBN 1NTRSPC LUMBAR: CPT | Mod: COE,,, | Performed by: NEUROLOGICAL SURGERY

## 2025-07-09 DEVICE — SET SCREW EXPEDIUM VERSE UNITZ: Type: IMPLANTABLE DEVICE | Site: SPINE LUMBAR | Status: FUNCTIONAL

## 2025-07-09 DEVICE — IMPLANTABLE DEVICE: Type: IMPLANTABLE DEVICE | Site: SPINE LUMBAR | Status: FUNCTIONAL

## 2025-07-09 DEVICE — SCREW EXPEDIUM VERSE 5.5 6X40: Type: IMPLANTABLE DEVICE | Site: SPINE LUMBAR | Status: FUNCTIONAL

## 2025-07-09 DEVICE — SCREW INNER SINGLE SET TITANIU: Type: IMPLANTABLE DEVICE | Site: SPINE LUMBAR | Status: FUNCTIONAL

## 2025-07-09 DEVICE — SCREW BONE SPINAL 5.5 6 X 40MM: Type: IMPLANTABLE DEVICE | Site: SPINE LUMBAR | Status: FUNCTIONAL

## 2025-07-09 RX ORDER — VANCOMYCIN HYDROCHLORIDE 1 G/20ML
INJECTION, POWDER, LYOPHILIZED, FOR SOLUTION INTRAVENOUS
Status: DISCONTINUED | OUTPATIENT
Start: 2025-07-09 | End: 2025-07-09 | Stop reason: HOSPADM

## 2025-07-09 RX ORDER — ACETAMINOPHEN 500 MG
1000 TABLET ORAL EVERY 8 HOURS
Status: DISCONTINUED | OUTPATIENT
Start: 2025-07-09 | End: 2025-07-12

## 2025-07-09 RX ORDER — ENOXAPARIN SODIUM 100 MG/ML
40 INJECTION SUBCUTANEOUS EVERY 24 HOURS
Status: DISCONTINUED | OUTPATIENT
Start: 2025-07-10 | End: 2025-07-13 | Stop reason: HOSPADM

## 2025-07-09 RX ORDER — MIDAZOLAM HYDROCHLORIDE 1 MG/ML
INJECTION INTRAMUSCULAR; INTRAVENOUS
Status: DISCONTINUED | OUTPATIENT
Start: 2025-07-09 | End: 2025-07-09

## 2025-07-09 RX ORDER — TALC
6 POWDER (GRAM) TOPICAL NIGHTLY PRN
Status: DISCONTINUED | OUTPATIENT
Start: 2025-07-09 | End: 2025-07-13 | Stop reason: HOSPADM

## 2025-07-09 RX ORDER — HALOPERIDOL LACTATE 5 MG/ML
0.5 INJECTION, SOLUTION INTRAMUSCULAR EVERY 10 MIN PRN
Status: DISCONTINUED | OUTPATIENT
Start: 2025-07-09 | End: 2025-07-09 | Stop reason: HOSPADM

## 2025-07-09 RX ORDER — PANTOPRAZOLE SODIUM 40 MG/1
40 TABLET, DELAYED RELEASE ORAL DAILY
Status: DISCONTINUED | OUTPATIENT
Start: 2025-07-09 | End: 2025-07-13 | Stop reason: HOSPADM

## 2025-07-09 RX ORDER — HYDROMORPHONE HYDROCHLORIDE 1 MG/ML
1 INJECTION, SOLUTION INTRAMUSCULAR; INTRAVENOUS; SUBCUTANEOUS EVERY 4 HOURS PRN
Refills: 0 | Status: DISCONTINUED | OUTPATIENT
Start: 2025-07-09 | End: 2025-07-12

## 2025-07-09 RX ORDER — OXYCODONE HYDROCHLORIDE 5 MG/1
5 TABLET ORAL EVERY 4 HOURS PRN
Refills: 0 | Status: DISCONTINUED | OUTPATIENT
Start: 2025-07-09 | End: 2025-07-13 | Stop reason: HOSPADM

## 2025-07-09 RX ORDER — GLUCAGON 1 MG
1 KIT INJECTION
Status: DISCONTINUED | OUTPATIENT
Start: 2025-07-09 | End: 2025-07-09 | Stop reason: HOSPADM

## 2025-07-09 RX ORDER — HYDROMORPHONE HYDROCHLORIDE 1 MG/ML
0.2 INJECTION, SOLUTION INTRAMUSCULAR; INTRAVENOUS; SUBCUTANEOUS EVERY 10 MIN PRN
Status: COMPLETED | OUTPATIENT
Start: 2025-07-09 | End: 2025-07-09

## 2025-07-09 RX ORDER — AMOXICILLIN 250 MG
2 CAPSULE ORAL 2 TIMES DAILY
Status: DISCONTINUED | OUTPATIENT
Start: 2025-07-09 | End: 2025-07-13 | Stop reason: HOSPADM

## 2025-07-09 RX ORDER — CEFAZOLIN SODIUM 1 G/3ML
INJECTION, POWDER, FOR SOLUTION INTRAMUSCULAR; INTRAVENOUS
Status: DISCONTINUED | OUTPATIENT
Start: 2025-07-09 | End: 2025-07-09

## 2025-07-09 RX ORDER — METHOCARBAMOL 750 MG/1
750 TABLET, FILM COATED ORAL 4 TIMES DAILY
Status: DISCONTINUED | OUTPATIENT
Start: 2025-07-09 | End: 2025-07-13 | Stop reason: HOSPADM

## 2025-07-09 RX ORDER — ONDANSETRON 8 MG/1
8 TABLET, ORALLY DISINTEGRATING ORAL EVERY 6 HOURS PRN
Status: DISCONTINUED | OUTPATIENT
Start: 2025-07-09 | End: 2025-07-13 | Stop reason: HOSPADM

## 2025-07-09 RX ORDER — PROCHLORPERAZINE EDISYLATE 5 MG/ML
5 INJECTION INTRAMUSCULAR; INTRAVENOUS EVERY 6 HOURS PRN
Status: DISCONTINUED | OUTPATIENT
Start: 2025-07-09 | End: 2025-07-13 | Stop reason: HOSPADM

## 2025-07-09 RX ORDER — DIAZEPAM 5 MG/1
5 TABLET ORAL 2 TIMES DAILY PRN
Status: DISCONTINUED | OUTPATIENT
Start: 2025-07-09 | End: 2025-07-12

## 2025-07-09 RX ORDER — MUPIROCIN 20 MG/G
1 OINTMENT TOPICAL 2 TIMES DAILY
Status: DISCONTINUED | OUTPATIENT
Start: 2025-07-09 | End: 2025-07-09 | Stop reason: HOSPADM

## 2025-07-09 RX ORDER — LEVOTHYROXINE SODIUM 75 UG/1
150 TABLET ORAL
Status: DISCONTINUED | OUTPATIENT
Start: 2025-07-10 | End: 2025-07-13 | Stop reason: HOSPADM

## 2025-07-09 RX ORDER — HYDROCHLOROTHIAZIDE 25 MG/1
25 TABLET ORAL DAILY
Status: DISCONTINUED | OUTPATIENT
Start: 2025-07-10 | End: 2025-07-11

## 2025-07-09 RX ORDER — CEFAZOLIN 2 G/1
2 INJECTION, POWDER, FOR SOLUTION INTRAMUSCULAR; INTRAVENOUS
Status: DISCONTINUED | OUTPATIENT
Start: 2025-07-09 | End: 2025-07-11

## 2025-07-09 RX ORDER — ROCURONIUM BROMIDE 10 MG/ML
INJECTION, SOLUTION INTRAVENOUS
Status: DISCONTINUED | OUTPATIENT
Start: 2025-07-09 | End: 2025-07-09

## 2025-07-09 RX ORDER — KETOROLAC TROMETHAMINE 15 MG/ML
15 INJECTION, SOLUTION INTRAMUSCULAR; INTRAVENOUS EVERY 8 HOURS PRN
Status: DISCONTINUED | OUTPATIENT
Start: 2025-07-09 | End: 2025-07-09 | Stop reason: HOSPADM

## 2025-07-09 RX ORDER — DEXAMETHASONE SODIUM PHOSPHATE 4 MG/ML
INJECTION, SOLUTION INTRA-ARTICULAR; INTRALESIONAL; INTRAMUSCULAR; INTRAVENOUS; SOFT TISSUE
Status: DISCONTINUED | OUTPATIENT
Start: 2025-07-09 | End: 2025-07-09

## 2025-07-09 RX ORDER — OXYCODONE HYDROCHLORIDE 10 MG/1
10 TABLET ORAL EVERY 4 HOURS PRN
Refills: 0 | Status: DISCONTINUED | OUTPATIENT
Start: 2025-07-09 | End: 2025-07-13 | Stop reason: HOSPADM

## 2025-07-09 RX ORDER — LOSARTAN POTASSIUM 25 MG/1
25 TABLET ORAL DAILY
Status: DISCONTINUED | OUTPATIENT
Start: 2025-07-10 | End: 2025-07-11

## 2025-07-09 RX ORDER — SODIUM CHLORIDE 9 MG/ML
INJECTION, SOLUTION INTRAVENOUS CONTINUOUS
Status: ACTIVE | OUTPATIENT
Start: 2025-07-09 | End: 2025-07-09

## 2025-07-09 RX ORDER — FENTANYL CITRATE 50 UG/ML
INJECTION, SOLUTION INTRAMUSCULAR; INTRAVENOUS
Status: DISCONTINUED | OUTPATIENT
Start: 2025-07-09 | End: 2025-07-09

## 2025-07-09 RX ORDER — MUPIROCIN 20 MG/G
OINTMENT TOPICAL 2 TIMES DAILY
Status: COMPLETED | OUTPATIENT
Start: 2025-07-09 | End: 2025-07-11

## 2025-07-09 RX ORDER — EPHEDRINE SULFATE 50 MG/ML
INJECTION, SOLUTION INTRAVENOUS
Status: DISCONTINUED | OUTPATIENT
Start: 2025-07-09 | End: 2025-07-09

## 2025-07-09 RX ORDER — ONDANSETRON HYDROCHLORIDE 2 MG/ML
INJECTION, SOLUTION INTRAVENOUS
Status: DISCONTINUED | OUTPATIENT
Start: 2025-07-09 | End: 2025-07-09

## 2025-07-09 RX ORDER — OXYCODONE AND ACETAMINOPHEN 5; 325 MG/1; MG/1
1 TABLET ORAL
Status: DISCONTINUED | OUTPATIENT
Start: 2025-07-09 | End: 2025-07-09 | Stop reason: HOSPADM

## 2025-07-09 RX ORDER — CALCIUM CARBONATE 200(500)MG
500 TABLET,CHEWABLE ORAL 2 TIMES DAILY
Status: DISCONTINUED | OUTPATIENT
Start: 2025-07-09 | End: 2025-07-13 | Stop reason: HOSPADM

## 2025-07-09 RX ORDER — MUPIROCIN 20 MG/G
OINTMENT TOPICAL
Status: DISCONTINUED | OUTPATIENT
Start: 2025-07-09 | End: 2025-07-09 | Stop reason: HOSPADM

## 2025-07-09 RX ORDER — PREGABALIN 50 MG/1
50 CAPSULE ORAL 3 TIMES DAILY
Status: DISCONTINUED | OUTPATIENT
Start: 2025-07-09 | End: 2025-07-13 | Stop reason: HOSPADM

## 2025-07-09 RX ORDER — LIDOCAINE HYDROCHLORIDE 20 MG/ML
INJECTION, SOLUTION EPIDURAL; INFILTRATION; INTRACAUDAL; PERINEURAL
Status: DISCONTINUED | OUTPATIENT
Start: 2025-07-09 | End: 2025-07-09

## 2025-07-09 RX ORDER — ALUMINUM HYDROXIDE, MAGNESIUM HYDROXIDE, AND SIMETHICONE 1200; 120; 1200 MG/30ML; MG/30ML; MG/30ML
30 SUSPENSION ORAL EVERY 4 HOURS PRN
Status: DISCONTINUED | OUTPATIENT
Start: 2025-07-09 | End: 2025-07-13 | Stop reason: HOSPADM

## 2025-07-09 RX ORDER — LIDOCAINE HYDROCHLORIDE AND EPINEPHRINE 10; 10 UG/ML; MG/ML
INJECTION, SOLUTION INFILTRATION; PERINEURAL
Status: DISCONTINUED | OUTPATIENT
Start: 2025-07-09 | End: 2025-07-09 | Stop reason: HOSPADM

## 2025-07-09 RX ORDER — PROPOFOL 10 MG/ML
VIAL (ML) INTRAVENOUS
Status: DISCONTINUED | OUTPATIENT
Start: 2025-07-09 | End: 2025-07-09

## 2025-07-09 RX ORDER — PHENYLEPHRINE HYDROCHLORIDE 10 MG/ML
INJECTION INTRAVENOUS
Status: DISCONTINUED | OUTPATIENT
Start: 2025-07-09 | End: 2025-07-09

## 2025-07-09 RX ORDER — HYDROMORPHONE HYDROCHLORIDE 1 MG/ML
INJECTION, SOLUTION INTRAMUSCULAR; INTRAVENOUS; SUBCUTANEOUS
Status: COMPLETED
Start: 2025-07-09 | End: 2025-07-09

## 2025-07-09 RX ADMIN — SUGAMMADEX 200 MG: 100 INJECTION, SOLUTION INTRAVENOUS at 09:07

## 2025-07-09 RX ADMIN — HYDROMORPHONE HYDROCHLORIDE 0.2 MG: 1 INJECTION, SOLUTION INTRAMUSCULAR; INTRAVENOUS; SUBCUTANEOUS at 12:07

## 2025-07-09 RX ADMIN — FENTANYL CITRATE 50 MCG: 50 INJECTION, SOLUTION INTRAMUSCULAR; INTRAVENOUS at 08:07

## 2025-07-09 RX ADMIN — ACETAMINOPHEN 1000 MG: 500 TABLET ORAL at 01:07

## 2025-07-09 RX ADMIN — EPHEDRINE SULFATE 5 MG: 50 INJECTION INTRAVENOUS at 10:07

## 2025-07-09 RX ADMIN — FENTANYL CITRATE 50 MCG: 50 INJECTION, SOLUTION INTRAMUSCULAR; INTRAVENOUS at 09:07

## 2025-07-09 RX ADMIN — LIDOCAINE HYDROCHLORIDE 100 MG: 20 INJECTION, SOLUTION EPIDURAL; INFILTRATION; INTRACAUDAL; PERINEURAL at 08:07

## 2025-07-09 RX ADMIN — PHENYLEPHRINE HYDROCHLORIDE 100 MCG: 10 INJECTION INTRAVENOUS at 09:07

## 2025-07-09 RX ADMIN — PANTOPRAZOLE SODIUM 40 MG: 40 TABLET, DELAYED RELEASE ORAL at 12:07

## 2025-07-09 RX ADMIN — METHOCARBAMOL 750 MG: 750 TABLET ORAL at 09:07

## 2025-07-09 RX ADMIN — SENNOSIDES AND DOCUSATE SODIUM 2 TABLET: 50; 8.6 TABLET ORAL at 09:07

## 2025-07-09 RX ADMIN — ROCURONIUM BROMIDE 30 MG: 10 INJECTION, SOLUTION INTRAVENOUS at 08:07

## 2025-07-09 RX ADMIN — MUPIROCIN: 20 OINTMENT TOPICAL at 09:07

## 2025-07-09 RX ADMIN — HYDROMORPHONE HYDROCHLORIDE 1 MG: 1 INJECTION, SOLUTION INTRAMUSCULAR; INTRAVENOUS; SUBCUTANEOUS at 04:07

## 2025-07-09 RX ADMIN — ONDANSETRON 4 MG: 2 INJECTION INTRAMUSCULAR; INTRAVENOUS at 10:07

## 2025-07-09 RX ADMIN — CALCIUM CARBONATE (ANTACID) CHEW TAB 500 MG 500 MG: 500 CHEW TAB at 12:07

## 2025-07-09 RX ADMIN — SODIUM CHLORIDE: 0.9 INJECTION, SOLUTION INTRAVENOUS at 07:07

## 2025-07-09 RX ADMIN — DEXAMETHASONE SODIUM PHOSPHATE 8 MG: 4 INJECTION, SOLUTION INTRAMUSCULAR; INTRAVENOUS at 08:07

## 2025-07-09 RX ADMIN — CEFAZOLIN 2 G: 330 INJECTION, POWDER, FOR SOLUTION INTRAMUSCULAR; INTRAVENOUS at 08:07

## 2025-07-09 RX ADMIN — PREGABALIN 50 MG: 50 CAPSULE ORAL at 09:07

## 2025-07-09 RX ADMIN — OXYCODONE HYDROCHLORIDE 10 MG: 10 TABLET ORAL at 09:07

## 2025-07-09 RX ADMIN — SODIUM CHLORIDE, SODIUM GLUCONATE, SODIUM ACETATE, POTASSIUM CHLORIDE, MAGNESIUM CHLORIDE, SODIUM PHOSPHATE, DIBASIC, AND POTASSIUM PHOSPHATE: .53; .5; .37; .037; .03; .012; .00082 INJECTION, SOLUTION INTRAVENOUS at 08:07

## 2025-07-09 RX ADMIN — MIDAZOLAM HYDROCHLORIDE 2 MG: 2 INJECTION, SOLUTION INTRAMUSCULAR; INTRAVENOUS at 07:07

## 2025-07-09 RX ADMIN — SODIUM CHLORIDE: 9 INJECTION, SOLUTION INTRAVENOUS at 09:07

## 2025-07-09 RX ADMIN — MUPIROCIN: 20 OINTMENT TOPICAL at 12:07

## 2025-07-09 RX ADMIN — HYDROMORPHONE HYDROCHLORIDE 0.2 MG: 1 INJECTION, SOLUTION INTRAMUSCULAR; INTRAVENOUS; SUBCUTANEOUS at 11:07

## 2025-07-09 RX ADMIN — METHOCARBAMOL 750 MG: 750 TABLET ORAL at 04:07

## 2025-07-09 RX ADMIN — SENNOSIDES AND DOCUSATE SODIUM 2 TABLET: 50; 8.6 TABLET ORAL at 12:07

## 2025-07-09 RX ADMIN — ACETAMINOPHEN 1000 MG: 500 TABLET ORAL at 09:07

## 2025-07-09 RX ADMIN — PROPOFOL 150 MG: 10 INJECTION, EMULSION INTRAVENOUS at 08:07

## 2025-07-09 RX ADMIN — DIAZEPAM 5 MG: 5 TABLET ORAL at 12:07

## 2025-07-09 RX ADMIN — PHENYLEPHRINE HYDROCHLORIDE 50 MCG: 10 INJECTION INTRAVENOUS at 09:07

## 2025-07-09 RX ADMIN — CALCIUM CARBONATE (ANTACID) CHEW TAB 500 MG 500 MG: 500 CHEW TAB at 09:07

## 2025-07-09 RX ADMIN — METHOCARBAMOL 750 MG: 750 TABLET ORAL at 12:07

## 2025-07-09 RX ADMIN — HYDROMORPHONE HYDROCHLORIDE 0.2 MG: 1 INJECTION, SOLUTION INTRAMUSCULAR; INTRAVENOUS; SUBCUTANEOUS at 01:07

## 2025-07-09 RX ADMIN — ROCURONIUM BROMIDE 50 MG: 10 INJECTION, SOLUTION INTRAVENOUS at 08:07

## 2025-07-09 RX ADMIN — MUPIROCIN: 20 OINTMENT TOPICAL at 06:07

## 2025-07-09 RX ADMIN — OXYCODONE HYDROCHLORIDE AND ACETAMINOPHEN 1 TABLET: 5; 325 TABLET ORAL at 12:07

## 2025-07-09 RX ADMIN — PHENYLEPHRINE HYDROCHLORIDE 150 MCG: 10 INJECTION INTRAVENOUS at 10:07

## 2025-07-09 RX ADMIN — CEFAZOLIN 2 G: 2 INJECTION, POWDER, FOR SOLUTION INTRAMUSCULAR; INTRAVENOUS at 04:07

## 2025-07-09 RX ADMIN — OXYCODONE HYDROCHLORIDE 10 MG: 10 TABLET ORAL at 02:07

## 2025-07-09 RX ADMIN — PREGABALIN 50 MG: 50 CAPSULE ORAL at 02:07

## 2025-07-09 RX ADMIN — PROPOFOL 30 MG: 10 INJECTION, EMULSION INTRAVENOUS at 09:07

## 2025-07-09 RX ADMIN — SODIUM CHLORIDE: 9 INJECTION, SOLUTION INTRAVENOUS at 11:07

## 2025-07-09 NOTE — HPI
Patient seen in clinic for back and bilateral LE pain. Recommended a L4-5 TLIF due to the grade 2 spondylolisthesis and severe stenosis causing debilitating pain. Failed conservative management. Presents for surgery.

## 2025-07-09 NOTE — ANESTHESIA PROCEDURE NOTES
Intubation    Date/Time: 7/9/2025 8:08 AM    Performed by: Leeanna Jaimes  Authorized by: Aleyda Duncan MD    Intubation:     Induction:  Intravenous    Intubated:  Postinduction    Mask Ventilation:  Easy mask    Attempts:  1    Attempted By:  Student    Method of Intubation:  Video laryngoscopy    Blade:  Shah 3    Laryngeal View Grade: Grade I - full view of cords      Difficult Airway Encountered?: No      Complications:  None    Airway Device:  Oral endotracheal tube    Airway Device Size:  7.0    Style/Cuff Inflation:  Cuffed (inflated to minimal occlusive pressure)    Inflation Amount (mL):  7    Tube secured:  21    Secured at:  The teeth    Placement Verified By:  Capnometry    Complicating Factors:  None    Findings Post-Intubation:  BS equal bilateral and atraumatic/condition of teeth unchanged

## 2025-07-09 NOTE — SUBJECTIVE & OBJECTIVE
Prescriptions Prior to Admission[1]    Review of patient's allergies indicates:  No Known Allergies    Past Medical History:   Diagnosis Date    GERD (gastroesophageal reflux disease)     Hypertension     Sleep apnea      Past Surgical History:   Procedure Laterality Date     SECTION      EPIDURAL STEROID INJECTION INTO LUMBAR SPINE N/A 2025    Procedure: L4-5 [facility] - [facility]LL[facility] PATIENT;  Surgeon: Brian Tobias MD;  Location: [facility]IN [facility]NAG[facility];  Service: Pain Management;  Laterality: N/A;  oral - ASA 5 days    NECK MASS EXCISION N/A 2024    Procedure: EXCISION, MASS, NECK;  Surgeon: Fabrice Adams MD;  Location: [facility] OR;  Service: General;  Laterality: N/A;  Prone    THYROIDECTOMY N/A 10/28/2022    Procedure: THYROIDECTOMY, total;  Surgeon: Isaura Hayes MD;  Location: [address];  Service: General;  Laterality: N/A;    TUBAL LIGATION           Family History       Problem Relation (Age of Onset)    Breast cancer Paternal Aunt (60)    Heart disease Father    Hypertension Father          Tobacco Use    Smoking status: Never     Passive exposure: Never    Smokeless tobacco: Never   Vaping Use    Vaping status: Never Used   Substance and Sexual Activity    Alcohol use: No    Drug use: No    Sexual activity: Yes     Partners: Male     Birth control/protection: Surgical     Comment: BTL     Review of Systems  Objective:     Weight: 82.9 kg (182 lb 12.2 oz)  Body mass index is 33.43 kg/m².  Vital Signs (Most Recent):  Temp: 97.7 °F (36.5 °C) (25)  Pulse: 67 (25)  Resp: 16 (25)  SpO2: 98 % (25) Vital Signs (24h Range):  Temp:  [97.6 °F (36.4 °C)-98.1 °F (36.7 °C)] 97.7 °F (36.5 °C)  Pulse:  [59-82] 67  Resp:  [16] 16  SpO2:  [98 %] 98 %  BP: (113-120)/(78-80) 120/78                                 Physical Exam         Neurosurgery Physical Exam      General: well developed, well nourished, no distress.   Head: normocephalic,  "atraumatic  Neurologic: Alert and oriented. Thought content appropriate.  GCS: Motor: 6/Verbal: 5/Eyes: 4 GCS Total: 15  Mental Status: Awake, Alert, Oriented x 4  Language: No aphasia  Speech: No dysarthria  Cranial nerves: face symmetric, tongue midline, CN II-XII grossly intact.   Eyes: pupils equal, round, reactive to light with accomodation, EOMI.   Pulmonary: normal respirations, no signs of respiratory distress  Abdomen: soft, non-distended  Skin: Skin is warm, dry and intact.  Sensory: intact to light touch throughout  Motor Strength:Moves all extremities spontaneously with good tone.        Significant Labs:  No results for input(s): "GLU", "NA", "K", "CL", "CO2", "BUN", "CREATININE", "CALCIUM", "MG" in the last 48 hours.  Recent Labs   Lab 07/08/25  1241   WBC 8.82   HGB 13.2   HCT 40.1        Recent Labs   Lab 07/08/25  1241   INR 1.0   APTT 26.2     Microbiology Results (last 7 days)       ** No results found for the last 168 hours. **          All pertinent labs from the last 24 hours have been reviewed.    Significant Diagnostics:  I have reviewed all pertinent imaging results/findings within the past 24 hours.       [1]   Medications Prior to Admission   Medication Sig Dispense Refill Last Dose/Taking    aspirin (ECOTRIN) 81 MG EC tablet Take 81 mg by mouth once daily.   Past Month    celecoxib (CELEBREX) 200 MG capsule Take 1 capsule (200 mg total) by mouth 2 (two) times daily. 60 capsule 3 Past Month    cholecalciferol, vitamin D3, (VITAMIN D3) 25 mcg (1,000 unit) capsule Take 2 capsules (2,000 Units total) by mouth once daily.  0 Past Month    fluticasone propionate (FLONASE) 50 mcg/actuation nasal spray 1 spray (50 mcg total) by Each Nostril route once daily. 48 mL 1 7/8/2025 Morning    hydroCHLOROthiazide (HYDRODIURIL) 25 MG tablet Take 1 tablet (25 mg total) by mouth once daily. 90 tablet 3 7/8/2025 Morning    levothyroxine (SYNTHROID) 150 MCG tablet Take 1 tab 6 days a week and 0.5 tabs " on Sundays for total of 6.5 tabs weekly 90 tablet 3 7/8/2025 Morning    naproxen (NAPROSYN) 500 MG tablet Take 1 tablet (500 mg total) by mouth 2 (two) times daily with meals. 60 tablet 2 Past Month    olmesartan (BENICAR) 20 MG tablet Take 1 tablet (20 mg total) by mouth once daily. 30 tablet 5 7/8/2025 Bedtime    pantoprazole (PROTONIX) 40 MG tablet Take 1 tablet (40 mg total) by mouth once daily. 90 tablet 3 7/8/2025 Morning    pregabalin (LYRICA) 50 MG capsule Take 1 capsule (50 mg total) by mouth 3 (three) times daily. 90 capsule 3 Past Month    WEGOVY 0.25 mg/0.5 mL PnIj Inject contents of one pen (0.25 MG) subcutaneously once weekly as directed 2 mL 0 6/27/2025

## 2025-07-09 NOTE — PT/OT/SLP PROGRESS
Physical Therapy      Patient Name:  Isaura Mancini   MRN:  [id number]    PT attempted to see patient at 1649; however, pt politely requesting to hold PT for today d/t 10/10 pain. Family in agreement. Pt tearful and visibly distressed d/t uncontrolled pain. LSO brace present in room. Will follow-up tomorrow as able and appropriate.

## 2025-07-09 NOTE — NURSING
Patient arrived to room 529 via bed, family present at bedside, VS taken and documented, admission documentation completed, SCD's applied, instructed on IS use, oriented to room and equipment, allowed time for questions, all questions answered, no further concerns voiced at this time, safety measures in place, call light within reach, instructed to call with any needs, verbalized understanding, continue current plan of care.

## 2025-07-09 NOTE — TRANSFER OF CARE
"Anesthesia Transfer of Care Note    Patient: Isaura Mancini    Procedure(s) Performed: Procedure(s) (LRB):  JMAES-L4-5 TLIF (N/A)    Patient location: PACU    Anesthesia Type: general    Transport from OR: Transported from OR on room air with adequate spontaneous ventilation    Post pain: adequate analgesia    Post assessment: no apparent anesthetic complications    Post vital signs: stable    Level of consciousness: sedated    Nausea/Vomiting: no nausea/vomiting    Complications: none    Transfer of care protocol was followed    Last vitals: Visit Vitals  /67   Pulse 67   Temp 36.5 °C (97.7 °F)   Resp 16   Ht 5' 2" (1.575 m)   Wt 82.9 kg (182 lb 12.2 oz)   SpO2 98%   Breastfeeding No   BMI 33.43 kg/m²     "

## 2025-07-09 NOTE — BRIEF OP NOTE
[facility] - Surgery (2nd Fl)  Brief Operative Note    SUMMARY     Surgery Date: 7/9/2025     Surgeons and Role:     * Bill Simpson MD - Primary     * Arya Gracia MD - Resident - Assisting        Pre-op Diagnosis:  Spinal stenosis of lumbar region without neurogenic claudication [M48.061]    Post-op Diagnosis:  Post-Op Diagnosis Codes:     * Spinal stenosis of lumbar region without neurogenic claudication [M48.061]    Procedure(s) (LRB):  JAMES-L4-5 TLIF (N/A)    Anesthesia: General    Implants:  Implant Name Type Inv. Item Serial No.  Lot No. LRB No. Used Action   SUB BNGF 6.25CC BIOACTIVE MED - [id number]  SUB BNGF 6.25CC BIOACTIVE MED  [employer] [id number] N/A 1 Implanted   EIT TLIF, H 11MM, 12 DEGREE, 32/12    [employer] [id number] N/A 1 Implanted   SCREW EXPEDIUM VERSE 5.5 6X40 - [id number]  SCREW EXPEDIUM VERSE 5.5 6X40  [employer]  N/A 2 Implanted   SET SCREW EXPEDIUM VERSE UNITZ - [id number]  SET SCREW EXPEDIUM VERSE UNITZ  [employer]  N/A 2 Implanted   SCREW BONE SPINAL 5.5 6 X 40MM - [id number]  SCREW BONE SPINAL 5.5 6 X 40MM  [employer]  N/A 2 Implanted   SCREW INNER SINGLE SET TITANIU - [id number]  SCREW INNER SINGLE SET TITANIU  [employer]  N/A 2 Implanted   KRYS EXPEDIUM PREBENT 5.5X50MM - [id number]  KRYS EXPEDIUM PREBENT 5.5X50MM  [employer]  N/A 2 Implanted       Operative Findings: L4/L5 TLIF    Estimated Blood Loss: * No values recorded between 7/9/2025  7:50 AM and 7/9/2025 10:58 AM *    Estimated Blood Loss has been documented.         Specimens:   Specimen (24h ago, onward)      None          * No specimens in log *    [id number]

## 2025-07-09 NOTE — ASSESSMENT & PLAN NOTE
Isaura Mancini is a 54 y.o. female seen in clinic today for her pre-op evaluation to answer additional questions and concerns for her upcoming surgery. She has a grade 2 spondylolisthesis and severe stenosis causing debilitating axial low back and right leg pain that has failed conservative measurement.     Proceed to OR as planned for L4/5 TLIF

## 2025-07-09 NOTE — CARE UPDATE
I have reviewed the chart of Isaura Mancini who is hospitalized for the following:    Active Hospital Problems    Diagnosis    *Spondylolisthesis at L4-L5 level    Other reduced mobility     Patient with Acute debility due to fracture/prosthesis. Latest AMPAC and GEMS scores have been reviewed. Evaluation for etiology is complete. Plan includes - Progressive mobility protocol initated  - PT/OT consulted  - Fall precautions in place.            [id number] Yeager-BC  Unit Based LMIA

## 2025-07-09 NOTE — H&P
[facility] - Surgery ([facility])  Neuorsurgery  History and Physical     Patient Name: Isaura Mancini  MRN: [id number]  Admission Date: 7/9/2025  Attending Physician: Bill Simpson MD   Primary Care Physician: Ashok Braden III, MD    Patient information was obtained from patient and ER records.     Subjective:     Chief Complaint: No chief complaint on file.       HPI:  HPI from 08/05/2020 (Dr. Keen):   This is a very pleasant 49 y.o. female who presents as referral for low back pain and leg pain. She says the back pain started around 3 years ago but has gotten worse over the past 6 months. Patient also has pain going down the posterolateral leg to the dorsum of the foot that is almost constant on the right and intermittent on the left. No specific positions make the pain better.  Right more than left leg pain.  Back pain is as severe as leg pain.  She be up and moving for 1-2 hours before having to rest due to severe pain in the back and legs. Denies any numbness or weakness.  No sphincter dysfunction symptoms.  She has done physical therapy 2 years ago without significant pain relief.  She is taking gabapentin with partial pain relief.  Pain is interfering with walking.  She is now limping because of the pain.  Pain is interfering with working.  She is working at [employer].  Pain is interfering with quality of life.       HPI from 08/08/2023 (Dr. Keen):   Isaura Mancini is a 52 y.o. female who presents with the above CC.  Patient was scheduled for surgery in 2020.  She states the surgery was canceled and then she started having medical problems with her thyroid and surgery so she put off returning back to clinic.  Patient states the back pain has gotten progressively worse and is constant across the low back.  It is better with flexion and worse with walking or standing and also when laying down.  She has pain in the left foot and has seen podiatry for this.  She has bilateral posterior leg pain and numbness  mainly when lying down at night.  The left leg is worse than the right leg.  The back and legs bother her equally.  She would physical therapy 3 years ago without relief.  No interventional pain procedures.  No spine surgery.  She takes gabapentin 100 mg and ibuprofen. Patient denies any recent accidents or trauma, no saddle anesthesias, and no bowel or bladder incontinence.     HPI from 09/27/2023 (Dr. Keen):  Six to 10/10 of low back pain, she reports severe left leg pain.  Pain is interfering with ability to stand, walk.  She works at [employer] in the [employer] department.  She has to lift up to 50 lb.  She also has a manager positioned.  Pain is affecting her quality of life and functional status.  She has completed a full conservative management including physical therapy     HPI from 11/20/2024 (Dr. Keen):   Complains of worsening difficulty ambulating.  Back pain radiates in bilateral legs.  Patient tends to lean forward due to pain.  Pain is affecting her quality of life and functional status.     HPI from 2/17/2025:   Today the patient reports that she was initially only experiencing back pain, but now is experiencing leg pain down to her right knee. She has tried PT, but has never had injections. She feels that her symptoms have gotten worse within the last 6 months. She's been leaning forward due to the pain. She denies any bladder dysfunction.      Interval Hx per  note 5/12/25:   Today the patient reports that she's having the same back pain as before but that it's becoming sharper. She's also noticed some pain going into her legs, right worse than left. She states she's been experiencing more constipation lately as well.      Interval Hx 7/8/25: After the last appointment with Dr. Simpson, he recommended a L4-5 TLIF due to the grade 2 spondylolisthesis and severe stenosis causing debilitating axial low back and right leg pain that has failed conservative measurement. She is being seen in  clinic today for her pre-op evaluation to answer additional questions and concerns. Denies changes in her symptoms since the last appointment.     Prescriptions Prior to Admission[1]    Review of patient's allergies indicates:  No Known Allergies    Past Medical History:   Diagnosis Date    GERD (gastroesophageal reflux disease)     Hypertension     Sleep apnea      Past Surgical History:   Procedure Laterality Date     SECTION      EPIDURAL STEROID INJECTION INTO LUMBAR SPINE N/A 2025    Procedure: L4-5 [facility] - [facility] PATIENT;  Surgeon: Brian Tobias MD;  Location: [facility] PAIN MANAGEMENT;  Service: Pain Management;  Laterality: N/A;  oral - ASA 5 days    NECK MASS EXCISION N/A 2024    Procedure: EXCISION, MASS, NECK;  Surgeon: Fabrice Adams MD;  Location: [facility] OR;  Service: General;  Laterality: N/A;  Prone    THYROIDECTOMY N/A 10/28/2022    Procedure: THYROIDECTOMY, total;  Surgeon: Isaura Hayes MD;  Location: [facility] OR [address] [place] [address];  Service: General;  Laterality: N/A;    TUBAL LIGATION           Family History       Problem Relation (Age of Onset)    Breast cancer Paternal Aunt (60)    Heart disease Father    Hypertension Father          Tobacco Use    Smoking status: Never     Passive exposure: Never    Smokeless tobacco: Never   Vaping Use    Vaping status: Never Used   Substance and Sexual Activity    Alcohol use: No    Drug use: No    Sexual activity: Yes     Partners: Male     Birth control/protection: Surgical     Comment: BTL     Review of Systems  Objective:     Weight: 82.9 kg (182 lb 12.2 oz)  Body mass index is 33.43 kg/m².  Vital Signs (Most Recent):  Temp: 97.7 °F (36.5 °C) (25)  Pulse: 67 (25 0641)  Resp: 16 (25)  SpO2: 98 % (25) Vital Signs (24h Range):  Temp:  [97.6 °F (36.4 °C)-98.1 °F (36.7 °C)] 97.7 °F (36.5 °C)  Pulse:  [59-82] 67  Resp:  [16] 16  SpO2:  [98 %] 98 %  BP: (113-120)/(78-80) 120/78                  "                Physical Exam         Neurosurgery Physical Exam      General: well developed, well nourished, no distress.   Head: normocephalic, atraumatic  Neurologic: Alert and oriented. Thought content appropriate.  GCS: Motor: 6/Verbal: 5/Eyes: 4 GCS Total: 15  Mental Status: Awake, Alert, Oriented x 4  Language: No aphasia  Speech: No dysarthria  Cranial nerves: face symmetric, tongue midline, CN II-XII grossly intact.   Eyes: pupils equal, round, reactive to light with accomodation, EOMI.   Pulmonary: normal respirations, no signs of respiratory distress  Abdomen: soft, non-distended  Skin: Skin is warm, dry and intact.  Sensory: intact to light touch throughout  Motor Strength:Moves all extremities spontaneously with good tone.        Significant Labs:  No results for input(s): "GLU", "NA", "K", "CL", "CO2", "BUN", "CREATININE", "CALCIUM", "MG" in the last 48 hours.  Recent Labs   Lab 07/08/25  1241   WBC 8.82   HGB 13.2   HCT 40.1        Recent Labs   Lab 07/08/25  1241   INR 1.0   APTT 26.2     Microbiology Results (last 7 days)       ** No results found for the last 168 hours. **          All pertinent labs from the last 24 hours have been reviewed.    Significant Diagnostics:  I have reviewed all pertinent imaging results/findings within the past 24 hours.    Assessment and Plan:     * Spondylolisthesis at L4-L5 level  Isaura Mancini is a 54 y.o. female seen in clinic today for her pre-op evaluation to answer additional questions and concerns for her upcoming surgery. She has a grade 2 spondylolisthesis and severe stenosis causing debilitating axial low back and right leg pain that has failed conservative measurement.     Proceed to OR as planned for L4/5 TLIF        Arya Gracia MD  Neurosurgery  [facility] - Surgery (2nd Fl)           [1]   Medications Prior to Admission   Medication Sig Dispense Refill Last Dose/Taking    aspirin (ECOTRIN) 81 MG EC tablet Take 81 mg by mouth once " daily.   Past Month    celecoxib (CELEBREX) 200 MG capsule Take 1 capsule (200 mg total) by mouth 2 (two) times daily. 60 capsule 3 Past Month    cholecalciferol, vitamin D3, (VITAMIN D3) 25 mcg (1,000 unit) capsule Take 2 capsules (2,000 Units total) by mouth once daily.  0 Past Month    fluticasone propionate (FLONASE) 50 mcg/actuation nasal spray 1 spray (50 mcg total) by Each Nostril route once daily. 48 mL 1 7/8/2025 Morning    hydroCHLOROthiazide (HYDRODIURIL) 25 MG tablet Take 1 tablet (25 mg total) by mouth once daily. 90 tablet 3 7/8/2025 Morning    levothyroxine (SYNTHROID) 150 MCG tablet Take 1 tab 6 days a week and 0.5 tabs on Sundays for total of 6.5 tabs weekly 90 tablet 3 7/8/2025 Morning    naproxen (NAPROSYN) 500 MG tablet Take 1 tablet (500 mg total) by mouth 2 (two) times daily with meals. 60 tablet 2 Past Month    olmesartan (BENICAR) 20 MG tablet Take 1 tablet (20 mg total) by mouth once daily. 30 tablet 5 7/8/2025 Bedtime    pantoprazole (PROTONIX) 40 MG tablet Take 1 tablet (40 mg total) by mouth once daily. 90 tablet 3 7/8/2025 Morning    pregabalin (LYRICA) 50 MG capsule Take 1 capsule (50 mg total) by mouth 3 (three) times daily. 90 capsule 3 Past Month    WEGOVY 0.25 mg/0.5 mL PnIj Inject contents of one pen (0.25 MG) subcutaneously once weekly as directed 2 mL 0 6/27/2025

## 2025-07-10 ENCOUNTER — TELEPHONE (OUTPATIENT)
Dept: NEUROSURGERY | Facility: CLINIC | Age: 55
End: 2025-07-10
Payer: COMMERCIAL

## 2025-07-10 LAB
ABSOLUTE EOSINOPHIL (OHS): 0.01 K/UL
ABSOLUTE MONOCYTE (OHS): 1.09 K/UL (ref 0.3–1)
ABSOLUTE NEUTROPHIL COUNT (OHS): 11.48 K/UL (ref 1.8–7.7)
ANION GAP (OHS): 4 MMOL/L (ref 8–16)
BASOPHILS # BLD AUTO: 0.03 K/UL
BASOPHILS NFR BLD AUTO: 0.2 %
BUN SERPL-MCNC: 16 MG/DL (ref 6–20)
CALCIUM SERPL-MCNC: 8.5 MG/DL (ref 8.7–10.5)
CHLORIDE SERPL-SCNC: 112 MMOL/L (ref 95–110)
CO2 SERPL-SCNC: 21 MMOL/L (ref 23–29)
CREAT SERPL-MCNC: 0.6 MG/DL (ref 0.5–1.4)
ERYTHROCYTE [DISTWIDTH] IN BLOOD BY AUTOMATED COUNT: 12 % (ref 11.5–14.5)
GFR SERPLBLD CREATININE-BSD FMLA CKD-EPI: >60 ML/MIN/1.73/M2
GLUCOSE SERPL-MCNC: 98 MG/DL (ref 70–110)
HCT VFR BLD AUTO: 32.3 % (ref 37–48.5)
HGB BLD-MCNC: 10.3 GM/DL (ref 12–16)
IMM GRANULOCYTES # BLD AUTO: 0.06 K/UL (ref 0–0.04)
IMM GRANULOCYTES NFR BLD AUTO: 0.4 % (ref 0–0.5)
LYMPHOCYTES # BLD AUTO: 1.81 K/UL (ref 1–4.8)
MCH RBC QN AUTO: 28.7 PG (ref 27–31)
MCHC RBC AUTO-ENTMCNC: 31.9 G/DL (ref 32–36)
MCV RBC AUTO: 90 FL (ref 82–98)
NUCLEATED RBC (/100WBC) (OHS): 0 /100 WBC
PLATELET # BLD AUTO: 287 K/UL (ref 150–450)
PLATELET BLD QL SMEAR: NORMAL
PMV BLD AUTO: 11.5 FL (ref 9.2–12.9)
POTASSIUM SERPL-SCNC: 3.9 MMOL/L (ref 3.5–5.1)
RBC # BLD AUTO: 3.59 M/UL (ref 4–5.4)
RELATIVE EOSINOPHIL (OHS): 0.1 %
RELATIVE LYMPHOCYTE (OHS): 12.5 % (ref 18–48)
RELATIVE MONOCYTE (OHS): 7.5 % (ref 4–15)
RELATIVE NEUTROPHIL (OHS): 79.3 % (ref 38–73)
SODIUM SERPL-SCNC: 137 MMOL/L (ref 136–145)
WBC # BLD AUTO: 14.48 K/UL (ref 3.9–12.7)

## 2025-07-10 PROCEDURE — 63600175 PHARM REV CODE 636 W HCPCS: Performed by: STUDENT IN AN ORGANIZED HEALTH CARE EDUCATION/TRAINING PROGRAM

## 2025-07-10 PROCEDURE — 97161 PT EVAL LOW COMPLEX 20 MIN: CPT

## 2025-07-10 PROCEDURE — 85025 COMPLETE CBC W/AUTO DIFF WBC: CPT | Performed by: STUDENT IN AN ORGANIZED HEALTH CARE EDUCATION/TRAINING PROGRAM

## 2025-07-10 PROCEDURE — 25000003 PHARM REV CODE 250: Performed by: STUDENT IN AN ORGANIZED HEALTH CARE EDUCATION/TRAINING PROGRAM

## 2025-07-10 PROCEDURE — 97535 SELF CARE MNGMENT TRAINING: CPT

## 2025-07-10 PROCEDURE — 36415 COLL VENOUS BLD VENIPUNCTURE: CPT | Performed by: STUDENT IN AN ORGANIZED HEALTH CARE EDUCATION/TRAINING PROGRAM

## 2025-07-10 PROCEDURE — 51798 US URINE CAPACITY MEASURE: CPT

## 2025-07-10 PROCEDURE — 11000001 HC ACUTE MED/SURG PRIVATE ROOM

## 2025-07-10 PROCEDURE — 80048 BASIC METABOLIC PNL TOTAL CA: CPT | Performed by: STUDENT IN AN ORGANIZED HEALTH CARE EDUCATION/TRAINING PROGRAM

## 2025-07-10 PROCEDURE — 97530 THERAPEUTIC ACTIVITIES: CPT

## 2025-07-10 PROCEDURE — 99024 POSTOP FOLLOW-UP VISIT: CPT | Mod: COE,,, | Performed by: PHYSICIAN ASSISTANT

## 2025-07-10 PROCEDURE — 97166 OT EVAL MOD COMPLEX 45 MIN: CPT

## 2025-07-10 RX ADMIN — PANTOPRAZOLE SODIUM 40 MG: 40 TABLET, DELAYED RELEASE ORAL at 08:07

## 2025-07-10 RX ADMIN — CALCIUM CARBONATE (ANTACID) CHEW TAB 500 MG 500 MG: 500 CHEW TAB at 09:07

## 2025-07-10 RX ADMIN — LOSARTAN POTASSIUM 25 MG: 25 TABLET, FILM COATED ORAL at 10:07

## 2025-07-10 RX ADMIN — ENOXAPARIN SODIUM 40 MG: 40 INJECTION SUBCUTANEOUS at 04:07

## 2025-07-10 RX ADMIN — CALCIUM CARBONATE (ANTACID) CHEW TAB 500 MG 500 MG: 500 CHEW TAB at 08:07

## 2025-07-10 RX ADMIN — CEFAZOLIN 2 G: 2 INJECTION, POWDER, FOR SOLUTION INTRAMUSCULAR; INTRAVENOUS at 11:07

## 2025-07-10 RX ADMIN — MUPIROCIN: 20 OINTMENT TOPICAL at 09:07

## 2025-07-10 RX ADMIN — METHOCARBAMOL 750 MG: 750 TABLET ORAL at 08:07

## 2025-07-10 RX ADMIN — ACETAMINOPHEN 1000 MG: 500 TABLET ORAL at 02:07

## 2025-07-10 RX ADMIN — MUPIROCIN: 20 OINTMENT TOPICAL at 08:07

## 2025-07-10 RX ADMIN — PREGABALIN 50 MG: 50 CAPSULE ORAL at 02:07

## 2025-07-10 RX ADMIN — OXYCODONE HYDROCHLORIDE 10 MG: 10 TABLET ORAL at 07:07

## 2025-07-10 RX ADMIN — LEVOTHYROXINE SODIUM 150 MCG: 75 TABLET ORAL at 05:07

## 2025-07-10 RX ADMIN — METHOCARBAMOL 750 MG: 750 TABLET ORAL at 12:07

## 2025-07-10 RX ADMIN — METHOCARBAMOL 750 MG: 750 TABLET ORAL at 04:07

## 2025-07-10 RX ADMIN — CEFAZOLIN 2 G: 2 INJECTION, POWDER, FOR SOLUTION INTRAMUSCULAR; INTRAVENOUS at 08:07

## 2025-07-10 RX ADMIN — ACETAMINOPHEN 1000 MG: 500 TABLET ORAL at 05:07

## 2025-07-10 RX ADMIN — METHOCARBAMOL 750 MG: 750 TABLET ORAL at 09:07

## 2025-07-10 RX ADMIN — ACETAMINOPHEN 1000 MG: 500 TABLET ORAL at 09:07

## 2025-07-10 RX ADMIN — CEFAZOLIN 2 G: 2 INJECTION, POWDER, FOR SOLUTION INTRAMUSCULAR; INTRAVENOUS at 01:07

## 2025-07-10 RX ADMIN — SENNOSIDES AND DOCUSATE SODIUM 2 TABLET: 50; 8.6 TABLET ORAL at 08:07

## 2025-07-10 RX ADMIN — OXYCODONE HYDROCHLORIDE 10 MG: 10 TABLET ORAL at 10:07

## 2025-07-10 RX ADMIN — PREGABALIN 50 MG: 50 CAPSULE ORAL at 09:07

## 2025-07-10 RX ADMIN — HYDROCHLOROTHIAZIDE 25 MG: 25 TABLET ORAL at 10:07

## 2025-07-10 RX ADMIN — PREGABALIN 50 MG: 50 CAPSULE ORAL at 08:07

## 2025-07-10 RX ADMIN — SENNOSIDES AND DOCUSATE SODIUM 2 TABLET: 50; 8.6 TABLET ORAL at 09:07

## 2025-07-10 RX ADMIN — CEFAZOLIN 2 G: 2 INJECTION, POWDER, FOR SOLUTION INTRAMUSCULAR; INTRAVENOUS at 04:07

## 2025-07-10 NOTE — NURSING
Transport @ bedside to bring pt to x ray pt stated she's not randy to sit or stand for x ray stated she was in too much pain, Candy RAUSCH notified stated they will try to get x ray again karel.

## 2025-07-10 NOTE — ASSESSMENT & PLAN NOTE
Isauar Mancini is a 54 y.o. female with grade 2 spondylolisthesis and severe stenosis causing debilitating axial low back and right leg pain that has failed conservative measurement. Now s/p L4-5 TLIF on 7/9.    - Neurologically stable  - Pain control: continue current regimen   - Leave dressing to POD3  - Drains: HV to suction. Ancef while drain in place. Empty and record output every shift or when full.  - Post op imaging pending.  - Continue PT/OT/OOB. OOB > 6hrs/day. LSO when OOB.  - GI: Diet as tolerated. Continue bowel regimen.   - Voiding, PVR 0. Bladder scan prn.  - DVT ppx: LVX, Compression stockings, SCDs  - Atelectasis ppx: IS at bedside. Encourage use every hour while awake.  - Seen with Dr. Simpson    - Dispo: Pending pain control, drain removal

## 2025-07-10 NOTE — PT/OT/SLP EVAL
Physical Therapy Co-Evaluation and Co-Treatment    Patient Name:  Isaura Mancini   MRN:  [id number]    Co-evaluation and co-treatment performed for this visit due to suspected patient need for two skilled therapists to ensure patient and staff safety and to accommodate for patient activity tolerance/pain management     Recommendations:     Discharge Recommendations: High Intensity Therapy  Discharge Equipment Recommendations: walker, rolling, hip kit   Barriers to Discharge: Increased level of assist and Decreased caregiver support    Assessment:     Isaura Mancini is a 54 y.o. female admitted with a medical diagnosis of Spondylolisthesis at L4-L5 level. She presents with the following impairments/functional limitations: weakness, impaired endurance, impaired self care skills, impaired functional mobility, gait instability, impaired balance, pain, orthopedic precautions, impaired cardiopulmonary response to activity. PT received HOB elevated and agreeable to therapy. Pt with limited ability to participate in IE today 2/2 significant elevation of pain with functional mobility, pt able to attempt sit to stand trial and x2 lateral steps towards HOB before needing to sit. RN aware. Pt demonstrated good motivation to participate and progress with therapy.     Recommend high intensity therapy following discharge once medically stable in order to reduce fall risk, reduce caregiver burden, improve quality of life, and return to PLOF. Patient presents with good participation, motivation, and family support to return to prior level of function and anticipate pt will demonstrate at time of d/c appropriate endurance, strength, pain control, and fine motor control to participate in up to 3 hours daily or 15 hrs weekly of multidisciplinary intensive therapy.      Rehab Prognosis: Good; patient will require acute skilled PT services daily to address these deficits and reach maximum level of function.  Recent Surgery: Procedure(s)  "(LRB):  JAMES-L4-5 TLIF (N/A) 1 Day Post-Op    Plan:     During this hospitalization, patient to be seen daily to address the identified rehab impairments via gait training, therapeutic activities, therapeutic exercises, neuromuscular re-education and progress toward the following goals:    Plan of Care Expires:  08/10/25    Subjective     Chief Complaint: "I was in a lot of pain last night"  Patient/Family Comments/Goals: to go home  Pain/Comfort:  Pain Rating 1: 6/10  Location 1: back  Pain Addressed 1: Reposition, Distraction, Nurse notified  Pain Rating Post-Intervention 1: 10/10    Patients cultural, spiritual, [employer] conflicts given the current situation: no    Living Environment:  Living Environment: Patient lives with their daughter, son, and brother in a single story house with number of outside stair(s): 0, tub-shower combo, and shower chair.  Prior Level of Function: Prior to admission, patient was independent and driving and working at [employer].  Equipment Used at Home: shower chair.  DME owned (not currently used): none  Assistance Upon Discharge: family    Objective:     Communicated with nursing prior to session. Patient found HOB elevated with peripheral IV, PureWick, hemovac, SCD upon PT entry to room.    General Precautions: Standard, fall  Orthopedic Precautions:spinal precautions    Braces: LSO  Respiratory Status: Room air    Exams:  Cognitive Exam:  Patient is oriented to Person, Place, Time, Situation, follows commands 100% of the time  RLE ROM: WFL  RLE Strength: WFL, grossly 3+/5  LLE ROM: WFL  LLE Strength: WFL, grossly 3+/5  Sensation: Intact light touch to BLEs  -       Intact    Functional Mobility:  Bed Mobility:    Rolling Left:  moderate assistance  Scooting: total assistance  Supine to Sit: moderate assistance of 2 persons for LE management and trunk management  Sit to Supine: maximal assistance of 2 persons  Transfers:    Sit to Stand: moderate assistance of 2 persons with rolling " walker with cues for hand placement  Gait: Patient ambulated 2 lateral steps with rolling walker and contact guard assistance.   Patient demonstrates decreased step length.   Patient required cues for sequencing and rolling walker management  All lines remained intact throughout ambulation trial.  Balance:   Static Sitting:Fair (Able to sit unsupported w/o LOB and w/o UE support),   Therapeutic Exercises and Education:  Whiteboard updated  Patient educated on:  role of acute care PT and PT plan of care, safety while in hospital including calling nurse for mobility, call light usage, spinal precautions, log roll technique , and appropriate use of LSO brace during all OOB activity.  The importance of maximal participation in therapy session in order to reduce negative effects of prolonged sedentary positioning.   Answered all questions within PT scope of practice and addressed functional mobility concerns.    AM-PAC 6 CLICK MOBILITY  Total Score:14     Patient left HOB elevated with all lines intact, call bell in reach, RN and son present, bed in lowest locked position, and 3/4 bed rails elevated.    GOALS:   Multidisciplinary Problems       Physical Therapy Goals          Problem: Physical Therapy    Goal Priority Disciplines Outcome Interventions   Physical Therapy Goal     PT, PT/OT Progressing    Description: Goals to be met by: 08/10/25     Patient will increase functional independence with mobility by performin. Supine to sit with [place] maintaining spinal precautions  2. Sit to supine with [place] maintaining spinal precautions  3. Rolling to Left and Right with [place] maintaining spinal precautions  4. Sit to stand transfer with Modified [place]  5. Bed to chair transfer with Modified [place] using LRAD  6. Gait  x 50 feet with Stand-by Assistance using LRAD.   7. Lower extremity exercise program x20 reps per handout, with independence  8. (I) with donning and doffing TLSO                          DME Justifications:   Isaura's mobility limitation cannot be sufficiently resolved by the use of a cane. Her functional mobility deficit can be sufficiently resolved with the use of a Rolling Walker. Patient's mobility limitation significantly impairs their ability to participate in one of more activities of daily living.  The use of a RW will significantly improve the patient's ability to participate in MRADLS and the patient will use it on regular basis in the home.    History:     Past Medical History:   Diagnosis Date    GERD (gastroesophageal reflux disease)     Hypertension     Sleep apnea        Past Surgical History:   Procedure Laterality Date     SECTION      EPIDURAL STEROID INJECTION INTO LUMBAR SPINE N/A 2025    Procedure: L4-5 [facility];  Surgeon: Brian Tobias MD;  Location: [facility];  Service: Pain Management;  Laterality: N/A;  oral - ASA 5 days    NECK MASS EXCISION N/A 2024    Procedure: EXCISION, MASS, NECK;  Surgeon: Fabrice Adams MD;  Location: [facility];  Service: General;  Laterality: N/A;  Prone    SPINAL FUSION N/A 2025    Procedure: JAMES-L4-5 TLIF;  Surgeon: Bill Simpson MD;  [facility];  Service: Neurosurgery;  Laterality: N/A;    THYROIDECTOMY N/A 10/28/2022    Procedure: THYROIDECTOMY, total;  Surgeon: Isaura Hayes MD;  [facility];  Service: General;  Laterality: N/A;    TUBAL LIGATION             Time Tracking:     PT Received On: 07/10/25  PT Start Time: 1013     PT Stop Time: 1038  PT Total Time (min): 25 min     Billable Minutes: Evaluation 10 Therapeutic Activity 15     07/10/2025

## 2025-07-10 NOTE — PT/OT/SLP EVAL
Occupational Therapy Co-Evaluation and Treatment    Name: Isaura Mancini  MRN: [id number]  Admitting Diagnosis: Spondylolisthesis at L4-L5 level 1 Day Post-Op  Recent Surgery: Procedure(s) (LRB):  JAMES-L4-5 TLIF (N/A) 1 Day Post-Op    Pt was co-treated with Physical Therapy to assess abilities and/or deficits for appropriate skilled interventions due to medical complexity, poor pain tolerance, and for increased safety.    A client care conference was performed between the GIRISH and Romulo TORRES, prior to treatment by BRIAN, to discuss the patient's status, treatment plan and established goals.     Recommendations:     Discharge Recommendations: High Intensity Therapy  Level of Assistance Recommended: 24 hours light assistance  Discharge Equipment Recommendations: walker, rolling, hip kit  Barriers to discharge: None    Assessment:     Isaura Mancini is a 54 y.o. female with a medical diagnosis of Spondylolisthesis at L4-L5 level. She presents with performance deficits affecting function including weakness, impaired endurance, impaired self care skills, impaired functional mobility, impaired balance, decreased lower extremity function, pain, impaired cardiopulmonary response to activity, orthopedic precautions. Pt requiring increased assistance to participate in simple ADLs s/p spinal surgery and is limited by severe pain post op. Will require further skilled education and therapy to address safe participation while following spinal precautions. She has good potential for recovery once with pain managed. Has good social support system.     Rehab Prognosis: Good; patient would benefit from acute OT services to address these deficits and reach maximum level of function.    Plan:     Patient to be seen 4 x/week to address the above listed problems via self-care/home management, therapeutic activities, therapeutic exercises, neuromuscular re-education, community/work re-entry  Plan of Care Expires: 07/24/25  Plan of Care  Reviewed with: patient, son    Subjective     Chief Complaint: see below  Patient Comments/Goals: agreed to evaluation  Pain/Comfort:  Pain Rating 1: 6/10  Location 1: back  Pain Addressed 1: Distraction, Cessation of Activity, Nurse notified  Pain Rating Post-Intervention 1: 10/10 (after sitting up at EOB)    Patients cultural, spiritual, Pentecostal conflicts given the current situation: no    Social History:  Living Environment: Patient lives with her family (son, daughter, and younger brother) in a single story home with tub-shower combo  Prior Level of Function: Prior to admission, patient was modified independent. Her participation and quality of life was impaired by pain levels.   Roles and Routines: Patient was driving and working at [employer] prior to admission.  Equipment Used at Home: shower chair  Assistance Upon Discharge: family. However, her family are all working during the day.     Objective:     Communicated with nurse prior to session. Patient found HOB elevated with peripheral IV, PureWick, hemovac, SCD upon OT entry to room.    General Precautions: Standard, fall   Orthopedic Precautions: spinal precautions   Braces: LSO    Respiratory Status: Room air    Occupational Performance    Gait belt applied - No    Bed Mobility:   Rolling/Turning to Left with moderate assistance  Scooting anteriorly to EOB to have both feet planted on floor: moderate assistance  Supine to sit from left side of bed with moderate assistance and of 2 persons  Sit to Supine with maximal assistance and of 2 persons on L side of bed  Scooted up to HOB with Total A x2, trendelenburg position.     Functional Mobility/Transfers:  Sit <> Stand Transfer with moderate assistance, 2 persons, and increased time with rolling walker  Functional Mobility: Side stepped to HOB 2x with CGA. Unable to participate further due to pain. Stood x1 min     Activities of Daily Living:  Upper Body Dressing: maximal assistance  Toileting: maximal  assistance    Cognitive/Visual Perceptual:  Cognitive/Psychosocial Skills:    -     Oriented to: Person, Place, Time, Situation  -     Follows Commands/attention: Follows one-step commands  -     Communication: clear/fluent  -     Memory: No Deficits noted  -     Safety awareness/insight to disability: intact  -     Mood/Affect/Coping skills/emotional control: Appropriate to situation and Tearful  Visual/Perceptual:    -     Intact    Physical Exam:  Balance:    -     Sitting: contact guard assistance  -     Standing: contact guard assistance  Sensation:    -       Intact  Motor Planning: Intact  Upper Extremity Range of Motion:     -       Right Upper Extremity: WFL  -       Left Upper Extremity: WFL  Upper Extremity Strength:    -       Right Upper Extremity: WFL  -       Left Upper Extremity: WFL   Strength:    -       Right Upper Extremity: WNL  -       Left Upper Extremity: WNL  Fine Motor Coordination:    -       Intact  Gross motor coordination:   WFL    AMPAC 6 Click ADL:  AMPAC Total Score: 16    Treatment & Education:  Educated on the importance of mobility to maximize recovery  Educated on the importance of OOB mobility within safe range in order to decrease adverse effects of prolonged bedrest  Educated about Spinal precautions including no bending, lifting, or twisting. Patient recalled 3 spinal precautions  Educated on safety with functional mobility; hand placement to ensure safe transfers to various surfaces in prep for ADLs  Educated on performing functional mobility and ADLs in adherence to orthopedic precautions  Will continue to educate as needed  Ot's role and POC    Patient not clear to transfer with RN/PCT.    Patient left supine with all lines intact, call button in reach, RN notified, and RN and family present.    GOALS:   Multidisciplinary Problems       Occupational Therapy Goals          Problem: Occupational Therapy    Goal Priority Disciplines Outcome Interventions   Occupational  Therapy Goal     OT, PT/OT Progressing    Description: Goals to be met by: 25     Patient will increase functional independence with ADLs by performing:    UE Dressing with Modified [place].  LE Dressing with Set-up Assistance.  Grooming while standing at sink with Modified [place].  Toileting from toilet with Set-up Assistance for hygiene and clothing management.   Bathing from  shower chair/bench with Set-up Assistance.  Sitting at edge of bed x15 minutes with Modified [place].  Log Rolling to Bilateral with Modified [place].   Toilet transfer to toilet with Modified [place].  Pt will adhere to spinal precautions 100% of time.                        DME Justifications:   Isaura's mobility limitation cannot be sufficiently resolved by the use of a cane. Her functional mobility deficit can be sufficiently resolved with the use of a Rolling Walker. Patient's mobility limitation significantly impairs their ability to participate in one of more activities of daily living.  The use of a RW will significantly improve the patient's ability to participate in MRADLS and the patient will use it on regular basis in the home.    History:     Past Medical History:   Diagnosis Date    GERD (gastroesophageal reflux disease)     Hypertension     Sleep apnea          Past Surgical History:   Procedure Laterality Date     SECTION      EPIDURAL STEROID INJECTION INTO LUMBAR SPINE N/A 2025    Procedure: L4-5 [facility];  Surgeon: Brian Tobias MD;  Location: [facility] PAIN MANAGEMENT;  Service: Pain Management;  Laterality: N/A;  oral - ASA 5 days    NECK MASS EXCISION N/A 2024    Procedure: EXCISION, MASS, NECK;  Surgeon: Fabrice Adams MD;  Location: [facility] OR;  Service: General;  Laterality: N/A;  Prone    SPINAL FUSION N/A 2025    Procedure: JAMES-L4-5 TLIF;  Surgeon: Bill Simpson MD;  Location: [facility] OR [address] [place] [address];  Service: Neurosurgery;  Laterality:  N/A;    THYROIDECTOMY N/A 10/28/2022    Procedure: THYROIDECTOMY, total;  Surgeon: Isaura Hayes MD;  Location: [facility] OR [address] [place] [address];  Service: General;  Laterality: N/A;    TUBAL LIGATION      1999       Time Tracking:     OT Date of Treatment: 07/10/25  OT Start Time: 1015  OT Stop Time: 1038  OT Total Time (min): 23 min    Billable Minutes: Evaluation 10 and Self Care/Home Management 13    7/10/2025

## 2025-07-10 NOTE — SUBJECTIVE & OBJECTIVE
Interval History: NAEON. AFVSS. C/o significant incisional pain. Denies preop leg pain. Voiding, PVR 0. HV drain to suction. Working with therapy. Encouraged OOB mobility to decrease risk of postop complication and aid in wound healing.     Medications:  Continuous Infusions:  Scheduled Meds:   acetaminophen  1,000 mg Oral Q8H    calcium carbonate  500 mg Oral BID    ceFAZolin (Ancef) IV (PEDS and ADULTS)  2 g Intravenous Q8H    enoxparin  40 mg Subcutaneous Daily    hydroCHLOROthiazide  25 mg Oral Daily    levothyroxine  150 mcg Oral Before breakfast    losartan  25 mg Oral Daily    methocarbamoL  750 mg Oral QID    mupirocin   Nasal BID    pantoprazole  40 mg Oral Daily    pregabalin  50 mg Oral TID    senna-docusate  2 tablet Oral BID     PRN Meds:  Current Facility-Administered Medications:     aluminum-magnesium hydroxide-simethicone, 30 mL, Oral, Q4H PRN    diazePAM, 5 mg, Oral, BID PRN    HYDROmorphone, 1 mg, Intravenous, Q4H PRN    melatonin, 6 mg, Oral, Nightly PRN    ondansetron, 8 mg, Oral, Q6H PRN    oxyCODONE, 5 mg, Oral, Q4H PRN    oxyCODONE, 10 mg, Oral, Q4H PRN    prochlorperazine, 5 mg, Intravenous, Q6H PRN     Review of Systems  Objective:     Weight: 82.9 kg (182 lb 12.2 oz)  Body mass index is 33.42 kg/m².  Vital Signs (Most Recent):  Temp: 98.9 °F (37.2 °C) (07/10/25 1523)  Pulse: 76 (07/10/25 1523)  Resp: 18 (07/10/25 1523)  BP: (!) 115/56 (07/10/25 1523)  SpO2: 98 % (07/10/25 1523) Vital Signs (24h Range):  Temp:  [97.7 °F (36.5 °C)-98.9 °F (37.2 °C)] 98.9 °F (37.2 °C)  Pulse:  [61-81] 76  Resp:  [17-20] 18  SpO2:  [98 %-99 %] 98 %  BP: ()/(52-80) 115/56     Date 07/10/25 0700 - 07/11/25 0659   Shift 7628-0371 3612-8474 2142-7172 24 Hour Total   INTAKE   Shift Total(mL/kg)       OUTPUT   Urine(mL/kg/hr) 300(0.5)   300   Shift Total(mL/kg) 300(3.6)   300(3.6)   Weight (kg) 82.9 82.9 82.9 82.9                            Closed/Suction Drain 07/09/25 1016 Tube - 1 Left Back Accordion 10 Fr.  "(Active)   Site Description Healing 07/10/25 0733   Dressing Type CHG impregnated dressing/sponge 07/10/25 0733   Dressing Status Dry;Intact 07/10/25 0733   Dressing Intervention Integrity maintained 07/10/25 0733   Drainage Bloody 07/10/25 0733   Status Other (Comment) 07/10/25 0733   Output (mL) 0 mL 07/10/25 0454          Physical Exam         Neurosurgery Physical Exam    General: well developed, well nourished, no distress.   Neurologic: Alert and oriented. Thought content appropriate.  GCS: Motor: 6/Verbal: 5/Eyes: 4 GCS Total: 15  Mental Status: Awake, Alert, Oriented x 4  Language: No aphasia  Speech: No dysarthria  Cranial nerves: face symmetric, tongue midline, CN II-XII grossly intact.   Pulmonary: normal respirations, no signs of respiratory distress  Sensory: intact to light touch throughout  Motor Strength: Moves all extremities spontaneously with good tone.  Full strength upper and lower extremities. No abnormal movements seen.     Strength  Deltoids Triceps Biceps Wrist Extension Wrist Flexion Hand    Upper: R 5/5 5/5 5/5 5/5 5/5 5/5    L 5/5 5/5 5/5 5/5 5/5 5/5     Iliopsoas Quadriceps Knee  Flexion Tibialis  anterior Gastro- cnemius EHL   Lower: R 5/5 5/5 5/5 5/5 5/5 5/5    L 5/5 5/5 5/5 5/5 5/5 5/5     Incision covered with dressing, HV x 1 to suction.      Significant Labs:  Recent Labs   Lab 07/10/25  0518   GLU 98      K 3.9   *   CO2 21*   BUN 16   CREATININE 0.6   CALCIUM 8.5*     Recent Labs   Lab 07/10/25  0518   WBC 14.48*   HGB 10.3*   HCT 32.3*        No results for input(s): "LABPT", "INR", "APTT" in the last 48 hours.  Microbiology Results (last 7 days)       ** No results found for the last 168 hours. **          Recent Lab Results         07/10/25  0518        Anion Gap 4       Baso # 0.03       Basophil % 0.2       BUN 16       Calcium 8.5       Chloride 112       CO2 21       Creatinine 0.6       eGFR >60  Comment: Estimated GFR calculated using the CKD-EPI " creatinine (2021) equation.       Eos # 0.01       Eos % 0.1       Glucose 98       Gran # (ANC) 11.48       Hematocrit 32.3       Hemoglobin 10.3       Immature Grans (Abs) 0.06  Comment: Mild elevation in immature granulocytes is non specific and can be seen in a variety of conditions including stress response, acute inflammation, trauma and pregnancy. Correlation with other laboratory and clinical findings is essential.       Immature Granulocytes 0.4       Lymph # 1.81       Lymph % 12.5       MCH 28.7       MCHC 31.9       MCV 90       Mono # 1.09       Mono % 7.5       MPV 11.5       Neut % 79.3       nRBC 0       Platelet Estimate Appears Normal       Platelet Count 287       Potassium 3.9       RBC 3.59       RDW 12.0       Sodium 137       WBC 14.48             All pertinent labs from the last 24 hours have been reviewed.    Significant Diagnostics:  I have reviewed all pertinent imaging results/findings within the past 24 hours.

## 2025-07-10 NOTE — PROGRESS NOTES
[facility]  Neurosurgery  Progress Note    Subjective:     History of Present Illness: Patient seen in clinic for back and bilateral LE pain. Recommended a L4-5 TLIF due to the grade 2 spondylolisthesis and severe stenosis causing debilitating pain. Failed conservative management. Presents for surgery.     Post-Op Info:  Procedure(s) (LRB):  JAMES-L4-5 TLIF (N/A)   1 Day Post-Op   Interval History: NAEON. AFVSS. C/o significant incisional pain. Denies preop leg pain. Voiding, PVR 0. HV drain to suction. Working with therapy. Encouraged OOB mobility to decrease risk of postop complication and aid in wound healing.     Medications:  Continuous Infusions:  Scheduled Meds:   acetaminophen  1,000 mg Oral Q8H    calcium carbonate  500 mg Oral BID    ceFAZolin (Ancef) IV (PEDS and ADULTS)  2 g Intravenous Q8H    enoxparin  40 mg Subcutaneous Daily    hydroCHLOROthiazide  25 mg Oral Daily    levothyroxine  150 mcg Oral Before breakfast    losartan  25 mg Oral Daily    methocarbamoL  750 mg Oral QID    mupirocin   Nasal BID    pantoprazole  40 mg Oral Daily    pregabalin  50 mg Oral TID    senna-docusate  2 tablet Oral BID     PRN Meds:  Current Facility-Administered Medications:     aluminum-magnesium hydroxide-simethicone, 30 mL, Oral, Q4H PRN    diazePAM, 5 mg, Oral, BID PRN    HYDROmorphone, 1 mg, Intravenous, Q4H PRN    melatonin, 6 mg, Oral, Nightly PRN    ondansetron, 8 mg, Oral, Q6H PRN    oxyCODONE, 5 mg, Oral, Q4H PRN    oxyCODONE, 10 mg, Oral, Q4H PRN    prochlorperazine, 5 mg, Intravenous, Q6H PRN     Review of Systems  Objective:     Weight: 82.9 kg (182 lb 12.2 oz)  Body mass index is 33.42 kg/m².  Vital Signs (Most Recent):  Temp: 98.9 °F (37.2 °C) (07/10/25 1523)  Pulse: 76 (07/10/25 1523)  Resp: 18 (07/10/25 1523)  BP: (!) 115/56 (07/10/25 1523)  SpO2: 98 % (07/10/25 1523) Vital Signs (24h Range):  Temp:  [97.7 °F (36.5 °C)-98.9 °F (37.2 °C)] 98.9 °F (37.2 °C)  Pulse:  [61-81] 76  Resp:  [17-20]  18  SpO2:  [98 %-99 %] 98 %  BP: ()/(52-80) 115/56     Date 07/10/25 0700 - 07/11/25 0659   Shift 2554-1330 1873-1055 9464-0450 24 Hour Total   INTAKE   Shift Total(mL/kg)       OUTPUT   Urine(mL/kg/hr) 300(0.5)   300   Shift Total(mL/kg) 300(3.6)   300(3.6)   Weight (kg) 82.9 82.9 82.9 82.9                            Closed/Suction Drain 07/09/25 1016 Tube - 1 Left Back Accordion 10 Fr. (Active)   Site Description Healing 07/10/25 0733   Dressing Type CHG impregnated dressing/sponge 07/10/25 0733   Dressing Status Dry;Intact 07/10/25 0733   Dressing Intervention Integrity maintained 07/10/25 0733   Drainage Bloody 07/10/25 0733   Status Other (Comment) 07/10/25 0733   Output (mL) 0 mL 07/10/25 0454          Physical Exam         Neurosurgery Physical Exam    General: well developed, well nourished, no distress.   Neurologic: Alert and oriented. Thought content appropriate.  GCS: Motor: 6/Verbal: 5/Eyes: 4 GCS Total: 15  Mental Status: Awake, Alert, Oriented x 4  Language: No aphasia  Speech: No dysarthria  Cranial nerves: face symmetric, tongue midline, CN II-XII grossly intact.   Pulmonary: normal respirations, no signs of respiratory distress  Sensory: intact to light touch throughout  Motor Strength: Moves all extremities spontaneously with good tone.  Full strength upper and lower extremities. No abnormal movements seen.     Strength  Deltoids Triceps Biceps Wrist Extension Wrist Flexion Hand    Upper: R 5/5 5/5 5/5 5/5 5/5 5/5    L 5/5 5/5 5/5 5/5 5/5 5/5     Iliopsoas Quadriceps Knee  Flexion Tibialis  anterior Gastro- cnemius EHL   Lower: R 5/5 5/5 5/5 5/5 5/5 5/5    L 5/5 5/5 5/5 5/5 5/5 5/5     Incision covered with dressing, HV x 1 to suction.      Significant Labs:  Recent Labs   Lab 07/10/25  0518   GLU 98      K 3.9   *   CO2 21*   BUN 16   CREATININE 0.6   CALCIUM 8.5*     Recent Labs   Lab 07/10/25  0518   WBC 14.48*   HGB 10.3*   HCT 32.3*        No results for input(s):  ""LABPT", "INR", "APTT" in the last 48 hours.  Microbiology Results (last 7 days)       ** No results found for the last 168 hours. **          Recent Lab Results         07/10/25  0518        Anion Gap 4       Baso # 0.03       Basophil % 0.2       BUN 16       Calcium 8.5       Chloride 112       CO2 21       Creatinine 0.6       eGFR >60  Comment: Estimated GFR calculated using the CKD-EPI creatinine (2021) equation.       Eos # 0.01       Eos % 0.1       Glucose 98       Gran # (ANC) 11.48       Hematocrit 32.3       Hemoglobin 10.3       Immature Grans (Abs) 0.06  Comment: Mild elevation in immature granulocytes is non specific and can be seen in a variety of conditions including stress response, acute inflammation, trauma and pregnancy. Correlation with other laboratory and clinical findings is essential.       Immature Granulocytes 0.4       Lymph # 1.81       Lymph % 12.5       MCH 28.7       MCHC 31.9       MCV 90       Mono # 1.09       Mono % 7.5       MPV 11.5       Neut % 79.3       nRBC 0       Platelet Estimate Appears Normal       Platelet Count 287       Potassium 3.9       RBC 3.59       RDW 12.0       Sodium 137       WBC 14.48             All pertinent labs from the last 24 hours have been reviewed.    Significant Diagnostics:  I have reviewed all pertinent imaging results/findings within the past 24 hours.  Assessment/Plan:     * Spondylolisthesis at L4-L5 level  Isaura Mancini is a 54 y.o. female with grade 2 spondylolisthesis and severe stenosis causing debilitating axial low back and right leg pain that has failed conservative measurement. Now s/p L4-5 TLIF on 7/9.    - Neurologically stable  - Pain control: continue current regimen   - Leave dressing to POD3  - Drains: HV to suction. Ancef while drain in place. Empty and record output every shift or when full.  - Post op imaging pending.  - Continue PT/OT/OOB. OOB > 6hrs/day. LSO when OOB.  - GI: Diet as tolerated. Continue bowel regimen. "   - Voiding, PVR 0. Bladder scan prn.  - DVT ppx: LVX, Compression stockings, SCDs  - Atelectasis ppx: IS at bedside. Encourage use every hour while awake.  - Seen with Dr. Simpson    - Dispo: Pending pain control, drain removal      Postoperative hypothyroidism  Continue home dose levothyroxine    Gastroesophageal reflux disease  Continue PPI    Essential hypertension  Patient's blood pressure range in the last 24 hours was: BP  Min: 93/52  Max: 147/73.The patient's inpatient anti-hypertensive regimen is listed below:  Current Antihypertensives  hydroCHLOROthiazide tablet 25 mg, Daily, Oral  losartan tablet 25 mg, Daily, Oral    Plan  - BP is controlled, no changes needed to their regimen  - Hold for SBP < 110        Kamala Singletary PA-C  Neurosurgery  [facility] - [facility]

## 2025-07-10 NOTE — ASSESSMENT & PLAN NOTE
Patient's blood pressure range in the last 24 hours was: BP  Min: 93/52  Max: 147/73.The patient's inpatient anti-hypertensive regimen is listed below:  Current Antihypertensives  hydroCHLOROthiazide tablet 25 mg, Daily, Oral  losartan tablet 25 mg, Daily, Oral    Plan  - BP is controlled, no changes needed to their regimen  - Hold for SBP < 110

## 2025-07-10 NOTE — PLAN OF CARE
[facility] - Surgery  Initial Discharge Assessment       Primary Care Provider: Ashok Braden III, MD    Admission Diagnosis: Spinal stenosis of lumbar region without neurogenic claudication [M48.061]  Lumbar spondylosis [M47.816]    Admission Date: 7/9/2025  Expected Discharge Date: 7/11/2025    Transition of Care Barriers: None    Payor: BLUE CROSS BLUE SHIELD / Plan: BCBS ALL OUT OF STATE / Product Type: PPO /     Extended Emergency Contact Information  Primary Emergency Contact: Jose Lombardi  Address: [address] [place] [address] [place]  Mobile Phone: [phone]  Relation: Son  Secondary Emergency Contact: Reyna Lombardi  Address: [address] [place] [address] [place]  Mobile Phone: [phone]  Relation: Daughter    Discharge Plan A: Home with family, Home Health  Discharge Plan B: Home with family      [facility] [address]  [place] [address] [facility]  [address] [facility]  [place] [address]  Phone: [phone] Fax: [phone]    [address], [address]  [address] [facility], [address]  [place] [address]  Phone: [phone] Fax: [phone]    [place] [id number]  [place] - [address] [facility]  [address] [place] [address]  [place] [address]  Phone: [phone] Fax: [phone]      Initial Assessment (most recent)       Adult Discharge Assessment - 07/10/25 1432          Discharge Assessment    Assessment Type Discharge Planning Assessment     Confirmed/corrected address, phone number and insurance Yes     Confirmed Demographics Correct on Facesheet     Source of Information patient     Communicated KATIE with patient/caregiver Yes     People in Home child(elaine), adult     Do you expect to return to your current living situation? Yes     Do you have help at home or someone to help you manage your care at home? Yes     Who are your caregiver(s) and their phone number(s)? Jenni  (children)     Prior to hospitilization cognitive status: Alert/Oriented     Current cognitive status: Alert/Oriented     Walking or Climbing Stairs Difficulty no     Dressing/Bathing Difficulty no     Home Layout Able to live on 1st floor     Equipment Currently Used at Home CPAP     Readmission within 30 days? No     Patient currently being followed by outpatient case management? No     Do you currently have service(s) that help you manage your care at home? No     Do you take prescription medications? Yes     Do you have prescription coverage? Yes     Do you have any problems affording any of your prescribed medications? No     Is the patient taking medications as prescribed? yes     How do you get to doctors appointments? car, drives self;family or friend will provide     Are you on dialysis? No     Do you take coumadin? No     Discharge Plan A Home with family;Home Health     Discharge Plan B Home with family     DME Needed Upon Discharge  none     Discharge Plan discussed with: Patient     Transition of Care Barriers None                       SW completed discharge planning assessment with the patient at bedside. SW verified demographic information listed on the pt.'s Face sheet. Pt reports living with her two children (Jose and Reyna) whom she plans to help manage her care upon discharge.    Pt desires to D/C home with HH w/ RW.    Radha Khan LCSW  Case Management   [facility]   Ext. [phone]

## 2025-07-10 NOTE — HOSPITAL COURSE
7/10: CINTIA. AFVSS. C/o significant incisional pain. Denies preop leg pain. Voiding, PVR 0. HV drain to suction. Working with therapy. Encouraged OOB mobility to decrease risk of postop complication and aid in wound healing.   7/11: CINTIA. AFVSS. + orthostatics with attempts to sit up today. 1L NaCl bolus given. Denies headache, chest pain, SOB, abdominal pain. VSS currently. Reports some improvement in pain today. HV drain removed. Anticipate home tomorrow if medically stable.  7/12: NAEON. Pain controlled. Exam stable. Sat on the side of the bed multiple times overnight and this morning. Vitals stable. No significant dizziness. Plan to work with therapy today and anticipate home this afternoon once postop XR obtained. Encouraged PO fluid intake. + flatus, no BM. Bowel regimen advanced.  7/13: NAEON. Pain controlled, ambulating, voiding and stooling without issue, exam stable. Patient wants to go home.

## 2025-07-10 NOTE — PLAN OF CARE
Problem: Occupational Therapy  Goal: Occupational Therapy Goal  Description: Goals to be met by: 7/24/25     Patient will increase functional independence with ADLs by performing:    UE Dressing with Modified [place].  LE Dressing with Set-up Assistance.  Grooming while standing at sink with Modified [place].  Toileting from toilet with Set-up Assistance for hygiene and clothing management.   Bathing from  shower chair/bench with Set-up Assistance.  Sitting at edge of bed x15 minutes with Modified [place].  Log Rolling to Bilateral with Modified [place].   Toilet transfer to toilet with Modified [place].  Pt will adhere to spinal precautions 100% of time.   Outcome: Progressing   OT 's initial evaluation completed and POC established.

## 2025-07-10 NOTE — PLAN OF CARE
Pt seen for IE, initial goals and POC established.     Problem: Physical Therapy  Goal: Physical Therapy Goal  Description: Goals to be met by: 08/10/25     Patient will increase functional independence with mobility by performin. Supine to sit with [place] maintaining spinal precautions  2. Sit to supine with [place] maintaining spinal precautions  3. Rolling to Left and Right with [place] maintaining spinal precautions  4. Sit to stand transfer with Modified [place]  5. Bed to chair transfer with Modified [place] using LRAD  6. Gait  x 50 feet with Stand-by Assistance using LRAD.   7. Lower extremity exercise program x20 reps per handout, with independence  8. (I) with donning and pankajing TLSO    Outcome: Progressing

## 2025-07-11 LAB
ABSOLUTE EOSINOPHIL (OHS): 0.03 K/UL
ABSOLUTE MONOCYTE (OHS): 1.31 K/UL (ref 0.3–1)
ABSOLUTE NEUTROPHIL COUNT (OHS): 13.45 K/UL (ref 1.8–7.7)
ANION GAP (OHS): 7 MMOL/L (ref 8–16)
BASOPHILS # BLD AUTO: 0.04 K/UL
BASOPHILS NFR BLD AUTO: 0.2 %
BUN SERPL-MCNC: 11 MG/DL (ref 6–20)
CALCIUM SERPL-MCNC: 8.6 MG/DL (ref 8.7–10.5)
CHLORIDE SERPL-SCNC: 107 MMOL/L (ref 95–110)
CO2 SERPL-SCNC: 25 MMOL/L (ref 23–29)
CREAT SERPL-MCNC: 0.6 MG/DL (ref 0.5–1.4)
ERYTHROCYTE [DISTWIDTH] IN BLOOD BY AUTOMATED COUNT: 12 % (ref 11.5–14.5)
GFR SERPLBLD CREATININE-BSD FMLA CKD-EPI: >60 ML/MIN/1.73/M2
GLUCOSE SERPL-MCNC: 100 MG/DL (ref 70–110)
HCT VFR BLD AUTO: 31.8 % (ref 37–48.5)
HGB BLD-MCNC: 10.2 GM/DL (ref 12–16)
IMM GRANULOCYTES # BLD AUTO: 0.05 K/UL (ref 0–0.04)
IMM GRANULOCYTES NFR BLD AUTO: 0.3 % (ref 0–0.5)
LYMPHOCYTES # BLD AUTO: 2.41 K/UL (ref 1–4.8)
MCH RBC QN AUTO: 28.4 PG (ref 27–31)
MCHC RBC AUTO-ENTMCNC: 32.1 G/DL (ref 32–36)
MCV RBC AUTO: 89 FL (ref 82–98)
NUCLEATED RBC (/100WBC) (OHS): 0 /100 WBC
PLATELET # BLD AUTO: 307 K/UL (ref 150–450)
PMV BLD AUTO: 11.9 FL (ref 9.2–12.9)
POCT GLUCOSE: 122 MG/DL (ref 70–110)
POTASSIUM SERPL-SCNC: 3.7 MMOL/L (ref 3.5–5.1)
RBC # BLD AUTO: 3.59 M/UL (ref 4–5.4)
RELATIVE EOSINOPHIL (OHS): 0.2 %
RELATIVE LYMPHOCYTE (OHS): 13.9 % (ref 18–48)
RELATIVE MONOCYTE (OHS): 7.6 % (ref 4–15)
RELATIVE NEUTROPHIL (OHS): 77.8 % (ref 38–73)
SODIUM SERPL-SCNC: 139 MMOL/L (ref 136–145)
WBC # BLD AUTO: 17.29 K/UL (ref 3.9–12.7)

## 2025-07-11 PROCEDURE — 97112 NEUROMUSCULAR REEDUCATION: CPT | Mod: CO

## 2025-07-11 PROCEDURE — 11000001 HC ACUTE MED/SURG PRIVATE ROOM

## 2025-07-11 PROCEDURE — 97535 SELF CARE MNGMENT TRAINING: CPT | Mod: CO

## 2025-07-11 PROCEDURE — 63600175 PHARM REV CODE 636 W HCPCS: Performed by: STUDENT IN AN ORGANIZED HEALTH CARE EDUCATION/TRAINING PROGRAM

## 2025-07-11 PROCEDURE — 97116 GAIT TRAINING THERAPY: CPT | Mod: CQ

## 2025-07-11 PROCEDURE — 85025 COMPLETE CBC W/AUTO DIFF WBC: CPT | Performed by: STUDENT IN AN ORGANIZED HEALTH CARE EDUCATION/TRAINING PROGRAM

## 2025-07-11 PROCEDURE — 97530 THERAPEUTIC ACTIVITIES: CPT | Mod: CQ

## 2025-07-11 PROCEDURE — 25000003 PHARM REV CODE 250: Performed by: PHYSICIAN ASSISTANT

## 2025-07-11 PROCEDURE — 99024 POSTOP FOLLOW-UP VISIT: CPT | Mod: COE,,, | Performed by: PHYSICIAN ASSISTANT

## 2025-07-11 PROCEDURE — 80048 BASIC METABOLIC PNL TOTAL CA: CPT | Performed by: STUDENT IN AN ORGANIZED HEALTH CARE EDUCATION/TRAINING PROGRAM

## 2025-07-11 PROCEDURE — 25000003 PHARM REV CODE 250: Performed by: STUDENT IN AN ORGANIZED HEALTH CARE EDUCATION/TRAINING PROGRAM

## 2025-07-11 PROCEDURE — 36415 COLL VENOUS BLD VENIPUNCTURE: CPT | Performed by: STUDENT IN AN ORGANIZED HEALTH CARE EDUCATION/TRAINING PROGRAM

## 2025-07-11 RX ADMIN — CALCIUM CARBONATE (ANTACID) CHEW TAB 500 MG 500 MG: 500 CHEW TAB at 08:07

## 2025-07-11 RX ADMIN — ENOXAPARIN SODIUM 40 MG: 40 INJECTION SUBCUTANEOUS at 04:07

## 2025-07-11 RX ADMIN — METHOCARBAMOL 750 MG: 750 TABLET ORAL at 08:07

## 2025-07-11 RX ADMIN — ACETAMINOPHEN 1000 MG: 500 TABLET ORAL at 06:07

## 2025-07-11 RX ADMIN — PANTOPRAZOLE SODIUM 40 MG: 40 TABLET, DELAYED RELEASE ORAL at 08:07

## 2025-07-11 RX ADMIN — PREGABALIN 50 MG: 50 CAPSULE ORAL at 08:07

## 2025-07-11 RX ADMIN — SODIUM CHLORIDE 1000 ML: 9 INJECTION, SOLUTION INTRAVENOUS at 12:07

## 2025-07-11 RX ADMIN — LOSARTAN POTASSIUM 25 MG: 25 TABLET, FILM COATED ORAL at 08:07

## 2025-07-11 RX ADMIN — ACETAMINOPHEN 1000 MG: 500 TABLET ORAL at 09:07

## 2025-07-11 RX ADMIN — SENNOSIDES AND DOCUSATE SODIUM 2 TABLET: 50; 8.6 TABLET ORAL at 08:07

## 2025-07-11 RX ADMIN — METHOCARBAMOL 750 MG: 750 TABLET ORAL at 04:07

## 2025-07-11 RX ADMIN — LEVOTHYROXINE SODIUM 150 MCG: 75 TABLET ORAL at 06:07

## 2025-07-11 RX ADMIN — CEFAZOLIN 2 G: 2 INJECTION, POWDER, FOR SOLUTION INTRAMUSCULAR; INTRAVENOUS at 08:07

## 2025-07-11 RX ADMIN — METHOCARBAMOL 750 MG: 750 TABLET ORAL at 12:07

## 2025-07-11 RX ADMIN — PREGABALIN 50 MG: 50 CAPSULE ORAL at 04:07

## 2025-07-11 RX ADMIN — OXYCODONE HYDROCHLORIDE 10 MG: 10 TABLET ORAL at 08:07

## 2025-07-11 RX ADMIN — HYDROCHLOROTHIAZIDE 25 MG: 25 TABLET ORAL at 08:07

## 2025-07-11 RX ADMIN — MUPIROCIN: 20 OINTMENT TOPICAL at 08:07

## 2025-07-11 RX ADMIN — OXYCODONE HYDROCHLORIDE 10 MG: 10 TABLET ORAL at 01:07

## 2025-07-11 NOTE — PT/OT/SLP PROGRESS
Occupational Therapy   Co-Treatment with PT  Co-treatment performed due to patient's multiple deficits requiring two skilled therapists to appropriately and safely assess patient's strength and endurance while facilitating functional tasks in addition to accommodating for patient's activity tolerance.     Name: Isaura Mancini  MRN: [id number]  Admitting Diagnosis:  Spondylolisthesis at L4-L5 level  2 Days Post-Op    Recommendations:     Discharge Recommendations: High Intensity Therapy  Discharge Equipment Recommendations:  walker, rolling, hip kit  Barriers to discharge:  None    Assessment:     Isaura Mancini is a 54 y.o. female with a medical diagnosis of Spondylolisthesis at L4-L5 level.  She presents with the following performance deficits affecting function :weakness, impaired functional mobility, impaired endurance, impaired self care skills, decreased lower extremity function, decreased safety awareness, pain, orthopedic precautions.     Rehab Prognosis:  Good; patient would benefit from acute skilled OT services to address these deficits and reach maximum level of function.       Plan:     Patient to be seen 4 x/week to address the above listed problems via self-care/home management, therapeutic activities, therapeutic exercises, neuromuscular re-education  Plan of Care Expires: 07/24/25  Plan of Care Reviewed with: family, patient    Subjective     Chief Complaint: dizziness and nausea  Patient/Family Comments/goals: to improve function  Pain/Comfort:  Pain Rating 1: 8/10  Location - Orientation 1: lower  Location 1: back  Pain Addressed 1: Reposition, Distraction, Pre-medicate for activity, Nurse notified  Pain Rating Post-Intervention 1: 10/10    Objective:     Communicated with: RN prior to session.  Patient found HOB elevated with PureWick, hemovac, peripheral IV, SCD upon OT entry to room.   Loren Shoemaker OTR/L readily available to answer questions about the patient's intervention at the time of the  provision of services.     General Precautions: Standard, fall    Orthopedic Precautions:spinal precautions  Braces: LSO  Respiratory Status: Room air     Occupational Performance:     Bed Mobility:    Patient completed Rolling/Turning to Left with  maximal assistance  Patient completed Scooting/Bridging with moderate assistance  Patient completed Supine to Sit with maximal assistance  Patient completed Sit to Supine with total assistance and 2 persons     Functional Mobility/Transfers:  Patient completed Sit <> Stand Transfer with minimum assistance and of 2 persons  with  rolling walker   Functional Mobility: pt taking side steps along EOB with Min A    Activities of Daily Living:  Upper Body Dressing: total assistance to don and doff LSO brace      [facility] 6 Click ADL: 17    Treatment & Education:  Pt educated on OT POC and frequency during hospital stay.   Pt educated on proper hand placement and techniques for RW mgmt to improve safety awareness.   Pt educated on importance of OOB activity to improve function and activity tolerance.  Pt standing with RW for ~1min before c/o dizziness then becoming less responsive, pt returned to supine position with total A of 2 persons. Pt BP taken at 102/59 once returned to supine, requiring cues to keep eyes open and answering questions. Pt put in 9 degrees Trendelenburg, pt returning to 120/63. Once pt returned to supine at 0 degrees BP recorded at 114/58   Addressed all patient questions/concerns within [facility] scope of practice.     Patient left supine with all lines intact, call button in reach, and RN notified    GOALS:   Multidisciplinary Problems       Occupational Therapy Goals          Problem: Occupational Therapy    Goal Priority Disciplines Outcome Interventions   Occupational Therapy Goal     OT, PT/OT Progressing    Description: Goals to be met by: 7/24/25     Patient will increase functional independence with ADLs by performing:    UE Dressing with Modified  [place].  LE Dressing with Set-up Assistance.  Grooming while standing at sink with Modified [place].  Toileting from toilet with Set-up Assistance for hygiene and clothing management.   Bathing from  shower chair/bench with Set-up Assistance.  Sitting at edge of bed x15 minutes with Modified [place].  Log Rolling to Bilateral with Modified [place].   Toilet transfer to toilet with Modified [place].  Pt will adhere to spinal precautions 100% of time.                      Time Tracking:     OT Date of Treatment: 07/11/25  OT Start Time: 1054  OT Stop Time: 1133  OT Total Time (min): 39 min    Billable Minutes:Self Care/Home Management 9  Neuromuscular Re-education 30    OT/BRIAN: BRIAN     Number of BRIAN visits since last OT visit: 1 7/11/2025

## 2025-07-11 NOTE — PLAN OF CARE
[facility] - Surgery      HOME HEALTH ORDERS  FACE TO FACE ENCOUNTER    Patient Name: Isaura Mancini  YOB: 1970    PCP: Ashok Braden III, MD   PCP Address: [address] [facility] / [place] [address]  PCP Phone Number: [phone]  PCP Fax: [phone]    Encounter Date: 5/15/25    Admit to Home Health    Diagnoses:  Active Hospital Problems    Diagnosis  POA    *Spondylolisthesis at L4-L5 level [M43.16]  Not Applicable    Other reduced mobility [Z74.09]  Yes     Patient with Acute debility due to fracture/prosthesis. Latest AMPAC and GEMS scores have been reviewed. Evaluation for etiology is complete. Plan includes - Progressive mobility protocol initated  - PT/OT consulted  - Fall precautions in place.        Postoperative hypothyroidism [E89.0]  Yes    Gastroesophageal reflux disease [K21.9]  Yes    Essential hypertension [I10]  Yes      Resolved Hospital Problems   No resolved problems to display.       Follow Up Appointments:  Future Appointments   Date Time Provider Department Center   7/18/2025 To Be Determined [facility] E-VISIT [facility] EVISIT None   7/23/2025 11:00 AM Luma Jung RN [facility] NEUROS8 [facility]   8/19/2025  2:30 PM Rica Wolf LOTR KW OP [facility] [place]   8/21/2025 10:30 AM [facility] OI EOS [facility] EOS [facility]   8/21/2025 11:30 AM Constanza Sandoval, NP [facility] NEUROS8 [facility]   9/3/2025  3:30 PM [facility] Frey, PT [facility]   9/8/2025  9:00 AM Priya Belle PA-C [facility] ORTHO [place]   9/12/2025  2:40 PM Chelsie Posadas, EL[facility]IA DESC OPTOMTY [place]   11/24/2025  8:00 AM LAB, [place] [facility] [place]   11/26/2025 10:40 AM Ashok Braden III, MD [facility]       Allergies:Review of patient's allergies indicates:  No Known Allergies    Medications: Review discharge medications with patient and family and provide education.    Current Medications[1]     Medication List        ASK your doctor about these medications      aspirin 81 MG  EC tablet  Commonly known as: ECOTRIN  Take 81 mg by mouth once daily.     celecoxib 200 MG capsule  Commonly known as: CeleBREX  Take 1 capsule (200 mg total) by mouth 2 (two) times daily.     cholecalciferol (vitamin D3) 25 mcg (1,000 unit) capsule  Commonly known as: VITAMIN D3  Take 2 capsules (2,000 Units total) by mouth once daily.     fluticasone propionate 50 mcg/actuation nasal spray  Commonly known as: FLONASE  1 spray (50 mcg total) by Each Nostril route once daily.     hydroCHLOROthiazide 25 MG tablet  Commonly known as: HYDRODIURIL  Take 1 tablet (25 mg total) by mouth once daily.     levothyroxine 150 MCG tablet  Commonly known as: SYNTHROID  Take 1 tab 6 days a week and 0.5 tabs on Sundays for total of 6.5 tabs weekly     naproxen 500 MG tablet  Commonly known as: NAPROSYN  Take 1 tablet (500 mg total) by mouth 2 (two) times daily with meals.     olmesartan 20 MG tablet  Commonly known as: BENICAR  Take 1 tablet (20 mg total) by mouth once daily.     pantoprazole 40 MG tablet  Commonly known as: PROTONIX  Take 1 tablet (40 mg total) by mouth once daily.     pregabalin 50 MG capsule  Commonly known as: LYRICA  Take 1 capsule (50 mg total) by mouth 3 (three) times daily.     WEGOVY 0.25 mg/0.5 mL Pnij  Generic drug: semaglutide (weight loss)  Inject contents of one pen (0.25 MG) subcutaneously once weekly as directed                I have seen and examined this patient within the last 30 days. My clinical findings that support the need for the home health skilled services and home bound status are the following:no   Weakness/numbness causing balance and gait disturbance due to Weakness/Debility, Dehydration, and Surgery making it taxing to leave home.  Requiring assistive device to leave home due to unsteady gait caused by  Weakness/Debility, Dehydration, and Surgery.     Diet:   regular diet    Labs:  N/A    Referrals/ Consults  Physical Therapy to evaluate and treat. Evaluate for home safety and  equipment needs; Establish/upgrade home exercise program. Perform / instruct on therapeutic exercises, gait training, transfer training, and Range of Motion.  Occupational Therapy to evaluate and treat. Evaluate home environment for safety and equipment needs. Perform/Instruct on transfers, ADL training, ROM, and therapeutic exercises.    Activities:   activity as tolerated    Nursing:   Agency to admit patient within 24 hours of hospital discharge unless specified on physician order or at patient request    SN to complete comprehensive assessment including routine vital signs. Instruct on disease process and s/s of complications to report to MD. Review/verify medication list sent home with the patient at time of discharge  and instruct patient/caregiver as needed. Frequency may be adjusted depending on start of care date.     Skilled nurse to perform up to 3 visits PRN for symptoms related to diagnosis    Notify MD if SBP > 160 or < 90; DBP > 90 or < 50; HR > 120 or < 50; Temp > 101; O2 < 88%    Ok to schedule additional visits based on staff availability and patient request on consecutive days within the home health episode.    When multiple disciplines ordered:    Start of Care occurs on Sunday - Wednesday schedule remaining discipline evaluations as ordered on separate consecutive days following the start of care.    Thursday SOC -schedule subsequent evaluations Friday and Monday the following week.     Friday - Saturday SOC - schedule subsequent discipline evaluations on consecutive days starting Monday of the following week.    For all post-discharge communication and subsequent orders please contact patient's primary care physician. If unable to reach primary care physician or do not receive response within 30 minutes, please contact Dr. Simpson staff for clinical staff order clarification    Miscellaneous   N/A    Home Health Aide:  Physical Therapy Three times weekly and Occupational Therapy Three times  weekly    Wound Care Orders  yes:  Surgical Wound:  Location: back    Monitor for signs of infection      I certify that this patient is confined to her home and needs physical therapy and occupational therapy.              [1]   Current Facility-Administered Medications   Medication Dose Route Frequency Provider Last Rate Last Admin    acetaminophen tablet 1,000 mg  1,000 mg Oral Q8H Arya Gracia MD   1,000 mg at 07/11/25 0609    aluminum-magnesium hydroxide-simethicone 200-200-20 mg/5 mL suspension 30 mL  30 mL Oral Q4H PRN Arya Gracia MD        calcium carbonate 200 mg calcium (500 mg) chewable tablet 500 mg  500 mg Oral BID Arya Gracia MD   500 mg at 07/11/25 0842    diazePAM tablet 5 mg  5 mg Oral BID PRN Arya Gracia MD   5 mg at 07/09/25 1212    enoxaparin injection 40 mg  40 mg Subcutaneous Daily Arya Gracia MD   40 mg at 07/10/25 1619    HYDROmorphone injection 1 mg  1 mg Intravenous Q4H PRN Arya Gracia MD   1 mg at 07/09/25 1652    lactulose 20 gram/30 mL solution Soln 20 g  20 g Oral Q6H PRN Kamala Singletary PA-C        levothyroxine tablet 150 mcg  150 mcg Oral Before breakfast Arya Gracia MD   150 mcg at 07/11/25 0609    melatonin tablet 6 mg  6 mg Oral Nightly PRN Arya Gracia MD        methocarbamoL tablet 750 mg  750 mg Oral QID Arya Gracia MD   750 mg at 07/11/25 1214    ondansetron disintegrating tablet 8 mg  8 mg Oral Q6H PRN Arya Gracia MD        oxyCODONE immediate release tablet 5 mg  5 mg Oral Q4H PRN Arya Gracia MD        oxyCODONE immediate release tablet Tab 10 mg  10 mg Oral Q4H PRN Arya Gracia MD   10 mg at 07/11/25 0100    pantoprazole EC tablet 40 mg  40 mg Oral Daily Arya Gracia MD   40 mg at 07/11/25 0843    pregabalin capsule 50 mg  50 mg Oral TID Arya Gracia MD   50 mg at 07/11/25 0839     prochlorperazine injection Soln 5 mg  5 mg Intravenous Q6H PRN Arya Gracia MD        senna-docusate 8.6-50 mg per tablet 2 tablet  2 tablet Oral BID Arya Gracia MD   2 tablet at 07/11/25 7296

## 2025-07-11 NOTE — NURSING
While working with PT pt became pale, dizzy and diaphoretic. Pt placed in trendelenburg position and place on a non O2 non re breather. Pt is AAOX4 and family is at bedside. Secure chat placed to [facility] awaiting new orders. Pt remains in supine position. No distress noted.

## 2025-07-11 NOTE — ASSESSMENT & PLAN NOTE
Patient's blood pressure range in the last 24 hours was: BP  Min: 109/59  Max: 130/62.The patient's inpatient anti-hypertensive regimen is listed below:  Current Antihypertensives       Plan  - BP is controlled, no changes needed to their regimen  - Losartan and HCTZ discontinued for hypotension. Given this AM. + orthostatics today. 1L bolus. Continue to monitor.

## 2025-07-11 NOTE — ASSESSMENT & PLAN NOTE
Isaura Mancini is a 54 y.o. female with grade 2 spondylolisthesis and severe stenosis causing debilitating axial low back and right leg pain that has failed conservative measurement. Now s/p L4-5 TLIF on 7/9.    - Neurologically stable  - Pain control: continue current regimen.   - Dressing removed. Leave incision open to air.   - HV drain removed  - Post op imaging pending.  - Continue PT/OT/OOB. OOB > 6hrs/day. LSO when OOB.  - GI: Diet as tolerated. Continue bowel regimen.   - Voiding, PVR 0. Bladder scan prn.  - DVT ppx: LVX, Compression stockings, SCDs  - Atelectasis ppx: IS at bedside. Encourage use every hour while awake.  - Seen with Dr. Simpson    - Dispo: Pending pain control, medical stability

## 2025-07-11 NOTE — SUBJECTIVE & OBJECTIVE
Interval History: NAEON. AFVSS. + orthostatics with attempts to sit up today. 1L NaCl bolus given. Denies headache, chest pain, SOB, abdominal pain. VSS currently. Reports some improvement in pain today. HV drain removed. Anticipate home tomorrow if medically stable.    Medications:  Continuous Infusions:  Scheduled Meds:   acetaminophen  1,000 mg Oral Q8H    calcium carbonate  500 mg Oral BID    enoxparin  40 mg Subcutaneous Daily    levothyroxine  150 mcg Oral Before breakfast    methocarbamoL  750 mg Oral QID    pantoprazole  40 mg Oral Daily    pregabalin  50 mg Oral TID    senna-docusate  2 tablet Oral BID     PRN Meds:  Current Facility-Administered Medications:     aluminum-magnesium hydroxide-simethicone, 30 mL, Oral, Q4H PRN    diazePAM, 5 mg, Oral, BID PRN    HYDROmorphone, 1 mg, Intravenous, Q4H PRN    lactulose, 20 g, Oral, Q6H PRN    melatonin, 6 mg, Oral, Nightly PRN    ondansetron, 8 mg, Oral, Q6H PRN    oxyCODONE, 5 mg, Oral, Q4H PRN    oxyCODONE, 10 mg, Oral, Q4H PRN    prochlorperazine, 5 mg, Intravenous, Q6H PRN     Review of Systems  Objective:     Weight: 82.9 kg (182 lb 12.2 oz)  Body mass index is 33.42 kg/m².  Vital Signs (Most Recent):  Temp: 97.7 °F (36.5 °C) (07/11/25 1120)  Pulse: 71 (07/11/25 1120)  Resp: 18 (07/11/25 1120)  BP: 116/75 (07/11/25 1120)  SpO2: 96 % (07/11/25 1120) Vital Signs (24h Range):  Temp:  [97.7 °F (36.5 °C)-100 °F (37.8 °C)] 97.7 °F (36.5 °C)  Pulse:  [63-78] 71  Resp:  [18] 18  SpO2:  [96 %-99 %] 96 %  BP: (109-130)/(56-75) 116/75     Date 07/11/25 0700 - 07/12/25 0659   Shift 0304-9207 1897-5458 0086-5419 24 Hour Total   INTAKE   Shift Total(mL/kg)       OUTPUT   Drains 0   0   Shift Total(mL/kg) 0(0)   0(0)   Weight (kg) 82.9 82.9 82.9 82.9                            Closed/Suction Drain 07/09/25 1016 Tube - 1 Left Back Accordion 10 Fr. (Active)   Site Description Healing 07/10/25 0733   Dressing Type CHG impregnated dressing/sponge 07/10/25 0733   Dressing  "Status Dry;Intact 07/10/25 0733   Dressing Intervention Integrity maintained 07/10/25 0733   Drainage Bloody 07/10/25 0733   Status Other (Comment) 07/10/25 0733   Output (mL) 0 mL 07/10/25 0454          Physical Exam         Neurosurgery Physical Exam    General: well developed, well nourished, no distress.   Neurologic: Alert and oriented. Thought content appropriate.  GCS: Motor: 6/Verbal: 5/Eyes: 4 GCS Total: 15  Mental Status: Awake, Alert, Oriented x 4  Language: No aphasia  Speech: No dysarthria  Cranial nerves: face symmetric, tongue midline, CN II-XII grossly intact.   Abdomen: soft, nontender to palpation, nondistended  Pulmonary: normal respirations, no signs of respiratory distress  Sensory: intact to light touch throughout  Motor Strength: Moves all extremities spontaneously with good tone.  Full strength upper and lower extremities. No abnormal movements seen.     Strength  Deltoids Triceps Biceps Wrist Extension Wrist Flexion Hand    Upper: R 5/5 5/5 5/5 5/5 5/5 5/5    L 5/5 5/5 5/5 5/5 5/5 5/5     Iliopsoas Quadriceps Knee  Flexion Tibialis  anterior Gastro- cnemius EHL   Lower: R 5/5 5/5 5/5 5/5 5/5 5/5    L 5/5 5/5 5/5 5/5 5/5 5/5     Incision c/d/I with skin edges approximated with staples/sutures. No drainage, erythema, edema, fluctuance.       Significant Labs:  Recent Labs   Lab 07/10/25  0518 07/11/25  0403   GLU 98 100    139   K 3.9 3.7   * 107   CO2 21* 25   BUN 16 11   CREATININE 0.6 0.6   CALCIUM 8.5* 8.6*     Recent Labs   Lab 07/10/25  0518 07/11/25 0403   WBC 14.48* 17.29*   HGB 10.3* 10.2*   HCT 32.3* 31.8*    307     No results for input(s): "LABPT", "INR", "APTT" in the last 48 hours.  Microbiology Results (last 7 days)       ** No results found for the last 168 hours. **          Recent Lab Results         07/11/25 0403        Anion Gap 7       Baso # 0.04       Basophil % 0.2       BUN 11       Calcium 8.6       Chloride 107       CO2 25       Creatinine " 0.6       eGFR >60  Comment: Estimated GFR calculated using the CKD-EPI creatinine (2021) equation.       Eos # 0.03       Eos % 0.2       Glucose 100       Gran # (ANC) 13.45       Hematocrit 31.8       Hemoglobin 10.2       Immature Grans (Abs) 0.05  Comment: Mild elevation in immature granulocytes is non specific and can be seen in a variety of conditions including stress response, acute inflammation, trauma and pregnancy. Correlation with other laboratory and clinical findings is essential.       Immature Granulocytes 0.3       Lymph # 2.41       Lymph % 13.9       MCH 28.4       MCHC 32.1       MCV 89       Mono # 1.31       Mono % 7.6       MPV 11.9       Neut % 77.8       nRBC 0       Platelet Count 307       Potassium 3.7       RBC 3.59       RDW 12.0       Sodium 139       WBC 17.29             All pertinent labs from the last 24 hours have been reviewed.    Significant Diagnostics:  I have reviewed all pertinent imaging results/findings within the past 24 hours.

## 2025-07-11 NOTE — PLAN OF CARE
Problem: Adult Inpatient Plan of Care  Goal: Plan of Care Review  Outcome: Progressing  Goal: Patient-Specific Goal (Individualized)  Outcome: Progressing  Goal: Absence of Hospital-Acquired Illness or Injury  Outcome: Progressing  Goal: Optimal Comfort and Wellbeing  Outcome: Progressing  Goal: Readiness for Transition of Care  Outcome: Progressing     Problem: Wound  Goal: Optimal Coping  Outcome: Progressing  Goal: Optimal Functional Ability  Outcome: Progressing  Goal: Absence of Infection Signs and Symptoms  Outcome: Progressing  Goal: Improved Oral Intake  Outcome: Progressing  Goal: Optimal Pain Control and Function  Outcome: Progressing  Goal: Skin Health and Integrity  Outcome: Progressing  Goal: Optimal Wound Healing  Outcome: Progressing     Problem: Fall Injury Risk  Goal: Absence of Fall and Fall-Related Injury  Outcome: Progressing   Pt in bed with son at bedside, no c/o pain, no distress noted. Pt reports faint dizziness with changing position. Will continue to monitor. Call bell within reach and encouraged to call for assistance.

## 2025-07-11 NOTE — OP NOTE
DATE OF SURGERY: 7/9/25     PREOPERATIVE DIAGNOSIS:  1. L4-5 spondylolisthesis and stenosis     POSTOPERATIVE DIAGNOSIS:  Same.     PROCEDURE PERFORMED:  1. Posterior spinal fusion, L4-5   2. Posterior nonsegmental spinal fixation, L4-5 (DePuy Synthes)  3. Posterior lumbar interbody fusion, L4-5  4.  Application of titanium interbody spacer, L4-5 (DePuy Synthes)  5.  Zahra osteotomy, L4  6.  Use of intraoperative fluoroscopy  7.  Use of intraoperative neuro-monitoring with triggered EMG  8. Fibergraft and local bone grafting     SURGEON: Bill Simpson M.D.     ASSISTANT: Arya Gracia M.D.     ANESTHESIA: GETA     ESTIMATED BLOOD LOSS: 250mL     COMPLICATIONS: None     DRAINS: Two deep Hemovacs     SPECIMENS SENT: None     FINDINGS: None     INDICATIONS:     54F with axial low back pain and radicular leg pain that failed conservative treatment in the setting of the above diagnoses. I recommended the above procedure. R/B/A/I/M were reviewed in detail and the patient wished to proceed. All questions were answered and no guarantees were made.  .  PROCEDURE:     The patient was brought into the operating room where she was intubated and placed under general anesthesia without difficulty.  All lines were placed. She was repositioned prone onto the operating room table with appropriate padding all pressure points. The region of interest was localized with AP and lateral x-ray.  The skin was marked and the area was prepped and draped in the usual sterile fashion.  A timeout was performed prior to procedure.  10 mL's of lidocaine with epinephrine were injected in the skin.     A midline linear incision was made with a 10 blade from approximately L4 to L5.  Supra and subfascial midline dissection was carried out with Bovie electrocautery.  Subperiosteal dissection was carried out with Bovie electrocautery and Carlos elevators to expose the posterior elements from L4 to L5.  A pedicle finder was placed into the presumed  L5 pedicle and confirmed on lateral x-ray. Medial facetectomies were performed bilaterally at L4-5 with the osteotome.       We placed freehand pedicle screws bilaterally at L4 and L5. Xrays and triggered EMG confirmed good position.     Attention was turned to performance of a posterior lumbar interbody fusion at L4-5 from a right-sided approach. First an L4-5 laminectomy was performed to access the thecal sac, nerve roots and disc space. It was performed in standard fashion with the high speed drill and rongeurs. Adequate decompression of the thecal sac and nerve roots were confirmed with Dina palpation.  Next, an L4 Zahra osteotomy was performed in standard fashion with the osteotome and ronguers to reduce the deformity. Next, a nerve root retractor was used to retract the thecal sac medially and expose the L4-5 disc space.  Epidural veins were cauterized and divided.  An annulotomy was performed with a 15 blade.  A pituitary rongeur was used to remove disc material.  The endplates were prepared with disc kathy, rasps, and curettes. A titanium cage was countersunk into the L4-5 disc space and had snug fit. The L4-5 disc space was pre and post packed with local bone for interbody arthrodesis. Xrays showed good cage position.    Bilateral titanium rods were sized, contoured and reduced into the tulip heads.  Set screws were tightened. Sequential compression was performed bilaterally across the L4 Zahra osteotomies to promote lumbar lordosis. Final xrays showed excellent reduction of the spondylolisthesis and excellent position of all hardware.  The wound was copiously irrigated with sterile normal saline and a dilute Betadine solution. Exposed bony surfaces from L4 to L5 were decorticated bilaterally with a high-speed drill.  Fibergraft and local were placed posteriorly for arthrodesis from L4 to L5. Two deep Hemovacs placed.  A watertight fascial closure was achieved with interrupted 0 Vicryl sutures and a  running Stratafix suture.  The soft tissues were closed in layers. Staples and vertical mattress nylon sutures were placed at the skin and a silver nitrate dressing was applied.     The patient appeared to tolerate the procedure well from a hemodynamic and neuromonitoring standpoint. MEPs were present and stable in all extremities throughout the case. I was present for all critical portions of the case, and at the end of the case all counts were correct. The patient was repositioned supine onto the hospital bed where she was extubated and allowed to emerge from anesthesia. She was sent to the PACU in stable condition for recovery.

## 2025-07-11 NOTE — ANESTHESIA POSTPROCEDURE EVALUATION
Anesthesia Post Evaluation    Patient: Isaura Mancini    Procedure(s) Performed: Procedure(s) (LRB):  JAMES-L4-5 TLIF (N/A)    Final Anesthesia Type: general      Patient location during evaluation: PACU  Patient participation: Yes- Able to Participate  Level of consciousness: awake and alert  Post-procedure vital signs: reviewed and stable  Pain management: adequate  Airway patency: patent    PONV status at discharge: No PONV  Anesthetic complications: no      Cardiovascular status: blood pressure returned to baseline  Respiratory status: unassisted  Hydration status: euvolemic  Follow-up not needed.              Vitals Value Taken Time   /64 07/11/25 08:44   Temp 36.8 °C (98.2 °F) 07/11/25 07:49   Pulse 63 07/11/25 07:49   Resp 18 07/11/25 07:49   SpO2 97 % 07/11/25 07:49         Event Time   Out of Recovery 11:15:00         Pain/Siddhartha Score: Pain Rating Prior to Med Admin: 0 (7/11/2025  6:09 AM)  Pain Rating Post Med Admin: 3 (7/11/2025  7:09 AM)

## 2025-07-11 NOTE — PROGRESS NOTES
[facility]  Neurosurgery  Progress Note    Subjective:     History of Present Illness: Patient seen in clinic for back and bilateral LE pain. Recommended a L4-5 TLIF due to the grade 2 spondylolisthesis and severe stenosis causing debilitating pain. Failed conservative management. Presents for surgery.     Post-Op Info:  Procedure(s) (LRB):  JAMES-L4-5 TLIF (N/A)   2 Days Post-Op   Interval History: NAEON. AFVSS. + orthostatics with attempts to sit up today. 1L NaCl bolus given. Denies headache, chest pain, SOB, abdominal pain. VSS currently. Reports some improvement in pain today. HV drain removed. Anticipate home tomorrow if medically stable.    Medications:  Continuous Infusions:  Scheduled Meds:   acetaminophen  1,000 mg Oral Q8H    calcium carbonate  500 mg Oral BID    enoxparin  40 mg Subcutaneous Daily    levothyroxine  150 mcg Oral Before breakfast    methocarbamoL  750 mg Oral QID    pantoprazole  40 mg Oral Daily    pregabalin  50 mg Oral TID    senna-docusate  2 tablet Oral BID     PRN Meds:  Current Facility-Administered Medications:     aluminum-magnesium hydroxide-simethicone, 30 mL, Oral, Q4H PRN    diazePAM, 5 mg, Oral, BID PRN    HYDROmorphone, 1 mg, Intravenous, Q4H PRN    lactulose, 20 g, Oral, Q6H PRN    melatonin, 6 mg, Oral, Nightly PRN    ondansetron, 8 mg, Oral, Q6H PRN    oxyCODONE, 5 mg, Oral, Q4H PRN    oxyCODONE, 10 mg, Oral, Q4H PRN    prochlorperazine, 5 mg, Intravenous, Q6H PRN     Review of Systems  Objective:     Weight: 82.9 kg (182 lb 12.2 oz)  Body mass index is 33.42 kg/m².  Vital Signs (Most Recent):  Temp: 97.7 °F (36.5 °C) (07/11/25 1120)  Pulse: 71 (07/11/25 1120)  Resp: 18 (07/11/25 1120)  BP: 116/75 (07/11/25 1120)  SpO2: 96 % (07/11/25 1120) Vital Signs (24h Range):  Temp:  [97.7 °F (36.5 °C)-100 °F (37.8 °C)] 97.7 °F (36.5 °C)  Pulse:  [63-78] 71  Resp:  [18] 18  SpO2:  [96 %-99 %] 96 %  BP: (109-130)/(56-75) 116/75     Date 07/11/25 0700 - 07/12/25 0659   Shift  0501-6257 0172-8939 4344-0093 24 Hour Total   INTAKE   Shift Total(mL/kg)       OUTPUT   Drains 0   0   Shift Total(mL/kg) 0(0)   0(0)   Weight (kg) 82.9 82.9 82.9 82.9                            Closed/Suction Drain 07/09/25 1016 Tube - 1 Left Back Accordion 10 Fr. (Active)   Site Description Healing 07/10/25 0733   Dressing Type CHG impregnated dressing/sponge 07/10/25 0733   Dressing Status Dry;Intact 07/10/25 0733   Dressing Intervention Integrity maintained 07/10/25 0733   Drainage Bloody 07/10/25 0733   Status Other (Comment) 07/10/25 0733   Output (mL) 0 mL 07/10/25 0454          Physical Exam         Neurosurgery Physical Exam    General: well developed, well nourished, no distress.   Neurologic: Alert and oriented. Thought content appropriate.  GCS: Motor: 6/Verbal: 5/Eyes: 4 GCS Total: 15  Mental Status: Awake, Alert, Oriented x 4  Language: No aphasia  Speech: No dysarthria  Cranial nerves: face symmetric, tongue midline, CN II-XII grossly intact.   Abdomen: soft, nontender to palpation, nondistended  Pulmonary: normal respirations, no signs of respiratory distress  Sensory: intact to light touch throughout  Motor Strength: Moves all extremities spontaneously with good tone.  Full strength upper and lower extremities. No abnormal movements seen.     Strength  Deltoids Triceps Biceps Wrist Extension Wrist Flexion Hand    Upper: R 5/5 5/5 5/5 5/5 5/5 5/5    L 5/5 5/5 5/5 5/5 5/5 5/5     Iliopsoas Quadriceps Knee  Flexion Tibialis  anterior Gastro- cnemius EHL   Lower: R 5/5 5/5 5/5 5/5 5/5 5/5    L 5/5 5/5 5/5 5/5 5/5 5/5     Incision c/d/I with skin edges approximated with staples/sutures. No drainage, erythema, edema, fluctuance.       Significant Labs:  Recent Labs   Lab 07/10/25  0518 07/11/25  0403   GLU 98 100    139   K 3.9 3.7   * 107   CO2 21* 25   BUN 16 11   CREATININE 0.6 0.6   CALCIUM 8.5* 8.6*     Recent Labs   Lab 07/10/25  0518 07/11/25  0403   WBC 14.48* 17.29*   HGB 10.3*  "10.2*   HCT 32.3* 31.8*    307     No results for input(s): "LABPT", "INR", "APTT" in the last 48 hours.  Microbiology Results (last 7 days)       ** No results found for the last 168 hours. **          Recent Lab Results         07/11/25  0403        Anion Gap 7       Baso # 0.04       Basophil % 0.2       BUN 11       Calcium 8.6       Chloride 107       CO2 25       Creatinine 0.6       eGFR >60  Comment: Estimated GFR calculated using the CKD-EPI creatinine (2021) equation.       Eos # 0.03       Eos % 0.2       Glucose 100       Gran # (ANC) 13.45       Hematocrit 31.8       Hemoglobin 10.2       Immature Grans (Abs) 0.05  Comment: Mild elevation in immature granulocytes is non specific and can be seen in a variety of conditions including stress response, acute inflammation, trauma and pregnancy. Correlation with other laboratory and clinical findings is essential.       Immature Granulocytes 0.3       Lymph # 2.41       Lymph % 13.9       MCH 28.4       MCHC 32.1       MCV 89       Mono # 1.31       Mono % 7.6       MPV 11.9       Neut % 77.8       nRBC 0       Platelet Count 307       Potassium 3.7       RBC 3.59       RDW 12.0       Sodium 139       WBC 17.29             All pertinent labs from the last 24 hours have been reviewed.    Significant Diagnostics:  I have reviewed all pertinent imaging results/findings within the past 24 hours.  Assessment/Plan:     * Spondylolisthesis at L4-L5 level  Isaura Mancini is a 54 y.o. female with grade 2 spondylolisthesis and severe stenosis causing debilitating axial low back and right leg pain that has failed conservative measurement. Now s/p L4-5 TLIF on 7/9.    - Neurologically stable  - Pain control: continue current regimen.   - Dressing removed. Leave incision open to air.   - HV drain removed  - Post op imaging pending.  - Continue PT/OT/OOB. OOB > 6hrs/day. LSO when OOB.  - GI: Diet as tolerated. Continue bowel regimen.   - Voiding, PVR 0. Bladder " scan prn.  - DVT ppx: LVX, Compression stockings, SCDs  - Atelectasis ppx: IS at bedside. Encourage use every hour while awake.  - Seen with Dr. Simpson    - Dispo: Pending pain control, medical stability       Postoperative hypothyroidism  Continue home dose levothyroxine    Gastroesophageal reflux disease  Continue PPI    Essential hypertension  Patient's blood pressure range in the last 24 hours was: BP  Min: 109/59  Max: 130/62.The patient's inpatient anti-hypertensive regimen is listed below:  Current Antihypertensives       Plan  - BP is controlled, no changes needed to their regimen  - Losartan and HCTZ discontinued for hypotension. Given this AM. + orthostatics today. 1L bolus. Continue to monitor.         Kamala Singletary PA-C  Neurosurgery  [facility]

## 2025-07-12 LAB
ABSOLUTE EOSINOPHIL (OHS): 0.25 K/UL
ABSOLUTE MONOCYTE (OHS): 1.16 K/UL (ref 0.3–1)
ABSOLUTE NEUTROPHIL COUNT (OHS): 9.33 K/UL (ref 1.8–7.7)
ANION GAP (OHS): 8 MMOL/L (ref 8–16)
BASOPHILS # BLD AUTO: 0.03 K/UL
BASOPHILS NFR BLD AUTO: 0.2 %
BUN SERPL-MCNC: 11 MG/DL (ref 6–20)
CALCIUM SERPL-MCNC: 9.2 MG/DL (ref 8.7–10.5)
CHLORIDE SERPL-SCNC: 106 MMOL/L (ref 95–110)
CO2 SERPL-SCNC: 25 MMOL/L (ref 23–29)
CREAT SERPL-MCNC: 0.6 MG/DL (ref 0.5–1.4)
ERYTHROCYTE [DISTWIDTH] IN BLOOD BY AUTOMATED COUNT: 12.2 % (ref 11.5–14.5)
GFR SERPLBLD CREATININE-BSD FMLA CKD-EPI: >60 ML/MIN/1.73/M2
GLUCOSE SERPL-MCNC: 78 MG/DL (ref 70–110)
HCT VFR BLD AUTO: 31.1 % (ref 37–48.5)
HGB BLD-MCNC: 10.3 GM/DL (ref 12–16)
IMM GRANULOCYTES # BLD AUTO: 0.05 K/UL (ref 0–0.04)
IMM GRANULOCYTES NFR BLD AUTO: 0.4 % (ref 0–0.5)
LYMPHOCYTES # BLD AUTO: 2.78 K/UL (ref 1–4.8)
MCH RBC QN AUTO: 29 PG (ref 27–31)
MCHC RBC AUTO-ENTMCNC: 33.1 G/DL (ref 32–36)
MCV RBC AUTO: 88 FL (ref 82–98)
NUCLEATED RBC (/100WBC) (OHS): 0 /100 WBC
PLATELET # BLD AUTO: 322 K/UL (ref 150–450)
PMV BLD AUTO: 11.7 FL (ref 9.2–12.9)
POTASSIUM SERPL-SCNC: 3.3 MMOL/L (ref 3.5–5.1)
RBC # BLD AUTO: 3.55 M/UL (ref 4–5.4)
RELATIVE EOSINOPHIL (OHS): 1.8 %
RELATIVE LYMPHOCYTE (OHS): 20.4 % (ref 18–48)
RELATIVE MONOCYTE (OHS): 8.5 % (ref 4–15)
RELATIVE NEUTROPHIL (OHS): 68.7 % (ref 38–73)
SODIUM SERPL-SCNC: 139 MMOL/L (ref 136–145)
WBC # BLD AUTO: 13.6 K/UL (ref 3.9–12.7)

## 2025-07-12 PROCEDURE — 99024 POSTOP FOLLOW-UP VISIT: CPT | Mod: COE,,, | Performed by: PHYSICIAN ASSISTANT

## 2025-07-12 PROCEDURE — 36415 COLL VENOUS BLD VENIPUNCTURE: CPT | Performed by: STUDENT IN AN ORGANIZED HEALTH CARE EDUCATION/TRAINING PROGRAM

## 2025-07-12 PROCEDURE — 11000001 HC ACUTE MED/SURG PRIVATE ROOM

## 2025-07-12 PROCEDURE — 25000003 PHARM REV CODE 250: Performed by: STUDENT IN AN ORGANIZED HEALTH CARE EDUCATION/TRAINING PROGRAM

## 2025-07-12 PROCEDURE — 80048 BASIC METABOLIC PNL TOTAL CA: CPT | Performed by: STUDENT IN AN ORGANIZED HEALTH CARE EDUCATION/TRAINING PROGRAM

## 2025-07-12 PROCEDURE — 63600175 PHARM REV CODE 636 W HCPCS: Performed by: STUDENT IN AN ORGANIZED HEALTH CARE EDUCATION/TRAINING PROGRAM

## 2025-07-12 PROCEDURE — 97530 THERAPEUTIC ACTIVITIES: CPT | Mod: CO

## 2025-07-12 PROCEDURE — 94761 N-INVAS EAR/PLS OXIMETRY MLT: CPT

## 2025-07-12 PROCEDURE — 85025 COMPLETE CBC W/AUTO DIFF WBC: CPT | Performed by: STUDENT IN AN ORGANIZED HEALTH CARE EDUCATION/TRAINING PROGRAM

## 2025-07-12 PROCEDURE — 25000003 PHARM REV CODE 250: Performed by: PHYSICIAN ASSISTANT

## 2025-07-12 PROCEDURE — 97535 SELF CARE MNGMENT TRAINING: CPT | Mod: CO

## 2025-07-12 RX ORDER — ACETAMINOPHEN 500 MG
500 TABLET ORAL EVERY 6 HOURS PRN
COMMUNITY
Start: 2025-07-12

## 2025-07-12 RX ORDER — METHOCARBAMOL 750 MG/1
750 TABLET, FILM COATED ORAL 4 TIMES DAILY PRN
Qty: 56 TABLET | Refills: 0 | Status: SHIPPED | OUTPATIENT
Start: 2025-07-12 | End: 2025-07-27

## 2025-07-12 RX ORDER — OXYCODONE HYDROCHLORIDE 10 MG/1
10 TABLET ORAL EVERY 6 HOURS PRN
Qty: 56 TABLET | Refills: 0 | Status: SHIPPED | OUTPATIENT
Start: 2025-07-12

## 2025-07-12 RX ORDER — BACITRACIN 500 [USP'U]/G
OINTMENT TOPICAL 2 TIMES DAILY
Qty: 14 G | Refills: 0 | Status: SHIPPED | OUTPATIENT
Start: 2025-07-12

## 2025-07-12 RX ORDER — HYDROMORPHONE HYDROCHLORIDE 1 MG/ML
0.5 INJECTION, SOLUTION INTRAMUSCULAR; INTRAVENOUS; SUBCUTANEOUS EVERY 6 HOURS PRN
Status: DISCONTINUED | OUTPATIENT
Start: 2025-07-12 | End: 2025-07-13 | Stop reason: HOSPADM

## 2025-07-12 RX ORDER — ACETAMINOPHEN 500 MG
500 TABLET ORAL EVERY 8 HOURS
Status: DISCONTINUED | OUTPATIENT
Start: 2025-07-12 | End: 2025-07-13 | Stop reason: HOSPADM

## 2025-07-12 RX ORDER — AMOXICILLIN 250 MG
1 CAPSULE ORAL 2 TIMES DAILY
COMMUNITY
Start: 2025-07-12 | End: 2025-07-13 | Stop reason: HOSPADM

## 2025-07-12 RX ORDER — ACETAMINOPHEN 500 MG
500 TABLET ORAL EVERY 8 HOURS PRN
Status: DISCONTINUED | OUTPATIENT
Start: 2025-07-12 | End: 2025-07-13 | Stop reason: HOSPADM

## 2025-07-12 RX ORDER — POLYETHYLENE GLYCOL 3350 17 G/17G
17 POWDER, FOR SOLUTION ORAL DAILY
Status: DISCONTINUED | OUTPATIENT
Start: 2025-07-12 | End: 2025-07-13 | Stop reason: HOSPADM

## 2025-07-12 RX ORDER — ACETAMINOPHEN 325 MG/1
650 TABLET ORAL EVERY 8 HOURS
Status: DISCONTINUED | OUTPATIENT
Start: 2025-07-12 | End: 2025-07-12

## 2025-07-12 RX ORDER — POLYETHYLENE GLYCOL 3350 17 G/17G
17 POWDER, FOR SOLUTION ORAL DAILY
COMMUNITY
Start: 2025-07-13

## 2025-07-12 RX ADMIN — PREGABALIN 50 MG: 50 CAPSULE ORAL at 09:07

## 2025-07-12 RX ADMIN — ENOXAPARIN SODIUM 40 MG: 40 INJECTION SUBCUTANEOUS at 06:07

## 2025-07-12 RX ADMIN — CALCIUM CARBONATE (ANTACID) CHEW TAB 500 MG 500 MG: 500 CHEW TAB at 09:07

## 2025-07-12 RX ADMIN — POLYETHYLENE GLYCOL 3350 17 G: 17 POWDER, FOR SOLUTION ORAL at 09:07

## 2025-07-12 RX ADMIN — METHOCARBAMOL 750 MG: 750 TABLET ORAL at 09:07

## 2025-07-12 RX ADMIN — OXYCODONE HYDROCHLORIDE 5 MG: 5 TABLET ORAL at 09:07

## 2025-07-12 RX ADMIN — METHOCARBAMOL 750 MG: 750 TABLET ORAL at 01:07

## 2025-07-12 RX ADMIN — PANTOPRAZOLE SODIUM 40 MG: 40 TABLET, DELAYED RELEASE ORAL at 09:07

## 2025-07-12 RX ADMIN — OXYCODONE HYDROCHLORIDE 10 MG: 10 TABLET ORAL at 09:07

## 2025-07-12 RX ADMIN — ACETAMINOPHEN 1000 MG: 500 TABLET ORAL at 06:07

## 2025-07-12 RX ADMIN — OXYCODONE HYDROCHLORIDE 10 MG: 10 TABLET ORAL at 01:07

## 2025-07-12 RX ADMIN — SENNOSIDES AND DOCUSATE SODIUM 2 TABLET: 50; 8.6 TABLET ORAL at 09:07

## 2025-07-12 RX ADMIN — ACETAMINOPHEN 1000 MG: 500 TABLET ORAL at 01:07

## 2025-07-12 RX ADMIN — ACETAMINOPHEN 500 MG: 500 TABLET ORAL at 09:07

## 2025-07-12 RX ADMIN — PREGABALIN 50 MG: 50 CAPSULE ORAL at 03:07

## 2025-07-12 RX ADMIN — LEVOTHYROXINE SODIUM 150 MCG: 75 TABLET ORAL at 06:07

## 2025-07-12 RX ADMIN — METHOCARBAMOL 750 MG: 750 TABLET ORAL at 06:07

## 2025-07-12 NOTE — SUBJECTIVE & OBJECTIVE
Interval History: NAEON. Pain controlled. Exam stable. Sat on the side of the bed multiple times overnight and this morning. Vitals stable. No significant dizziness. Plan to work with therapy today and anticipate home this afternoon once postop XR obtained. Encouraged PO fluid intake. + flatus, no BM. Bowel regimen advanced.     Medications:  Continuous Infusions:  Scheduled Meds:   acetaminophen  1,000 mg Oral Q8H    calcium carbonate  500 mg Oral BID    enoxparin  40 mg Subcutaneous Daily    levothyroxine  150 mcg Oral Before breakfast    methocarbamoL  750 mg Oral QID    pantoprazole  40 mg Oral Daily    polyethylene glycol  17 g Oral Daily    pregabalin  50 mg Oral TID    senna-docusate  2 tablet Oral BID     PRN Meds:  Current Facility-Administered Medications:     aluminum-magnesium hydroxide-simethicone, 30 mL, Oral, Q4H PRN    diazePAM, 5 mg, Oral, BID PRN    HYDROmorphone, 1 mg, Intravenous, Q4H PRN    lactulose, 20 g, Oral, Q6H PRN    melatonin, 6 mg, Oral, Nightly PRN    ondansetron, 8 mg, Oral, Q6H PRN    oxyCODONE, 5 mg, Oral, Q4H PRN    oxyCODONE, 10 mg, Oral, Q4H PRN    prochlorperazine, 5 mg, Intravenous, Q6H PRN     Review of Systems  Objective:     Weight: 82.9 kg (182 lb 12.2 oz)  Body mass index is 33.42 kg/m².  Vital Signs (Most Recent):  Temp: 97.9 °F (36.6 °C) (07/12/25 0817)  Pulse: 75 (07/12/25 0817)  Resp: 18 (07/12/25 0937)  BP: (!) 119/57 (07/12/25 0817)  SpO2: 100 % (07/12/25 0817) Vital Signs (24h Range):  Temp:  [97.9 °F (36.6 °C)-99 °F (37.2 °C)] 97.9 °F (36.6 °C)  Pulse:  [63-75] 75  Resp:  [17-18] 18  SpO2:  [97 %-100 %] 100 %  BP: (101-133)/(57-68) 119/57                                Closed/Suction Drain 07/09/25 1016 Tube - 1 Left Back Accordion 10 Fr. (Active)   Site Description Healing 07/10/25 0733   Dressing Type CHG impregnated dressing/sponge 07/10/25 0733   Dressing Status Dry;Intact 07/10/25 0733   Dressing Intervention Integrity maintained 07/10/25 0733   Drainage  "Bloody 07/10/25 0733   Status Other (Comment) 07/10/25 0733   Output (mL) 0 mL 07/10/25 0454          Physical Exam         Neurosurgery Physical Exam    General: well developed, well nourished, no distress.   Neurologic: Alert and oriented. Thought content appropriate.  GCS: Motor: 6/Verbal: 5/Eyes: 4 GCS Total: 15  Mental Status: Awake, Alert, Oriented x 4  Language: No aphasia  Speech: No dysarthria  Cranial nerves: face symmetric, tongue midline, CN II-XII grossly intact.   Abdomen: soft, nontender to palpation, nondistended  Pulmonary: normal respirations, no signs of respiratory distress  Sensory: intact to light touch throughout  Motor Strength: Moves all extremities spontaneously with good tone.  Full strength upper and lower extremities. No abnormal movements seen.     Strength  Deltoids Triceps Biceps Wrist Extension Wrist Flexion Hand    Upper: R 5/5 5/5 5/5 5/5 5/5 5/5    L 5/5 5/5 5/5 5/5 5/5 5/5     Iliopsoas Quadriceps Knee  Flexion Tibialis  anterior Gastro- cnemius EHL   Lower: R 5/5 5/5 5/5 5/5 5/5 5/5    L 5/5 5/5 5/5 5/5 5/5 5/5     Incision c/d/I with skin edges approximated with staples/sutures. No drainage, erythema, edema, fluctuance.       Significant Labs:  Recent Labs   Lab 07/11/25 0403 07/12/25 0412    78    139   K 3.7 3.3*    106   CO2 25 25   BUN 11 11   CREATININE 0.6 0.6   CALCIUM 8.6* 9.2     Recent Labs   Lab 07/11/25  0403 07/12/25  0412   WBC 17.29* 13.60*   HGB 10.2* 10.3*   HCT 31.8* 31.1*    322     No results for input(s): "LABPT", "INR", "APTT" in the last 48 hours.  Microbiology Results (last 7 days)       ** No results found for the last 168 hours. **          Recent Lab Results         07/12/25  0412   07/11/25  1221        Anion Gap 8         Baso # 0.03         Basophil % 0.2         BUN 11         Calcium 9.2         Chloride 106         CO2 25         Creatinine 0.6         eGFR >60  Comment: Estimated GFR calculated using the CKD-EPI " creatinine (2021) equation.         Eos # 0.25         Eos % 1.8         Glucose 78         Gran # (ANC) 9.33         Hematocrit 31.1         Hemoglobin 10.3         Immature Grans (Abs) 0.05  Comment: Mild elevation in immature granulocytes is non specific and can be seen in a variety of conditions including stress response, acute inflammation, trauma and pregnancy. Correlation with other laboratory and clinical findings is essential.         Immature Granulocytes 0.4         Lymph # 2.78         Lymph % 20.4         MCH 29.0         MCHC 33.1         MCV 88         Mono # 1.16         Mono % 8.5         MPV 11.7         Neut % 68.7         nRBC 0         Platelet Count 322         POCT Glucose   122       Potassium 3.3         RBC 3.55         RDW 12.2         Sodium 139         WBC 13.60               All pertinent labs from the last 24 hours have been reviewed.    Significant Diagnostics:  I have reviewed all pertinent imaging results/findings within the past 24 hours.

## 2025-07-12 NOTE — PT/OT/SLP PROGRESS
Occupational Therapy   Treatment    Name: Isaura Mancini  MRN: [id number]  Admitting Diagnosis:  Spondylolisthesis at L4-L5 level  3 Days Post-Op    Recommendations:     Discharge Recommendations: High Intensity Therapy  Discharge Equipment Recommendations:  walker, rolling, hip kit  Barriers to discharge:       Assessment:     Isaura Mancini is a 54 y.o. female with a medical diagnosis of Spondylolisthesis at L4-L5 level.  In today's session, the patient completed a step transfer to the bedside chair. Patient educated on spinal precautions and expectations of inpatient rehab. Performance deficits affecting function are weakness, impaired functional mobility, impaired endurance, impaired self care skills, decreased lower extremity function, decreased safety awareness, pain, orthopedic precautions.     Rehab Prognosis:  Good; patient would benefit from acute skilled OT services to address these deficits and reach maximum level of function.       Plan:     Patient to be seen 4 x/week to address the above listed problems via self-care/home management, therapeutic activities, therapeutic exercises, neuromuscular re-education  Plan of Care Expires: 07/24/25  Plan of Care Reviewed with: patient, family    Subjective     Chief Complaint: Lower back pain  Patient/Family Comments/goals: Patient reports she wants to go home.  Pain/Comfort:  Pain Rating 1: 6/10  Location - Orientation 1: lower  Location 1: back  Pain Addressed 1: Reposition, Distraction, Nurse notified    Objective:     Communicated with: Nurse prior to session.  Patient found supine with PureWick upon OT entry to room.    A client care conference was completed by the OTR and the HUTTON prior to treatment by the HUTTON to discuss the patient's POC and current status.     Nancy GOMEZ OTR/JULIEN readily available to answer questions about the patient's intervention at the time of the provision of services.     General Precautions: Standard, fall    Orthopedic  Precautions:spinal precautions  Braces: LSO  Respiratory Status: Room air     Occupational Performance:     Bed Mobility:    Patient completed Rolling/Turning to Left with minimum assistance  Patient completed Supine to Sit with moderate assistance     Functional Mobility/Transfers:  Patient completed x2 Sit <> Stand Transfers from the EOB and the bedside chair with maximal assistance with rolling walker   Patient completed Bed > Chair Transfer using Step Transfer technique with minimum assistance with rolling walker  Patient required verbal/tactile cues for RW management.    Activities of Daily Living:  Upper Body Dressing: maximal assistance to mini LSO seated at EOB.  Lower Body Dressing: total assistance to mini socks seated at EOB. Patient unable to complete 4-figure dressing technique secondary to increased pain.    [facility] 6 Click ADL: 17    Treatment & Education:  Patient educated on OOB mobility to improve overall activity tolerance and promote recovery.  Patient sat at EOB to promote core engagement, static/dynamic sitting balance, tolerance, and skin integrity for carry over with all seated ADLs.   Patient educated on spinal precautions (No bending, lifting, or twisting)  Addressed questions and /or concerns within HUTTON scope of practice.  Patient educated on Log roll technique for bed mobility to maintain spinal precautions and manage pain.  Patient educated on the reasoning behind therapy's recommendation for inpatient rehab to promote further recovery, mobility, strengthening, and ADL modifications for the patient to achieve their baseline and/or independence.   Patient completed standing task to promote static/dynamic standing balance and endurance for carry over with all standing ADLs.  Patient educated on role of occupational therapy, POC, and safety during ADLs and functional mobility. Patient and OT discussed importance of safe, continued mobility to optimize daily living skills. Patient verbalized  understanding. Patient given instruction to call for medical staff/nurse for assistance.     Patient left up in chair with all lines intact, call button in reach, nurse notified, and family present    GOALS:   Multidisciplinary Problems       Occupational Therapy Goals          Problem: Occupational Therapy    Goal Priority Disciplines Outcome Interventions   Occupational Therapy Goal     OT, PT/OT Progressing    Description: Goals to be met by: 7/24/25     Patient will increase functional independence with ADLs by performing:    UE Dressing with Modified [place].  LE Dressing with Set-up Assistance.  Grooming while standing at sink with Modified [place].  Toileting from toilet with Set-up Assistance for hygiene and clothing management.   Bathing from  shower chair/bench with Set-up Assistance.  Sitting at edge of bed x15 minutes with Modified [place].  Log Rolling to Bilateral with Modified [place].   Toilet transfer to toilet with Modified [place].  Pt will adhere to spinal precautions 100% of time.                      Time Tracking:     OT Date of Treatment: 07/12/25  OT Start Time: 1124  OT Stop Time: 1207  OT Total Time (min): 43 min    Billable Minutes:Self Care/Home Management 18  Therapeutic Activity 25    OT/BRIAN: BRIAN     Number of BRIAN visits since last OT visit: 2    7/12/2025

## 2025-07-12 NOTE — PROGRESS NOTES
[facility]  Neurosurgery  Progress Note    Subjective:     History of Present Illness: Patient seen in clinic for back and bilateral LE pain. Recommended a L4-5 TLIF due to the grade 2 spondylolisthesis and severe stenosis causing debilitating pain. Failed conservative management. Presents for surgery.     Post-Op Info:  Procedure(s) (LRB):  JAMES-L4-5 TLIF (N/A)   3 Days Post-Op   Interval History: NAEON. Pain controlled. Exam stable. Sat on the side of the bed multiple times overnight and this morning. Vitals stable. No significant dizziness. Plan to work with therapy today and anticipate home this afternoon once postop XR obtained. Encouraged PO fluid intake. + flatus, no BM. Bowel regimen advanced.     Medications:  Continuous Infusions:  Scheduled Meds:   acetaminophen  1,000 mg Oral Q8H    calcium carbonate  500 mg Oral BID    enoxparin  40 mg Subcutaneous Daily    levothyroxine  150 mcg Oral Before breakfast    methocarbamoL  750 mg Oral QID    pantoprazole  40 mg Oral Daily    polyethylene glycol  17 g Oral Daily    pregabalin  50 mg Oral TID    senna-docusate  2 tablet Oral BID     PRN Meds:  Current Facility-Administered Medications:     aluminum-magnesium hydroxide-simethicone, 30 mL, Oral, Q4H PRN    diazePAM, 5 mg, Oral, BID PRN    HYDROmorphone, 1 mg, Intravenous, Q4H PRN    lactulose, 20 g, Oral, Q6H PRN    melatonin, 6 mg, Oral, Nightly PRN    ondansetron, 8 mg, Oral, Q6H PRN    oxyCODONE, 5 mg, Oral, Q4H PRN    oxyCODONE, 10 mg, Oral, Q4H PRN    prochlorperazine, 5 mg, Intravenous, Q6H PRN     Review of Systems  Objective:     Weight: 82.9 kg (182 lb 12.2 oz)  Body mass index is 33.42 kg/m².  Vital Signs (Most Recent):  Temp: 97.9 °F (36.6 °C) (07/12/25 0817)  Pulse: 75 (07/12/25 0817)  Resp: 18 (07/12/25 0937)  BP: (!) 119/57 (07/12/25 0817)  SpO2: 100 % (07/12/25 0817) Vital Signs (24h Range):  Temp:  [97.9 °F (36.6 °C)-99 °F (37.2 °C)] 97.9 °F (36.6 °C)  Pulse:  [63-75] 75  Resp:  [17-18]  "18  SpO2:  [97 %-100 %] 100 %  BP: (101-133)/(57-68) 119/57                                Closed/Suction Drain 07/09/25 1016 Tube - 1 Left Back Accordion 10 Fr. (Active)   Site Description Healing 07/10/25 0733   Dressing Type CHG impregnated dressing/sponge 07/10/25 0733   Dressing Status Dry;Intact 07/10/25 0733   Dressing Intervention Integrity maintained 07/10/25 0733   Drainage Bloody 07/10/25 0733   Status Other (Comment) 07/10/25 0733   Output (mL) 0 mL 07/10/25 0454          Physical Exam         Neurosurgery Physical Exam    General: well developed, well nourished, no distress.   Neurologic: Alert and oriented. Thought content appropriate.  GCS: Motor: 6/Verbal: 5/Eyes: 4 GCS Total: 15  Mental Status: Awake, Alert, Oriented x 4  Language: No aphasia  Speech: No dysarthria  Cranial nerves: face symmetric, tongue midline, CN II-XII grossly intact.   Abdomen: soft, nontender to palpation, nondistended  Pulmonary: normal respirations, no signs of respiratory distress  Sensory: intact to light touch throughout  Motor Strength: Moves all extremities spontaneously with good tone.  Full strength upper and lower extremities. No abnormal movements seen.     Strength  Deltoids Triceps Biceps Wrist Extension Wrist Flexion Hand    Upper: R 5/5 5/5 5/5 5/5 5/5 5/5    L 5/5 5/5 5/5 5/5 5/5 5/5     Iliopsoas Quadriceps Knee  Flexion Tibialis  anterior Gastro- cnemius EHL   Lower: R 5/5 5/5 5/5 5/5 5/5 5/5    L 5/5 5/5 5/5 5/5 5/5 5/5     Incision c/d/I with skin edges approximated with staples/sutures. No drainage, erythema, edema, fluctuance.       Significant Labs:  Recent Labs   Lab 07/11/25 0403 07/12/25 0412    78    139   K 3.7 3.3*    106   CO2 25 25   BUN 11 11   CREATININE 0.6 0.6   CALCIUM 8.6* 9.2     Recent Labs   Lab 07/11/25 0403 07/12/25 0412   WBC 17.29* 13.60*   HGB 10.2* 10.3*   HCT 31.8* 31.1*    322     No results for input(s): "LABPT", "INR", "APTT" in the last 48 " hours.  Microbiology Results (last 7 days)       ** No results found for the last 168 hours. **          Recent Lab Results         07/12/25  0412   07/11/25  1221        Anion Gap 8         Baso # 0.03         Basophil % 0.2         BUN 11         Calcium 9.2         Chloride 106         CO2 25         Creatinine 0.6         eGFR >60  Comment: Estimated GFR calculated using the CKD-EPI creatinine (2021) equation.         Eos # 0.25         Eos % 1.8         Glucose 78         Gran # (ANC) 9.33         Hematocrit 31.1         Hemoglobin 10.3         Immature Grans (Abs) 0.05  Comment: Mild elevation in immature granulocytes is non specific and can be seen in a variety of conditions including stress response, acute inflammation, trauma and pregnancy. Correlation with other laboratory and clinical findings is essential.         Immature Granulocytes 0.4         Lymph # 2.78         Lymph % 20.4         MCH 29.0         MCHC 33.1         MCV 88         Mono # 1.16         Mono % 8.5         MPV 11.7         Neut % 68.7         nRBC 0         Platelet Count 322         POCT Glucose   122       Potassium 3.3         RBC 3.55         RDW 12.2         Sodium 139         WBC 13.60               All pertinent labs from the last 24 hours have been reviewed.    Significant Diagnostics:  I have reviewed all pertinent imaging results/findings within the past 24 hours.  Assessment/Plan:     * Spondylolisthesis at L4-L5 level  Isaura Mancini is a 54 y.o. female with grade 2 spondylolisthesis and severe stenosis causing debilitating axial low back and right leg pain that has failed conservative measurement. Now s/p L4-5 TLIF on 7/9.    - Neurologically stable  - Pain control: continue current regimen.   - Post op imaging pending.  - Continue PT/OT/OOB. OOB > 6hrs/day. LSO when OOB.  - GI: Diet as tolerated. Continue bowel regimen.   - Voiding, PVR 0 7/10. Bladder scan prn.  - DVT ppx: LVX, Compression stockings, SCDs  -  Atelectasis ppx: IS at bedside. Encourage use every hour while awake.    - Dispo: home today pending medical stability, postop XR       Postoperative hypothyroidism  Continue home dose levothyroxine    Gastroesophageal reflux disease  Continue PPI    Essential hypertension  Patient's blood pressure range in the last 24 hours was: BP  Min: 101/61  Max: 133/68.The patient's inpatient anti-hypertensive regimen is listed below:  Current Antihypertensives       Plan  - BP is controlled, no changes needed to their regimen  - Losartan and HCTZ discontinued for hypotension. Continue to hold at discharge.        Kamala Singletary PA-C  Neurosurgery  [facility] - Surgery

## 2025-07-12 NOTE — ASSESSMENT & PLAN NOTE
Isaura Mancini is a 54 y.o. female with grade 2 spondylolisthesis and severe stenosis causing debilitating axial low back and right leg pain that has failed conservative measurement. Now s/p L4-5 TLIF on 7/9.    - Neurologically stable  - Pain control: continue current regimen.   - Post op imaging pending.  - Continue PT/OT/OOB. OOB > 6hrs/day. LSO when OOB.  - GI: Diet as tolerated. Continue bowel regimen.   - Voiding, PVR 0 7/10. Bladder scan prn.  - DVT ppx: LVX, Compression stockings, SCDs  - Atelectasis ppx: IS at bedside. Encourage use every hour while awake.    - Dispo: home today pending medical stability, postop XR

## 2025-07-12 NOTE — ASSESSMENT & PLAN NOTE
Patient's blood pressure range in the last 24 hours was: BP  Min: 101/61  Max: 133/68.The patient's inpatient anti-hypertensive regimen is listed below:  Current Antihypertensives       Plan  - BP is controlled, no changes needed to their regimen  - Losartan and HCTZ discontinued for hypotension. Continue to hold at discharge.

## 2025-07-12 NOTE — PT/OT/SLP PROGRESS
Physical Therapy Treatment    Patient Name:  Isaura Mancini   MRN:  [id number]    Recommendations:     Discharge Recommendations: High Intensity Therapy  Discharge Equipment Recommendations: walker, rolling, hip kit  Barriers to discharge: Decreased caregiver support    Assessment:     Isaura Mancini is a 54 y.o. female admitted with a medical diagnosis of Spondylolisthesis at L4-L5 level.  She presents with the following impairments/functional limitations: impaired endurance, weakness, impaired self care skills, impaired functional mobility, gait instability, impaired balance, decreased safety awareness, decreased lower extremity function, pain, decreased ROM, impaired cardiopulmonary response to activity, orthopedic precautions.    Rehab Prognosis: Good; patient would benefit from acute skilled PT services to address these deficits and reach maximum level of function.    Recent Surgery: Procedure(s) (LRB):  JAMES-L4-5 TLIF (N/A) 3 Days Post-Op    Plan:     During this hospitalization, patient to be seen daily to address the identified rehab impairments via gait training, therapeutic activities, therapeutic exercises, neuromuscular re-education and progress toward the following goals:    Plan of Care Expires:  08/10/25    Subjective     Chief Complaint: pt feeling dizzy   Patient/Family Comments/goals: none   Pain/Comfort:         Objective:     Communicated with RN prior to session.  Patient found HOB elevated with PureWick, hemovac, peripheral IV, SCD upon PT entry to room.     General Precautions: Standard, fall  Orthopedic Precautions: spinal precautions  Braces: LSO  Respiratory Status: Room air     Functional Mobility:  Bed Mobility:     Supine to Sit: maximal assistance  Sit to Supine: total assistance and of 2 persons  Transfers:     Sit to Stand:  maximal assistance with rolling walker  Gait: 1-2 lateral steps with RW and min of 2 persons       AM-PAC 6 CLICK MOBILITY          Treatment & Education:  Pt  became hypotensive while standing and did not improve once seated. Total of 2 to return to supine and required 9 degrees of trendelenburg to recover. RN present taking vitals during event.     Patient left HOB elevated with all lines intact and call button in reach..    GOALS:   Multidisciplinary Problems       Physical Therapy Goals          Problem: Physical Therapy    Goal Priority Disciplines Outcome Interventions   Physical Therapy Goal     PT, PT/OT Progressing    Description: Goals to be met by: 08/10/25     Patient will increase functional independence with mobility by performin. Supine to sit with [place] maintaining spinal precautions  2. Sit to supine with [place] maintaining spinal precautions  3. Rolling to Left and Right with [place] maintaining spinal precautions  4. Sit to stand transfer with Modified [place]  5. Bed to chair transfer with Modified [place] using LRAD  6. Gait  x 50 feet with Stand-by Assistance using LRAD.   7. Lower extremity exercise program x20 reps per handout, with independence  8. (I) with donning and doffing TLSO                         DME Justifications:      Time Tracking:     PT Received On: 25  PT Start Time: 1054     PT Stop Time: 1133  PT Total Time (min): 39 min     Billable Minutes: Gait Training 10 and Therapeutic Activity 29    Treatment Type: Treatment  PT/PTA: PTA     Number of PTA visits since last PT visit: 2025

## 2025-07-12 NOTE — DISCHARGE INSTRUCTIONS
Post op Spine Patient Instructions    Activity Restrictions:  [x]  Return to work will be determined on an individual basis.  [x]  No lifting greater than 5-10 pounds.  [x]  Avoid bending and twisting the area of your surgery more than 45 degrees from neutral position in any direction.  [x]  No driving or operating machinery:  [x]  until cleared by your surgeon.  [x]  while taking narcotic pain medications or muscle relaxants.  [x]  Wear your brace at all times except when lying in bed or showering.   [x]  Increase ambulation over the next 2 weeks. Walk on paved surfaces only. It is okay to walk up and down stairs while holding onto a side rail.  [x]  No sexual activity for 2 weeks.    Discharge Medication/Follow-up:  [x]  Please refer to discharge medication reconciliation form.  [x]  Take Tylenol (acetaminophen) 650 mg every 6 hours as needed for additional pain control.  [x]  Do not take ANY Aspirin or Aspirin containing products for 2 weeks after surgery (unless otherwise directed in discharge medication list).   [x]  Do not take ANY herbal supplements for 2 weeks after surgery.    [x]  Do not take ANY non-steroidal anti-inflammatory drugs (NSAIDS), including the following: ibuprofen, naprosyn, Aleve, Advil, Indocin, Mobic, or Celebrex for ***12 weeks after surgery.   [x]  Prescriptions for appropriate medication will be given upon discharge.  [x]  Take the pain medication as prescribed. We recommend to wean use of your pain medication after 1 week of taking as prescribed (Ex: After 1 week of taking every 4 hours as needed, wean down to taking your pain medication every 6 hours as needed).  [x]  Take docusate (Colace 100 mg): take one capsule a day as needed for constipation. You can get this over the counter. ***You may take any over the counter agent for constipation. If you have not had a bowel movement within 5 days of surgery please use an enema or suppository. You can get this over the counter.  [x]   Follow-up appointment:  [x]  10-14 days post-op for wound check by physician assistant/nurse  [x]  4-6 weeks with MD:  [x]  with x-rays  [x]  An appointment will be mailed to you.    Wound Care:  [x]  Remove the small dressing near your incision in 5 days.   [x]  No bandage required. Keep your incision open to the air.   [x]  You may shower on the 2nd day after your surgery. Do not allow the force of water to hit the incision. Ok to allow water to rinse over the incision. Pat the incision dry if it becomes wet. Do not rub or scrub the incision.  [x]  You cannot take a bath until 8 weeks after surgery.  [x]  Apply bacitracin to incision twice a day for 2 weeks.    Call your doctor or go to the Emergency Room for any signs of infection, including: increased redness, drainage, pain, or fever (temperature >=101). Call your doctor or go to the Emergency Room if there are any localized neurological changes; problems with speech, vision, numbness, tingling, weakness, or severe headache; inability to control urination or bowel movements; inability to urinate; or for other concerns.      Special Instructions:  [x]  No use of tobacco products.  [x]  Diet: Please eat a regular diet as tolerated.      Physicians need 3 days' notice for pain medicine to be refilled. Pain medicine will only be refilled between 8 AM and 5 PM, Monday through Friday, due to Food and Drug Administration regulation of documentation.    If you have any questions about this form, please call [phone].    Form No. 03064 (Revised 10/31/2013)

## 2025-07-13 VITALS
TEMPERATURE: 98 F | SYSTOLIC BLOOD PRESSURE: 109 MMHG | BODY MASS INDEX: 33.63 KG/M2 | OXYGEN SATURATION: 100 % | HEART RATE: 80 BPM | DIASTOLIC BLOOD PRESSURE: 61 MMHG | HEIGHT: 62 IN | WEIGHT: 182.75 LBS | RESPIRATION RATE: 16 BRPM

## 2025-07-13 PROCEDURE — 25000003 PHARM REV CODE 250: Performed by: STUDENT IN AN ORGANIZED HEALTH CARE EDUCATION/TRAINING PROGRAM

## 2025-07-13 PROCEDURE — 25000003 PHARM REV CODE 250: Performed by: PHYSICIAN ASSISTANT

## 2025-07-13 RX ADMIN — METHOCARBAMOL 750 MG: 750 TABLET ORAL at 11:07

## 2025-07-13 RX ADMIN — PREGABALIN 50 MG: 50 CAPSULE ORAL at 11:07

## 2025-07-13 RX ADMIN — LEVOTHYROXINE SODIUM 150 MCG: 75 TABLET ORAL at 05:07

## 2025-07-13 RX ADMIN — CALCIUM CARBONATE (ANTACID) CHEW TAB 500 MG 500 MG: 500 CHEW TAB at 11:07

## 2025-07-13 RX ADMIN — OXYCODONE HYDROCHLORIDE 10 MG: 10 TABLET ORAL at 11:07

## 2025-07-13 RX ADMIN — PANTOPRAZOLE SODIUM 40 MG: 40 TABLET, DELAYED RELEASE ORAL at 11:07

## 2025-07-13 RX ADMIN — SENNOSIDES AND DOCUSATE SODIUM 2 TABLET: 50; 8.6 TABLET ORAL at 11:07

## 2025-07-13 RX ADMIN — ACETAMINOPHEN 500 MG: 500 TABLET ORAL at 05:07

## 2025-07-13 RX ADMIN — POLYETHYLENE GLYCOL 3350 17 G: 17 POWDER, FOR SOLUTION ORAL at 11:07

## 2025-07-13 NOTE — PLAN OF CARE
Problem: Adult Inpatient Plan of Care  Goal: Plan of Care Review  Outcome: Progressing  Goal: Patient-Specific Goal (Individualized)  Outcome: Progressing  Goal: Absence of Hospital-Acquired Illness or Injury  Outcome: Progressing  Goal: Optimal Comfort and Wellbeing  Outcome: Progressing  Goal: Readiness for Transition of Care  Outcome: Progressing     Problem: Wound  Goal: Optimal Coping  Outcome: Progressing  Goal: Optimal Functional Ability  Outcome: Progressing  Goal: Absence of Infection Signs and Symptoms  Outcome: Progressing  Goal: Improved Oral Intake  Outcome: Progressing  Goal: Optimal Pain Control and Function  Outcome: Progressing  Goal: Skin Health and Integrity  Outcome: Progressing  Goal: Optimal Wound Healing  Outcome: Progressing     Problem: Fall Injury Risk  Goal: Absence of Fall and Fall-Related Injury  Outcome: Progressing     Problem: Adult Inpatient Plan of Care  Goal: Plan of Care Review  Outcome: Progressing  Goal: Patient-Specific Goal (Individualized)  Outcome: Progressing  Goal: Absence of Hospital-Acquired Illness or Injury  Outcome: Progressing  Goal: Optimal Comfort and Wellbeing  Outcome: Progressing  Goal: Readiness for Transition of Care  Outcome: Progressing     Problem: Wound  Goal: Optimal Coping  Outcome: Progressing  Goal: Optimal Functional Ability  Outcome: Progressing  Goal: Absence of Infection Signs and Symptoms  Outcome: Progressing  Goal: Improved Oral Intake  Outcome: Progressing  Goal: Optimal Pain Control and Function  Outcome: Progressing  Goal: Skin Health and Integrity  Outcome: Progressing  Goal: Optimal Wound Healing  Outcome: Progressing     Problem: Fall Injury Risk  Goal: Absence of Fall and Fall-Related Injury  Outcome: Progressing

## 2025-07-13 NOTE — NURSING
Pt received home meds. Pt and family received discharge instructions and verbalized understanding. PIV removed, catheter intact. NADN. Pt left unit via wheelchair with all belongings.

## 2025-07-13 NOTE — PLAN OF CARE
Patient will discharge home with family. No additional sw needs.   07/13/25 1029   Final Note   Assessment Type Final Discharge Note   Anticipated Discharge Disposition Home   What phone number can be called within the next 1-3 days to see how you are doing after discharge? [phone]   Hospital Resources/Appts/Education Provided Appointments scheduled and added to AVS   Post-Acute Status   Discharge Delays None known at this time     [facility] - Surgery  Discharge Final Note    Primary Care Provider: Ashok Braden III, MD    Expected Discharge Date: 7/13/2025    Final Discharge Note (most recent)       Final Note - 07/13/25 1029          Final Note    Assessment Type Final Discharge Note (P)      Anticipated Discharge Disposition Home or Self Care (P)      What phone number can be called within the next 1-3 days to see how you are doing after discharge? [phone] (P)      Hospital Resources/Appts/Education Provided Appointments scheduled and added to AVS (P)         Post-Acute Status    Discharge Delays None known at this time (P)                      Important Message from Medicare

## 2025-07-13 NOTE — PROGRESS NOTES
[facility] - [facility]  Neurosurgery  Progress Note    Subjective:     History of Present Illness: Patient seen in clinic for back and bilateral LE pain. Recommended a L4-5 TLIF due to the grade 2 spondylolisthesis and severe stenosis causing debilitating pain. Failed conservative management. Presents for surgery.     Post-Op Info:  Procedure(s) (LRB):  JAMES-L4-5 TLIF (N/A)   4 Days Post-Op     Past Medical History:   Diagnosis Date    GERD (gastroesophageal reflux disease)     Hypertension     Sleep apnea        Past Surgical History:   Procedure Laterality Date     SECTION      EPIDURAL STEROID INJECTION INTO LUMBAR SPINE N/A 2025    Procedure: L4-5 [facility];  Surgeon: Brian Tobias MD;  Location: [facility]IN [facility];  Service: Pain Management;  Laterality: N/A;  oral - ASA 5 days    NECK MASS EXCISION N/A 2024    Procedure: EXCISION, MASS, NECK;  Surgeon: Fabrice Adams MD;  Location: [facility];  Service: General;  Laterality: N/A;  Prone    SPINAL FUSION N/A 2025    Procedure: JAMES-L4-5 TLIF;  Surgeon: Bill Simpson MD;  Location: [facility] OR [address] [place] [address];  Service: Neurosurgery;  Laterality: N/A;    THYROIDECTOMY N/A 10/28/2022    Procedure: THYROIDECTOMY, total;  Surgeon: Isaura Hayes MD;  Location: [facility] OR [address] [place] [address];  Service: General;  Laterality: N/A;    TUBAL LIGATION             Social History[1]    Family History   Problem Relation Name Age of Onset    Heart disease Father          age 70 , started 30s    Hypertension Father      Breast cancer Paternal Aunt  60    Diabetes Neg Hx      Colon cancer Neg Hx      Ovarian cancer Neg Hx         Review of patient's allergies indicates:  No Known Allergies      Medications:  Continuous Infusions:  Scheduled Meds:   acetaminophen  500 mg Oral Q8H    calcium carbonate  500 mg Oral BID    enoxparin  40 mg Subcutaneous Daily    levothyroxine  150 mcg Oral Before breakfast    methocarbamoL  750 mg Oral QID     pantoprazole  40 mg Oral Daily    polyethylene glycol  17 g Oral Daily    pregabalin  50 mg Oral TID    senna-docusate  2 tablet Oral BID     PRN Meds:  Current Facility-Administered Medications:     acetaminophen, 500 mg, Oral, Q8H PRN    aluminum-magnesium hydroxide-simethicone, 30 mL, Oral, Q4H PRN    HYDROmorphone, 0.5 mg, Intravenous, Q6H PRN    lactulose, 20 g, Oral, Q6H PRN    melatonin, 6 mg, Oral, Nightly PRN    ondansetron, 8 mg, Oral, Q6H PRN    oxyCODONE, 5 mg, Oral, Q4H PRN    oxyCODONE, 10 mg, Oral, Q4H PRN    prochlorperazine, 5 mg, Intravenous, Q6H PRN     Review of Systems  Objective:     Weight: 82.9 kg (182 lb 12.2 oz)  Body mass index is 33.42 kg/m².  Vital Signs (Most Recent):  Temp: 98 °F (36.7 °C) (07/13/25 0811)  Pulse: 80 (07/13/25 0811)  Resp: 18 (07/13/25 0811)  BP: 109/61 (07/13/25 0811)  SpO2: 100 % (07/13/25 0811) Vital Signs (24h Range):  Temp:  [97.7 °F (36.5 °C)-100.2 °F (37.9 °C)] 98 °F (36.7 °C)  Pulse:  [63-82] 80  Resp:  [16-18] 18  SpO2:  [94 %-100 %] 100 %  BP: (109-125)/(57-74) 109/61                              Closed/Suction Drain 07/09/25 1016 Tube - 1 Left Back Accordion 10 Fr. (Active)   Site Description Healing 07/11/25 2000   Dressing Type CHG impregnated dressing/sponge 07/11/25 2000   Dressing Status Clean;Intact;Dry 07/11/25 2000   Dressing Intervention Integrity maintained 07/11/25 2000   Drainage Sanguineous 07/11/25 2000   Status Other (Comment) 07/10/25 0733   Output (mL) 0 mL 07/11/25 0800     Neurosurgery Physical Exam  RUE: 5/5 delt, 5/5 bi, 5/5 tri, 5/5 hg, 5/5 io  LUE: 5/5 delt, 5/5 bi, 5/5 tri, 5/5 hg, 5/5 io  RLE: 5/5 hf, 5/5 quad, 5/5 hamstring, 5/5 df, 5/5 pf  LLE: 5/5 hf, 5/5 quad, 5/5 hamstring, 5/5 df, 5/5 pf    SILT    No hoffmans  No clonus  No babinski      Significant Labs:  Recent Labs   Lab 07/12/25  0412   GLU 78      K 3.3*      CO2 25   BUN 11   CREATININE 0.6   CALCIUM 9.2     Recent Labs   Lab 07/12/25  0412   WBC 13.60*   HGB  "10.3*   HCT 31.1*        No results for input(s): "LABPT", "INR", "APTT" in the last 48 hours.  Microbiology Results (last 7 days)       ** No results found for the last 168 hours. **          All pertinent labs from the last 24 hours have been reviewed.    Significant Diagnostics:  I have reviewed and interpreted all pertinent imaging results/findings within the past 24 hours.    Assessment/Plan:     * Spondylolisthesis at L4-L5 level  Isaura Mancini is a 54 y.o. female with grade 2 spondylolisthesis and severe stenosis causing debilitating axial low back and right leg pain that has failed conservative measurement. Now s/p L4-5 TLIF on 7/9.    Xray L spine 7/12: Satisfactory for discharge    - Neurologically stable  - Pain control: continue current regimen.   - LSO when OOB  - Clinic f/u with Dr. Simpson in 2 weeks    - Dispo: home today      Postoperative hypothyroidism  Continue home dose levothyroxine    Gastroesophageal reflux disease  Continue PPI    Essential hypertension  Patient's blood pressure range in the last 24 hours was: BP  Min: 101/61  Max: 133/68.The patient's inpatient anti-hypertensive regimen is listed below:  Current Antihypertensives       Plan  - BP is controlled, no changes needed to their regimen  - Losartan and HCTZ discontinued for hypotension. Continue to hold at discharge.        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   Tobacco Use    Smoking status: Never     Passive exposure: Never    Smokeless tobacco: Never   Vaping Use    Vaping status: Never Used   Substance and Sexual Activity    Alcohol use: No    Drug use: No    Sexual activity: Yes     Partners: Male     Birth control/protection: Surgical     Comment: BTL   Social History Narrative    [place] from [place]     Works in [employer] at [employer]     Lives at home with two adult children, son and daughter ages 32 and 25      Social Drivers of Health     Financial Resource Strain: Low Risk  (7/9/2025)    Overall Financial Resource Strain (CARDIA)     Difficulty of Paying Living Expenses: Not hard at all   Food Insecurity: No Food Insecurity (7/9/2025)    Hunger Vital Sign     Worried About Running Out of Food in the Last Year: Never true     Ran Out of Food in the Last Year: Never true   Transportation Needs: No Transportation Needs (7/9/2025)    PRAPARE - Transportation     Lack of Transportation (Medical): No     Lack of Transportation (Non-Medical): No   Physical Activity: Inactive (3/11/2025)    Exercise Vital Sign     Days of Exercise per Week: 0 days     Minutes of Exercise per Session: 0 min   Stress: No Stress Concern Present (7/9/2025)    [place] [employer] - Occupational Stress Questionnaire     Feeling of Stress : Not at all   Housing Stability: Low Risk  (7/9/2025)    Housing Stability Vital Sign     Unable to Pay for Housing in the Last Year: No     Number of Times Moved in the Last Year: 0     Homeless in the Last Year: No

## 2025-07-13 NOTE — SUBJECTIVE & OBJECTIVE
Past Medical History:   Diagnosis Date    GERD (gastroesophageal reflux disease)     Hypertension     Sleep apnea        Past Surgical History:   Procedure Laterality Date     SECTION      EPIDURAL STEROID INJECTION INTO LUMBAR SPINE N/A 2025    Procedure: L4-5 [facility];  Surgeon: Brian Tobias MD;  Location: [facility];  Service: Pain Management;  Laterality: N/A;  oral - ASA 5 days    NECK MASS EXCISION N/A 2024    Procedure: EXCISION, MASS, NECK;  Surgeon: Fabrice Adams MD;  Location: [facility];  Service: General;  Laterality: N/A;  Prone    SPINAL FUSION N/A 2025    Procedure: JAMES-L4-5 TLIF;  Surgeon: Bill Simpson MD;  Location: [facility] OR [address] [place] [address];  Service: Neurosurgery;  Laterality: N/A;    THYROIDECTOMY N/A 10/28/2022    Procedure: THYROIDECTOMY, total;  Surgeon: Isaura Hayes MD;  [facility];  Service: General;  Laterality: N/A;    TUBAL LIGATION             Social History[1]    Family History   Problem Relation Name Age of Onset    Heart disease Father          age 70 , started 30s    Hypertension Father      Breast cancer Paternal Aunt  60    Diabetes Neg Hx      Colon cancer Neg Hx      Ovarian cancer Neg Hx         Review of patient's allergies indicates:  No Known Allergies      Medications:  Continuous Infusions:  Scheduled Meds:   acetaminophen  500 mg Oral Q8H    calcium carbonate  500 mg Oral BID    enoxparin  40 mg Subcutaneous Daily    levothyroxine  150 mcg Oral Before breakfast    methocarbamoL  750 mg Oral QID    pantoprazole  40 mg Oral Daily    polyethylene glycol  17 g Oral Daily    pregabalin  50 mg Oral TID    senna-docusate  2 tablet Oral BID     PRN Meds:  Current Facility-Administered Medications:     acetaminophen, 500 mg, Oral, Q8H PRN    aluminum-magnesium hydroxide-simethicone, 30 mL, Oral, Q4H PRN    HYDROmorphone, 0.5 mg, Intravenous, Q6H PRN    lactulose, 20 g, Oral, Q6H PRN    melatonin,  "6 mg, Oral, Nightly PRN    ondansetron, 8 mg, Oral, Q6H PRN    oxyCODONE, 5 mg, Oral, Q4H PRN    oxyCODONE, 10 mg, Oral, Q4H PRN    prochlorperazine, 5 mg, Intravenous, Q6H PRN     Review of Systems  Objective:     Weight: 82.9 kg (182 lb 12.2 oz)  Body mass index is 33.42 kg/m².  Vital Signs (Most Recent):  Temp: 98 °F (36.7 °C) (07/13/25 0811)  Pulse: 80 (07/13/25 0811)  Resp: 18 (07/13/25 0811)  BP: 109/61 (07/13/25 0811)  SpO2: 100 % (07/13/25 0811) Vital Signs (24h Range):  Temp:  [97.7 °F (36.5 °C)-100.2 °F (37.9 °C)] 98 °F (36.7 °C)  Pulse:  [63-82] 80  Resp:  [16-18] 18  SpO2:  [94 %-100 %] 100 %  BP: (109-125)/(57-74) 109/61                              Closed/Suction Drain 07/09/25 1016 Tube - 1 Left Back Accordion 10 Fr. (Active)   Site Description Healing 07/11/25 2000   Dressing Type CHG impregnated dressing/sponge 07/11/25 2000   Dressing Status Clean;Intact;Dry 07/11/25 2000   Dressing Intervention Integrity maintained 07/11/25 2000   Drainage Sanguineous 07/11/25 2000   Status Other (Comment) 07/10/25 0733   Output (mL) 0 mL 07/11/25 0800     Neurosurgery Physical Exam  RUE: 5/5 delt, 5/5 bi, 5/5 tri, 5/5 hg, 5/5 io  LUE: 5/5 delt, 5/5 bi, 5/5 tri, 5/5 hg, 5/5 io  RLE: 5/5 hf, 5/5 quad, 5/5 hamstring, 5/5 df, 5/5 pf  LLE: 5/5 hf, 5/5 quad, 5/5 hamstring, 5/5 df, 5/5 pf    SILT    No hoffmans  No clonus  No babinski      Significant Labs:  Recent Labs   Lab 07/12/25 0412   GLU 78      K 3.3*      CO2 25   BUN 11   CREATININE 0.6   CALCIUM 9.2     Recent Labs   Lab 07/12/25 0412   WBC 13.60*   HGB 10.3*   HCT 31.1*        No results for input(s): "LABPT", "INR", "APTT" in the last 48 hours.  Microbiology Results (last 7 days)       ** No results found for the last 168 hours. **          All pertinent labs from the last 24 hours have been reviewed.    Significant Diagnostics:  I have reviewed and interpreted all pertinent imaging results/findings within the past 24 hours.       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   Tobacco Use    Smoking status: Never     Passive exposure: Never    Smokeless tobacco: Never   Vaping Use    Vaping status: Never Used   Substance and Sexual Activity    Alcohol use: No    Drug use: No    Sexual activity: Yes     Partners: Male     Birth control/protection: Surgical     Comment: BTL   Social History Narrative    [place] from [facility]     Works in [employer] at [employer]     Lives at home with two adult children, son and daughter ages 32 and 25      Social Drivers of Health     Financial Resource Strain: Low Risk  (7/9/2025)    Overall Financial Resource Strain (CARDIA)     Difficulty of Paying Living Expenses: Not hard at all   Food Insecurity: No Food Insecurity (7/9/2025)    Hunger Vital Sign     Worried About Running Out of Food in the Last Year: Never true     Ran Out of Food in the Last Year: Never true   Transportation Needs: No Transportation Needs (7/9/2025)    PRAPARE - Transportation     Lack of Transportation (Medical): No     Lack of Transportation (Non-Medical): No   Physical Activity: Inactive (3/11/2025)    Exercise Vital Sign     Days of Exercise per Week: 0 days     Minutes of Exercise per Session: 0 min   Stress: No Stress Concern Present (7/9/2025)    [place] [employer] - Occupational Stress Questionnaire     Feeling of Stress : Not at all   Housing Stability: Low Risk  (7/9/2025)    Housing Stability Vital Sign     Unable to Pay for Housing in the Last Year: No     Number of Times Moved in the Last Year: 0     Homeless in the Last Year: No

## 2025-07-13 NOTE — DISCHARGE SUMMARY
[facility] - Surgery  Neurosurgery  Discharge Summary      Patient Name: Isaura Mancini  MRN: [id number]  Admission Date: 7/9/2025  Hospital Length of Stay: 4 days  Discharge Date and Time: 07/13/2025 9:25 AM  Attending Physician: Bill Simpson MD   Discharging Provider: Christopher Shoemaker MD, PhD  Primary Care Provider: Ashok Braden III, MD    HPI:   Patient seen in clinic for back and bilateral LE pain. Recommended a L4-5 TLIF due to the grade 2 spondylolisthesis and severe stenosis causing debilitating pain. Failed conservative management. Presents for surgery.     Procedure(s) (LRB):  JAMES-L4-5 TLIF (N/A)     Hospital Course: 7/10: NAEON. AFVSS. C/o significant incisional pain. Denies preop leg pain. Voiding, PVR 0. HV drain to suction. Working with therapy. Encouraged OOB mobility to decrease risk of postop complication and aid in wound healing.   7/11: NAEON. AFVSS. + orthostatics with attempts to sit up today. 1L NaCl bolus given. Denies headache, chest pain, SOB, abdominal pain. VSS currently. Reports some improvement in pain today. HV drain removed. Anticipate home tomorrow if medically stable.  7/12: NAEON. Pain controlled. Exam stable. Sat on the side of the bed multiple times overnight and this morning. Vitals stable. No significant dizziness. Plan to work with therapy today and anticipate home this afternoon once postop XR obtained. Encouraged PO fluid intake. + flatus, no BM. Bowel regimen advanced.  7/13: NAEON. Pain controlled, ambulating, voiding and stooling without issue, exam stable. Patient wants to go home.    Goals of Care Treatment Preferences:  Code Status: Full Code      Consults:     Significant Diagnostic Studies: Labs and imaging    Pending Diagnostic Studies:       Procedure Component Value Units Date/Time    X-Ray Lumbar Spine Ap And Lateral [[id number]] Resulted: 07/12/25 1534    Order Status: Sent Lab Status: In process Updated: 07/12/25 1534          Final Active Diagnoses:    Diagnosis  "Date Noted POA    PRINCIPAL PROBLEM:  Spondylolisthesis at L4-L5 level [M43.16] 12/20/2016 Not Applicable    Other reduced mobility [Z74.09] 07/09/2025 Yes    Postoperative hypothyroidism [E89.0] 05/25/2023 Yes    Gastroesophageal reflux disease [K21.9] 09/11/2019 Yes    Essential hypertension [I10] 11/12/2013 Yes      Problems Resolved During this Admission:      Discharged Condition: good     Disposition: Home or Self Care    Follow Up:    Patient Instructions:      WALKER FOR HOME USE     Order Specific Question Answer Comments   Type of Walker: Adult (5'4"-6'6")    With wheels? Yes    Height: 5' 2.01" (1.575 m)    Weight: 82.9 kg (182 lb 12.2 oz)    Length of need (1-99 months): 99    Does patient have medical equipment at home? shower chair    Please check all that apply: Patient needs help to get in and out of chair.    Please check all that apply: Walker will be used for gait training.    Please check all that apply: Patient is unable to safely ambulate without equipment.      Diet Adult Regular     Notify your health care provider if you experience any of the following:  temperature >100.4     Notify your health care provider if you experience any of the following:  persistent nausea and vomiting or diarrhea     Notify your health care provider if you experience any of the following:  severe uncontrolled pain     Notify your health care provider if you experience any of the following:  redness, tenderness, or signs of infection (pain, swelling, redness, odor or green/yellow discharge around incision site)     Notify your health care provider if you experience any of the following:  difficulty breathing or increased cough     Notify your health care provider if you experience any of the following:  severe persistent headache     Notify your health care provider if you experience any of the following:  worsening rash     Notify your health care provider if you experience any of the following:  persistent " dizziness, light-headedness, or visual disturbances     Notify your health care provider if you experience any of the following:  increased confusion or weakness     Notify your health care provider if you experience any of the following:     Lifting restrictions     Activity as tolerated     Medications:  Reconciled Home Medications:      Medication List        PAUSE taking these medications      aspirin 81 MG EC tablet  Wait to take this until: July 16, 2025  Commonly known as: ECOTRIN  Take 81 mg by mouth once daily.     hydroCHLOROthiazide 25 MG tablet  Wait to take this until your doctor or other care provider tells you to start again.  Commonly known as: HYDRODIURIL  Take 1 tablet (25 mg total) by mouth once daily.     olmesartan 20 MG tablet  Wait to take this until your doctor or other care provider tells you to start again.  Commonly known as: BENICAR  Take 1 tablet (20 mg total) by mouth once daily.            START taking these medications      acetaminophen 500 MG tablet  Commonly known as: TYLENOL  Take 1 tablet (500 mg total) by mouth every 6 (six) hours as needed for Pain.     bacitracin 500 unit/gram ointment  Apply topically 2 (two) times daily.     methocarbamoL 750 MG Tab  Commonly known as: ROBAXIN  Take 1 tablet (750 mg total) by mouth 4 (four) times daily as needed (muscle spasms).     oxyCODONE 10 mg Tab immediate release tablet  Commonly known as: ROXICODONE  Take 1 tablet (10 mg total) by mouth every 6 (six) hours as needed for Pain.     polyethylene glycol 17 gram Pwpk  Commonly known as: GLYCOLAX  Take 17 g by mouth once daily.            CONTINUE taking these medications      cholecalciferol (vitamin D3) 25 mcg (1,000 unit) capsule  Commonly known as: VITAMIN D3  Take 2 capsules (2,000 Units total) by mouth once daily.     fluticasone propionate 50 mcg/actuation nasal spray  Commonly known as: FLONASE  1 spray (50 mcg total) by Each Nostril route once daily.     levothyroxine 150 MCG  tablet  Commonly known as: SYNTHROID  Take 1 tab 6 days a week and 0.5 tabs on Sundays for total of 6.5 tabs weekly     pantoprazole 40 MG tablet  Commonly known as: PROTONIX  Take 1 tablet (40 mg total) by mouth once daily.     pregabalin 50 MG capsule  Commonly known as: LYRICA  Take 1 capsule (50 mg total) by mouth 3 (three) times daily.     WEGOVY 0.25 mg/0.5 mL Pnij  Generic drug: semaglutide (weight loss)  Inject contents of one pen (0.25 MG) subcutaneously once weekly as directed            STOP taking these medications      celecoxib 200 MG capsule  Commonly known as: CeleBREX     naproxen 500 MG tablet  Commonly known as: MALINI Shoemaker MD, PhD  Neurosurgery  [facility] - Surgery

## 2025-07-13 NOTE — ASSESSMENT & PLAN NOTE
Isaura Mancini is a 54 y.o. female with grade 2 spondylolisthesis and severe stenosis causing debilitating axial low back and right leg pain that has failed conservative measurement. Now s/p L4-5 TLIF on 7/9.    Xray L spine 7/12: Satisfactory for discharge    - Neurologically stable  - Pain control: continue current regimen.   - LSO when OOB  - Clinic f/u with Dr. Simpson in 2 weeks    - Dispo: home today

## 2025-07-14 ENCOUNTER — PATIENT MESSAGE (OUTPATIENT)
Dept: ENDOCRINOLOGY | Facility: CLINIC | Age: 55
End: 2025-07-14
Payer: COMMERCIAL

## 2025-07-15 ENCOUNTER — PATIENT MESSAGE (OUTPATIENT)
Dept: ENDOCRINOLOGY | Facility: CLINIC | Age: 55
End: 2025-07-15
Payer: COMMERCIAL

## 2025-07-16 ENCOUNTER — TELEPHONE (OUTPATIENT)
Dept: NEUROSURGERY | Facility: CLINIC | Age: 55
End: 2025-07-16
Payer: COMMERCIAL

## 2025-07-16 ENCOUNTER — TELEPHONE (OUTPATIENT)
Dept: ORTHOPEDICS | Facility: CLINIC | Age: 55
End: 2025-07-16
Payer: COMMERCIAL

## 2025-07-16 DIAGNOSIS — Z98.1 S/P LUMBAR FUSION: Primary | ICD-10-CM

## 2025-07-16 NOTE — TELEPHONE ENCOUNTER
Clinicals sent to [facility] ( no pcp involved), adam appts linked, bundle resolved and closed.

## 2025-07-18 ENCOUNTER — E-VISIT (OUTPATIENT)
Dept: FAMILY MEDICINE | Facility: CLINIC | Age: 55
End: 2025-07-18
Payer: COMMERCIAL

## 2025-07-18 ENCOUNTER — TELEPHONE (OUTPATIENT)
Dept: ORTHOPEDICS | Facility: CLINIC | Age: 55
End: 2025-07-18
Payer: COMMERCIAL

## 2025-07-18 DIAGNOSIS — I10 ESSENTIAL HYPERTENSION: ICD-10-CM

## 2025-07-18 DIAGNOSIS — E66.812 CLASS 2 SEVERE OBESITY WITH SERIOUS COMORBIDITY AND BODY MASS INDEX (BMI) OF 37.0 TO 37.9 IN ADULT, UNSPECIFIED OBESITY TYPE: ICD-10-CM

## 2025-07-18 DIAGNOSIS — E66.01 CLASS 2 SEVERE OBESITY WITH SERIOUS COMORBIDITY AND BODY MASS INDEX (BMI) OF 37.0 TO 37.9 IN ADULT, UNSPECIFIED OBESITY TYPE: ICD-10-CM

## 2025-07-18 RX ORDER — SEMAGLUTIDE 0.5 MG/.5ML
0.5 INJECTION, SOLUTION SUBCUTANEOUS
Qty: 3 ML | Refills: 0 | Status: SHIPPED | OUTPATIENT
Start: 2025-07-18

## 2025-07-18 NOTE — TELEPHONE ENCOUNTER
I called and spoke with the patient on 07/18/2025 for post surgery follow-up. The patient had a  JAMES-L4-5 TLIF  with Dr Bill Simpson on 07/09/2025.      Pain Scale: 4 / 10     Any issues with Fever: no     Any issues with medications (specifically DVT prophylaxis): No.     Pain medication: yes; Methocarbamol (Scheduled), Oxycodone (As Needed), Pregablin / Gabapentin (Scheduled)    Resume home meds : Yes     Any issues with:   Bowel movements: no  Passing gas: yes  Urination: no     Completing at home exercises: yes     Any concerns regarding their dressing/bandage:  no     Request for OP Physical Therapy Placed: no     Any other concerns: no           The patient denies any issues following surgery and was informed that if they have any urgent issues with their bandage, medications or any other health concerns regarding their surgery to call  24/7 Neurosurgery at [phone]. The patient was reminded that if they have any chest pain or shortness of breath or a fall to call 911 or go to the closest ER.     The patient verbalized understanding and does not have any other questions            ARIS Amaral (Dee)  Complex Spine Navigator  [facility]         [phone]

## 2025-07-18 NOTE — PROGRESS NOTES
Patient ID: Isaura Mancini is a 54 y.o. female.        E-Visit Time Tracking:             Chief Complaint: Weight Loss (Entered automatically based on patient selection in [facility].)      The patient initiated a request through [facility] on 7/18/2025 for evaluation and management with a chief complaint of Weight Loss (Entered automatically based on patient selection in [facility].)     I evaluated the questionnaire submission on 07/18/2025.    Oh[facility] Peq Evisit Weight Management    7/18/2025 10:08 AM CDT - Filed by Patient   Do you agree to participate in an E-Visit? Yes   If you have any of the following symptoms, please present to your local emergency room or call 911: I acknowledge   Medication requests for narcotics will not be addressed via an E-Visit.  Please schedule an appointment. I acknowledge   Choose the state of your primary residence [place]   Do you have any of the following pregnancy-related conditions? (Pregnant, Possibly pregnant, Breast feeding, None) None   What is your current weight? 185   What best describes your appetite? Big   Do you have specific food cravings? Yes   Describe your specific food cravings: sweets   When you eat, how quickly do you feel full A long time   Give an example of what you have eaten in a day in the past 2 weeks:    Breakfast: eggs oatmeal   Lunch: chicken breast salad   Dinner: salad fish   Snacks: granola bar   Drinks: water jacqueline   Have you exercised in the past week? Yes   What type of exercise? walking   How many days in a week do you exercise? 3   How many minutes do you exericse? 30   Do you have a personal or family history of thyroid cancer? No   Do you have a personal history of kidney stones? No   Do you have a personal history of seizures? No   Do you have a personal history of pancreatitis? No   I would like to address: Medication for weight loss   Do you want to address a new or existing medication? (Start a new medication, Address a current medication)  Address a current medication   Would you like to change or continue your medication? (Change medication, Continue medication) Continue medication   What is the name of the medication you would like to continue? wegovy   Are you taking your medication as prescribed? Yes    What medical condition is the  medication intended to treat? weight loss   Has the medication helped your condition? (Yes, No, Not sure) Yes   Have you experienced any side effects from the medication? No   Provide any information you feel is important to your history not asked above its working good i loss 10 lbs   Please attach any relevant images or files    Are you able to take your vitals? Yes   Systolic Blood Pressure 130   Diastolic Blood Pressure 85   Weight: 185   Height: 62   Pluse:    Temp    Resp:    SpO2          Encounter Diagnoses   Name Primary?    Essential hypertension     Class 2 severe obesity with serious comorbidity and body mass index (BMI) of 37.0 to 37.9 in adult, unspecified obesity type         No orders of the defined types were placed in this encounter.     Medications Ordered This Encounter   Medications    semaglutide, weight loss, (WEGOVY) 0.5 mg/0.5 mL PnIj     Sig: Inject 0.5 mg into the skin every 7 days.     Dispense:  3 mL     Refill:  0     NPI -- [id number]        No follow-ups on file.               Health Maintenance Due   Topic Date Due    COVID-19 Vaccine (5 - 2024-25 season) 12/09/2024       Future Appointments   Date Time Provider Department Center   7/23/2025 11:00 AM Luma Jung RN [facility] NEUROS8 [facility]   8/12/2025  1:45 PM LAB, NINO KENH LAB [place]   8/15/2025 11:00 AM Sarahi Whelan MD [facility] ENDODIA [facility]   8/21/2025 10:30 AM [facility] OIC EOS [facility] EOS IC Imaging Ctr   8/21/2025 11:30 AM Constanza Sandoval NP [facility] NEUROS8 [facility]   9/3/2025  3:30 PM [facility] Frey, PT [facility]   9/3/2025  5:00 PM Ned Henry, OTR/L,CHT [facility] OP [facility] N[facility] [place]   9/8/2025  9:00 AM  Priya Belle, CAR [facility] ORTHO [facility]   9/12/2025  2:40 PM Chelsie Posadas OD [facility] OPTOMTY [place]   11/24/2025  8:00 AM LAB, MELLISA KENH LAB [place]   11/26/2025 10:40 AM Ashok Braden III, MD [facility]         Medication List with Changes/Refills   New Medications    SEMAGLUTIDE, WEIGHT LOSS, (WEGOVY) 0.5 MG/0.5 ML PNIJ    Inject 0.5 mg into the skin every 7 days.   Current Medications    ACETAMINOPHEN (TYLENOL) 500 MG TABLET    Take 1 tablet (500 mg total) by mouth every 6 (six) hours as needed for Pain.    ASPIRIN (ECOTRIN) 81 MG EC TABLET    Take 81 mg by mouth once daily.    BACITRACIN 500 UNIT/GRAM OINTMENT    Apply topically 2 (two) times daily.    CHOLECALCIFEROL, VITAMIN D3, (VITAMIN D3) 25 MCG (1,000 UNIT) CAPSULE    Take 2 capsules (2,000 Units total) by mouth once daily.    FLUTICASONE PROPIONATE (FLONASE) 50 MCG/ACTUATION NASAL SPRAY    1 spray (50 mcg total) by Each Nostril route once daily.    HYDROCHLOROTHIAZIDE (HYDRODIURIL) 25 MG TABLET    Take 1 tablet (25 mg total) by mouth once daily.    LEVOTHYROXINE (SYNTHROID) 150 MCG TABLET    Take 1 tab 6 days a week and 0.5 tabs on Sundays for total of 6.5 tabs weekly    METHOCARBAMOL (ROBAXIN) 750 MG TAB    Take 1 tablet (750 mg total) by mouth 4 (four) times daily as needed (muscle spasms).    OLMESARTAN (BENICAR) 20 MG TABLET    Take 1 tablet (20 mg total) by mouth once daily.    OXYCODONE (ROXICODONE) 10 MG TAB IMMEDIATE RELEASE TABLET    Take 1 tablet (10 mg total) by mouth every 6 (six) hours as needed for Pain.    PANTOPRAZOLE (PROTONIX) 40 MG TABLET    Take 1 tablet (40 mg total) by mouth once daily.    POLYETHYLENE GLYCOL (GLYCOLAX) 17 GRAM PWPK    Take 17 g by mouth once daily.    PREGABALIN (LYRICA) 50 MG CAPSULE    Take 1 capsule (50 mg total) by mouth 3 (three) times daily.   Discontinued Medications    WEGOVY 0.25 MG/0.5 ML PNIJ    Inject contents of one pen (0.25 MG) subcutaneously once weekly as directed          Disclaimer:  This note has been generated using voice-recognition software. There may be grammatical or spelling errors that have been missed during proof-reading

## 2025-07-21 ENCOUNTER — TELEPHONE (OUTPATIENT)
Dept: NEUROSURGERY | Facility: CLINIC | Age: 55
End: 2025-07-21
Payer: COMMERCIAL

## 2025-07-21 NOTE — TELEPHONE ENCOUNTER
Received message late friday from patient that her bp was high as she was discharged from hospital to hold her BP medications. Patient stated she is on digital medicine and was checking BP, she spoke to digital medicine and they resumed her BP meds and she is now WNL on BP. Patient states she stopped the oxycodone for 3 days now but taking the muscle relaxer and tylenol and pain is under control. Will see patient on wed for wound check,    Future Appointments   Date Time Provider Department Center   7/23/2025 11:00 AM Luma Jung RN [facility] NEUROS[address] [place] [address]   8/12/2025  1:45 PM LAB, NINO K[facility]   8/15/2025 11:00 AM Sarahi Whelan MD [facility] ENDODIA [facility]   8/21/2025 10:30 AM [facility] OI EOS [facility] EOS IC Imaging Ctr   8/21/2025 11:30 AM Constanza Sandoval, NP [facility] NEUROS[address] [place] [address]   9/3/2025  3:30 PM Gay Frey, PT [facility]   9/3/2025  5:00 PM Ned Henry, OTR/L,CHT [facility] OP [facility]   9/8/2025  9:00 AM Priya Belle PA-C [facility] ORTHO [facility]   9/12/2025  2:40 PM Chelsie Posadas, ELIDIA [facility] OPTOMTY [place]   11/24/2025  8:00 AM LAB, NINO KEN[facility]   11/26/2025 10:40 AM Ashok Braden III, MD [facility]     Luma Jung RN, CMSRN  RN Navigator  [facility]   [phone]  Fax [phone]

## 2025-07-23 ENCOUNTER — CLINICAL SUPPORT (OUTPATIENT)
Dept: NEUROSURGERY | Facility: CLINIC | Age: 55
End: 2025-07-23
Payer: COMMERCIAL

## 2025-07-23 VITALS — HEART RATE: 73 BPM | SYSTOLIC BLOOD PRESSURE: 124 MMHG | DIASTOLIC BLOOD PRESSURE: 81 MMHG | TEMPERATURE: 98 F

## 2025-07-23 DIAGNOSIS — Z98.1 S/P LUMBAR FUSION: Primary | ICD-10-CM

## 2025-07-23 DIAGNOSIS — M47.816 LUMBAR FACET ARTHROPATHY: ICD-10-CM

## 2025-07-23 DIAGNOSIS — I10 ESSENTIAL HYPERTENSION: ICD-10-CM

## 2025-07-23 DIAGNOSIS — M54.16 LUMBAR RADICULOPATHY: ICD-10-CM

## 2025-07-23 PROCEDURE — 99999 PR PBB SHADOW E&M-EST. PATIENT-LVL III: CPT | Mod: PBBFAC,COE,,

## 2025-07-23 RX ORDER — PREGABALIN 50 MG/1
50 CAPSULE ORAL 3 TIMES DAILY
Qty: 90 CAPSULE | Refills: 3 | Status: SHIPPED | OUTPATIENT
Start: 2025-07-23 | End: 2025-11-20

## 2025-07-23 RX ORDER — METHOCARBAMOL 750 MG/1
750 TABLET, FILM COATED ORAL 4 TIMES DAILY PRN
Qty: 56 TABLET | Refills: 0 | Status: SHIPPED | OUTPATIENT
Start: 2025-07-23 | End: 2025-08-06

## 2025-07-23 NOTE — PROGRESS NOTES
Isaura Mancini is a 54 y.o. female here for 2 week post op wound check here on 8th floor neurosurgery clinic    Surgery: TLIF with dr. Simpson    Symptoms: left upper leg and hip soreness    DME: rolling walker    Brace: LSO    Pain: 3    Medication Refills: patient states she stopped taking oxycodone on saturday 7/19- no refill requests on narcotics requested. Request refill on methocarbomal and lyrica       Incision with sutures and  staples, BRIAN, well approximated, no redness, swelling or purulent drainage. Sutures and  staples removed. Pt tolerated procedure well.            EDUCATION:    Instructed patient to keep incision BRIAN, no lotions, creams or bandages. OK to shower without water pressure to incision. No scrubbing. Pat dry.  No submurging incision in water (no bathing/swimming) until 8 weeks after surgery once wound is healed. Do not lift more than 10 pounds. Avoid bending or twisting in the area of your surgery more than 45 degrees from neutral position in any direction. Wear brace at all times when up out of bed except when showering or flat in bed. Increase ambulation as tolerated. You should be walking 2 miles/day. Walk on paved surfaces only, holding side rail when using stairs.  No sexual activity for 6 weeks after surgery.  No driving or operating heavy machinary : until cleared by surgeon  or while you are taking narcotic pain medication or muscle relaxant.  If you have had a fusion, do not take any non steroidal anti-inflammatory drugs (NSAIDS) Including the following: Ibuprofen, naprosyn, Aleve, Advil, Indocin, Mobic, or Celebrex for 6 weeks.  Take docusate (colace 100mg): take 1 capsule a day as needed for constipation. You can purchase over the counter.  Avoid use of tobacco products.    Call your doctor or go to the Emergency Room for any signs of infection including: increased redness, drainage, pain or fever (temperature of 101.5 or above for 24hrs). Call your doctor or go to the ER if  there are any localized neurological changes, problems with speech, vision,numbness,tingling,weakness,severe headache or other concerns.  Above instructions reviewed with pt and copy of instructions given to pt along with follow up appt date and time.    Physicians need 3 days' notice for pain medicine to be refilled. Pain medicine will only be refilled between 8am and 5pm Monday-Friday.    Patient verbalizes understanding. All questions answered. Pt. Encouraged to call or send message thru the portal for any additional concerns. Patient to followup with Dr. Simpson for 6 week followup with/without imaging.      Future Appointments   Date Time Provider Department Center   8/12/2025  1:45 PM [facility]   8/15/2025 11:00 AM Sarahi Whelan MD [facility] ENDODIA [facility]   8/21/2025 10:30 AM [facility] OI EOS [facility] EOS IC Imaging Ctr   8/21/2025 11:30 AM Constanza Sandoval, NP [facility] NEUROS8 [facility]   8/26/2025  1:00 PM Uriel Barbour, PT [facility] OP RHB [place] [facility]   8/26/2025  2:00 PM Ned Henry, OTR/L,CHT [facility] OP RHB [facility]   9/8/2025  9:00 AM Priya Belle PA-C [facility] ORTHO [facility]   9/12/2025  2:40 PM Chelsie Posadas, ELIDIA DESC OPTOMTY [place]   11/24/2025  8:00 AM [facility]   11/26/2025 10:40 AM Ashok Braden III, MD [facility]     Luma Jung, RN, CMSRN  RN Navigator  [facility]  Ph [phone]  Fax [phone]

## 2025-07-24 RX ORDER — OLMESARTAN MEDOXOMIL 20 MG/1
20 TABLET ORAL DAILY
Qty: 90 TABLET | Refills: 3 | Status: SHIPPED | OUTPATIENT
Start: 2025-07-24

## 2025-07-24 NOTE — TELEPHONE ENCOUNTER
Refill Routing Note   Medication(s) are not appropriate for processing by [facility] for the following reason(s):        ED/Hospital Visit since last OV with provider  New or recently adjusted medication    [facility] action(s):  Defer             Appointments  past 12m or future 3m with PCP    Date Provider   Last Visit   7/18/2025 Ashok Braden III, MD   Next Visit   11/26/2025 Ashok Braden III, MD   ED visits in past 90 days: 0        Note composed:9:30 AM 07/24/2025

## 2025-07-24 NOTE — TELEPHONE ENCOUNTER
No care due was identified.  [facility] Embedded Care Due Messages. Reference number: [id number].   7/24/2025 8:25:48 AM CDT

## 2025-07-31 ENCOUNTER — PATIENT MESSAGE (OUTPATIENT)
Dept: NEUROSURGERY | Facility: CLINIC | Age: 55
End: 2025-07-31
Payer: COMMERCIAL

## 2025-08-04 ENCOUNTER — DOCUMENT SCAN (OUTPATIENT)
Dept: HOME HEALTH SERVICES | Facility: HOSPITAL | Age: 55
End: 2025-08-04
Payer: COMMERCIAL

## 2025-08-12 ENCOUNTER — LAB VISIT (OUTPATIENT)
Dept: LAB | Facility: HOSPITAL | Age: 55
End: 2025-08-12
Attending: INTERNAL MEDICINE
Payer: COMMERCIAL

## 2025-08-12 DIAGNOSIS — C73 HURTHLE CELL CARCINOMA OF THYROID: ICD-10-CM

## 2025-08-12 PROCEDURE — 86800 THYROGLOBULIN ANTIBODY: CPT

## 2025-08-12 PROCEDURE — 36415 COLL VENOUS BLD VENIPUNCTURE: CPT | Mod: PO

## 2025-08-12 PROCEDURE — 84443 ASSAY THYROID STIM HORMONE: CPT

## 2025-08-13 LAB — TSH SERPL-ACNC: 2.08 UIU/ML (ref 0.4–4)

## 2025-08-14 LAB
ENDOCRINOLOGIST REVIEW: NORMAL
THYROGLOB AB SERPL IA-ACNC: <1.8 IU/ML
THYROGLOB SERPL-MCNC: 0.4 NG/ML

## 2025-08-15 ENCOUNTER — OFFICE VISIT (OUTPATIENT)
Dept: ENDOCRINOLOGY | Facility: CLINIC | Age: 55
End: 2025-08-15
Payer: COMMERCIAL

## 2025-08-15 DIAGNOSIS — E66.812 CLASS 2 SEVERE OBESITY WITH SERIOUS COMORBIDITY AND BODY MASS INDEX (BMI) OF 37.0 TO 37.9 IN ADULT, UNSPECIFIED OBESITY TYPE: ICD-10-CM

## 2025-08-15 DIAGNOSIS — C73 HURTHLE CELL CARCINOMA OF THYROID: ICD-10-CM

## 2025-08-15 DIAGNOSIS — E89.0 POSTOPERATIVE HYPOTHYROIDISM: Primary | ICD-10-CM

## 2025-08-15 DIAGNOSIS — E66.01 CLASS 2 SEVERE OBESITY WITH SERIOUS COMORBIDITY AND BODY MASS INDEX (BMI) OF 37.0 TO 37.9 IN ADULT, UNSPECIFIED OBESITY TYPE: ICD-10-CM

## 2025-08-15 PROCEDURE — 99212 OFFICE O/P EST SF 10 MIN: CPT | Performed by: INTERNAL MEDICINE

## 2025-08-15 RX ORDER — LEVOTHYROXINE SODIUM 150 UG/1
TABLET ORAL
Qty: 90 TABLET | Refills: 3 | Status: SHIPPED | OUTPATIENT
Start: 2025-08-15

## 2025-08-21 ENCOUNTER — TELEPHONE (OUTPATIENT)
Dept: ORTHOPEDICS | Facility: CLINIC | Age: 55
End: 2025-08-21
Payer: COMMERCIAL

## 2025-08-21 ENCOUNTER — OFFICE VISIT (OUTPATIENT)
Dept: NEUROSURGERY | Facility: CLINIC | Age: 55
End: 2025-08-21
Payer: COMMERCIAL

## 2025-08-21 ENCOUNTER — HOSPITAL ENCOUNTER (OUTPATIENT)
Dept: RADIOLOGY | Facility: HOSPITAL | Age: 55
Discharge: HOME | End: 2025-08-21
Attending: NEUROLOGICAL SURGERY
Payer: COMMERCIAL

## 2025-08-21 DIAGNOSIS — Z98.1 S/P LUMBAR FUSION: ICD-10-CM

## 2025-08-21 DIAGNOSIS — M48.07 SPINAL STENOSIS, LUMBOSACRAL REGION: Primary | ICD-10-CM

## 2025-08-21 DIAGNOSIS — Z98.890 S/P SPINAL SURGERY: ICD-10-CM

## 2025-08-21 PROCEDURE — 72100 X-RAY EXAM L-S SPINE 2/3 VWS: CPT | Mod: TC

## 2025-08-21 PROCEDURE — 1160F RVW MEDS BY RX/DR IN RCRD: CPT | Mod: CPTII,S$GLB,COE, | Performed by: NURSE PRACTITIONER

## 2025-08-21 PROCEDURE — 99024 POSTOP FOLLOW-UP VISIT: CPT | Mod: S$GLB,COE,, | Performed by: NURSE PRACTITIONER

## 2025-08-21 PROCEDURE — 1159F MED LIST DOCD IN RCRD: CPT | Mod: CPTII,S$GLB,COE, | Performed by: NURSE PRACTITIONER

## 2025-08-21 PROCEDURE — 99999 PR PBB SHADOW E&M-EST. PATIENT-LVL IV: CPT | Mod: PBBFAC,COE,, | Performed by: NURSE PRACTITIONER

## 2025-08-21 PROCEDURE — 4010F ACE/ARB THERAPY RXD/TAKEN: CPT | Mod: CPTII,S$GLB,COE, | Performed by: NURSE PRACTITIONER

## 2025-08-24 DIAGNOSIS — C73 HURTHLE CELL CARCINOMA OF THYROID: Primary | ICD-10-CM

## 2025-08-24 DIAGNOSIS — E66.812 CLASS 2 SEVERE OBESITY WITH SERIOUS COMORBIDITY AND BODY MASS INDEX (BMI) OF 37.0 TO 37.9 IN ADULT, UNSPECIFIED OBESITY TYPE: ICD-10-CM

## 2025-08-24 DIAGNOSIS — E66.01 CLASS 2 SEVERE OBESITY WITH SERIOUS COMORBIDITY AND BODY MASS INDEX (BMI) OF 37.0 TO 37.9 IN ADULT, UNSPECIFIED OBESITY TYPE: ICD-10-CM

## 2025-08-25 ENCOUNTER — TELEPHONE (OUTPATIENT)
Dept: NEUROSURGERY | Facility: CLINIC | Age: 55
End: 2025-08-25
Payer: COMMERCIAL

## 2025-08-27 RX ORDER — SEMAGLUTIDE 0.5 MG/.5ML
0.5 INJECTION, SOLUTION SUBCUTANEOUS
Qty: 6 ML | Refills: 0 | Status: SHIPPED | OUTPATIENT
Start: 2025-08-27

## 2025-09-03 ENCOUNTER — EXTERNAL HOME HEALTH (OUTPATIENT)
Dept: HOME HEALTH SERVICES | Facility: HOSPITAL | Age: 55
End: 2025-09-03
Payer: COMMERCIAL

## (undated) DEVICE — CORD BIPOLAR 12 FOOT

## (undated) DEVICE — ELECTRODE BLADE INSULATED 1 IN

## (undated) DEVICE — SPONGE LAP 4X18 PREWASHED

## (undated) DEVICE — MARKER SKIN RULER STERILE

## (undated) DEVICE — SPHERE MARKER REFLECTIVE DISP

## (undated) DEVICE — GOWN POLY REINF BRTH SLV LG

## (undated) DEVICE — CHLORAPREP 10.5 ML APPLICATOR

## (undated) DEVICE — SUT 2-0 12-18IN SILK

## (undated) DEVICE — DRAPE C-ARM ELAS CLIP 42X120IN

## (undated) DEVICE — NDL HYPO REG 25G X 1 1/2

## (undated) DEVICE — KIT EVACUATOR 3-SPRING 1/8 DRN

## (undated) DEVICE — CLIP MED TICALL

## (undated) DEVICE — SPONGE COTTON TRAY 4X4IN

## (undated) DEVICE — SYR IRRIGATION BULB STER 60ML

## (undated) DEVICE — DRESSING TRANS 4X4 TEGADERM

## (undated) DEVICE — PENCIL ROCKER SWITCH 10FT CORD

## (undated) DEVICE — GLOVE SURGICAL LATEX SZ 7

## (undated) DEVICE — SUT CTD VICRYL 2-0 CR/CT-2

## (undated) DEVICE — DRAPE CORETEMP FLD WRM 56X62IN

## (undated) DEVICE — ELECTRODE REM PLYHSV RETURN 9

## (undated) DEVICE — DRESSING AQUACEL SACRAL 9 X 9

## (undated) DEVICE — DISSECTOR LIGASURE EXACT 21CM

## (undated) DEVICE — DRESSING AQUACEL FOAM 5 X 5

## (undated) DEVICE — COVER BACK TBL HD 2-TIER 72IN

## (undated) DEVICE — DRAPE STERI-DRAPE 1000 17X11IN

## (undated) DEVICE — SUT VICRYL CTD 2-0 GI 27 SH

## (undated) DEVICE — SUT MCRYL PLUS 4-0 PS2 27IN

## (undated) DEVICE — DRAPE HALF SURGICAL 40X58IN

## (undated) DEVICE — ADHESIVE DERMABOND ADVANCED

## (undated) DEVICE — APPLICATOR CHLORAPREP ORN 26ML

## (undated) DEVICE — PACK BASIC

## (undated) DEVICE — DRESSING AQUACEL FOAM 3 X 3

## (undated) DEVICE — TAP POWER 6MM DOUBLE LEAD

## (undated) DEVICE — BLADE 4IN EDGE INSULATED

## (undated) DEVICE — DRESSING MEPILEX BORDER 4 X 4

## (undated) DEVICE — DRAPE STERI INSTRUMENT 1018

## (undated) DEVICE — DRAPE EENT SPLIT STERILE

## (undated) DEVICE — SPONGE NEURO 1/4X1/4

## (undated) DEVICE — Device

## (undated) DEVICE — SUT 2/0 30IN SILK BLK BRAI

## (undated) DEVICE — TRAY CATH 1-LYR URIMTR 16FR

## (undated) DEVICE — SUT 3-0 12-18IN SILK

## (undated) DEVICE — KIT SPINAL PATIENT CARE JACK

## (undated) DEVICE — BUR BONE CUT MICRO TPS 3X3.8MM

## (undated) DEVICE — DRESSING AQUACEL FOAM 4 X 12

## (undated) DEVICE — ELECTRODE MEGADYNE RETURN DUAL

## (undated) DEVICE — DRESSING AQUACEL AG ADV 3.5X6

## (undated) DEVICE — TIP YANKAUERS BULB NO VENT

## (undated) DEVICE — SUT SILK 3-0 BLK BR SH 30IN

## (undated) DEVICE — DRESSING TRANS 4X4 3/4

## (undated) DEVICE — SUT VICRYL 3-0 27 SH

## (undated) DEVICE — MARKER SKIN STND TIP BLUE BARR

## (undated) DEVICE — SUT VICRYL 6-0 P1 18IN UD

## (undated) DEVICE — NDL SPINAL 18GX3.5 SPINOCAN

## (undated) DEVICE — DRAPE THYROID SOFT STERILE

## (undated) DEVICE — BLADE SURG #15 CARBON STEEL

## (undated) DEVICE — PAD PREP CUFFED NS 24X48IN

## (undated) DEVICE — COVER OVERHEAD SURG LT BLUE

## (undated) DEVICE — SYR 10CC LUER LOCK

## (undated) DEVICE — GOWN POLY REINF BRTH SLV XL

## (undated) DEVICE — TOWEL OR DISP STRL BLUE 4/PK

## (undated) DEVICE — BLADE MILL BONE MEDIUM

## (undated) DEVICE — SUT STRATAFIX PDS PLUS VIO

## (undated) DEVICE — DRAPE ABDOMINAL TIBURON 14X11

## (undated) DEVICE — PACK SURGERY START

## (undated) DEVICE — SEE MEDLINE ITEM 157194

## (undated) DEVICE — MANIFOLD 4 PORT

## (undated) DEVICE — CONTAINER SPECIMEN OR STER 4OZ

## (undated) DEVICE — SUT VICRYL+ 1 CT1 18IN

## (undated) DEVICE — SUT LIGACLIP SMALL XTRA

## (undated) DEVICE — DRAPE TOP 53X102IN

## (undated) DEVICE — PROBE SIMULATOR KRAFF

## (undated) DEVICE — SPONGE GAUZE 16PLY 4X4

## (undated) DEVICE — DRAPE C-ARMOR EQUIPMENT COVER

## (undated) DEVICE — HEMOSTAT SURGICEL FIBRLR 1X2IN

## (undated) DEVICE — FORCEP STRAIGHT DISP

## (undated) DEVICE — DRESSING TELFA STRL 4X3 LF

## (undated) DEVICE — SUT 4-0 12-18IN SILK BLACK

## (undated) DEVICE — KIT SURGIFLO HEMOSTATIC MATRIX

## (undated) DEVICE — TRAY MINOR GEN SURG OMC

## (undated) DEVICE — GAUZE SPONGE PEANUT STRL

## (undated) DEVICE — TUBE FRAZIER 5MM 2FT SOFT TIP

## (undated) DEVICE — TRAY NEURO OMC